# Patient Record
Sex: FEMALE | Race: WHITE | NOT HISPANIC OR LATINO | Employment: FULL TIME | URBAN - METROPOLITAN AREA
[De-identification: names, ages, dates, MRNs, and addresses within clinical notes are randomized per-mention and may not be internally consistent; named-entity substitution may affect disease eponyms.]

---

## 2017-01-04 ENCOUNTER — ALLSCRIPTS OFFICE VISIT (OUTPATIENT)
Dept: OTHER | Facility: OTHER | Age: 57
End: 2017-01-04

## 2017-01-05 ENCOUNTER — GENERIC CONVERSION - ENCOUNTER (OUTPATIENT)
Dept: OTHER | Facility: OTHER | Age: 57
End: 2017-01-05

## 2017-01-05 ENCOUNTER — HOSPITAL ENCOUNTER (OUTPATIENT)
Dept: RADIOLOGY | Age: 57
Discharge: HOME | End: 2017-01-05

## 2017-01-05 DIAGNOSIS — Z12.31 ENCOUNTER FOR SCREENING MAMMOGRAM FOR BREAST CANCER: ICD-10-CM

## 2017-01-11 ENCOUNTER — ALLSCRIPTS OFFICE VISIT (OUTPATIENT)
Dept: OTHER | Facility: OTHER | Age: 57
End: 2017-01-11

## 2017-01-31 ENCOUNTER — TRANSCRIBE ORDERS (OUTPATIENT)
Dept: ADMINISTRATIVE | Age: 57
End: 2017-01-31

## 2017-01-31 ENCOUNTER — APPOINTMENT (OUTPATIENT)
Dept: LAB | Age: 57
End: 2017-01-31
Attending: PREVENTIVE MEDICINE

## 2017-01-31 DIAGNOSIS — Z00.00 ROUTINE GENERAL MEDICAL EXAMINATION AT A HEALTH CARE FACILITY: ICD-10-CM

## 2017-01-31 DIAGNOSIS — Z00.00 ROUTINE GENERAL MEDICAL EXAMINATION AT A HEALTH CARE FACILITY: Primary | ICD-10-CM

## 2017-01-31 LAB — RUBV IGG SERPL IA-ACNC: 30.6 IU/ML

## 2017-01-31 PROCEDURE — 86735 MUMPS ANTIBODY: CPT

## 2017-01-31 PROCEDURE — 86480 TB TEST CELL IMMUN MEASURE: CPT

## 2017-01-31 PROCEDURE — 36415 COLL VENOUS BLD VENIPUNCTURE: CPT

## 2017-01-31 PROCEDURE — 86787 VARICELLA-ZOSTER ANTIBODY: CPT

## 2017-01-31 PROCEDURE — 86762 RUBELLA ANTIBODY: CPT

## 2017-01-31 PROCEDURE — 86765 RUBEOLA ANTIBODY: CPT

## 2017-02-02 LAB
ANNOTATION COMMENT IMP: NORMAL
GAMMA INTERFERON BACKGROUND BLD IA-ACNC: 0.07 IU/ML
M TB IFN-G BLD-IMP: NEGATIVE
M TB IFN-G CD4+ BCKGRND COR BLD-ACNC: 0 IU/ML
M TB IFN-G CD4+ T-CELLS BLD-ACNC: 0.07 IU/ML
MEV IGG SER QL: NORMAL
MITOGEN IGNF BLD-ACNC: >10 IU/ML
MUV IGG SER QL: NORMAL
QUANTIFERON-TB GOLD IN TUBE: NORMAL
SERVICE CMNT-IMP: NORMAL
VZV IGG SER IA-ACNC: NORMAL

## 2017-02-08 ENCOUNTER — ALLSCRIPTS OFFICE VISIT (OUTPATIENT)
Dept: OTHER | Facility: OTHER | Age: 57
End: 2017-02-08

## 2017-02-20 ENCOUNTER — ALLSCRIPTS OFFICE VISIT (OUTPATIENT)
Dept: OTHER | Facility: OTHER | Age: 57
End: 2017-02-20

## 2017-03-21 ENCOUNTER — ALLSCRIPTS OFFICE VISIT (OUTPATIENT)
Dept: OTHER | Facility: OTHER | Age: 57
End: 2017-03-21

## 2017-07-05 ENCOUNTER — ALLSCRIPTS OFFICE VISIT (OUTPATIENT)
Dept: OTHER | Facility: OTHER | Age: 57
End: 2017-07-05

## 2017-07-11 ENCOUNTER — ALLSCRIPTS OFFICE VISIT (OUTPATIENT)
Dept: OTHER | Facility: OTHER | Age: 57
End: 2017-07-11

## 2017-07-12 ENCOUNTER — APPOINTMENT (OUTPATIENT)
Dept: LAB | Facility: HOSPITAL | Age: 57
End: 2017-07-12
Payer: COMMERCIAL

## 2017-07-12 ENCOUNTER — TRANSCRIBE ORDERS (OUTPATIENT)
Dept: LAB | Facility: HOSPITAL | Age: 57
End: 2017-07-12

## 2017-07-12 DIAGNOSIS — R74.02 NONSPECIFIC ELEVATION OF LEVELS OF TRANSAMINASE OR LACTIC ACID DEHYDROGENASE (LDH): ICD-10-CM

## 2017-07-12 DIAGNOSIS — D64.9 ANEMIA, UNSPECIFIED: ICD-10-CM

## 2017-07-12 DIAGNOSIS — R74.01 NONSPECIFIC ELEVATION OF LEVELS OF TRANSAMINASE OR LACTIC ACID DEHYDROGENASE (LDH): ICD-10-CM

## 2017-07-12 DIAGNOSIS — R53.83 FATIGUE, UNSPECIFIED TYPE: ICD-10-CM

## 2017-07-12 DIAGNOSIS — E55.9 UNSPECIFIED VITAMIN D DEFICIENCY: ICD-10-CM

## 2017-07-12 DIAGNOSIS — Z00.8 ENCOUNTER FOR OTHER GENERAL EXAMINATION: Primary | ICD-10-CM

## 2017-07-12 DIAGNOSIS — R53.83 FATIGUE, UNSPECIFIED TYPE: Primary | ICD-10-CM

## 2017-07-12 DIAGNOSIS — Z00.8 ENCOUNTER FOR OTHER GENERAL EXAMINATION: ICD-10-CM

## 2017-07-12 LAB
25(OH)D3 SERPL-MCNC: 45.1 NG/ML (ref 30–100)
ALBUMIN SERPL BCP-MCNC: 3.7 G/DL (ref 3.5–5)
ALP SERPL-CCNC: 75 U/L (ref 46–116)
ALT SERPL W P-5'-P-CCNC: 38 U/L (ref 12–78)
AST SERPL W P-5'-P-CCNC: 17 U/L (ref 5–45)
BASOPHILS # BLD AUTO: 0.02 THOUSANDS/ΜL (ref 0–0.1)
BASOPHILS NFR BLD AUTO: 0 % (ref 0–1)
BILIRUB DIRECT SERPL-MCNC: 0.09 MG/DL (ref 0–0.2)
BILIRUB SERPL-MCNC: 0.41 MG/DL (ref 0.2–1)
CHOLEST SERPL-MCNC: 191 MG/DL (ref 50–200)
EOSINOPHIL # BLD AUTO: 0.22 THOUSAND/ΜL (ref 0–0.61)
EOSINOPHIL NFR BLD AUTO: 4 % (ref 0–6)
ERYTHROCYTE [DISTWIDTH] IN BLOOD BY AUTOMATED COUNT: 12.9 % (ref 11.6–15.1)
EST. AVERAGE GLUCOSE BLD GHB EST-MCNC: 117 MG/DL
FERRITIN SERPL-MCNC: 16 NG/ML (ref 8–388)
HBA1C MFR BLD: 5.7 % (ref 4.2–6.3)
HCT VFR BLD AUTO: 35.9 % (ref 34.8–46.1)
HDLC SERPL-MCNC: 58 MG/DL (ref 40–60)
HGB BLD-MCNC: 12.1 G/DL (ref 11.5–15.4)
IRON SERPL-MCNC: 64 UG/DL (ref 50–170)
LDLC SERPL CALC-MCNC: 105 MG/DL (ref 0–100)
LYMPHOCYTES # BLD AUTO: 1.78 THOUSANDS/ΜL (ref 0.6–4.47)
LYMPHOCYTES NFR BLD AUTO: 31 % (ref 14–44)
MCH RBC QN AUTO: 30.4 PG (ref 26.8–34.3)
MCHC RBC AUTO-ENTMCNC: 33.7 G/DL (ref 31.4–37.4)
MCV RBC AUTO: 90 FL (ref 82–98)
MONOCYTES # BLD AUTO: 0.33 THOUSAND/ΜL (ref 0.17–1.22)
MONOCYTES NFR BLD AUTO: 6 % (ref 4–12)
NEUTROPHILS # BLD AUTO: 3.33 THOUSANDS/ΜL (ref 1.85–7.62)
NEUTS SEG NFR BLD AUTO: 59 % (ref 43–75)
NRBC BLD AUTO-RTO: 0 /100 WBCS
PLATELET # BLD AUTO: 329 THOUSANDS/UL (ref 149–390)
PMV BLD AUTO: 9 FL (ref 8.9–12.7)
PROT SERPL-MCNC: 6.9 G/DL (ref 6.4–8.2)
RBC # BLD AUTO: 3.98 MILLION/UL (ref 3.81–5.12)
TRIGL SERPL-MCNC: 141 MG/DL
WBC # BLD AUTO: 5.69 THOUSAND/UL (ref 4.31–10.16)

## 2017-07-12 PROCEDURE — 80076 HEPATIC FUNCTION PANEL: CPT

## 2017-07-12 PROCEDURE — 85025 COMPLETE CBC W/AUTO DIFF WBC: CPT

## 2017-07-12 PROCEDURE — 80061 LIPID PANEL: CPT

## 2017-07-12 PROCEDURE — 86663 EPSTEIN-BARR ANTIBODY: CPT

## 2017-07-12 PROCEDURE — 83036 HEMOGLOBIN GLYCOSYLATED A1C: CPT

## 2017-07-12 PROCEDURE — 82728 ASSAY OF FERRITIN: CPT

## 2017-07-12 PROCEDURE — 83540 ASSAY OF IRON: CPT

## 2017-07-12 PROCEDURE — 86665 EPSTEIN-BARR CAPSID VCA: CPT

## 2017-07-12 PROCEDURE — 86618 LYME DISEASE ANTIBODY: CPT

## 2017-07-12 PROCEDURE — 86664 EPSTEIN-BARR NUCLEAR ANTIGEN: CPT

## 2017-07-12 PROCEDURE — 36415 COLL VENOUS BLD VENIPUNCTURE: CPT

## 2017-07-12 PROCEDURE — 82306 VITAMIN D 25 HYDROXY: CPT

## 2017-07-13 LAB
EBV EA IGG SER-ACNC: 27.5 U/ML (ref 0–8.9)
EBV NA IGG SER IA-ACNC: <18 U/ML (ref 0–17.9)
EBV PATRN SPEC IB-IMP: ABNORMAL
EBV VCA IGG SER IA-ACNC: >600 U/ML (ref 0–17.9)
EBV VCA IGM SER IA-ACNC: <36 U/ML (ref 0–35.9)

## 2017-07-16 LAB
B BURGDOR IGG SER IA-ACNC: 0.06
B BURGDOR IGM SER IA-ACNC: 0.25

## 2017-07-17 ENCOUNTER — GENERIC CONVERSION - ENCOUNTER (OUTPATIENT)
Dept: OTHER | Facility: OTHER | Age: 57
End: 2017-07-17

## 2017-07-19 ENCOUNTER — TRANSCRIBE ORDERS (OUTPATIENT)
Dept: RADIOLOGY | Facility: HOSPITAL | Age: 57
End: 2017-07-19

## 2017-07-19 ENCOUNTER — HOSPITAL ENCOUNTER (OUTPATIENT)
Dept: RADIOLOGY | Facility: HOSPITAL | Age: 57
Discharge: HOME | End: 2017-07-19
Payer: COMMERCIAL

## 2017-07-19 DIAGNOSIS — M25.551 RIGHT HIP PAIN: ICD-10-CM

## 2017-07-19 DIAGNOSIS — M79.604 RIGHT LEG PAIN: ICD-10-CM

## 2017-07-19 DIAGNOSIS — M79.604 RIGHT LEG PAIN: Primary | ICD-10-CM

## 2017-07-19 PROCEDURE — 73521 X-RAY EXAM HIPS BI 2 VIEWS: CPT

## 2017-07-19 PROCEDURE — 72110 X-RAY EXAM L-2 SPINE 4/>VWS: CPT

## 2017-07-26 ENCOUNTER — GENERIC CONVERSION - ENCOUNTER (OUTPATIENT)
Dept: OTHER | Facility: OTHER | Age: 57
End: 2017-07-26

## 2017-09-18 ENCOUNTER — ALLSCRIPTS OFFICE VISIT (OUTPATIENT)
Dept: OTHER | Facility: OTHER | Age: 57
End: 2017-09-18

## 2017-09-18 DIAGNOSIS — G47.09 OTHER ORGANIC INSOMNIA: ICD-10-CM

## 2017-09-18 DIAGNOSIS — F41.1 GAD (GENERALIZED ANXIETY DISORDER): ICD-10-CM

## 2017-09-18 DIAGNOSIS — F33.42 MAJOR DEPRESSIVE DISORDER, RECURRENT EPISODE, IN FULL REMISSION (HCC): Primary | ICD-10-CM

## 2017-09-21 ENCOUNTER — ALLSCRIPTS OFFICE VISIT (OUTPATIENT)
Dept: OTHER | Facility: OTHER | Age: 57
End: 2017-09-21

## 2017-11-08 ENCOUNTER — ALLSCRIPTS OFFICE VISIT (OUTPATIENT)
Dept: OTHER | Facility: OTHER | Age: 57
End: 2017-11-08

## 2017-11-12 NOTE — PROGRESS NOTES
Assessment    1  Hypertension (401 9) (I10)   2  Hypovitaminosis D (268 9) (E55 9)   3  Anemia (285 9) (D64 9)   4  Fibromyalgia (729 1) (M79 7)    Plan  Anemia    · (1) FERRITIN; Status:Active; Requested for:08Nov2017;    · (1) IRON; Status:Active; Requested for:08Nov2017;   Elevated BP    · HydroCHLOROthiazide 12 5 MG Oral Tablet; TAKE 1 TABLET DAILY  Fatigue    · (1) TSH WITH FT4 REFLEX; Status:Active; Requested OHL:[id number];   Hypertension, Impaired fasting glucose    · (1) COMPREHENSIVE METABOLIC PANEL; Status:Active; Requested VZW:[id number];   Impaired fasting glucose    · (1) HEMOGLOBIN A1C; Status:Active; Requested ISR:[id number];     Discussion/Summary    61-year-old female here for routine follow-up  Patient states she is doing well overall  HTN: Initially elevated, however upon recheck decreased and down to a normal range  I would like patient to monitor this closely  Return in 1-2 weeks for BP check with nurse visit  Continue with hydrochlorothiazide and lifestyle modifications  Hypovitaminosis D: Continue with vitamin-D supplementation  Anemia: Will check iron studies as patient discontinued iron supplementation 1 month ago due to constipation and painful hemorrhoids  Would encourage increasing iron rich food intake  Fibromyalgia: Overall stable  RHM: Patient is scheduled for colonoscopy to be done on 12/04  with mammogram in January  for BP check in 1-2 weeks  for routine checkup in 6 months or sooner if neededspent 25 minutes with the patient with greater than 50% of the time spent on counseling  The patient was counseled regarding diagnostic results,-- instructions for management,-- risk factor reductions,-- prognosis,-- patient and family education,-- impressions,-- risks and benefits of treatment options,-- importance of compliance with treatment  Possible side effects of new medications were reviewed with the patient/guardian today   The treatment plan was reviewed with the patient/guardian  The patient/guardian understands and agrees with the treatment plan      Chief Complaint  Pt is here for follow up office visit  All meds/allergies reviewed w/ pt  History of Present Illness  53-year-old female here for routine follow-up  Patient states she is doing well overall  is scheduled for colonoscopy on 12/04 to be done by Dr Nicola Arizmendi  states she stopped taking iron as she was getting constipated and having hemorrhoids, which were painful  Review of Systems   Constitutional: no recent weight gain-- and-- no recent weight loss  Eyes: No complaints of eye pain, no red eyes, no eyesight problems, no discharge, no dry eyes, no itching of eyes  ENT: no complaints of earache, no loss of hearing, no nose bleeds, no nasal discharge, no sore throat, no hoarseness  Cardiovascular: No complaints of slow heart rate, no fast heart rate, no chest pain, no palpitations, no leg claudication, no lower extremity edema,-- no chest pain,-- no palpitations-- and-- no lower extremity edema  Respiratory: No complaints of shortness of breath, no wheezing, no cough, no SOB on exertion, no orthopnea, no PND-- and-- no shortness of breath  Gastrointestinal: no abdominal pain-- and-- no constipation  Psychiatric: depression-- and-- stable  Active Problems    1  Abnormal abdominal ultrasound (793 6) (R93 5)   2  Anemia (285 9) (D64 9)   3  Chronic fatigue syndrome (780 71) (R53 82)   4  Coccyx pain (724 79) (M53 3)   5  Colon cancer screening (V76 51) (Z12 11)   6  Cough (786 2) (R05)   7  Diarrhea (787 91) (R19 7)   8  Elevated ALT measurement (790 4) (R74 0)   9  Elevated BP (401 9) (I10)   10  Encounter for screening colonoscopy (V76 51) (Z12 11)   11  Encounter for screening mammogram for malignant neoplasm of breast (V76 12)  (Z12 31)   12  Fatigue (780 79) (R53 83)   13  Fibromyalgia (729 1) (M79 7)   14  JA (generalized anxiety disorder) (300 02) (F41 1)   15   Gluten intolerance (579 0) (K90 0)   16  Hypertension (401 9) (I10)   17  Hypovitaminosis D (268 9) (E55 9)   18  Impaired fasting glucose (790 21) (R73 01)   19  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)   20  Liver lesion (573 8) (K76 9)   21  Low hemoglobin (285 9) (D64 9)   22  Major depressive disorder, recurrent episode, in full remission (296 36) (F33 42)   23  Nausea and vomiting (787 01) (R11 2)   24  Other insomnia (780 52) (G47 09)   25  Right hip pain (719 45) (M25 551)   26  Right leg pain (729 5) (M79 604)   27  Right low back pain (724 2) (M54 5)   28  Screening for lipoid disorders (V77 91) (Z13 220)   29  Skin lesion (709 9) (L98 9)   30  Skin rash (782 1) (R21)   31  Sleep apnea (780 57) (G47 30)   32  Thumb pain, left (729 5) (M79 645)   33  Thumb pain, right (729 5) (M79 644)   34  Upper respiratory infection (465 9) (J06 9)   35  Vaginal Pap smear (V76 47) (Z12 72)    Past Medical History  1  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)   2  Denied: History of head injury   3  Denied: History of Seizures    Surgical History  1  History of Appendectomy   2  History of Cholecystectomy    Family History  Mother    1  Family history of depression (V17 0) (Z81 8)   2  Family history of essential hypertension (V17 49) (Z82 49)   3  Family history of Mitral valve prolapse  Brother    4  Family history of diabetes mellitus (V18 0) (Z83 3)   5  FH: heart attack (V17 3) (Z82 49)  Maternal Grandmother    6  Family history of cardiac disorder (V17 49) (Z82 49)   7  Family history of hyperlipidemia (V18 19) (Z83 49)  Paternal Grandmother    6  Family history of stroke (V17 1) (Z82 3)  Paternal Grandfather    5  Family history of stroke (V17 1) (Z82 3)  Maternal Aunt    10  Family history of malignant neoplasm of breast (V16 3) (Z80 3)    Social History     · Denied: History of Caffeine use   · Never smoked   · No alcohol use   · No drug use    Current Meds   1  D-Ribose Powder; Therapy: 56IRS5179 to Recorded   2   DULoxetine HCl - 60 MG Oral Capsule Delayed Release Particles; take 1 capsule daily; Therapy: 40FKO3972 to (Evaluate:01Jan2018)  Requested for: 45RTD2606; Last Rx:54Iyu5715 Ordered   3  Ferrous Sulfate 324 (65 Fe) MG TBEC; Therapy: 38NCE1080 to Recorded   4  HydroCHLOROthiazide 12 5 MG Oral Tablet; TAKE 1 TABLET DAILY; Therapy: 37WLB7947 to ([place])  Requested for: 11Aug2017; Last Rx:11Aug2017 Ordered   5  Iron 325 (65 Fe) MG Oral Tablet; Take one daily; Therapy: 22OCI4757 to Recorded   6  LORazepam 0 5 MG Oral Tablet; TAKE 1 TABLET TWICE DAILY AS NEEDED; Therapy: 77PZT1418 to (Evaluate:01Jan2018); Last Rx:10Wst0555 Ordered   7  Magnesium Gluconate 250 MG Oral Tablet; TAKE 1 TABLET DAILY; Therapy: 37DAC9096 to Recorded   8  Magnesium Glycinate Powder; Therapy: 52BRH4736 to Recorded   9  TraMADol HCl - 50 MG Oral Tablet; TAKE 1 TABLET as needed daily  Do not drink, operate machinery or drive while on this medication; Therapy: 75OGC5051 to (Evaluate:08Jul2016); Last Rx:08Jun2016 Ordered   10  Vitamin D3 5000 UNIT Oral Capsule; Take one capsule daily; Therapy: 36ZPB0263 to (Evaluate:13Gbr8548) Recorded   11  Vitamin D3 5000 UNIT Oral Tablet; Therapy: 63VAJ4165 to Recorded   12  Zolpidem Tartrate ER 12 5 MG Oral Tablet Extended Release; TAKE 1 TABLET AT   BEDTIME AS NEEDED FOR INSOMNIA; Therapy: 92DCH0178 to (Evaluate:01Jan2018); Last Rx:98Nfo7902 Ordered    The medication list was reviewed and updated today  Allergies  1  Lexapro TABS   2  NSAIDs    Vitals  Vital Signs    Recorded: 03BKM3713 06:21PM Recorded: 39RQB7561 05:59PM   Temperature  97 9 F   Heart Rate  88   Respiration  16   Systolic 567 396   Diastolic 86 90   Height  5 ft 1 in   Weight  168 lb 8 oz   BMI Calculated  31 84   BSA Calculated  1 76       Physical Exam   Constitutional  General appearance: No acute distress, well appearing and well nourished  Eyes  Conjunctiva and lids: No swelling, erythema or discharge     Pupils and irises: Equal, round and reactive to light  Ears, Nose, Mouth, and Throat  External inspection of ears and nose: Normal    Pulmonary  Respiratory effort: No increased work of breathing or signs of respiratory distress  Auscultation of lungs: Clear to auscultation  Cardiovascular  Auscultation of heart: Normal rate and rhythm, normal S1 and S2, without murmurs  Examination of extremities for edema and/or varicosities: Normal    Abdomen  Abdomen: Non-tender, no masses  Liver and spleen: No hepatomegaly or splenomegaly  Lymphatic  Palpation of lymph nodes in neck: No lymphadenopathy  Neurologic  Cranial nerves: Cranial nerves 2-12 intact  Psychiatric  Mood and affect: Normal          Future Appointments    Date/Time Provider Specialty Site   12/04/2017 07:30 AM Cheyanne Gaines DO Gastroenterology Adult [address] OUTPATIENT   12/13/2017 04:30 PM DEBI Flor   Psychiatry [facility]   05/09/2018 06:00 PM Arianna Keane MD Family Medicine [address] [place] [address]   11/15/2017 11:45 AM [address] [facility], Nurse Schedule  [address] [place] [address]       Signatures   Electronically signed by : Quynh Govea MD; Nov 11 2017  8:19AM EST                       (Author)

## 2017-11-15 ENCOUNTER — APPOINTMENT (EMERGENCY)
Dept: RADIOLOGY | Facility: HOSPITAL | Age: 57
End: 2017-11-15
Payer: COMMERCIAL

## 2017-11-15 ENCOUNTER — HOSPITAL ENCOUNTER (EMERGENCY)
Facility: HOSPITAL | Age: 57
Discharge: HOME | End: 2017-11-15
Attending: EMERGENCY MEDICINE | Admitting: EMERGENCY MEDICINE
Payer: COMMERCIAL

## 2017-11-15 VITALS
TEMPERATURE: 98.2 F | OXYGEN SATURATION: 97 % | BODY MASS INDEX: 30.91 KG/M2 | RESPIRATION RATE: 18 BRPM | SYSTOLIC BLOOD PRESSURE: 149 MMHG | WEIGHT: 168 LBS | HEART RATE: 96 BPM | DIASTOLIC BLOOD PRESSURE: 78 MMHG | HEIGHT: 62 IN

## 2017-11-15 DIAGNOSIS — R55 NEAR SYNCOPE: Primary | ICD-10-CM

## 2017-11-15 DIAGNOSIS — M54.12 CERVICAL RADICULOPATHY: ICD-10-CM

## 2017-11-15 LAB
ANION GAP SERPL CALCULATED.3IONS-SCNC: 8 MMOL/L (ref 4–13)
BACTERIA UR QL AUTO: ABNORMAL /HPF
BASOPHILS # BLD AUTO: 0.02 THOUSANDS/ΜL (ref 0–0.1)
BASOPHILS NFR BLD AUTO: 0 % (ref 0–1)
BILIRUB UR QL STRIP: NEGATIVE
BUN SERPL-MCNC: 13 MG/DL (ref 5–25)
CALCIUM SERPL-MCNC: 9.9 MG/DL (ref 8.3–10.1)
CHLORIDE SERPL-SCNC: 103 MMOL/L (ref 100–108)
CLARITY UR: CLEAR
CO2 SERPL-SCNC: 29 MMOL/L (ref 21–32)
COLOR UR: YELLOW
COLOR, POC: NORMAL
CREAT SERPL-MCNC: 0.79 MG/DL (ref 0.6–1.3)
EOSINOPHIL # BLD AUTO: 0.26 THOUSAND/ΜL (ref 0–0.61)
EOSINOPHIL NFR BLD AUTO: 4 % (ref 0–6)
ERYTHROCYTE [DISTWIDTH] IN BLOOD BY AUTOMATED COUNT: 13 % (ref 11.6–15.1)
GFR SERPL CREATININE-BSD FRML MDRD: 85 ML/MIN/1.73SQ M
GLUCOSE SERPL-MCNC: 125 MG/DL (ref 65–140)
GLUCOSE UR STRIP-MCNC: NEGATIVE MG/DL
HCT VFR BLD AUTO: 39 % (ref 34.8–46.1)
HGB BLD-MCNC: 13.2 G/DL (ref 11.5–15.4)
HGB UR QL STRIP.AUTO: ABNORMAL
HYALINE CASTS #/AREA URNS LPF: ABNORMAL /LPF
KETONES UR STRIP-MCNC: NEGATIVE MG/DL
LEUKOCYTE ESTERASE UR QL STRIP: ABNORMAL
LYMPHOCYTES # BLD AUTO: 2.12 THOUSANDS/ΜL (ref 0.6–4.47)
LYMPHOCYTES NFR BLD AUTO: 33 % (ref 14–44)
MCH RBC QN AUTO: 29.5 PG (ref 26.8–34.3)
MCHC RBC AUTO-ENTMCNC: 33.8 G/DL (ref 31.4–37.4)
MCV RBC AUTO: 87 FL (ref 82–98)
MONOCYTES # BLD AUTO: 0.33 THOUSAND/ΜL (ref 0.17–1.22)
MONOCYTES NFR BLD AUTO: 5 % (ref 4–12)
NEUTROPHILS # BLD AUTO: 3.59 THOUSANDS/ΜL (ref 1.85–7.62)
NEUTS SEG NFR BLD AUTO: 58 % (ref 43–75)
NITRITE UR QL STRIP: NEGATIVE
NON-SQ EPI CELLS URNS QL MICRO: ABNORMAL /HPF
NRBC BLD AUTO-RTO: 0 /100 WBCS
PH UR STRIP.AUTO: 7.5 [PH] (ref 4.5–8)
PLATELET # BLD AUTO: 323 THOUSANDS/UL (ref 149–390)
PMV BLD AUTO: 8.8 FL (ref 8.9–12.7)
POTASSIUM SERPL-SCNC: 3.1 MMOL/L (ref 3.5–5.3)
PROT UR STRIP-MCNC: NEGATIVE MG/DL
RBC # BLD AUTO: 4.48 MILLION/UL (ref 3.81–5.12)
RBC #/AREA URNS AUTO: ABNORMAL /HPF
SODIUM SERPL-SCNC: 140 MMOL/L (ref 136–145)
SP GR UR STRIP.AUTO: 1.01 (ref 1–1.03)
SPECIMEN SOURCE: NORMAL
TROPONIN I BLD-MCNC: 0 NG/ML (ref 0–0.08)
UROBILINOGEN UR QL STRIP.AUTO: 0.2 E.U./DL
WBC # BLD AUTO: 6.34 THOUSAND/UL (ref 4.31–10.16)
WBC #/AREA URNS AUTO: ABNORMAL /HPF

## 2017-11-15 PROCEDURE — 70496 CT ANGIOGRAPHY HEAD: CPT

## 2017-11-15 PROCEDURE — 81002 URINALYSIS NONAUTO W/O SCOPE: CPT | Performed by: EMERGENCY MEDICINE

## 2017-11-15 PROCEDURE — 80048 BASIC METABOLIC PNL TOTAL CA: CPT | Performed by: EMERGENCY MEDICINE

## 2017-11-15 PROCEDURE — 85025 COMPLETE CBC W/AUTO DIFF WBC: CPT | Performed by: EMERGENCY MEDICINE

## 2017-11-15 PROCEDURE — 99284 EMERGENCY DEPT VISIT MOD MDM: CPT

## 2017-11-15 PROCEDURE — 36415 COLL VENOUS BLD VENIPUNCTURE: CPT | Performed by: EMERGENCY MEDICINE

## 2017-11-15 PROCEDURE — 84484 ASSAY OF TROPONIN QUANT: CPT

## 2017-11-15 PROCEDURE — 81001 URINALYSIS AUTO W/SCOPE: CPT

## 2017-11-15 PROCEDURE — 70498 CT ANGIOGRAPHY NECK: CPT

## 2017-11-15 PROCEDURE — 93005 ELECTROCARDIOGRAM TRACING: CPT

## 2017-11-15 RX ORDER — ERGOCALCIFEROL 1.25 MG/1
CAPSULE ORAL
COMMUNITY
Start: 2016-08-08 | End: 2018-02-20 | Stop reason: SDDI

## 2017-11-15 RX ORDER — POTASSIUM CHLORIDE 20 MEQ/1
40 TABLET, EXTENDED RELEASE ORAL ONCE
Status: COMPLETED | OUTPATIENT
Start: 2017-11-15 | End: 2017-11-15

## 2017-11-15 RX ORDER — DULOXETIN HYDROCHLORIDE 60 MG/1
60 CAPSULE, DELAYED RELEASE ORAL DAILY
COMMUNITY
Start: 2014-12-08 | End: 2018-04-16 | Stop reason: SDUPTHER

## 2017-11-15 RX ORDER — LORAZEPAM 0.5 MG/1
0.5 TABLET ORAL AS NEEDED
COMMUNITY
Start: 2014-12-08 | End: 2020-07-13 | Stop reason: ALTCHOICE

## 2017-11-15 RX ORDER — DIAZEPAM 5 MG/1
5 TABLET ORAL ONCE
Status: COMPLETED | OUTPATIENT
Start: 2017-11-15 | End: 2017-11-15

## 2017-11-15 RX ORDER — ZOLPIDEM TARTRATE 12.5 MG/1
TABLET, FILM COATED, EXTENDED RELEASE ORAL
COMMUNITY
Start: 2011-12-07 | End: 2018-08-14 | Stop reason: SDUPTHER

## 2017-11-15 RX ORDER — HYDROCHLOROTHIAZIDE 12.5 MG/1
12.5 TABLET ORAL DAILY
COMMUNITY
Start: 2016-11-14 | End: 2018-08-24 | Stop reason: SDUPTHER

## 2017-11-15 RX ADMIN — DIAZEPAM 5 MG: 5 TABLET ORAL at 16:35

## 2017-11-15 RX ADMIN — IOHEXOL 85 ML: 350 INJECTION, SOLUTION INTRAVENOUS at 16:52

## 2017-11-15 RX ADMIN — POTASSIUM CHLORIDE 40 MEQ: 1500 TABLET, EXTENDED RELEASE ORAL at 17:26

## 2017-11-15 NOTE — ED PROVIDER NOTES
History  Chief Complaint   Patient presents with    Dizziness     Patient is an employee of hospital  Patient states she was walking down hallway and became dizzy  Patient found by GI staff with HTN  Patient states she does feels better at this time  HPI     61-year-old female with past medical history of hypertension currently on hydrochlorothiazide, fibromyalgia, insomnia presents with chief complaint of near syncope at work  Patient was rubbing her neck trying to work out a "kink" as she slept differently on her bed last evening  Patient has a history of chronic neck pain  She describes as a spasmodic sharp pain currently 5/10 with rotatory neck movements becomes a 8 out 10  This pain does not radiate  Patient admits to a dull global headache over the last 7 days as well  Patient admits to numbness in her left hand  Patient admits to an episode of lightheadedness which remitted with sitting down, lasts 3-4 minutes  Patient has had a history of numbness chronically from work  Patient uses a keyboard often at work  Patient has been hx of fibromyalgia  Patient denies cervical trauma or recent manipulation to her cervical spine  Patient became concerned today when she almost had a near syncopal episode  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 Taking?    DULoxetine (CYMBALTA) 60 mg delayed release capsule   Yes Yes   Sig: Take by mouth   LORazepam (ATIVAN) 0 5 mg tablet   Yes Yes   Sig: Take by mouth   ergocalciferol (VITAMIN D2) 50,000 units   Yes Yes   Sig: Take by mouth   hydrochlorothiazide (HYDRODIURIL) 12 5 mg tablet   Yes Yes   Sig: Take by mouth   zolpidem (AMBIEN CR) 12 5 MG CR tablet   Yes Yes   Sig: Zolpidem Tartrate ER 12 5 MG Oral Tablet Extended Release   Quantity: 60 00; Refills: 0      , M D ;  Started 7-Dec-2011  Active      Facility-Administered Medications: None       Past Medical History:   Diagnosis Date    Hypertension        History reviewed  No pertinent surgical history  History reviewed  No pertinent family history  I have reviewed and agree with the history as documented  Social History   Substance Use Topics    Smoking status: Never Smoker    Smokeless tobacco: Never Used    Alcohol use No        Review of Systems   Constitutional: Negative for activity change, appetite change, chills, diaphoresis, fatigue, fever and unexpected weight change  HENT: Negative for congestion, ear discharge, ear pain, facial swelling, hearing loss, nosebleeds, postnasal drip, rhinorrhea, sinus pressure, sneezing, sore throat and tinnitus  Eyes: Negative for photophobia, pain, redness, itching and visual disturbance  Respiratory: Negative for cough, chest tightness, shortness of breath, wheezing and stridor  Cardiovascular: Negative for chest pain, palpitations and leg swelling  Gastrointestinal: Negative for abdominal distention, abdominal pain, anal bleeding, blood in stool, constipation, diarrhea, nausea and vomiting  Endocrine: Negative for polydipsia, polyphagia and polyuria  Genitourinary: Negative for decreased urine volume, difficulty urinating, dysuria, enuresis, flank pain, frequency, hematuria, menstrual problem, urgency, vaginal bleeding, vaginal discharge and vaginal pain  Musculoskeletal: Positive for neck pain  Negative for arthralgias, back pain, gait problem, joint swelling, myalgias and neck stiffness  Skin: Negative for rash and wound  Allergic/Immunologic: Negative for environmental allergies, food allergies and immunocompromised state  Neurological: Positive for numbness and headaches  Negative for dizziness, tremors, seizures, syncope, facial asymmetry, speech difficulty, weakness and light-headedness  Hematological: Negative for adenopathy  Psychiatric/Behavioral: Negative for agitation, behavioral problems, confusion, dysphoric mood, hallucinations, self-injury, sleep disturbance and suicidal ideas  The patient is not nervous/anxious and is not hyperactive  Physical Exam  ED Triage Vitals   Temperature Pulse Respirations Blood Pressure SpO2   11/15/17 1441 11/15/17 1441 11/15/17 1441 11/15/17 1441 11/15/17 1515   98 2 °F (36 8 °C) 72 18 (!) 186/91 99 %      Temp Source Heart Rate Source Patient Position - Orthostatic VS BP Location FiO2 (%)   11/15/17 1441 11/15/17 1441 11/15/17 1441 11/15/17 1441 --   Oral Monitor Standing Right arm       Pain Score       11/15/17 1440       3           Orthostatic Vital Signs  Vitals:    11/15/17 1515 11/15/17 1634 11/15/17 1745 11/15/17 1915   BP: (!) 185/86 163/97 134/96 149/78   Pulse: 102 104 (!) 108 96   Patient Position - Orthostatic VS:  Lying         Physical Exam   Constitutional: She is oriented to person, place, and time  She appears well-developed and well-nourished  No distress  HENT:   Head: Normocephalic and atraumatic  Right Ear: External ear normal    Left Ear: External ear normal    Nose: Nose normal    Mouth/Throat: Oropharynx is clear and moist  No oropharyngeal exudate  Eyes: Conjunctivae and EOM are normal  Pupils are equal, round, and reactive to light  Right eye exhibits no discharge  Left eye exhibits no discharge  No scleral icterus  Neck: Normal range of motion  Neck supple  No JVD present  No tracheal deviation present  No thyromegaly present  Cardiovascular: Normal rate, regular rhythm, normal heart sounds and intact distal pulses  No murmur heard  Pulmonary/Chest: Effort normal and breath sounds normal  No stridor  No respiratory distress  She has no wheezes  Abdominal: Soft   Bowel sounds are normal  She exhibits no distension and no mass  There is no tenderness  There is no rebound and no guarding  No hernia  Musculoskeletal: Normal range of motion  She exhibits no edema, tenderness or deformity  Lymphadenopathy:     She has no cervical adenopathy  Neurological: She is alert and oriented to person, place, and time  She displays normal reflexes  No cranial nerve deficit  She exhibits normal muscle tone  strength 5/5 upper and lower extremities, sensation intact throughout, cerebellar testing including finger-to-nose heel-to-shin rapid alternating movement intact, cranial nerves 2-12 intact  No pronator drift, normal gait normal, tandem gait, normal speech  Skin: Skin is warm and dry  No rash noted  She is not diaphoretic  No erythema  Psychiatric: She has a normal mood and affect  Her behavior is normal  Judgment and thought content normal    Nursing note and vitals reviewed        ED Medications  Medications   diazepam (VALIUM) tablet 5 mg (5 mg Oral Given 11/15/17 1635)   potassium chloride (K-DUR,KLOR-CON) CR tablet 40 mEq (40 mEq Oral Given 11/15/17 1726)   iohexol (OMNIPAQUE) 350 MG/ML injection (MULTI-DOSE) 85 mL (85 mL Intravenous Given 11/15/17 1652)       Diagnostic Studies  Results Reviewed     Procedure Component Value Units Date/Time    Urine Microscopic [[id number]]  (Abnormal) Collected:  11/15/17 1924    Lab Status:  Final result Specimen:  Urine from Urine, Clean Catch Updated:  11/15/17 1838     RBC, UA None Seen /hpf      WBC, UA 2-4 (A) /hpf      Epithelial Cells None Seen /hpf      Bacteria, UA None Seen /hpf      Hyaline Casts, UA None Seen /lpf     POCT urinalysis dipstick [[id number]]  (Normal) Resulted:  11/15/17 1818    Lab Status:  Final result Updated:  11/15/17 1818     Color, UA see results    ED Urine Macroscopic [[id number]]  (Abnormal) Collected:  11/15/17 1924    Lab Status:  Final result Specimen:  Urine Updated:  11/15/17 1817     Color, UA Yellow     Clarity, UA Clear pH, UA 7 5     Leukocytes, UA Moderate (A)     Nitrite, UA Negative     Protein, UA Negative mg/dl      Glucose, UA Negative mg/dl      Ketones, UA Negative mg/dl      Urobilinogen, UA 0 2 E U /dl      Bilirubin, UA Negative     Blood, UA Trace (A)     Specific [place], UA 1 015    Narrative:       CLINITEK RESULT    POCT troponin [[id number]]  (Normal) Collected:  11/15/17 1637    Lab Status:  Final result Updated:  11/15/17 1650     POC Troponin I 0 00 ng/ml      Specimen Type VENOUS    Narrative:         [employer] i-Stat handheld analyzer 99% cutoff is > 0 08ng/mL in network Emergency Departments    o cTnI 99% cutoff is useful only when applied to patients in the clinical setting of myocardial ischemia  o cTnI 99% cutoff should be interpreted in the context of clinical history, ECG findings and possibly cardiac imaging to establish correct diagnosis  o cTnI 99% cutoff may be suggestive but clearly not indicative of a coronary event without the clinical setting of myocardial ischemia  Basic metabolic panel [[id number]]  (Abnormal) Collected:  11/15/17 1557    Lab Status:  Final result Specimen:  Blood from Arm, Left Updated:  11/15/17 1632     Sodium 140 mmol/L      Potassium 3 1 (L) mmol/L      Chloride 103 mmol/L      CO2 29 mmol/L      Anion Gap 8 mmol/L      BUN 13 mg/dL      Creatinine 0 79 mg/dL      Glucose 125 mg/dL      Calcium 9 9 mg/dL      eGFR 85 ml/min/1 73sq m     Narrative:         National Kidney Disease Education Program recommendations are as follows:  GFR calculation is accurate only with a steady state creatinine  Chronic Kidney disease less than 60 ml/min/1 73 sq  meters  Kidney failure less than 15 ml/min/1 73 sq  meters      CBC and differential [[id number]]  (Abnormal) Collected:  11/15/17 1557    Lab Status:  Final result Specimen:  Blood from Arm, Left Updated:  11/15/17 1620     WBC 6 34 Thousand/uL      RBC 4 48 Million/uL      Hemoglobin 13 2 g/dL      Hematocrit 39 0 %      MCV 87 fL MCH 29 5 pg      MCHC 33 8 g/dL      RDW 13 0 %      MPV 8 8 (L) fL      Platelets 207 Thousands/uL      nRBC 0 /100 WBCs      Neutrophils Relative 58 %      Lymphocytes Relative 33 %      Monocytes Relative 5 %      Eosinophils Relative 4 %      Basophils Relative 0 %      Neutrophils Absolute 3 59 Thousands/µL      Lymphocytes Absolute 2 12 Thousands/µL      Monocytes Absolute 0 33 Thousand/µL      Eosinophils Absolute 0 26 Thousand/µL      Basophils Absolute 0 02 Thousands/µL                  CTA head and neck with and without contrast   Final Result by Curly Sullivan MD (11/15 1801)         1  No evidence of occlusion, stenosis, aneurysm or dissection  2  Multilevel cervical spine degenerative changes  Workstation performed: [id number]               Procedures  ECG 12 Lead Documentation  Date/Time: 11/15/2017 4:55 PM  Performed by: Xavier Ackerman  Authorized by: Fallon Polanco     Indications / Diagnosis:  Lightheadedness  ECG reviewed by me, the ED Provider: yes    Patient location:  ED  Interpretation:     Interpretation: normal    Rate:     ECG rate:  78    ECG rate assessment: normal    Rhythm:     Rhythm: sinus rhythm    Ectopy:     Ectopy: none    QRS:     QRS axis:  Normal  Conduction:     Conduction: normal    ST segments:     ST segments:  Normal  T waves:     T waves: flattening      Flattening:  III, V5 and V6          Phone Consults  ED Phone Contact    ED Course  ED Course as of Nov 16 0312   Wed Nov 15, 2017   1649 Potassium: (!) 3 1   1654 Improved with Valium Blood Pressure: 163/97   1654 Replace Potassium: (!) 3 1                               MDM  Number of Diagnoses or Management Options  Cervical radiculopathy:   Near syncope:   Diagnosis management comments: 65-year-old female presenting with chief complaint of neck pain and near syncope at work  On exam patient is hypertensive took meds today  Patient's blood pressure remitted with Valium  Patient's pain improved  Cervical radiculopathy history of, concern for vertebral artery dissection as patient has numbness in left hand, CTA of head and neck result signed out to the next resident Dr Ricardo Mahoney  EKG normal sinus rhythm, troponin negative, patient hypokalemic which was replaced orally  Disposition pending Dr Ricardo Mahoney is evaluation after CTA  CritCare Time    Disposition  Final diagnoses:   Near syncope   Cervical radiculopathy     Time reflects when diagnosis was documented in both MDM as applicable and the Disposition within this note     Time User Action Codes Description Comment    11/15/2017  7:41 PM Luz, [address] [place] [address] [R55] Near syncope     11/15/2017  7:41 PM Amor Escobar Add [M54 12] Cervical radiculopathy       ED Disposition     ED Disposition Condition Comment    Discharge  Moe Chu discharge to home/self care  Condition at discharge: Stable        Follow-up Information     Follow up With Specialties Details Why [address] [facility] [address], [place] Family Medicine In 1 week  [address]  [phone]          Discharge Medication List as of 11/15/2017  7:42 PM      CONTINUE these medications which have NOT CHANGED    Details   DULoxetine (CYMBALTA) 60 mg delayed release capsule Take by mouth, Starting Mon 12/8/2014, Historical Med      ergocalciferol (VITAMIN D2) 50,000 units Take by mouth, Starting Mon 8/8/2016, Historical Med      hydrochlorothiazide (HYDRODIURIL) 12 5 mg tablet Take by mouth, Starting Mon 11/14/2016, Historical Med      LORazepam (ATIVAN) 0 5 mg tablet Take by mouth, Starting Mon 12/8/2014, Historical Med      zolpidem (AMBIEN CR) 12 5 MG CR tablet Zolpidem Tartrate ER 12 5 MG Oral Tablet Extended Release   Quantity: 60 00; Refills: 0      , M D ;  Started 7-Dec-2011  Active, Historical Med           No discharge procedures on file  ED Provider  Attending physically available and evaluated Moe Chu I managed the patient along with the ED Attending      Electronically Signed by         Skip Chen DO  Resident  11/16/17 5294

## 2017-11-15 NOTE — ED ATTENDING ATTESTATION
Esteban Orosco MD, saw and evaluated the patient  I have discussed the patient with the resident/non-physician practitioner and agree with the resident's/non-physician practitioner's findings, Plan of Care, and MDM as documented in the resident's/non-physician practitioner's note, except where noted  All available labs and Radiology studies were reviewed  At this point I agree with the current assessment done in the Emergency Department  I have conducted an independent evaluation of this patient a history and physical is as follows:  Patient presents with episode of lightheadedness and feelings as though she may pass out  The event occurred after she tried to work out a kink in her neck by putting pressure on the posterior aspect of her right neck patient never actually passed out had no vertiginous symptoms    Patient has had a mild headache over the last week frontal in nature throbbing described as a 2/10 patient has no visual changes, no focal weakness, she did complain of some mild tingling in her left hand however she states that this numbness and tingling has been present for years intermittently patient states she has known cervical disc disease  Patient denies chest pain or palpitations denies shortness of breath denies leg pain or leg swelling no vomiting or diarrhea no fever or chills  EXAM:   Const:   well appearing   NAD     HEENT:  NCAT    sclera anicteric conjunctiva pink   throat clear, MMM    Neck:   supple  no meningismus  no jvd   no bruits  no  midline tenderness   Lungs:   clear  CW non-tender   No creiptation  Heart:   RRR no m/g/r  Normal pulses  Abd:   soft nt nd pos bs   Ext:    normal nontender  No edema  Neruo:   CN 2 -12 intact  motor intact 5/5 sensory intact cerebellar intact       Gait normal    IMPRESSION:  Lightheadedness with presyncope in the setting of neck pain  PLAN:  CTA of head and neck labs EKG shows normal sinus rhythm nonspecific ST T wave changes no acute ischemic changes  Will treat muscle spasm in neck with benzodiazepine patient will be re-evaluated      Critical Care Time  CritCare Time

## 2017-11-16 LAB
ATRIAL RATE: 78 BPM
P AXIS: 42 DEGREES
PR INTERVAL: 130 MS
QRS AXIS: -2 DEGREES
QRSD INTERVAL: 82 MS
QT INTERVAL: 364 MS
QTC INTERVAL: 414 MS
T WAVE AXIS: 83 DEGREES
VENTRICULAR RATE: 78 BPM

## 2017-11-16 NOTE — DISCHARGE INSTRUCTIONS
Cervical Radiculopathy   WHAT YOU NEED TO KNOW:   Cervical radiculopathy is a painful condition that happens when a spinal nerve in your neck is pinched or irritated  DISCHARGE INSTRUCTIONS:   Medicines: You may need any of the following:  · NSAIDs  help decrease swelling and pain  This medicine can be bought without a doctor's order  This medicine can cause stomach bleeding or kidney problems in certain people  If you take blood thinner medicine, always ask your healthcare provider if NSAIDs are safe for you  Always read the medicine label and follow the directions on it before using this medicine  · Prescription pain medicine  helps decrease pain  Do not wait until the pain is severe before you take this medicine  · Steroids  help decrease pain and swelling  These may be given as a pill or as an injection in your neck  You may need more than 1 injection if your symptoms do not improve after the first treatment  · Take your medicine as directed  Contact your healthcare provider if you think your medicine is not helping or if you have side effects  Tell him of her if you are allergic to any medicine  Keep a list of the medicines, vitamins, and herbs you take  Include the amounts, and when and why you take them  Bring the list or the pill bottles to follow-up visits  Carry your medicine list with you in case of an emergency  Follow up with your healthcare provider or spine specialist as directed:  Write down your questions so you remember to ask them during your visits  Physical therapy:  Your healthcare provider may suggest physical therapy to stretch and strengthen your muscles  Your physical therapist can teach you how to improve your posture and the way you hold your neck  He may also teach you how to be safely active and avoid further injury  He can also help you develop an exercise program that is safe for your back and neck  Self-care:   · Ice  helps decrease swelling and pain   Ice may also help prevent tissue damage  Use an ice pack, or put crushed ice in a plastic bag  Cover it with a towel and place it on your neck for 15 to 20 minutes every hour or as directed  · Rest  when you feel it is needed  Slowly start to do more each day  Return to your daily activities as directed  · Wear a soft collar  You may be given a soft collar to support your neck while you sleep  Wear the soft collar only as directed  · Do light stretches and regular exercise  Your healthcare provider may suggest light stretches to help decrease stiffness in your neck and arm as you recover  After your pain is controlled, you may benefit from regular exercise  Ask what type of exercise is safe for your back and neck  · Review your work area  A comfortable work area can help prevent neck strain  Ask your employer for an ergonomic review to check the position of your desk, chair, phone, and computer  Make any necessary adjustments for your comfort  Contact your healthcare provider or spine specialist if:   · You have a fever  · You are losing weight without trying  · Your pain is worse, even with medicine  · One or both hands feel more numb than before, or you cannot move your fingers well  · You have questions or concerns about your condition or care  © 2017 [employer]  Information is for End User's use only and may not be sold, redistributed or otherwise used for commercial purposes  All illustrations and images included in CareNotes® are the copyrighted property of [employer]  or A[employer] Shannon  The above information is an  only  It is not intended as medical advice for individual conditions or treatments  Talk to your doctor, nurse or pharmacist before following any medical regimen to see if it is safe and effective for you        Near Syncope   WHAT YOU NEED TO KNOW:   Near syncope, also called presyncope, is the feeling that you may faint (lose consciousness), but you do not  You can control some health conditions that cause near syncope  Your healthcare providers can help you create a plan to manage near syncope and prevent episodes  DISCHARGE INSTRUCTIONS:   Return to the emergency department if:   · You have sudden chest pain  · You have trouble breathing or shortness of breath  · You have vision changes, are sweating, and have nausea while you are sitting or lying down  · You feel dizzy or flushed and your heart is fluttering  · You lose consciousness  · You cannot use your arm, hand, foot, or leg, or it feels weak  · You have trouble speaking or understanding others when they speak  Contact your healthcare provider if:   · You have new or worsening symptoms  · Your heart beats faster or slower than usual      · You have questions or concerns about your condition or care  Follow up with your healthcare provider as directed: You may need more tests to help find the cause of your near syncope episodes  The tests will help healthcare providers plan the best treatment for you  Write down your questions so you remember to ask them during your visits  Manage near syncope:   · Sit or lie down when needed  This includes when you feel dizzy, your throat is getting tight, and your vision changes  Raise your legs above the level of your heart  · Take slow, deep breaths if you start to breathe faster with anxiety or fear  This can help decrease dizziness and the feeling that you might faint  · Keep a record of your near syncope episodes  Include your symptoms and your activity before and after the episode  The record can help your healthcare provider find the cause of your near syncope and help you manage episodes  Prevent a near syncope episode:   · Move slowly and let yourself get used to one position before you move to another position    This is very important when you change from a lying or sitting position to a standing position  Take some deep breaths before you stand up from a lying position  Stand up slowly  Sudden movements may cause a fainting spell  Sit on the side of the bed or couch for a few minutes before you stand up  If you are on bedrest, try to be upright for about 2 hours each day, or as directed  Do not lock your legs if you are standing for a long period of time  Move your legs and bend your knees to keep blood flowing  · Follow your healthcare provider's recommendations  Your provider may  recommend that you drink more liquids to prevent dehydration  You may also need to have more salt to keep your blood pressure from dropping too low and causing syncope  Your provider will tell you how much liquid and sodium to have each day  · Watch for signs of low blood sugar  These include hunger, nervousness, sweating, and fast or fluttery heartbeats  Talk with your healthcare provider about ways to keep your blood sugar level steady  · Check your blood pressure often  This is important if you take medicine to lower your blood pressure  Check your blood pressure when you are lying down and when you are standing  Ask how often to check during the day  Keep a record of your blood pressure numbers  Your healthcare provider may use the record to help plan your treatment  · Do not strain if you are constipated  You may faint if you strain to have a bowel movement  Walking is the best way to get your bowels moving  Eat foods high in fiber to make it easier to have a bowel movement  Good examples are high-fiber cereals, beans, vegetables, and whole-grain breads  Prune juice may help make bowel movements softer  · Do not exercise outside on a hot day  The combination of physical activity and heat can lead to dehydration  This can cause syncope  © 2017 [employer] Information is for End User's use only and may not be sold, redistributed or otherwise used for commercial purposes  All illustrations and images included in CareNotes® are the copyrighted property of [employer]  or [employer] Shannon  The above information is an  only  It is not intended as medical advice for individual conditions or treatments  Talk to your doctor, nurse or pharmacist before following any medical regimen to see if it is safe and effective for you

## 2017-11-21 ENCOUNTER — ANESTHESIA EVENT (OUTPATIENT)
Dept: PERIOP | Facility: AMBULARY SURGERY CENTER | Age: 57
End: 2017-11-21
Payer: COMMERCIAL

## 2017-12-01 RX ORDER — RIBONUCLEIC ACID (RNA)
POWDER (GRAM) MISCELLANEOUS
COMMUNITY
End: 2020-01-29

## 2017-12-01 RX ORDER — FERROUS SULFATE 325(65) MG
325 TABLET ORAL
Status: ON HOLD | COMMUNITY
End: 2017-12-04 | Stop reason: ALTCHOICE

## 2017-12-01 RX ORDER — MULTIVITAMIN WITH IRON
1 TABLET ORAL DAILY
COMMUNITY
End: 2018-04-16 | Stop reason: SDUPTHER

## 2017-12-01 RX ORDER — TRAMADOL HYDROCHLORIDE 50 MG/1
50 TABLET ORAL EVERY 6 HOURS PRN
COMMUNITY
End: 2019-03-20

## 2017-12-04 ENCOUNTER — ANESTHESIA (OUTPATIENT)
Dept: PERIOP | Facility: AMBULARY SURGERY CENTER | Age: 57
End: 2017-12-04
Payer: COMMERCIAL

## 2017-12-04 ENCOUNTER — HOSPITAL ENCOUNTER (OUTPATIENT)
Facility: AMBULARY SURGERY CENTER | Age: 57
Setting detail: OUTPATIENT SURGERY
Discharge: HOME | End: 2017-12-04
Attending: INTERNAL MEDICINE | Admitting: INTERNAL MEDICINE
Payer: COMMERCIAL

## 2017-12-04 ENCOUNTER — GENERIC CONVERSION - ENCOUNTER (OUTPATIENT)
Dept: OTHER | Facility: OTHER | Age: 57
End: 2017-12-04

## 2017-12-04 VITALS
HEIGHT: 61 IN | HEART RATE: 82 BPM | BODY MASS INDEX: 31.34 KG/M2 | OXYGEN SATURATION: 100 % | TEMPERATURE: 98 F | DIASTOLIC BLOOD PRESSURE: 81 MMHG | RESPIRATION RATE: 18 BRPM | SYSTOLIC BLOOD PRESSURE: 126 MMHG | WEIGHT: 166 LBS

## 2017-12-04 DIAGNOSIS — Z12.11 ENCOUNTER FOR SCREENING FOR MALIGNANT NEOPLASM OF COLON: ICD-10-CM

## 2017-12-04 PROCEDURE — 88305 TISSUE EXAM BY PATHOLOGIST: CPT | Performed by: INTERNAL MEDICINE

## 2017-12-04 RX ORDER — PROPOFOL 10 MG/ML
INJECTION, EMULSION INTRAVENOUS CONTINUOUS PRN
Status: DISCONTINUED | OUTPATIENT
Start: 2017-12-04 | End: 2017-12-04 | Stop reason: SURG

## 2017-12-04 RX ORDER — PROPOFOL 10 MG/ML
INJECTION, EMULSION INTRAVENOUS AS NEEDED
Status: DISCONTINUED | OUTPATIENT
Start: 2017-12-04 | End: 2017-12-04 | Stop reason: SURG

## 2017-12-04 RX ORDER — SODIUM CHLORIDE 9 MG/ML
50 INJECTION, SOLUTION INTRAVENOUS CONTINUOUS
Status: DISCONTINUED | OUTPATIENT
Start: 2017-12-04 | End: 2017-12-04 | Stop reason: HOSPADM

## 2017-12-04 RX ADMIN — PROPOFOL 100 MG: 10 INJECTION, EMULSION INTRAVENOUS at 10:32

## 2017-12-04 RX ADMIN — PROPOFOL 50 MG: 10 INJECTION, EMULSION INTRAVENOUS at 10:50

## 2017-12-04 RX ADMIN — PROPOFOL 50 MG: 10 INJECTION, EMULSION INTRAVENOUS at 10:40

## 2017-12-04 RX ADMIN — SODIUM CHLORIDE: 0.9 INJECTION, SOLUTION INTRAVENOUS at 09:46

## 2017-12-04 RX ADMIN — PROPOFOL 160 MCG/KG/MIN: 10 INJECTION, EMULSION INTRAVENOUS at 10:33

## 2017-12-04 RX ADMIN — PROPOFOL 50 MG: 10 INJECTION, EMULSION INTRAVENOUS at 10:35

## 2017-12-04 NOTE — ANESTHESIA PREPROCEDURE EVALUATION
Review of Systems/Medical History  Patient summary reviewed  Chart reviewed  No history of anesthetic complications (REQUIRED NEBULIZER AFTER PREVIOUS ENDOSCOPY)     Cardiovascular  Exercise tolerance: good,  Hypertension , No CAD, ,    Pulmonary  Asthma (REMOTE H/O ASTHMA): , No recent URI , Sleep apnea , ,        GI/Hepatic            Endo/Other     GYN       Hematology  Anemia ,     Musculoskeletal       Neurology    Neuromuscular disease (CHRONIC FATIGUE SYNDROME) , No CVA ,    Psychology   Depression ,            Physical Exam    Airway    Mallampati score: II  TM Distance: >3 FB       Dental       Cardiovascular      Pulmonary      Other Findings  RIGHT UPPER CROWNS      Anesthesia Plan  ASA Score- 2       Anesthesia Type- IV sedation with anesthesia with ASA Monitors  Additional Monitors:   Airway Plan:     Comment: DEBI Holloway , have personally seen and evaluated the patient prior to anesthetic care  I have reviewed the pre-anesthetic record, and other medical records if appropriate to the anesthetic care  If a CRNA is involved in the case, I have reviewed the CRNA assessment, if present, and agree  Risks/benefits and alternatives discussed with patient including possible PONV, sore throat, and possibility of rare anesthetic and surgical emergencies          Induction-       Informed Consent- Anesthetic plan and risks discussed with patient  I personally reviewed this patient with the CRNA  Discussed and agreed on the Anesthesia Plan with the CRNA  Godfrey Smith

## 2017-12-04 NOTE — OP NOTE
OPERATIVE REPORT  PATIENT NAME: Marko Rodriguez    :  1960  MRN: [id number]  Pt Location: [facility]  GI ROOM 01    SURGERY DATE: 2017    Surgeon(s) and Role:     * Roderick Razo DO - Primary    Preop Diagnosis:  Encounter for screening for malignant neoplasm of colon [Z12 11]    Post-Op Diagnosis Codes:     * Encounter for screening for malignant neoplasm of colon [Z12 11]    Procedure(s) (LRB):  COLONOSCOPY (N/A)    Specimen(s):  ID Type Source Tests Collected by Time Destination   1 : descending colon Tissue Polyp, Colorectal TISSUE EXAM Roderick Razo,  2017 1045    2 : sigmoid colon Tissue Polyp, Colorectal TISSUE EXAM Roderick Razo,  2017 1048    3 : distal sigmoid colon Tissue Polyp, Colorectal TISSUE EXAM Roderick Razo, DO 2017 1052        Estimated Blood Loss:   Minimal    Drains:       Anesthesia Type:   IV Sedation with Anesthesia    Operative Indications:  Encounter for screening for malignant neoplasm of colon [Z12 11]      Operative Findings:    Colonoscopy Procedure Note    Procedure: Colonoscopy    Sedation: Monitored anesthesia care, check anesthesia records      ASA Class: 2    INDICATIONS: Screening for Colon Cancer    POST-OP DIAGNOSIS: See the impression below    Procedure Details     Prior colonoscopy: No prior colonoscopy  Informed consent was obtained for the procedure, including sedation  Risks of perforation, hemorrhage, adverse drug reaction and aspiration were discussed  The patient was placed in the left lateral decubitus position  Based on the pre-procedure assessment, including review of the patient's medical history, medications, allergies, and review of systems, she had been deemed to be an appropriate candidate for conscious sedation; she was therefore sedated with the medications listed below  The patient was monitored continuously with telemetry, pulse oximetry, blood pressure monitoring, and direct observations        A rectal examination was performed  The colonoscope was inserted into the rectum and advanced under direct vision to the cecum, which was identified by the ileocecal valve and appendiceal orifice  The quality of the colonic preparation was good  A careful inspection was made as the colonoscope was withdrawn, including a retroflexed view of the rectum; findings and interventions are described below  Findings:  One 5 mm sessile polyp in the descending colon  Removed with cold snare polypectomy  One 5 mm sessile polyp in the sigmoid colon  Removed with cold snare polypectomy  One 15 mm pedunculated polyp in the distal sigmoid colon  Removed with hot snare polypectomy  Complications:  None; patient tolerated the procedure well  Impression: Three polyps removed  Recommendations:  Await pathology results  Repeat colonoscopy in 3 years or sooner if clinically indicated  Repeat colonoscopy is being recommended at an interval of less than 10 years due to a greater than 1 cm polyp removed today which is likely an adenoma              Lei Lloyd DO  DATE: December 4, 2017  TIME: 11:01 AM

## 2017-12-04 NOTE — H&P
History and Physical - [facility]  Sekou Driver 56 y o  female MRN: [id number]                  HPI: Sekou Driver is a 56y o  year old female who presents for colon cancer screening  For colonoscopy  This is her first colonoscopy  REVIEW OF SYSTEMS: Per the HPI, and otherwise unremarkable  Historical Information   Past Medical History:   Diagnosis Date    Anemia     Chronic fatigue syndrome     Depression     Fibromyalgia, primary     Hypertension     Sleep apnea      Past Surgical History:   Procedure Laterality Date    APPENDECTOMY      CHOLECYSTECTOMY       Social History   History   Alcohol Use No     History   Drug use: Unknown     History   Smoking Status    Never Smoker   Smokeless Tobacco    Never Used     History reviewed  No pertinent family history  Meds/Allergies     Prescriptions Prior to Admission   Medication    D-Ribose POWD    DULoxetine (CYMBALTA) 60 mg delayed release capsule    ergocalciferol (VITAMIN D2) 50,000 units    hydrochlorothiazide (HYDRODIURIL) 12 5 mg tablet    LORazepam (ATIVAN) 0 5 mg tablet    Magnesium 250 MG TABS    zolpidem (AMBIEN CR) 12 5 MG CR tablet    traMADol (ULTRAM) 50 mg tablet       Allergies   Allergen Reactions    Escitalopram Swelling    Nsaids        Objective     Blood pressure 134/88, pulse 86, temperature 98 °F (36 7 °C), temperature source Temporal, resp  rate 16, height 5' 1" (1 549 m), weight 75 3 kg (166 lb), SpO2 100 %  PHYSICAL EXAM    Gen: NAD  CV: RRR  CHEST: Clear  ABD: soft, NT/ND  EXT: no edema  Neuro: AAO      ASSESSMENT/PLAN:  This is a 56y o  year old female here for colon cancer screening        PLAN:  colonoscopy

## 2017-12-13 ENCOUNTER — GENERIC CONVERSION - ENCOUNTER (OUTPATIENT)
Dept: OTHER | Facility: OTHER | Age: 57
End: 2017-12-13

## 2017-12-13 ENCOUNTER — ALLSCRIPTS OFFICE VISIT (OUTPATIENT)
Dept: OTHER | Facility: OTHER | Age: 57
End: 2017-12-13

## 2018-01-09 NOTE — RESULT NOTES
Verified Results  (1) HEPATIC FUNCTION PANEL 28Jun2016 11:31AM Milagro Dorman     Test Name Result Flag Reference   PROTEIN, TOTAL 6 7 g/dL  6 1-8 1   ALBUMIN 4 2 g/dL  3 6-5 1   GLOBULIN 2 5 g/dL (calc)  1 9-3 7   ALBUMIN/GLOBULIN RATIO 1 7 (calc)  1 0-2 5   BILIRUBIN, TOTAL 0 4 mg/dL  0 2-1 2   BILIRUBIN, DIRECT 0 1 mg/dL  < OR = 0 2   BILIRUBIN, INDIRECT 0 3 mg/dL (calc)  0 2-1 2   ALKALINE PHOSPHATASE 84 U/L     AST 35 U/L  10-35   ALT 46 U/L H 6-29     (1) ACUTE HEPATITIS PANEL 28Jun2016 11:31AM Milagro Dorman     Test Name Result Flag Reference   HEPATITIS A IGM NON-REACTIVE  NON-REACTIVE   HEPATITIS B SURFACE$ANTIGEN NON-REACTIVE  NON-REACTIVE   HEPATITIS B CORE$ANTIBODY (IGM) NON-REACTIVE  NON-REACTIVE   HEPATITIS C ANTIBODY NON-REACTIVE  NON-REACTIVE   SIGNAL TO CUT-OFF 0 01  <1 00     (1) IRON [id number] 11:31AM Milagro Dorman     Test Name Result Flag Reference   IRON, TOTAL 63 mcg/dL       (1) OP KWAN FERRITIN [id number] 11:31AM Milagro Dorman   REPORT COMMENT:  FASTING:NO     Test Name Result Flag Reference   FERRITIN 7 ng/mL L

## 2018-01-12 VITALS
WEIGHT: 167 LBS | HEIGHT: 61 IN | HEART RATE: 81 BPM | DIASTOLIC BLOOD PRESSURE: 85 MMHG | SYSTOLIC BLOOD PRESSURE: 134 MMHG | BODY MASS INDEX: 31.53 KG/M2

## 2018-01-12 NOTE — RESULT NOTES
Verified Results  (Q) LIPID PANEL WITH REFLEX TO DIRECT LDL [id number] 09:49AM Milagro Dorman     Test Name Result Flag Reference   CHOLESTEROL, TOTAL 195 mg/dL  125-200   HDL CHOLESTEROL 61 mg/dL  > OR = 46   TRIGLICERIDES 69 mg/dL  <587   LDL-CHOLESTEROL 120 mg/dL (calc)  <130   Desirable range <100 mg/dL for patients with CHD or  diabetes and <70 mg/dL for diabetic patients with  known heart disease  CHOL/HDLC RATIO 3 2 (calc)  < OR = 5 0   NON HDL CHOLESTEROL 134 mg/dL (calc)     Target for non-HDL cholesterol is 30 mg/dL higher than   LDL cholesterol target  (1) COMPREHENSIVE METABOLIC PANEL [id number] 84:87LI Stephenvarun Milagro     Test Name Result Flag Reference   GLUCOSE 98 mg/dL  65-99   Fasting reference interval   UREA NITROGEN (BUN) 13 mg/dL  7-25   CREATININE 0 82 mg/dL  0 50-1 05   For patients >52years of age, the reference limit  for Creatinine is approximately 13% higher for people  identified as -American  eGFR NON-AFR   AMERICAN 82 mL/min/1 73m2  > OR = 60   eGFR AFRICAN AMERICAN 95 mL/min/1 73m2  > OR = 60   BUN/CREATININE RATIO   1-02   NOT APPLICABLE (calc)   SODIUM 141 mmol/L  135-146   POTASSIUM 3 9 mmol/L  3 5-5 3   CHLORIDE 105 mmol/L     CARBON DIOXIDE 25 mmol/L  19-30   CALCIUM 9 6 mg/dL  8 6-10 4   PROTEIN, TOTAL 6 8 g/dL  6 1-8 1   ALBUMIN 4 4 g/dL  3 6-5 1   GLOBULIN 2 4 g/dL (calc)  1 9-3 7   ALBUMIN/GLOBULIN RATIO 1 8 (calc)  1 0-2 5   BILIRUBIN, TOTAL 0 4 mg/dL  0 2-1 2   ALKALINE PHOSPHATASE 87 U/L     AST 25 U/L  10-35   ALT 41 U/L H 6-29     (1) CBC/PLT/DIFF [id number] 09:49AM Rowancolten Milagro     Test Name Result Flag Reference   WHITE BLOOD CELL COUNT 5 3 Thousand/uL  3 8-10 8   RED BLOOD CELL COUNT 4 01 Million/uL  3 80-5 10   HEMOGLOBIN 10 8 g/dL L 11 7-15 5   HEMATOCRIT 33 3 % L 35 0-45 0   MCV 83 2 fL  80 0-100 0   MCH 26 9 pg L 27 0-33 0   MCHC 32 3 g/dL  32 0-36 0   RDW 16 4 % H 11 0-15 0   PLATELET COUNT 715 Thousand/uL  140-400   MPV 8 0 fL 7  5-11 5   ABSOLUTE NEUTROPHILS 3132 cells/uL  9093-7088   ABSOLUTE LYMPHOCYTES 1611 cells/uL  850-3900   ABSOLUTE MONOCYTES 350 cells/uL  200-950   ABSOLUTE EOSINOPHILS 180 cells/uL     ABSOLUTE BASOPHILS 27 cells/uL  0-200   NEUTROPHILS 59 1 %     LYMPHOCYTES 30 4 %     MONOCYTES 6 6 %     EOSINOPHILS 3 4 %     BASOPHILS 0 5 %       (Q) NICK BARR VIRUS ANTIBODY PANEL [id number] 09:49AM Milagro Dorman     Test Name Result Flag Reference   NICK SOTO VIRUS VCA$ANTIBODY (IGM) < OR = 0 90     Value         Interpretation                             -----         --------------                             < or = 0 90   Negative                             0  91-1 09     Equivocal                             > or = 1 10   Positive   NICK SOTO VIRUS VCA$ANTIBODY (IGG) >5 00 H    Value         Interpretation                             -----         --------------                             < or = 0 90   Negative                             0  91-1 09     Equivocal                             > or = 1 10   Positive   NICK BARR VIRUS (EBNA)$ANTIBODY (IGG) 3 91 H    Value         Interpretation                             -----         --------------                             < or = 0 90   Negative                             0  91-1 09     Equivocal                             > or = 1 10   Positive   INTERPRETATION:      Suggestive of a past Nick-Barr virus infection  In infants, a similar pattern may occur as a result  of passive maternal transfer of antibody       *(Q) VITAMIN D, 25-HYDROXY, LC/MS/MS [id number] 09:49AM Milagro Dorman     Test Name Result Flag Reference   VITAMIN D, 25-OH, TOTAL 20 ng/mL L    Vitamin D Status         25-OH Vitamin D:     Deficiency:                    <20 ng/mL  Insufficiency:             20 - 29 ng/mL  Optimal:                 > or = 30 ng/mL     For 25-OH Vitamin D testing on patients on   D2-supplementation and patients for whom quantitation   of D2 and D3 fractions is required, the QuestAssureD(TM)  25-OH VIT D, (D2,D3), LC/MS/MS is recommended: order   code 07687 (patients >2yrs)  For more information on this test, go to:  http://education  HotelQuickly/faq/NIN160  (This link is being provided for   informational/educational purposes only )     (Q) TSH, 3RD GENERATION W/REFLEX TO FT4 [id number] 09:49AM Milagro Dorman     Test Name Result Flag Reference   TSH W/REFLEX TO FT4 1 39 mIU/L     Reference Range                         > or = 20 Years  0 40-4 50                              Pregnancy Ranges            First trimester    0 26-2 66            Second trimester   0 55-2 73            Third trimester    0 43-2 91     (Q) HEMOGLOBIN A1c [id number] 09:49AM Milagro Dorman   REPORT COMMENT:  FASTING:YES     Test Name Result Flag Reference   HEMOGLOBIN A1c 5 9 % of total Hgb H <5 7   According to ADA guidelines, hemoglobin A1c <7 0%  represents optimal control in non-pregnant diabetic  patients  Different metrics may apply to specific  patient populations  Standards of Medical Care in    Diabetes Care  2013;36:s11-s66     For the purpose of screening for the presence of  diabetes  <5 7%       Consistent with the absence of diabetes  5 7-6 4%    Consistent with increased risk for diabetes              (prediabetes)  >or=6 5%    Consistent with diabetes     This assay result is consistent with an increased risk  of diabetes  Currently, no consensus exists for use of hemoglobin  A1c for diagnosis of diabetes for children       (Q) SED RATE BY MODIFIED WESTERGREN [id number] 09:49AM Milagro Dorman     Test Name Result Flag Reference   SED RATE BY MODIFIED$WESTERGREN 9 mm/h  < OR = 30     (1) RF QUANTITATION [id number] 09:49AM Lakia Milagro     Test Name Result Flag Reference   RHEUMATOID FACTOR 9 IU/mL  <14     (1) C-REACTIVE PROTEIN [id number] 09:49AM Milagro Dorman     Test Name Result Flag Reference   C-REACTIVE PROTEIN 0 34 mg/dL  <0 80 Please be advised that patients taking Carboxypenicillins  may exhibit falsely decreased C-Reactive Protein levels  due to an analytical interference in this assay  (Q) TEST AUTHORIZATION [id number] 09:49AM Milagro Dorman   REPORT COMMENT:  FASTING:YES     Test Name Result Flag Reference   TEST(S) ORDERED ON$REQUISITION      ANACHOICE(R) SPECIFIC AB DANIEL   TEST CODE:      [id number] [id number] 193X   CLIENT CONTACT: Jose Guadalupe Lee     REPORT ALWAYS MESSAGE$SIGNATURE See Below     The laboratory testing on this patient was verbally requested  or confirmed by the ordering physician or his or her authorized  representative after contact with an employee of [employer]  Federal regulations require that we maintain on file written  authorization for all laboratory testing  Accordingly we are asking  that the ordering physician or his or her authorized representative  sign a copy of this report and promptly return it to the client    Signature:____________________________________________________     * XR SACRUM AND COCCYX [id number] 11:56AM Milagro Whitley     Test Name Result Flag Reference   XR SACRUM AND COCCYX (Report)     SACRUM AND COCCYX     INDICATION: Sacral/coccygeal pain  COMPARISON: None     VIEWS: 3; 3 images      FINDINGS:     There is anterolisthesis L5 on S1 with malalignment measuring about 7 mm  There is complete loss of the L5-S1 disc space with sclerosis of the vertebral endplates at this level  There appears to be narrowing of the lower lumbar facet joints and some    sclerosis  Sacral arcuate lines are maintained  The SI joints appear symmetric  There is unusual appearance of the L5 transverse processes which are somewhat hyperplastic extending almost to the iliac bones  On the left, there may be pseudoarthrosis of the left transverse process and ileum  Pubic symphysis maintained  IMPRESSION:     No fracture   Severe degenerative changes L5-S1 with 7 mm anterolisthesis L5 on S1  Degenerative arthritis of the lower lumbar facet joints  Hyperplastic L5 transverse processes with questionable pseudoarthrosis on the left with the ileum  Workstation performed: [id number]     Signed by:   Mekhi Salcedo MD   5/5/16     * XR SPINE LUMBAR MINIMUM 4 VIEWS [id number] 11:56AM Muragali, Milagro     Test Name Result Flag Reference   XR SPINE LUMBAR MINIMUM 4 VIEWS (Report)     LUMBAR SPINE     INDICATION: Low back and coccyx pain  COMPARISON: None     VIEWS: AP, lateral, bilateral oblique and coned down projections; 5 images     FINDINGS:     Mild thoracolumbar dextroscoliosis  Grade 2 L5 anterolisthesis  Marked narrowing L5 versus S1 disc space  There may be a left-sided L5 spondylolysis  Grade one L4 anterolisthesis  Mild narrowing L4-5 disc space  Prominent hypertrophic changes at the facets at L4-S1  Enlarged transverse processes at L5, possible pseudoarticulation with the left iliac wing  There is no radiographic evidence of acute fracture or destructive osseous lesion  Surgical clips right upper quadrant  IMPRESSION:     Prominent anterolisthesis L5-S1 and L4-5 levels  Workstation performed: [id number]     Signed by:   Giuliano Sotelo MD   5/5/16     XR HAND 2 VIEW LEFT [id number] 11:56AM Muragali, Milagro     Test Name Result Flag Reference   XR HAND 2 VW LEFT (Report)     BILATERAL HANDS     INDICATION: Bilateral hand pain MCP joints  COMPARISON: None     VIEWS: 2 of each hand; 2 images     For the purposes of institution wide universal language the following terms will apply: (thumb=1st digit/finger, index finger=2nd digit/finger, long finger=3rd digit/finger, ring=4th digit/finger and small finger=5th digit/finger)      FINDINGS:     No acute fracture or pathologic bone lesions,   No focal erosions  LEFT HAND:   No significant degenerative changes  There may be minimal degenerative change 1st MCP joint  Bony alignment is maintained  Soft tissues are unremarkable  RIGHT HAND:   No significant degenerative changes  There may be minimal degenerative change 1st MCP joint  Bony alignment is maintained  Soft tissues are unremarkable  IMPRESSION:     Intact  There may be minimal degenerative change 1st MCP joints  No significant degenerative changes  Workstation performed: [id number]     Signed by:   Beronica Gallegos MD   5/5/16     XR HAND 2 VIEW RIGHT [id number] 11:56AM Milagro Dorman     Test Name Result Flag Reference   XR HAND 2 VW RIGHT (Report)     BILATERAL HANDS     INDICATION: Bilateral hand pain MCP joints  COMPARISON: None     VIEWS: 2 of each hand; 2 images     For the purposes of institution wide universal language the following terms will apply: (thumb=1st digit/finger, index finger=2nd digit/finger, long finger=3rd digit/finger, ring=4th digit/finger and small finger=5th digit/finger)      FINDINGS:     No acute fracture or pathologic bone lesions,   No focal erosions  LEFT HAND:   No significant degenerative changes  There may be minimal degenerative change 1st MCP joint  Bony alignment is maintained  Soft tissues are unremarkable  RIGHT HAND:   No significant degenerative changes  There may be minimal degenerative change 1st MCP joint  Bony alignment is maintained  Soft tissues are unremarkable  IMPRESSION:     Intact  There may be minimal degenerative change 1st MCP joints  No significant degenerative changes  Workstation performed: [id number]     Signed by:   Beronica Gallegos MD   5/5/16       Plan  Coccyx pain    · 1 - Odessa Mata DO  (Physical Medicine And Rehabilitation) Physician Referral   Consult  Status: Active  Requested for: 02IRA0682  Care Summary provided  : Yes  Elevated ALT measurement    · (1) ACUTE HEPATITIS PANEL; Status:Active; Requested for:15Dak9040;    · (1) HEPATIC FUNCTION PANEL; Status:Active;  Requested for:13May2016; · * US ABDOMEN COMPLETE; Status:Hold For - Scheduling; Requested for:13May2016;   Hypovitaminosis D    · Vitamin D (Ergocalciferol) 50337 UNIT Oral Capsule; Take 1 capsule weekly for 8  weeks  , then monthly for 2 months  Low hemoglobin    · (1) FERRITIN; Status:Active; Requested for:13May2016;    · (1) IRON; Status:Active;  Requested for:13May2016;

## 2018-01-12 NOTE — PSYCH
Psych Med Mgmt    Appearance: was calm and cooperative, adequate hygiene and grooming and good eye contact  Observed mood: mood appropriate  Observed mood: affect appropriate  Speech: a normal rate and fluent  Thought processes: coherent/organized  Hallucinations: no hallucinations present  Thought Content: no delusions  Abnormal Thoughts: The patient has no suicidal thoughts and no homicidal thoughts  Orientation: The patient is oriented to person, place and time  Recent and Remote Memory: short term memory intact and long term memory intact  Attention Span And Concentration: concentration intact  Insight: Insight intact  Judgment: Her judgment was intact  Muscle Strength And Tone  Muscle strength and tone were normal  Normal gait and station  Language: no difficulty naming common objects, no difficulty repeating a phrase and no difficulty writing a sentence  Fund of knowledge: Patient displays adequate knowledge of current events, adequate fund of knowledge regarding past history and adequate fund of knowledge regarding vocabulary  The patient is experiencing moderate to severe pain  On a scale of 0 - 10 the pain severity is a 6  Treatment Recommendations: Continue Cymbalta 60 mg daily - patient does not want dose increase  Continue Ambien CR 12 5 mg at bedtime as needed  Continue Ativan 0 5 mg bid PRN  Patient follows up with PCP for elevated blood pressure  Risks, Benefits And Possible Side Effects Of Medications: Risks, benefits, and possible side effects of medications explained to patient and patient verbalizes understanding  Discussed with patient the risks of sedation, respiratory depression, impairment of ability to drive and potential for abuse and addiction related to treatment with benzodiazepine medications   The patient understands risk of treatment with benzodiazepine medications, agrees to not drive if feels impaired and agrees to take medications as prescribed  The patient has been filling controlled prescriptions on time as prescribed to [facility]       She reports normal appetite, decreased energy, no weight change and normal number of sleep hours  Gaye Oconnor states she has been doing better since last visit  Mood has improved, no longer feels depressed  Anxiety is controlled better as well  Denies suicidal or homicidal ideation  No psychotic symptoms  Sleep has improved  Appetite is good  No side effects from medications reported  PHQ-9 is decreased to 2 today from 5 at the last visit  Vitals  Signs   Recorded: 06Zhu4928 04:37PM   Heart Rate: 81  Systolic: 089  Diastolic: 85  BP Cuff Size: Large  Height: 5 ft 1 in  Weight: 167 lb   BMI Calculated: 31 55  BSA Calculated: 1 75    Assessment    1  JA (generalized anxiety disorder) (300 02) (F41 1)   2  Major depressive disorder, recurrent episode, in full remission (296 36) (F33 42)   3  Other insomnia (780 52) (G47 09)    Plan    1  Follow-up visit in 3 months Evaluation and Treatment  Follow-up  Status: Hold For -   Scheduling  Requested for: 50Ppz9170    Review of Systems    Constitutional: feeling tired  Cardiovascular: no complaints of slow or fast heart rate, no chest pain, no palpitations  Respiratory: shortness of breath and cough  Gastrointestinal: no complaints of abdominal pain, no constipation, no nausea, no diarrhea, no vomiting  Genitourinary: no complaints of dysuria, no incontinence, no pelvic pain, no urinary frequency  Musculoskeletal: myalgias  Integumentary: no complaints of skin rash, no itching, no dry skin  Neurological: no complaints of headache, no confusion, no numbness, no dizziness  Past Psychiatric History    Past Psychiatric History: No history of past inpatient psychiatric admissions  Attended partial program January 2015  No history of past suicide attempts          Substance Abuse Hx    Substance Abuse History: No history of drug or alcohol use  Active Problems    1  Abnormal abdominal ultrasound (793 6) (R93 5)   2  Anemia (285 9) (D64 9)   3  Chronic fatigue syndrome (780 71) (R53 82)   4  Coccyx pain (724 79) (M53 3)   5  Cough (786 2) (R05)   6  Diarrhea (787 91) (R19 7)   7  Elevated ALT measurement (790 4) (R74 0)   8  Elevated BP (401 9) (I10)   9  Encounter for screening colonoscopy (V76 51) (Z12 11)   10  Encounter for screening mammogram for malignant neoplasm of breast (V76 12)    (Z12 31)   11  Fatigue (780 79) (R53 83)   12  Fibromyalgia (729 1) (M79 7)   13  JA (generalized anxiety disorder) (300 02) (F41 1)   14  Gluten intolerance (579 0) (K90 0)   15  Hypertension (401 9) (I10)   16  Hypovitaminosis D (268 9) (E55 9)   17  Impaired fasting glucose (790 21) (R73 01)   18  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)   19  Liver lesion (573 8) (K76 9)   20  Low hemoglobin (285 9) (D64 9)   21  Nausea and vomiting (787 01) (R11 2)   22  Other insomnia (780 52) (G47 09)   23  Right hip pain (719 45) (M25 551)   24  Right leg pain (729 5) (M79 604)   25  Right low back pain (724 2) (M54 5)   26  Screening for lipoid disorders (V77 91) (Z13 220)   27  Skin lesion (709 9) (L98 9)   28  Skin rash (782 1) (R21)   29  Sleep apnea (780 57) (G47 30)   30  Thumb pain, left (729 5) (M79 645)   31  Thumb pain, right (729 5) (M79 644)   32  Upper respiratory infection (465 9) (J06 9)   33  Vaginal Pap smear (V76 47) (Z12 72)    Past Medical History    1  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)   2  Denied: History of head injury   3  Denied: History of Seizures    The active problems and past medical history were reviewed and updated today  Allergies    1  Lexapro TABS   2  NSAIDs    Current Meds   1  D-Ribose Powder; Therapy: 10YCN4109 to Recorded   2  DULoxetine HCl - 60 MG Oral Capsule Delayed Release Particles; take 1 capsule daily;    Therapy: 76EIP5548 to (Evaluate:01Jan2018) Requested for: 45VHE2657; Last   Rx:21Ylt0346 Ordered   3  Ferrous Sulfate 324 (65 Fe) MG TBEC; Therapy: 86GBA1220 to Recorded   4  HydroCHLOROthiazide 12 5 MG Oral Tablet; TAKE 1 TABLET DAILY; Therapy: 77GUG7229 to (Mal Taj)  Requested for: 11Aug2017; Last   Rx:11Aug2017 Ordered   5  Iron 325 (65 Fe) MG Oral Tablet; Take one daily; Therapy: 18FEG5073 to Recorded   6  LORazepam 0 5 MG Oral Tablet; TAKE 1 TABLET TWICE DAILY AS NEEDED; Therapy: 30KBO4351 to (Evaluate:01Jan2018); Last Rx:44Uhg8372 Ordered   7  Magnesium Gluconate 250 MG Oral Tablet; TAKE 1 TABLET DAILY; Therapy: 17CAV7707 to Recorded   8  Magnesium Glycinate Powder; Therapy: 50ECG6486 to Recorded   9  TraMADol HCl - 50 MG Oral Tablet; TAKE 1 TABLET as needed daily  Do not drink,   operate machinery or drive while on this medication; Therapy: 18ZOH5267 to (Evaluate:08Jul2016); Last Rx:08Jun2016 Ordered   10  Vitamin D3 5000 UNIT Oral Capsule; Take one capsule daily; Therapy: 24QOO7393 to (Evaluate:11Huz2244) Recorded   11  Vitamin D3 5000 UNIT Oral Tablet; Therapy: 91NKL2298 to Recorded   12  Zolpidem Tartrate ER 12 5 MG Oral Tablet Extended Release; TAKE 1 TABLET AT     BEDTIME AS NEEDED FOR INSOMNIA; Therapy: 56CSH0960 to (Evaluate:01Jan2018); Last Rx:11Nqq4281 Ordered    The medication list was reviewed and updated today  Family Psych History  Mother    1  Family history of depression (V17 0) (Z81 8)   2  Family history of essential hypertension (V17 49) (Z82 49)   3  Family history of Mitral valve prolapse  Brother    4  Family history of diabetes mellitus (V18 0) (Z83 3)   5  FH: heart attack (V17 3) (Z82 49)  Maternal Grandmother    6  Family history of cardiac disorder (V17 49) (Z82 49)   7  Family history of hyperlipidemia (V18 19) (Z83 49)  Paternal Grandmother    6  Family history of stroke (V17 1) (Z82 3)  Paternal Grandfather    5  Family history of stroke (V17 1) (Z82 3)  Maternal Aunt    10   Family history of malignant neoplasm of breast (V16 3) (Z80 3)    The family history was reviewed and updated today  Social History    · Denied: History of Caffeine use   · Never smoked   · No alcohol use   · No drug use  The social history was reviewed and updated today  Single, lives alone  Danii Hayes No children  Works for [employer] at [employer]  Has bachelor's degree in sociology  No history of legal problems  No  history  History Of Phys/Sex Abuse Or Perpetration    History Of Phys/Sex Abuse or Perpetration: No history of physical or sexual abuse  End of Encounter Meds    1  Ferrous Sulfate 324 (65 Fe) MG TBEC; Therapy: 74CBI7031 to Recorded    2  HydroCHLOROthiazide 12 5 MG Oral Tablet; TAKE 1 TABLET DAILY; Therapy: 57YNF8348 to (Beena Jimenez)  Requested for: 11Aug2017; Last   Rx:11Aug2017 Ordered    3  D-Ribose Powder; Therapy: 71SAU1117 to Recorded   4  TraMADol HCl - 50 MG Oral Tablet; TAKE 1 TABLET as needed daily  Do not drink,   operate machinery or drive while on this medication; Therapy: 03LZH1977 to (Evaluate:08Jul2016); Last Rx:08Jun2016 Ordered    5  LORazepam 0 5 MG Oral Tablet; TAKE 1 TABLET TWICE DAILY AS NEEDED; Therapy: 58QSL9947 to (Evaluate:01Jan2018); Last Rx:18Qax0358 Ordered    6  Iron 325 (65 Fe) MG Oral Tablet; Take one daily; Therapy: 55KCH5034 to Recorded   7  Magnesium Gluconate 250 MG Oral Tablet; TAKE 1 TABLET DAILY; Therapy: 66LQF4233 to Recorded   8  Magnesium Glycinate Powder; Therapy: 89TWO0325 to Recorded   9  Vitamin D3 5000 UNIT Oral Capsule; Take one capsule daily; Therapy: 79VVB2252 to (Evaluate:11Stc7683) Recorded    10  Vitamin D3 5000 UNIT Oral Tablet; Therapy: 63WCD1412 to Recorded    11  DULoxetine HCl - 60 MG Oral Capsule Delayed Release Particles; take 1 capsule daily; Therapy: 79XNI2584 to (Evaluate:01Jan2018)  Requested for: 65SGY8299; Last    Rx:32Gsp9244 Ordered    12   Zolpidem Tartrate ER 12 5 MG Oral Tablet Extended Release; TAKE 1 TABLET AT     BEDTIME AS NEEDED FOR INSOMNIA; Therapy: 32UTS9554 to (Evaluate:01Jan2018);  Last Rx:62Liz0518 Ordered    Future Appointments    Date/Time Provider Specialty Site   09/21/2017 10:20 AM Chris Lee DO Gastroenterology Adult [facility]   11/08/2017 06:30 PM Tiffanie Harrington MD Family Medicine [address] [place] [address]     Signatures   Electronically signed by : DEBI Roa ; Sep 18 2017  4:52PM EST                       (Author)

## 2018-01-12 NOTE — RESULT NOTES
Verified Results  (1) CBC/PLT/DIFF [id number] 09:06AM Milagro Dorman     Test Name Result Flag Reference   WBC COUNT 5 69 Thousand/uL  4 31-10 16   RBC COUNT 3 98 Million/uL  3 81-5 12   HEMOGLOBIN 12 1 g/dL  11 5-15 4   HEMATOCRIT 35 9 %  34 8-46  1   MCV 90 fL  82-98   MCH 30 4 pg  26 8-34 3   MCHC 33 7 g/dL  31 4-37 4   RDW 12 9 %  11 6-15 1   MPV 9 0 fL  8 9-12 7   PLATELET COUNT 567 Thousands/uL  149-390   nRBC AUTOMATED 0 /100 WBCs     NEUTROPHILS RELATIVE PERCENT 59 %  43-75   LYMPHOCYTES RELATIVE PERCENT 31 %  14-44   MONOCYTES RELATIVE PERCENT 6 %  4-12   EOSINOPHILS RELATIVE PERCENT 4 %  0-6   BASOPHILS RELATIVE PERCENT 0 %  0-1   NEUTROPHILS ABSOLUTE COUNT 3 33 Thousands/? ??L  1 85-7 62   LYMPHOCYTES ABSOLUTE COUNT 1 78 Thousands/? ??L  0 60-4 47   MONOCYTES ABSOLUTE COUNT 0 33 Thousand/? ??L  0 17-1 22   EOSINOPHILS ABSOLUTE COUNT 0 22 Thousand/? ??L  0 00-0 61   BASOPHILS ABSOLUTE COUNT 0 02 Thousands/? ??L  0 00-0 10   This bloodwork is non-fasting  Please drink two glasses of water morning of  bloodwork  (1) HEPATIC FUNCTION PANEL [id number] 09:06AM Hannah Galdamez     Test Name Result Flag Reference   ALBUMIN 3 7 g/dL  3 5-5 0   This bloodwork is non-fasting  Please drink two glasses of water morning of  bloodwork  ALK PHOSPHATAS 75 U/L     ALT (SGPT) 38 U/L  12-78   AST(SGOT) 17 U/L  5-45   BILI, DIRECT 0 09 mg/dL  0 00-0 20   BILI, TOTAL 0 41 mg/dL  0 20-1 00   TOTAL PROTEIN 6 9 g/dL  6 4-8 2     (1) FERRITIN [id number] 09:06AM Milagro Dorman     Test Name Result Flag Reference   FERRITIN 16 ng/mL  8-388     (1) IRON [id number] 09:06AM Milagro Dorman     Test Name Result Flag Reference   IRON 64 ug/dL     Patients treated with metal-binding drugs (ie  Deferoxamine) may have depressed iron values       (1) VITAMIN D 25-HYDROXY [id number] 09:06Milagro Maurice     Test Name Result Flag Reference   VIT D 25-HYDROX 45 1 ng/mL  30 0-100 0   This assay is a certified procedure of the CDC Vitamin D Standardization Certification Program (VDSCP)     Deficiency <20ng/ml   Insufficiency 20-30ng/ml   Sufficient  ng/ml     *Patients undergoing fluorescein dye angiography may retain small amounts of fluorescein in the body for 48-72 hours post procedure  Samples containing fluorescein can produce falsely elevated Vitamin D values  If the patient had this procedure, a specimen should be resubmitted post fluorescein clearance  (1) NICK SOTO VIRUS [id number] 09:06AM Milagro Dorman     Test Name Result Flag Reference   EBV EARLY ANTIGEN AB, IGG 27 5 U/mL H 0 0 - 8 9   Hepatitis A, Hepatitis C and HIV antibodies may cross-react  with this assay                                     Negative        < 9 0                                   Equivocal  9 0 - 10 9                                   Positive        >10 9   NICK-BARR VCA IGG >600 0 U/mL H 0 0 - 17 9   Negative        <18 0                                   Equivocal 18 0 - 21 9                                   Positive        >21 9   NICK-BARR VCA IGM <36 0 U/mL  0 0 - 35 9   Negative        <36 0                                   Equivocal 36 0 - 43 9                                   Positive        >43 9   NICK-SOTO NUCLEAR ANTIGEN AB IGG <18 0 U/mL  0 0 - 17 9   Negative        <18 0                                   Equivocal 18 0 - 21 9                                   Positive        >21 9   EBV INTERPRETATION Comment     EBV Interpretation Chart  Interpretation   EBV-IgM  EA(D)-IgG  VCA-IgG  EBNA-IgG  EBV Seronegative    -        -         -          -  Early Phase         +        -         -          -  Acute Primary       +       +or-       +          -  Infection  Convalescence/Past  -       +or-       +          +  Infection  Reactivated        +or-      +         +          +  Infection         + Antibody Present      - Antibody Absent  Performed at:  [address]  [id number]  Lab Director: Gibson Faust MD, Phone:  [phone]     (1) LYME ANTIBODY PROFILE W/REFLEX TO WESTERN BLOT [id number] 09:06AM Milagro Dorman     Test Name Result Flag Reference   LYME IGG 0 06  0 00-0 79   NEGATIVE(0 00-0 79)-Absence of detectable Borrelia IgG Antibodies  A negative result does not exclude the possibility of Borrelia infection  If early Lyme disease is suspected,a second sample should be collected & tested 4 weeks after initial testing  LYME IGM 0 25  0 00-0 79   NEGATIVE (0 00-0 79)-Absence of detectable Borrelia IgM antibodies  A negative result does not exclude the possibility of Borrelia infection  If early lyme disease is suspected, a second sample should be collected & tested 4 weeks after initial testing

## 2018-01-13 NOTE — PSYCH
Psych Med Mgmt    Appearance: was calm and cooperative, adequate hygiene and grooming and good eye contact  Observed mood: anxious  Observed mood: affect appropriate  Speech: a normal rate  Thought processes: coherent/organized  Hallucinations: no hallucinations present  Thought Content: no delusions  Abnormal Thoughts: The patient has no suicidal thoughts and no homicidal thoughts  Orientation: The patient is oriented to person, place and time  Recent and Remote Memory: short term memory intact and long term memory intact  Attention Span And Concentration: concentration impaired  Insight: Insight intact  Judgment: Her judgment was intact  Muscle Strength And Tone  Muscle strength and tone were normal  Normal gait and station  Language: no difficulty naming common objects  Fund of knowledge: Patient displays adequate knowledge of current events, adequate fund of knowledge regarding past history and adequate fund of knowledge regarding vocabulary  The patient is experiencing moderate to severe pain  On a scale of 0 - 10 the pain severity is a 5  Treatment Recommendations: Continue Cymbalta 60 mg daily  Increase Ambien CR to 12 5 mg at bedtime as needed  Continue Ativan 0 5 mg bid PRN  Patient follows up with PCP for elevated blood pressure  Risks, Benefits And Possible Side Effects Of Medications: Risks, benefits, and possible side effects of medications explained to patient and patient verbalizes understanding  She reports normal appetite, decreased energy, no weight change and decrease in number of sleep hours 4  Patient states she has been doing fairly well since last visit  Mood is improved, depression is improved  Has some tiredness - attributes that to her fibromyalgia and chronic pain  Still has occasional on and off anxiety, feels having Ativan with her helps with anxiety "If I have it with me then I do not need it"  Denies suicidality or homicidality    No psychotic symptoms  Sleep is decreased - wakes up due to pain  Glad she has now a job with [employer] [place] [address]  No side effects from medications reported  PHQ-9 is 10 today  Vitals  Signs [Data Includes: Current Encounter]   Recorded: [id number] 04:43PM   Heart Rate: 81  Systolic: 838  Diastolic: 93  BP Cuff Size: Large  Height: 5 ft 1 in  Weight: 174 lb   BMI Calculated: 32 88  BSA Calculated: 1 78    Assessment    1  Anxiety disorder, unspecified (300 00) (F41 9)   2  Major depression, recurrent, full remission (296 36) (F33 42)    Plan    1  LORazepam 0 5 MG Oral Tablet; TAKE 1 TABLET TWICE DAILY AS NEEDED    2  Follow-up visit in 3 months Evaluation and Treatment  Follow-up  Status: Hold For -   Scheduling  Requested for: [id number]    3  From  Zolpidem Tartrate ER 6 25 MG Oral Tablet Extended Release TAKE 1   TABLET AT  BEDTIME AS NEEDED FOR INSOMNIA To Zolpidem Tartrate ER 12 5 MG Oral   Tablet Extended Release TAKE 1 TABLET AT  BEDTIME AS NEEDED FOR INSOMNIA    4  DULoxetine HCl - 60 MG Oral Capsule Delayed Release Particles; take 1   capsule daily    Review of Systems    Constitutional: feeling tired  Cardiovascular: no complaints of slow or fast heart rate, no chest pain, no palpitations  Respiratory: no complaints of shortness of breath, no wheezing, no dyspnea on exertion  Gastrointestinal: no complaints of abdominal pain, no constipation, no nausea, no diarrhea, no vomiting  Genitourinary: no complaints of dysuria, no incontinence, no pelvic pain, no urinary frequency  Musculoskeletal: arthralgias and myalgias  Integumentary: no complaints of skin rash, no itching, no dry skin  Neurological: no complaints of headache, no confusion, no numbness, no dizziness  Past Psychiatric History    Past Psychiatric History: No history of past inpatient psychiatric admissions  Attended partial program January 2015  No history of past suicide attempts          Substance Abuse Hx    Substance Abuse History: No history of drug or alcohol use  Active Problems    1  Anxiety disorder, unspecified (300 00) (F41 9)   2  Chronic fatigue syndrome (780 71) (R53 82)   3  Coccyx pain (724 79) (M53 3)   4  Encounter for screening colonoscopy (V76 51) (Z12 11)   5  Fatigue (780 79) (R53 83)   6  Fibromyalgia (729 1) (M79 7)   7  Gluten intolerance (579 0) (K90 0)   8  Insomnia (780 52) (G47 00)   9  Major depression, recurrent, full remission (296 36) (F33 42)   10  Screening for lipoid disorders (V77 91) (Z13 220)   11  Skin lesion (709 9) (L98 9)   12  Sleep apnea (780 57) (G47 30)   13  Thumb pain, left (729 5) (M79 645)   14  Thumb pain, right (729 5) (M79 644)    Past Medical History    1  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)   2  Denied: History of head injury   3  Denied: History of Seizures    The active problems and past medical history were reviewed and updated today  Allergies    1  Lexapro TABS   2  NSAIDs    Current Meds   1  Betamethasone Valerate 0 1 % External Ointment; Therapy: 58SUS8418 to (Last Rx:76Ntl7656)  Requested for: 83Kog8799 Ordered   2  DULoxetine HCl - 60 MG Oral Capsule Delayed Release Particles; take 1 capsule daily; Therapy: 14ZUE6312 to (Evaluate:01Rjd6720)  Requested for: 51Exi2661; Last   Rx:92Kjs9815 Ordered   3  LORazepam 0 5 MG Oral Tablet; TAKE 1 TABLET TWICE DAILY AS NEEDED; Therapy: 11KRA2218 to (Evaluate:22Jun2016); Last Rx:92Cny2216 Ordered   4  Omeprazole 20 MG Oral Capsule Delayed Release; Therapy: 50ZAM9363 to (Last Rx:10Mar2011)  Requested for: 69ZIX2593 Ordered   5  Zolpidem Tartrate ER 12 5 MG Oral Tablet Extended Release; Therapy: 55CRF0787 to (Last Rx:52Epd6720)  Requested for: 04Tvi2658 Ordered   6  Zolpidem Tartrate ER 6 25 MG Oral Tablet Extended Release; TAKE 1 TABLET AT    BEDTIME AS NEEDED FOR INSOMNIA;    Therapy: 47WUJ0318 to (Evaluate:22Jun2016)  Requested for: 23Feb2016; Last   Rx:23Feb2016 Ordered    The medication list was reviewed and updated today  Family Psych History  Mother    1  Family history of depression (V17 0) (Z81 8)   2  Family history of essential hypertension (V17 49) (Z82 49)   3  Family history of Mitral valve prolapse  Brother    4  Family history of diabetes mellitus (V18 0) (Z83 3)   5  FH: heart attack (V17 3) (Z82 49)  Maternal Grandmother    6  Family history of cardiac disorder (V17 49) (Z82 49)   7  Family history of hyperlipidemia (V18 19) (Z83 49)  Paternal Grandmother    6  Family history of stroke (V17 1) (Z82 3)  Paternal Grandfather    5  Family history of stroke (V17 1) (Z82 3)  Maternal Aunt    10  Family history of malignant neoplasm of breast (V16 3) (Z80 3)    The family history was reviewed and updated today  Social History    · Denied: History of Caffeine use   · Never smoked   · No alcohol use   · No drug use  The social history was reviewed and updated today  The social history was reviewed and is unchanged  Single, lives with a roommate  No children  Works for [employer] at [employer]  Has bachelor's degree in sociology  No history of legal problems  No  history  History Of Phys/Sex Abuse Or Perpetration    History Of Phys/Sex Abuse or Perpetration: No history of physical or sexual abuse  End of Encounter Meds    1  LORazepam 0 5 MG Oral Tablet; TAKE 1 TABLET TWICE DAILY AS NEEDED; Therapy: 23ENS6562 to (Evaluate:08Sep2016); Last Rx:11May2016 Ordered    2  Zolpidem Tartrate ER 12 5 MG Oral Tablet Extended Release; TAKE 1 TABLET AT    BEDTIME AS NEEDED FOR INSOMNIA; Therapy: 93FHP7725 to (Evaluate:08Sep2016)  Requested for: 94RZL0060; Last   Rx:11May2016 Ordered    3  DULoxetine HCl - 60 MG Oral Capsule Delayed Release Particles; take 1 capsule daily; Therapy: 93FUU9975 to (Tono Wild)  Requested for: 88EBA3764; Last   Rx:11May2016 Ordered    4   Betamethasone Valerate 0 1 % External Ointment; Therapy: 97TFM9288 to (Last Rx:55Uwc0916)  Requested for: 76IXV0444 Ordered   5  Omeprazole 20 MG Oral Capsule Delayed Release; Therapy: 53EHT9586 to (Last Rx:10Mar2011)  Requested for: 05DWW6630 Ordered   6  Zolpidem Tartrate ER 12 5 MG Oral Tablet Extended Release;    Therapy: 39MVA2397 to (Last Rx:74Ybm5651)  Requested for: 88WMA6634 Ordered    Future Appointments    Date/Time Provider Specialty Site   06/08/2016 06:30 PM Raeann Esteves MD Family Medicine [address] [place] [address]     Signatures   Electronically signed by : DEBI Leon ; May 11 2016  4:59PM EST                       (Author)

## 2018-01-13 NOTE — RESULT NOTES
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  Thank you     Verified Results  * MRI ABDOMEN W WO CONTRAST [id number] 08:24AM Yoana Raven     Test Name Result Flag Reference   MRI ABDOMEN W WO CONTRAST (Report)     MRI OF THE ABDOMEN (LIVER) WITH AND WITHOUT CONTRAST     INDICATION: 1 3 cm hypoechoic lesion in the right lobe of the liver, noted on recent sonogram      COMPARISON: Abdominal sonogram from July 6, 2016  TECHNIQUE: The following pulse sequences were obtained on a 1 5 T scanner: Coronal and axial T2 with TE of 90 and 180 respectively, axial T2 with fat saturation, axial FIESTA fat-sat, axial T1-weighted in-and-out-of phase, pre-contrast axial T1 with    fat saturation, post-contrast dynamic axial T1 with fat saturation at 20, 70, and 180 seconds, followed by coronal and 7 minute delayed axial T1 with fat saturation  8 mL of Gadavist gadolinium was administered intravenously without immediate    complication  FINDINGS:     LIVER:    General: Normal in size, signal and enhancement  Decreased hepatic signal on opposed phase images, indicating mild hepatic steatosis  Lesions: 6 mm mass in the dome of the right lobe (segment 7), hyperintense on T2-weighted images with T1 hypointensity, no restricted water diffusion and gradual enhancement, hyperintense on 7 minute delayed enhanced images  This is most consistent    with a small hemangioma  This does not correspond anatomically to the finding on sonography, which is located more inferiorly  That finding has no correlate on this MRI study  Specifically, there is no mass in that location  It may represent a small    area of focal fatty sparing     Vasculature: Portal and hepatic veins patent without evidence of thrombosis  BILIARY TREE: Normal       GALLBLADDER: Postcholecystectomy  PANCREAS: Normal      ADRENAL GLANDS: Normal      SPLEEN: Normal      KIDNEYS: Normal      ABDOMINAL CAVITY: No lymphadenopathy or mass  No ascites  BOWEL: Unremarkable MRI appearance  OSSEOUS STRUCTURES: Normal marrow signal and enhancement  No recent fracture or marrow replacing process  EXTRAHEPATIC VASCULAR STRUCTURES: Visualized vasculature is normal      ABDOMINAL WALL: Normal      LUNG BASES: Unremarkable MRI appearance  IMPRESSION:     1  Hepatic steatosis  2  6 mm hemangioma in the dome of the right lobe of the liver  3  No evidence of additional liver mass  Specifically, there is no mass that corresponds to the finding on recent sonography  This may be an small area of focal fatty sparing         Workstation performed: [id number]     Signed by:   Pillo Rivera MD   8/9/16

## 2018-01-14 VITALS
RESPIRATION RATE: 16 BRPM | TEMPERATURE: 97.9 F | HEART RATE: 88 BPM | SYSTOLIC BLOOD PRESSURE: 138 MMHG | WEIGHT: 168.5 LBS | BODY MASS INDEX: 31.81 KG/M2 | HEIGHT: 61 IN | DIASTOLIC BLOOD PRESSURE: 86 MMHG

## 2018-01-14 VITALS
WEIGHT: 167.13 LBS | DIASTOLIC BLOOD PRESSURE: 82 MMHG | BODY MASS INDEX: 31.55 KG/M2 | OXYGEN SATURATION: 98 % | HEART RATE: 76 BPM | HEIGHT: 61 IN | SYSTOLIC BLOOD PRESSURE: 120 MMHG

## 2018-01-14 VITALS
BODY MASS INDEX: 32.36 KG/M2 | HEART RATE: 98 BPM | SYSTOLIC BLOOD PRESSURE: 118 MMHG | HEIGHT: 61 IN | DIASTOLIC BLOOD PRESSURE: 72 MMHG | TEMPERATURE: 98.8 F | RESPIRATION RATE: 16 BRPM | WEIGHT: 171.38 LBS

## 2018-01-14 VITALS
TEMPERATURE: 98.5 F | RESPIRATION RATE: 16 BRPM | WEIGHT: 167.5 LBS | SYSTOLIC BLOOD PRESSURE: 108 MMHG | BODY MASS INDEX: 31.63 KG/M2 | HEIGHT: 61 IN | HEART RATE: 80 BPM | DIASTOLIC BLOOD PRESSURE: 84 MMHG

## 2018-01-14 VITALS
HEART RATE: 74 BPM | RESPIRATION RATE: 16 BRPM | SYSTOLIC BLOOD PRESSURE: 134 MMHG | BODY MASS INDEX: 31.77 KG/M2 | DIASTOLIC BLOOD PRESSURE: 88 MMHG | WEIGHT: 168.25 LBS | TEMPERATURE: 98.4 F | HEIGHT: 61 IN

## 2018-01-14 NOTE — MISCELLANEOUS
Message  Spoke to patient regarding abdominal ultrasound results which shows hypoechoic lesion in the liver, recommendation is to have MRI of abdomen at this is indeterminate  I will mail Rx to patient along with BMP  Will call when results are back      Plan  Liver lesion    · (1) BASIC METABOLIC PROFILE; Status:Active; Requested for:93Tsy1879;    · * MRI ABDOMEN W WO CONTRAST; Status:Need Information - Financial Authorization;   Requested for:56Saj9406;     Signatures   Electronically signed by : Eveline Joseph MD; Jul 8 2016  2:35PM EST                       (Author)

## 2018-01-14 NOTE — PSYCH
Psych Med Mgmt    Appearance: was calm and cooperative, adequate hygiene and grooming and good eye contact  Observed mood: mood appropriate  Observed mood: affect appropriate  Speech: a normal rate  Thought processes: coherent/organized  Hallucinations: no hallucinations present  Thought Content: no delusions  Abnormal Thoughts: The patient has no suicidal thoughts and no homicidal thoughts  Orientation: The patient is oriented to person, place and time  Recent and Remote Memory: short term memory intact and long term memory intact  Attention Span And Concentration: concentration intact  Insight: Insight intact  Judgment: Her judgment was intact  Muscle Strength And Tone  Muscle strength and tone were normal  Normal gait and station  Language: no difficulty naming common objects, no difficulty repeating a phrase and no difficulty writing a sentence  Fund of knowledge: Patient displays adequate knowledge of current events, adequate fund of knowledge regarding past history and adequate fund of knowledge regarding vocabulary  The patient is experiencing moderate to severe pain  On a scale of 0 - 10 the pain severity is a 5  Treatment Recommendations: Continue Cymbalta 60 mg daily  Continue Ambien CR 6 26 mg at bedtime as needed  Continue Ativan 0 5 mg bid PRN  Patient follows up with PCP for elevated blood pressure  Risks, Benefits And Possible Side Effects Of Medications: Risks, benefits, and possible side effects of medications explained to patient and patient verbalizes understanding  Discussed with patient the risks of sedation, respiratory depression, impairment of ability to drive and potential for abuse and addiction related to treatment with benzodiazepine medications  The patient understands risk of treatment with benzodiazepine medications, agrees to not drive if feels impaired and agrees to take medications as prescribed         No records found for controlled prescriptions according to Dajuan Landrum [address]        She reports normal appetite, decreased energy, recent 5 lbs weight gain  and normal number of sleep hours  [facility] states she has been doing well since last visit  Mood remains stable, no significant depression  Anxiety remains controlled  Denies suicidal or homicidal ideation  No psychotic symptoms  Has now permanent job with [employer] in [address] as a   No side effects from medications reported  PHQ-9 is slightly decreased to 1 today from 7 at the last visit  Vitals  Signs   Recorded: 20Feb2017 04:22PM   Heart Rate: 94  Systolic: 839  Diastolic: 83  BP Cuff Size: Large  Height: 5 ft 1 in  Weight: 173 lb   BMI Calculated: 32 69  BSA Calculated: 1 78    Assessment    1  Insomnia (780 52) (G47 00)   2  Major depression, recurrent, full remission (296 36) (F33 42)   3  Anxiety disorder, unspecified (300 00) (F41 9)    Plan    1  LORazepam 0 5 MG Oral Tablet; TAKE 1 TABLET TWICE DAILY AS NEEDED    2  Follow-up visit in 4 Months Evaluation and Treatment  Follow-up  Status: Hold For -   Scheduling  Requested for: 65Kte4822    3  Zolpidem Tartrate ER 6 25 MG Oral Tablet Extended Release; TAKE 1 TABLET AT    BEDTIME AS NEEDED FOR INSOMNIA    4  DULoxetine HCl - 60 MG Oral Capsule Delayed Release Particles; take 1   capsule daily    Review of Systems    Constitutional: recent 5 lb weight gain and feeling tired  Cardiovascular: no complaints of slow or fast heart rate, no chest pain, no palpitations  Respiratory: no complaints of shortness of breath, no wheezing, no dyspnea on exertion  Gastrointestinal: no complaints of abdominal pain, no constipation, no nausea, no diarrhea, no vomiting  Genitourinary: no complaints of dysuria, no incontinence, no pelvic pain, no urinary frequency  Musculoskeletal: foot pain and upper back pain     Integumentary: no complaints of skin rash, no itching, no dry skin  Neurological: no complaints of headache, no confusion, no numbness, no dizziness  Past Psychiatric History    Past Psychiatric History: No history of past inpatient psychiatric admissions  Attended partial program January 2015  No history of past suicide attempts  Substance Abuse Hx    Substance Abuse History: No history of drug or alcohol use  Active Problems    1  Abnormal abdominal ultrasound (793 6) (R93 5)   2  Anemia (285 9) (D64 9)   3  Anxiety disorder, unspecified (300 00) (F41 9)   4  Chronic fatigue syndrome (780 71) (R53 82)   5  Coccyx pain (724 79) (M53 3)   6  Cough (786 2) (R05)   7  Diarrhea (787 91) (R19 7)   8  Elevated ALT measurement (790 4) (R74 0)   9  Elevated BP (401 9) (I10)   10  Encounter for screening colonoscopy (V76 51) (Z12 11)   11  Encounter for screening mammogram for malignant neoplasm of breast (V76 12)    (Z12 31)   12  Fatigue (780 79) (R53 83)   13  Fibromyalgia (729 1) (M79 7)   14  Gluten intolerance (579 0) (K90 0)   15  Hypertension (401 9) (I10)   16  Hypovitaminosis D (268 9) (E55 9)   17  Impaired fasting glucose (790 21) (R73 01)   18  Insomnia (780 52) (G47 00)   19  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)   20  Liver lesion (573 8) (K76 9)   21  Low hemoglobin (285 9) (D64 9)   22  Major depression, recurrent, full remission (296 36) (F33 42)   23  Screening for lipoid disorders (V77 91) (Z13 220)   24  Skin lesion (709 9) (L98 9)   25  Skin rash (782 1) (R21)   26  Sleep apnea (780 57) (G47 30)   27  Thumb pain, left (729 5) (M79 645)   28  Thumb pain, right (729 5) (M79 644)   29  Upper respiratory infection (465 9) (J06 9)   30  Vaginal Pap smear (V76 47) (Z12 72)    Past Medical History    1  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)   2  Denied: History of head injury   3  Denied: History of Seizures    The active problems and past medical history were reviewed and updated today  Allergies    1  Lexapro TABS   2  NSAIDs    Current Meds   1  D-Ribose Powder; Therapy: 58KBE7941 to Recorded   2  DULoxetine HCl - 60 MG Oral Capsule Delayed Release Particles; take 1 capsule daily; Therapy: 60VJN4399 to (Evaluate:85Qit8079)  Requested for: 17Aug2016; Last   Rx:17Aug2016 Ordered   3  Ferrous Sulfate 324 (65 Fe) MG Oral Tablet Delayed Release; Therapy: 24ZSO8215 to Recorded   4  HydroCHLOROthiazide 12 5 MG Oral Tablet; TAKE 1 TABLET DAILY; Therapy: 84FLS5218 to (Evaluate:85Pwf4385)  Requested for: 77YFI1025; Last   Rx:37Eoo7019 Ordered   5  Iron 325 (65 Fe) MG Oral Tablet; Take one daily; Therapy: 06SHU2121 to Recorded   6  LORazepam 0 5 MG Oral Tablet; TAKE 1 TABLET TWICE DAILY AS NEEDED; Therapy: 28PAY7118 to (Evaluate:43Hqh4318); Last Rx:17Aug2016 Ordered   7  Magnesium Gluconate 250 MG Oral Tablet; TAKE 1 TABLET DAILY; Therapy: 39AHW6220 to Recorded   8  Magnesium Glycinate Powder; Therapy: 14FLM9813 to Recorded   9  Omeprazole 20 MG Oral Capsule Delayed Release; Therapy: 82VYO3074 to (Last Rx:10Mar2011)  Requested for: 43GBZ5335 Ordered   10  TraMADol HCl - 50 MG Oral Tablet; TAKE 1 TABLET as needed daily  Do not drink,    operate machinery or drive while on this medication; Therapy: 18OIR7197 to (Evaluate:32Xew1754); Last Rx:08Jun2016 Ordered   11  Vitamin D3 5000 UNIT Oral Capsule; Take one capsule daily; Therapy: 45JBB9478 to (Evaluate:18Idf6759) Recorded   12  Vitamin D3 5000 UNIT Oral Tablet; Therapy: 81JAH3350 to Recorded   13  Zolpidem Tartrate ER 6 25 MG Oral Tablet Extended Release; TAKE 1 TABLET AT     BEDTIME AS NEEDED FOR INSOMNIA; Therapy: 01VMQ0711 to (Evaluate:85Aix4724)  Requested for: 17Aug2016; Last    Rx:17Aug2016 Ordered    The medication list was reviewed and updated today  Family Psych History  Mother    1  Family history of depression (V17 0) (Z81 8)   2  Family history of essential hypertension (V17 49) (Z82 49)   3   Family history of Mitral valve prolapse  Brother    4  Family history of diabetes mellitus (V18 0) (Z83 3)   5  FH: heart attack (V17 3) (Z82 49)  Maternal Grandmother    6  Family history of cardiac disorder (V17 49) (Z82 49)   7  Family history of hyperlipidemia (V18 19) (Z83 49)  Paternal Grandmother    6  Family history of stroke (V17 1) (Z82 3)  Paternal Grandfather    5  Family history of stroke (V17 1) (Z82 3)  Maternal Aunt    10  Family history of malignant neoplasm of breast (V16 3) (Z80 3)    The family history was reviewed and updated today  Social History    · Denied: History of Caffeine use   · Never smoked   · No alcohol use   · No drug use  The social history was reviewed and updated today  The social history was reviewed and is unchanged  Single, lives with a roommate  No children  Works for [employer] at [employer]  Has bachelor's degree in sociology  No history of legal problems  No  history  History Of Phys/Sex Abuse Or Perpetration    History Of Phys/Sex Abuse or Perpetration: No history of physical or sexual abuse  End of Encounter Meds    1  Ferrous Sulfate 324 (65 Fe) MG Oral Tablet Delayed Release; Therapy: 28PVR2106 to Recorded    2  LORazepam 0 5 MG Oral Tablet; TAKE 1 TABLET TWICE DAILY AS NEEDED; Therapy: 87POV4655 to (Evaluate:14Uvz4535); Last Rx:29Dzq9748 Ordered    3  HydroCHLOROthiazide 12 5 MG Oral Tablet; TAKE 1 TABLET DAILY; Therapy: 76QIP3281 to (Evaluate:23Ncw9317)  Requested for: 23DMB4577; Last   Rx:28Wjr4093 Ordered    4  D-Ribose Powder; Therapy: 41AFE1188 to Recorded   5  TraMADol HCl - 50 MG Oral Tablet; TAKE 1 TABLET as needed daily  Do not drink,   operate machinery or drive while on this medication; Therapy: 39OOI0113 to (Evaluate:40Fax1956); Last Rx:72Hnn7294 Ordered    6  Iron 325 (65 Fe) MG Oral Tablet; Take one daily; Therapy: 94LND1565 to Recorded   7  Magnesium Gluconate 250 MG Oral Tablet; TAKE 1 TABLET DAILY;    Therapy: 47EQX4327 to Recorded   8  Magnesium Glycinate Powder; Therapy: 49FBI8623 to Recorded   9  Vitamin D3 5000 UNIT Oral Capsule; Take one capsule daily; Therapy: 76UVX0102 to (Evaluate:21Kif2740) Recorded    10  Vitamin D3 5000 UNIT Oral Tablet; Therapy: 67UQL0209 to Recorded    11  Zolpidem Tartrate ER 6 25 MG Oral Tablet Extended Release; TAKE 1 TABLET AT     BEDTIME AS NEEDED FOR INSOMNIA; Therapy: 95PQJ4494 to (Evaluate:79Kjy0066)  Requested for: 48Ouq4995; Last    Rx:15Qli0307 Ordered    12  DULoxetine HCl - 60 MG Oral Capsule Delayed Release Particles; take 1 capsule daily; Therapy: 41BDX0039 to (Evaluate:76Etz7548)  Requested for: 98Odz8025; Last    Rx:55Jqg2045 Ordered    13  Omeprazole 20 MG Oral Capsule Delayed Release;     Therapy: 86MHL3508 to (Last Rx:10Mar2011)  Requested for: 99RLL3595 Ordered    Future Appointments    Date/Time Provider Specialty Site   03/23/2017 04:30 PM DEBI Gold  [facility]   03/21/2017 05:30 PM Shahida Betts MD Family [facility]  [address]   Electronically signed by : DEBI Lim ; Feb 20 2017  4:36PM EST                       (Author)

## 2018-01-14 NOTE — RESULT NOTES
Verified Results  * MAMMO SCREENING BILATERAL W CAD [id number] 01:20PM Arianna Keane     Test Name Result Flag Reference   MAMMO SCREENING BILATERAL W CAD (Report)     Patient History:   Patient is nulliparous  Family history of breast cancer in maternal aunt at age 61  Benign excisional biopsy of the left breast, October 25, 2004  Patient has never smoked  Patient's BMI is 31 6  Reason for exam: screening (asymptomatic)  Screening     Mammo Screening Bilateral W CAD: December 22, 2016 - Check In #:    [de-identified]   Bilateral MLO, CC, and XCCL view(s) were taken  Technologist: Ml Calabrese RT(R)(M)   Prior study comparison: June 9, 2015, bilateral mammogram,    performed at [facility]  Rosalva 3, 2014,    bilateral mammogram, performed at [facility]  May 2, 2013, bilateral mammogram, performed at [facility]  March 30, 2010, bilateral digital screening    mammogram performed at [address] [place] [address]  August 29, 2007, bilateral digital screening mammo w/CAD, performed at    [facility]  There are scattered fibroglandular densities  No dominant soft    tissue mass, architectural distortion or suspicious    calcifications are noted in either breast  The skin and nipple    contours are within normal limits  The right inframammary fold is obscured by overlapping tissue and   subtle abnormalities could be obscured  The right mediolateral    oblique view should be repeated with improved visualization of    the DANICA       1  The right inframammary fold is not optimally seen secondary    to overlapping tissue and this view should be repeated to obtain    better visualization of the inferior posterior right breast    2  Stable left breast mammogram      ASSESSMENT: BiRad:0 - Incomplete: needs additional imaging    evaluation - Right     Recommendation:   Repeat films of the right breast    A breast health care nurse from our facility will be contacting    the patient regarding the need for additional imaging  Analyzed by CAD     8-10% of cancers will be missed on mammography  Management of a    palpable abnormality must be based on clinical grounds  Patients   will be notified of their results via letter from our facility  Accredited by [employer] of [employer] and FDA       Transcription Location: [address] [address]: [id number]     Risk Value(s):   Tyrer-Cuzick 10 Year: 4 994%, Tyrer-Cuzick Lifetime: 16 857%,    Myriad Table: 1 5%, HARSHA 5 Year: 1 6%, NCI Lifetime: 11 6%   Signed by:   Christopher Mckeon MD   12/28/16

## 2018-01-15 NOTE — RESULT NOTES
Verified Results  * US ABDOMEN COMPLETE [id number] 07:26AM Milagro Dorman     Test Name Result Flag Reference   US ABDOMEN COMPLETE (Report)     ABDOMEN ULTRASOUND, COMPLETE     INDICATION: Elevated liver enzymes     COMPARISON: None  TECHNIQUE:  Real-time ultrasound of the abdomen was performed with a curvilinear transducer with both volumetric sweeps and still imaging techniques  FINDINGS:     PANCREAS: Portions of the pancreas are obscured by bowel gas  Visualized portions of the pancreas are unremarkable  AORTA AND IVC: Visualized portions are normal for patient age  LIVER:   Size: Within normal range  The liver measures 10 0 cm in the midclavicular line  Contour: Surface contour is smooth  Parenchyma: The liver is mildly increased in echotexture  There is a hypoechoic lesion in the right lobe measuring 0 8 x 1 3 x 1 2 cm  The main portal vein is patent and hepatopetal       BILIARY:   The patient is status post cholecystectomy  No intrahepatic biliary dilatation  CBD measures 2 mm  No choledocholithiasis  KIDNEY:    Right kidney measures 10 2 x 3 7 cm  Within normal limits  Left kidney measures 10 1 x 5 3 cm  Within normal limits  SPLEEN:    Measures 9 1 cm  Within normal limits  ASCITES: None  IMPRESSION:     1  Mildly increased liver echotexture suggesting hepatic steatosis  2  Hypoechoic lesion in the right lobe, indeterminate  Follow-up MRI of the abdomen is recommended for further evaluation       ##sigslh##sigslh     ##fuslh01##fuslh01       Workstation performed: [id number]     Signed by:   Navid Randall MD   7/7/16

## 2018-01-15 NOTE — RESULT NOTES
Verified Results  (1) BASIC METABOLIC PROFILE [id number] 09:44AM Milagro Dorman   REPORT COMMENT:  FASTING:YES     Test Name Result Flag Reference   GLUCOSE 80 mg/dL  65-99   Fasting reference interval   UREA NITROGEN (BUN) 12 mg/dL  7-25   CREATININE 0 88 mg/dL  0 50-1 05   For patients >52years of age, the reference limit  for Creatinine is approximately 13% higher for people  identified as -American  eGFR NON-AFR   AMERICAN 75 mL/min/1 73m2  > OR = 60   eGFR AFRICAN AMERICAN 87 mL/min/1 73m2  > OR = 60   BUN/CREATININE RATIO   4-45   NOT APPLICABLE (calc)   SODIUM 143 mmol/L  135-146   POTASSIUM 3 5 mmol/L  3 5-5 3   CHLORIDE 106 mmol/L     CARBON DIOXIDE 26 mmol/L  19-30   CALCIUM 9 7 mg/dL  8 6-10 4

## 2018-01-16 NOTE — PSYCH
Psych Med Mgmt    Appearance: was calm and cooperative, adequate hygiene and grooming and good eye contact  Observed mood: depressed and anxious  Observed mood: affect was constricted  Speech: speech soft and fluent  Thought processes: coherent/organized  Hallucinations: no hallucinations present  Thought Content: no delusions  Abnormal Thoughts: The patient has no suicidal thoughts and no homicidal thoughts  Orientation: The patient is oriented to person, place and time  Recent and Remote Memory: short term memory intact and long term memory intact  Attention Span And Concentration: concentration intact  Insight: Insight intact  Judgment: Her judgment was intact  Muscle Strength And Tone  Muscle strength and tone were normal  Normal gait and station  Language: no difficulty naming common objects, no difficulty repeating a phrase and no difficulty writing a sentence  Fund of knowledge: Patient displays adequate knowledge of current events, adequate fund of knowledge regarding past history and adequate fund of knowledge regarding vocabulary  The patient is experiencing moderate to severe pain  On a scale of 0 - 10 the pain severity is an 8  Treatment Recommendations: Continue Cymbalta 60 mg daily - patient does not want dose increase  Increase Ambien CR to 12 5 mg at bedtime as needed  Continue Ativan 0 5 mg bid PRN  Patient follows up with PCP for elevated blood pressure  Risks, Benefits And Possible Side Effects Of Medications: Risks, benefits, and possible side effects of medications explained to patient and patient verbalizes understanding  Discussed with patient the risks of sedation, respiratory depression, impairment of ability to drive and potential for abuse and addiction related to treatment with benzodiazepine medications   The patient understands risk of treatment with benzodiazepine medications, agrees to not drive if feels impaired and agrees to take medications as prescribed  No records found for controlled prescriptions according to Dajuan Landrum 17      She reports decreased appetite, decreased energy, recent 3 lbs weight loss and decrease in number of sleep hours 5  Alpha Mixer states she has been feeling slightly more depressed and slightly more anxious since last visit  Frustrated with financial issues "every appliance in my house broke and cannot afford to buy new ones"  Has low energy  Denies suicidal or homicidal ideation  No psychotic symptoms  Sleep has been decreased recently  Appetite is also decreased  No side effects from medications reported  PHQ-9 is increased to 5 today from 1 at the last visit  Vitals  Signs   Recorded: 79DLW9955 04:32PM   Heart Rate: 81  Systolic: 523  Diastolic: 85  BP Cuff Size: Large  Height: 5 ft 1 in  Weight: 168 lb   BMI Calculated: 31 74  BSA Calculated: 1 75    Assessment    1  MDD (major depressive disorder), recurrent episode, mild (296 31) (F33 0)   2  Other insomnia (780 52) (G47 09)   3  JA (generalized anxiety disorder) (300 02) (F41 1)    Plan    1  LORazepam 0 5 MG Oral Tablet; TAKE 1 TABLET TWICE DAILY AS NEEDED   2  Follow-up visit in 2 months Evaluation and Treatment  Follow-up  Status: Hold For -   Scheduling  Requested for: 56PPG3257    3  DULoxetine HCl - 60 MG Oral Capsule Delayed Release Particles; take 1   capsule daily    4  Zolpidem Tartrate ER 6 25 MG Oral Tablet Extended Release; TAKE 1 TABLET AT    BEDTIME AS NEEDED FOR INSOMNIA    Review of Systems    Constitutional: feeling tired and recent 3 lb weight loss  Cardiovascular: no complaints of slow or fast heart rate, no chest pain, no palpitations  Respiratory: no complaints of shortness of breath, no wheezing, no dyspnea on exertion  Gastrointestinal: no complaints of abdominal pain, no constipation, no nausea, no diarrhea, no vomiting     Genitourinary: no complaints of dysuria, no incontinence, no pelvic pain, no urinary frequency  Musculoskeletal: hand pain  Integumentary: no complaints of skin rash, no itching, no dry skin  Neurological: no complaints of headache, no confusion, no numbness, no dizziness  Past Psychiatric History    Past Psychiatric History: No history of past inpatient psychiatric admissions  Attended partial program January 2015  No history of past suicide attempts  Substance Abuse Hx    Substance Abuse History: No history of drug or alcohol use  Active Problems    1  Abnormal abdominal ultrasound (793 6) (R93 5)   2  Anemia (285 9) (D64 9)   3  Chronic fatigue syndrome (780 71) (R53 82)   4  Coccyx pain (724 79) (M53 3)   5  Cough (786 2) (R05)   6  Diarrhea (787 91) (R19 7)   7  Elevated ALT measurement (790 4) (R74 0)   8  Elevated BP (401 9) (I10)   9  Encounter for screening colonoscopy (V76 51) (Z12 11)   10  Encounter for screening mammogram for malignant neoplasm of breast (V76 12)    (Z12 31)   11  Fatigue (780 79) (R53 83)   12  Fibromyalgia (729 1) (M79 7)   13  Gluten intolerance (579 0) (K90 0)   14  Hypertension (401 9) (I10)   15  Hypovitaminosis D (268 9) (E55 9)   16  Impaired fasting glucose (790 21) (R73 01)   17  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)   18  Liver lesion (573 8) (K76 9)   19  Low hemoglobin (285 9) (D64 9)   20  Screening for lipoid disorders (V77 91) (Z13 220)   21  Skin lesion (709 9) (L98 9)   22  Skin rash (782 1) (R21)   23  Sleep apnea (780 57) (G47 30)   24  Thumb pain, left (729 5) (M79 645)   25  Thumb pain, right (729 5) (M79 644)   26  Upper respiratory infection (465 9) (J06 9)   27  Vaginal Pap smear (V76 47) (Z12 72)    Past Medical History    1  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)   2  Denied: History of head injury   3  Denied: History of Seizures    The active problems and past medical history were reviewed and updated today  Allergies    1  Lexapro TABS   2  NSAIDs    Current Meds   1  D-Ribose Powder; Therapy: 25MME7630 to Recorded   2  DULoxetine HCl - 60 MG Oral Capsule Delayed Release Particles; take 1 capsule daily; Therapy: 23WMU4631 to (Evaluate:95Xbj2426)  Requested for: 26Dfy9474; Last   Rx:97Sga6548 Ordered   3  Ferrous Sulfate 324 (65 Fe) MG Oral Tablet Delayed Release; Therapy: 75CCE8068 to Recorded   4  HydroCHLOROthiazide 12 5 MG Oral Tablet; TAKE 1 TABLET DAILY; Therapy: 94IJW6250 to (Evaluate:10Aug2017)  Requested for: 56XJV2278; Last   Rx:42Mqy2070 Ordered   5  Iron 325 (65 Fe) MG Oral Tablet; Take one daily; Therapy: 59JQB3775 to Recorded   6  LORazepam 0 5 MG Oral Tablet; TAKE 1 TABLET TWICE DAILY AS NEEDED; Therapy: 53XLO3045 to (Evaluate:43Itw3493); Last Rx:67Zny6855 Ordered   7  Magnesium Gluconate 250 MG Oral Tablet; TAKE 1 TABLET DAILY; Therapy: 01WQS0148 to Recorded   8  Magnesium Glycinate Powder; Therapy: 49BAB8942 to Recorded   9  Omeprazole 20 MG Oral Capsule Delayed Release; Therapy: 48QAB1523 to (Last Rx:10Mar2011)  Requested for: 24LAW4946 Ordered   10  TraMADol HCl - 50 MG Oral Tablet; TAKE 1 TABLET as needed daily  Do not drink,    operate machinery or drive while on this medication; Therapy: 12OPB2711 to (Evaluate:57Gxa7890); Last Rx:08Jun2016 Ordered   11  Vitamin D3 5000 UNIT Oral Capsule; Take one capsule daily; Therapy: 00OIB3361 to (Evaluate:45Mxl0685) Recorded   12  Vitamin D3 5000 UNIT Oral Tablet; Therapy: 21MDS9810 to Recorded   13  Zolpidem Tartrate ER 6 25 MG Oral Tablet Extended Release; TAKE 1 TABLET AT     BEDTIME AS NEEDED FOR INSOMNIA; Therapy: 36PLE5087 to (Evaluate:54Jky6402)  Requested for: 57Pvs7380; Last    Rx:84Pkw1340 Ordered    The medication list was reviewed and updated today  Family Psych History  Mother    1  Family history of depression (V17 0) (Z81 8)   2  Family history of essential hypertension (V17 49) (Z82 49)   3   Family history of Mitral valve prolapse  Brother 4  Family history of diabetes mellitus (V18 0) (Z83 3)   5  FH: heart attack (V17 3) (Z82 49)  Maternal Grandmother    6  Family history of cardiac disorder (V17 49) (Z82 49)   7  Family history of hyperlipidemia (V18 19) (Z83 49)  Paternal Grandmother    6  Family history of stroke (V17 1) (Z82 3)  Paternal Grandfather    5  Family history of stroke (V17 1) (Z82 3)  Maternal Aunt    10  Family history of malignant neoplasm of breast (V16 3) (Z80 3)    The family history was reviewed and updated today  Social History    · Denied: History of Caffeine use   · Never smoked   · No alcohol use   · No drug use  The social history was reviewed and updated today  The social history was reviewed and is unchanged  Single, lives with a roommate  No children  Works for [employer] at [employer]  Has bachelor's degree in sociology  No history of legal problems  No  history  History Of Phys/Sex Abuse Or Perpetration    History Of Phys/Sex Abuse or Perpetration: No history of physical or sexual abuse  End of Encounter Meds    1  Ferrous Sulfate 324 (65 Fe) MG Oral Tablet Delayed Release; Therapy: 16KAY2898 to Recorded    2  HydroCHLOROthiazide 12 5 MG Oral Tablet; TAKE 1 TABLET DAILY; Therapy: 77DMK8311 to (Evaluate:10Aug2017)  Requested for: 34ALM7268; Last   Rx:81Kih9491 Ordered    3  D-Ribose Powder; Therapy: 54UWC1031 to Recorded   4  TraMADol HCl - 50 MG Oral Tablet; TAKE 1 TABLET as needed daily  Do not drink,   operate machinery or drive while on this medication; Therapy: 01KTD1869 to (Evaluate:71Ukh9920); Last Rx:08Jun2016 Ordered    5  LORazepam 0 5 MG Oral Tablet; TAKE 1 TABLET TWICE DAILY AS NEEDED; Therapy: 85XYE5975 to (Evaluate:01Jan2018); Last Rx:94Uyc2432 Ordered    6  Iron 325 (65 Fe) MG Oral Tablet; Take one daily; Therapy: 49SNQ5836 to Recorded   7  Magnesium Gluconate 250 MG Oral Tablet; TAKE 1 TABLET DAILY; Therapy: 12EHH4753 to Recorded   8  Magnesium Glycinate Powder; Therapy: 06PPJ8949 to Recorded   9  Vitamin D3 5000 UNIT Oral Capsule; Take one capsule daily; Therapy: 79KYO2890 to (Evaluate:48Stm5639) Recorded    10  Vitamin D3 5000 UNIT Oral Tablet; Therapy: 42UBK7488 to Recorded    11  DULoxetine HCl - 60 MG Oral Capsule Delayed Release Particles; take 1 capsule daily; Therapy: 38VWW8316 to (Evaluate:01Jan2018)  Requested for: 21ZBF4262; Last    Rx:51Ihj7530 Ordered    12  Zolpidem Tartrate ER 6 25 MG Oral Tablet Extended Release; TAKE 1 TABLET AT     BEDTIME AS NEEDED FOR INSOMNIA; Therapy: 27JWF9427 to (Evaluate:01Jan2018)  Requested for: 73APH2799; Last    Rx:78Ikx6672 Ordered    13  Omeprazole 20 MG Oral Capsule Delayed Release;     Therapy: 40DMJ8631 to (Last Rx:10Mar2011)  Requested for: 18VZU6855 Ordered    Future Appointments    Date/Time Provider Specialty Site   07/11/2017 06:00 PM Ayana Peace MD Family Medicine [address] [place] [address]     Signatures   Electronically signed by : DEBI Saul ; Jul 5 2017  4:48PM EST                       (Author)    Electronically signed by : EDBI Saul ; Jul 5 2017  4:50PM EST                       (Author)

## 2018-01-16 NOTE — MISCELLANEOUS
Message  Left message on patient's voicemail regarding blood work and x-ray results  Patient's ALT was minimally elevated  I would like to repeat this in one month along with a hepatitis panel  I would also like to check an abdominal ultrasound as well  In addition patient's hemoglobin was noted to be 10 8  I would like to check an iron and ferritin level  Her vitamin D was noted to be 20 and I will call in an Rx for supplementation to start  Her EBV titers were elevated, IgG  I do not have a prior IgG to compare to  Her x-ray of her coccyx showed severe degenerative changes at L5-S1, her head x-ray showed mild first MCP degenerative changes  I will print out all repeat blood work, ultrasound, referral to spine specialist to have patient   I have asked patient to return my call      Plan  Coccyx pain    · 1 - Alexa Mata DO  ([facility]) Physician Referral   Consult  Status: Active  Requested for: 55MXS2162  Care Summary provided  : Yes  Elevated ALT measurement    · (1) ACUTE HEPATITIS PANEL; Status:Active; Requested for:82Meh4405;    · (1) HEPATIC FUNCTION PANEL; Status:Active; Requested for:13May2016;    · * US ABDOMEN COMPLETE; Status:Hold For - Scheduling; Requested for:13May2016;   Hypovitaminosis D    · Vitamin D (Ergocalciferol) 18617 UNIT Oral Capsule; Take 1 capsule weekly for 8  weeks  , then monthly for 2 months  Low hemoglobin    · (1) FERRITIN; Status:Active; Requested for:13May2016;    · (1) IRON; Status:Active;  Requested for:58Dby3770;     Signatures   Electronically signed by : Valentine Spence MD; May 13 2016  4:54PM EST                       (Author)

## 2018-01-16 NOTE — PSYCH
Psych Med Mgmt    Appearance: was calm and cooperative  Observed mood: anxious  Observed mood: affect appropriate  Speech: a normal rate  Thought processes: coherent/organized  Hallucinations: no hallucinations present  Thought Content: no delusions  Abnormal Thoughts: The patient has no suicidal thoughts and no homicidal thoughts  Orientation: The patient is oriented to person, place and time  Recent and Remote Memory: short term memory intact and long term memory intact  Attention Span And Concentration: concentration intact  Insight: Insight intact  Judgment: Her judgment was intact  Muscle Strength And Tone  Muscle strength and tone were normal  Normal gait and station  Language: no difficulty naming common objects, no difficulty repeating a phrase and no difficulty writing a sentence  Fund of knowledge: Patient displays adequate knowledge of current events, adequate fund of knowledge regarding past history and adequate fund of knowledge regarding vocabulary  The patient is experiencing moderate to severe pain  On a scale of 0 - 10 the pain severity is a 4  Treatment Recommendations: Continue Cymbalta 60 mg daily  Continue Ambien CR 6 26 mg at bedtime as needed  Continue Ativan 0 5 mg bid PRN  Patient follows up with PCP for elevated blood pressure  Risks, Benefits And Possible Side Effects Of Medications: Risks, benefits, and possible side effects of medications explained to patient and patient verbalizes understanding  Discussed with patient the risks of sedation, respiratory depression, impairment of ability to drive and potential for abuse and addiction related to treatment with benzodiazepine medications  The patient understands risk of treatment with benzodiazepine medications, agrees to not drive if feels impaired and agrees to take medications as prescribed          The patient has been filling controlled prescriptions on time as prescribed to [place] [facility] program       She reports decreased appetite, decreased energy, recent 5 lbs weight gain  and normal number of sleep hours  [place] states she continues to do well since last visit  Mood has been fairly stable, no significant depressive symptoms  Some anxiety on and off - felt frustrated and overwhelmed with election time, now anxiety is better  Denies suicidal or homicidal ideation  No psychotic symptoms  Sleep is improved  States she still awaits response regarding permanent position at [address]  No side effects from medications reported  PHQ-9 is slightly increased to 7 today from 4 at the last visit  Labs reviewed - ALT is elevated at 42, patient states she was diagnosed in past with fatty liver  Vitals  Signs   Recorded: 47PJZ4619 63:25GH   Systolic: 921  Diastolic: 75  Heart Rate: 102  Height: 5 ft 1 in  Weight: 175 lb   BMI Calculated: 33 07  BSA Calculated: 1 78  BP Cuff Size: Large    Assessment    1  Anxiety disorder, unspecified (300 00) (F41 9)   2  Insomnia (780 52) (G47 00)   3  Major depression, recurrent, full remission (296 36) (F33 42)    Plan    1  Follow-up visit in 3 months Evaluation and Treatment  Follow-up  Status: Hold For -   Scheduling  Requested for: 28OYN0358    Review of Systems    Constitutional: recent 5 lb weight gain and feeling tired  Cardiovascular: no complaints of slow or fast heart rate, no chest pain, no palpitations  Respiratory: no complaints of shortness of breath, no wheezing, no dyspnea on exertion  Gastrointestinal: no complaints of abdominal pain, no constipation, no nausea, no diarrhea, no vomiting  Genitourinary: no complaints of dysuria, no incontinence, no pelvic pain, no urinary frequency  Musculoskeletal: shoulder pain and upper back pain  Integumentary: no complaints of skin rash, no itching, no dry skin  Neurological: no complaints of headache, no confusion, no numbness, no dizziness  Past Psychiatric History    Past Psychiatric History: No history of past inpatient psychiatric admissions  Attended partial program January 2015  No history of past suicide attempts  Substance Abuse Hx    Substance Abuse History: No history of drug or alcohol use  Active Problems    1  Abnormal abdominal ultrasound (793 6) (R93 5)   2  Anxiety disorder, unspecified (300 00) (F41 9)   3  Chronic fatigue syndrome (780 71) (R53 82)   4  Coccyx pain (724 79) (M53 3)   5  Elevated ALT measurement (790 4) (R74 0)   6  Elevated BP (401 9) (I10)   7  Encounter for screening colonoscopy (V76 51) (Z12 11)   8  Encounter for screening mammogram for malignant neoplasm of breast (V76 12)   (Z12 31)   9  Fatigue (780 79) (R53 83)   10  Fibromyalgia (729 1) (M79 7)   11  Gluten intolerance (579 0) (K90 0)   12  Hypertension (401 9) (I10)   13  Hypovitaminosis D (268 9) (E55 9)   14  Impaired fasting glucose (790 21) (R73 01)   15  Insomnia (780 52) (G47 00)   16  Liver lesion (573 8) (K76 9)   17  Low hemoglobin (285 9) (D64 9)   18  Major depression, recurrent, full remission (296 36) (F33 42)   19  Screening for lipoid disorders (V77 91) (Z13 220)   20  Skin lesion (709 9) (L98 9)   21  Skin rash (782 1) (R21)   22  Sleep apnea (780 57) (G47 30)   23  Thumb pain, left (729 5) (M79 645)   24  Thumb pain, right (729 5) (M79 644)   25  Vaginal Pap smear (V76 47) (Z12 72)    Past Medical History    1  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)   2  Denied: History of head injury   3  Denied: History of Seizures    The active problems and past medical history were reviewed and updated today  Allergies    1  Lexapro TABS   2  NSAIDs    Current Meds   1  D-Ribose Powder; Therapy: 93WZQ6107 to Recorded   2  DULoxetine HCl - 60 MG Oral Capsule Delayed Release Particles; take 1 capsule daily; Therapy: 23YYD4105 to (Evaluate:23Elx6954)  Requested for: 55Var8477; Last   Rx:23Cvm0804 Ordered   3  HydroCHLOROthiazide 12 5 MG Oral Tablet; TAKE 1 TABLET DAILY; Therapy: 58DTI3115 to (Evaluate:01Zxe7750)  Requested for: 73HTG7182; Last   Rx:14Nov2016 Ordered   4  LORazepam 0 5 MG Oral Tablet; TAKE 1 TABLET TWICE DAILY AS NEEDED; Therapy: 64ZUU9848 to (Evaluate:51Mjp7722); Last Rx:17Aug2016 Ordered   5  Omeprazole 20 MG Oral Capsule Delayed Release; Therapy: 56HIQ9664 to (Last Rx:10Mar2011)  Requested for: 27VWA0703 Ordered   6  TraMADol HCl - 50 MG Oral Tablet; TAKE 1 TABLET as needed daily  Do not drink,   operate machinery or drive while on this medication; Therapy: 27TKK8913 to (Evaluate:39Ypi3963); Last Rx:08Jun2016 Ordered   7  Vitamin D (Ergocalciferol) 46522 UNIT Oral Capsule; Take 1 capsule weekly for 8   weeks  , then monthly for 2 months; Therapy: 75CVD7806 to (Braeden Hodge)  Requested for: 95Nic9414; Last   Rx:42Khx1413 Ordered   8  Zolpidem Tartrate ER 6 25 MG Oral Tablet Extended Release; TAKE 1 TABLET AT    BEDTIME AS NEEDED FOR INSOMNIA; Therapy: 23ZZR5387 to (Evaluate:97Glw3701)  Requested for: 17Aug2016; Last   Rx:17Aug2016 Ordered    The medication list was reviewed and updated today  Family Psych History  Mother    1  Family history of depression (V17 0) (Z81 8)   2  Family history of essential hypertension (V17 49) (Z82 49)   3  Family history of Mitral valve prolapse  Brother    4  Family history of diabetes mellitus (V18 0) (Z83 3)   5  FH: heart attack (V17 3) (Z82 49)  Maternal Grandmother    6  Family history of cardiac disorder (V17 49) (Z82 49)   7  Family history of hyperlipidemia (V18 19) (Z83 49)  Paternal Grandmother    6  Family history of stroke (V17 1) (Z82 3)  Paternal Grandfather    5  Family history of stroke (V17 1) (Z82 3)  Maternal Aunt    10  Family history of malignant neoplasm of breast (V16 3) (Z80 3)    The family history was reviewed and updated today         Social History    · Denied: History of Caffeine use   · Never smoked   · No alcohol use   · No drug use  The social history was reviewed and updated today  The social history was reviewed and is unchanged  Single, lives with a roommate  No children  Works for information technology at [employer]  Has bachelor's degree in sociology  No history of legal problems  No  history  History Of Phys/Sex Abuse Or Perpetration    History Of Phys/Sex Abuse or Perpetration: No history of physical or sexual abuse  End of Encounter Meds    1  LORazepam 0 5 MG Oral Tablet; TAKE 1 TABLET TWICE DAILY AS NEEDED; Therapy: 46HQU4042 to (Evaluate:76Osh3735); Last Rx:17Aug2016 Ordered    2  HydroCHLOROthiazide 12 5 MG Oral Tablet; TAKE 1 TABLET DAILY; Therapy: 39JSA4917 to (Evaluate:84Hwn2414)  Requested for: 54VBN8950; Last   Rx:14Nov2016 Ordered    3  D-Ribose Powder; Therapy: 25IVZ6028 to Recorded   4  TraMADol HCl - 50 MG Oral Tablet; TAKE 1 TABLET as needed daily  Do not drink,   operate machinery or drive while on this medication; Therapy: 30LVP5625 to (Evaluate:78Pko3891); Last Rx:08Jun2016 Ordered    5  Vitamin D (Ergocalciferol) 89238 UNIT Oral Capsule; Take 1 capsule weekly for 8   weeks  , then monthly for 2 months; Therapy: 34FZH8803 to (Kitty Veras)  Requested for: 50Deq6649; Last   Rx:67Xmh6582 Ordered    6  Zolpidem Tartrate ER 6 25 MG Oral Tablet Extended Release; TAKE 1 TABLET AT    BEDTIME AS NEEDED FOR INSOMNIA; Therapy: 22WQJ8606 to (Evaluate:39Orw9883)  Requested for: 17Aug2016; Last   Rx:17Aug2016 Ordered    7  DULoxetine HCl - 60 MG Oral Capsule Delayed Release Particles; take 1 capsule daily; Therapy: 53VTN8182 to (Evaluate:04Nza8799)  Requested for: 17Aug2016; Last   Rx:17Aug2016 Ordered    8  Omeprazole 20 MG Oral Capsule Delayed Release;    Therapy: 01LPN0833 to (Last Rx:10Mar2011)  Requested for: 91GZS3489 Ordered    Signatures   Electronically signed by : DEBI Moreno ; Nov 17 2016  4:44PM EST                       (Author)

## 2018-01-17 NOTE — RESULT NOTES
Verified Results  * XR SPINE LUMBAR MINIMUM 4 VIEWS NON INJURY 51YVC8605 05:44PM Murvarun, Milagro     Test Name Result Flag Reference   XR SPINE LUMBAR MINIMUM 4 VIEWS (Report)     LUMBAR SPINE     INDICATION: Low back pain radiating into hip     COMPARISON: May 4, 2016     VIEWS: AP, lateral, bilateral oblique and coned down projections     IMAGES: 5     FINDINGS:     Lumbar scoliosis again noted convex to the right centered at L2-L3  Degree of scoliosis is stable  There is again noted anterolisthesis of L4 on L5 grade 1 as well as L5 on S1 also grade 1  This anterolisthesis is stable  There is no radiographic evidence of acute fracture or destructive osseous lesion  Prominent facet degeneration seen extending from L4 through S1  Disc space narrowing at the L5-S1 level stable  Remaining disc spaces are preserved  Rounded calcific density seen projected anteriorly over the left kidney  IMPRESSION:     Stable anterolisthesis L4 and L5 on L5 on S1 with associated degenerative changes lower lumbar spine  Workstation performed: [id number]     Signed by:   Eve Tubbs MD   7/22/17     * XR HIPS BILATERAL 2 VW W PELVIS IF PERFORMED [id number] 05:44PM Lakia Milagro     Test Name Result Flag Reference   XR HIPS BILATERAL WITH AP PELVIS (Report)     BILATERAL HIPS AND PELVIS     INDICATION: Back pain radiating to both hips  COMPARISON: None     VIEWS: AP pelvis and coned down views of each hip     IMAGES: 5      FINDINGS:     No acute pelvic fracture or pathologic bone lesions  Visualized bony pelvis appears intact  LEFT HIP:   No significant degenerative changes  Bony alignment is maintained  Soft tissues are unremarkable  RIGHT HIP:   No significant degenerative changes  Bony alignment is maintained  Soft tissues are unremarkable  IMPRESSION:     Unremarkable hips and pelvis         Workstation performed: [id number]     Signed by:   Eve Tubbs MD   7/22/17

## 2018-01-18 NOTE — RESULT NOTES
Verified Results  MAMMO FOLLOW UP (NO CHARGE) [id number] 12:23PM Milagro Dorman     Test Name Result Flag Reference   MAMMO FOLLOW UP (NO CHARGE) (Report)     Patient History:   Patient is nulliparous  Family history of breast cancer in maternal aunt at age 61  Benign excisional biopsy of the left breast, October 25, 2004  Patient has never smoked  Patient's BMI is 31 6  Reason for exam: screening (asymptomatic)  Mammo Follow Up (No Charge): January 5, 2017 - Check In #:    [de-identified]   Bilateral CC and MLO view(s) were taken  Technologist: RT Maureen(R)(M)   Prior study comparison: December 22, 2016, mammo screening    bilateral W CAD, performed at [address] [place] [address]     June 9, 2015, bilateral mammogram, performed at [facility]  There are scattered fibroglandular densities  This was a technical callback  The entire study was reviewed  No dominant soft tissue mass, architectural distortion or    suspicious calcifications are noted in either breast  The skin    and nipple contours are within normal limits  No evidence of malignancy  No significant changes when compared with prior studies  ASSESSMENT: BiRad:2 - Benign     Recommendation:   Routine screening mammogram of both breasts in 1 year  A    reminder letter will be scheduled  Analyzed by CAD     8-10% of cancers will be missed on mammography  Management of a    palpable abnormality must be based on clinical grounds  Patients   will be notified of their results via letter from our facility  Accredited by [employer] and FDA       Transcription Location: [facility]: [id number]     Risk Value(s):   Tyrer-Cuzick 10 Year: 4 994%, Tyrer-Cuzick Lifetime: 16 857%,    Myriad Table: 1 5%, HARSHA 5 Year: 1 6%, NCI Lifetime: 11 6%   Signed by:   Roger Metzger MD   1/5/17

## 2018-01-18 NOTE — PSYCH
Psych Med Mgmt    Appearance: was calm and cooperative, adequate hygiene and grooming and good eye contact  Observed mood: mood appropriate  Observed mood: affect appropriate  Speech: a normal rate  Thought processes: coherent/organized  Hallucinations: no hallucinations present  Thought Content: no delusions  Abnormal Thoughts: The patient has no suicidal thoughts and no homicidal thoughts  Orientation: The patient is oriented to person, place and time  Recent and Remote Memory: short term memory intact and long term memory intact  Attention Span And Concentration: concentration intact  Insight: Insight intact  Judgment: Her judgment was intact  Muscle Strength And Tone  Muscle strength and tone were normal  Normal gait and station  Language: no difficulty naming common objects  Fund of knowledge: Patient displays adequate knowledge of current events, adequate fund of knowledge regarding past history and adequate fund of knowledge regarding vocabulary  The patient is experiencing moderate to severe pain  On a scale of 0 - 10 the pain severity is a 5  Treatment Recommendations: Continue Cymbalta 60 mg daily  Decrease Ambien CR to 6 26 mg at bedtime as needed (patient feels that higher dose was "too strong"  Continue Ativan 0 5 mg bid PRN  Patient follows up with PCP for elevated blood pressure  Risks, Benefits And Possible Side Effects Of Medications: Risks, benefits, and possible side effects of medications explained to patient and patient verbalizes understanding  She reports normal appetite, decreased energy, recent 4 lbs weight loss and normal number of sleep hours  Henrik Al states she has been doing well since last visit  Mood remains stable, no significant depression "I am in a pretty good place right now"  Anxiety is controlled  Denies suicidal or homicidal ideation  No psychotic symptoms  Sleep is improved    Doing well at work - applied for permanent position today at [employer]  No side effects from medications reported  PHQ-9 is decreased to 4 today from 10 at the last visit  Vitals  Signs   Recorded: 85GFY3712 39:23IE   Systolic: 576  Diastolic: 82  Heart Rate: 99  Height: 5 ft 1 in  Weight: 170 lb   BMI Calculated: 32 12  BSA Calculated: 1 76  BP Cuff Size: Large    Assessment    1  Anxiety disorder, unspecified (300 00) (F41 9)   2  Insomnia (780 52) (G47 00)   3  Major depression, recurrent, full remission (296 36) (F33 42)    Plan    1  LORazepam 0 5 MG Oral Tablet; TAKE 1 TABLET TWICE DAILY AS NEEDED    2  Follow-up visit in 3 months Evaluation and Treatment  Follow-up  Status: Hold For -   Scheduling  Requested for: 20Yfj3642    3  From  Zolpidem Tartrate ER 12 5 MG Oral Tablet Extended Release TAKE 1   TABLET AT  BEDTIME AS NEEDED FOR INSOMNIA To Zolpidem Tartrate ER 6 25 MG Oral   Tablet Extended Release TAKE 1 TABLET AT  BEDTIME AS NEEDED FOR INSOMNIA    4  DULoxetine HCl - 60 MG Oral Capsule Delayed Release Particles; take 1   capsule daily    Review of Systems    Constitutional: feeling tired and recent 4 lb weight loss  Cardiovascular: no complaints of slow or fast heart rate, no chest pain, no palpitations  Respiratory: no complaints of shortness of breath, no wheezing, no dyspnea on exertion  Gastrointestinal: no complaints of abdominal pain, no constipation, no nausea, no diarrhea, no vomiting  Genitourinary: no complaints of dysuria, no incontinence, no pelvic pain, no urinary frequency  Musculoskeletal: upper back pain  Integumentary: no complaints of skin rash, no itching, no dry skin  Neurological: no complaints of headache, no confusion, no numbness, no dizziness  Past Psychiatric History    Past Psychiatric History: No history of past inpatient psychiatric admissions  Attended partial program January 2015  No history of past suicide attempts          Substance Abuse Hx    Substance Abuse History: No history of drug or alcohol use  Active Problems    1  Abnormal abdominal ultrasound (793 6) (R93 5)   2  Anxiety disorder, unspecified (300 00) (F41 9)   3  Chronic fatigue syndrome (780 71) (R53 82)   4  Coccyx pain (724 79) (M53 3)   5  Elevated ALT measurement (790 4) (R74 0)   6  Elevated BP (401 9) (I10)   7  Encounter for screening colonoscopy (V76 51) (Z12 11)   8  Fatigue (780 79) (R53 83)   9  Fibromyalgia (729 1) (M79 7)   10  Gluten intolerance (579 0) (K90 0)   11  Hypertension (401 9) (I10)   12  Hypovitaminosis D (268 9) (E55 9)   13  Impaired fasting glucose (790 21) (R73 01)   14  Insomnia (780 52) (G47 00)   15  Liver lesion (573 8) (K76 9)   16  Low hemoglobin (285 9) (D64 9)   17  Major depression, recurrent, full remission (296 36) (F33 42)   18  Screening for lipoid disorders (V77 91) (Z13 220)   19  Skin lesion (709 9) (L98 9)   20  Sleep apnea (780 57) (G47 30)   21  Thumb pain, left (729 5) (M79 645)   22  Thumb pain, right (729 5) (M79 644)    Past Medical History    1  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)   2  Denied: History of head injury   3  Denied: History of Seizures    The active problems and past medical history were reviewed and updated today  Allergies    1  Lexapro TABS   2  NSAIDs    Current Meds   1  D-Ribose Powder; Therapy: 06AQB0163 to Recorded   2  DULoxetine HCl - 60 MG Oral Capsule Delayed Release Particles; take 1 capsule daily; Therapy: 63FWG3860 to (Fozia Oh)  Requested for: 41OIW3727; Last   Rx:50Fvw5571 Ordered   3  Hydrochlorothiazide 12 5 MG Oral Tablet; TAKE 1 TABLET DAILY; Therapy: 39UNN1311 to (Evaluate:04Oct2016)  Requested for: 22Qnz1675; Last   Rx:73Ndd9612 Ordered   4  LORazepam 0 5 MG Oral Tablet; TAKE 1 TABLET TWICE DAILY AS NEEDED; Therapy: 47KYA6232 to (Evaluate:89Tof8871); Last Rx:97Rsp7036 Ordered   5  Omeprazole 20 MG Oral Capsule Delayed Release;    Therapy: 30LKU7566 to (Last Rx:10Mar2011)  Requested for: 63TBS4758 Ordered   6  TraMADol HCl - 50 MG Oral Tablet; TAKE 1 TABLET as needed daily  Do not drink,   operate machinery or drive while on this medication; Therapy: 69PUY5571 to (Evaluate:05Pgv0738); Last Rx:85Wer4202 Ordered   7  Vitamin D (Ergocalciferol) 74780 UNIT Oral Capsule; Take 1 capsule weekly for 8   weeks  , then monthly for 2 months; Therapy: 55YCE6956 to (Candi Cowan)  Requested for: 36Fwd7585; Last   Rx:70Rlg2383 Ordered   8  Zolpidem Tartrate ER 12 5 MG Oral Tablet Extended Release; TAKE 1 TABLET AT    BEDTIME AS NEEDED FOR INSOMNIA; Therapy: 64BPN9876 to (Evaluate:48Qgf5839)  Requested for: 65INI8755; Last   Rx:37Swy1824 Ordered    The medication list was reviewed and updated today  Family Psych History  Mother    1  Family history of depression (V17 0) (Z81 8)   2  Family history of essential hypertension (V17 49) (Z82 49)   3  Family history of Mitral valve prolapse  Brother    4  Family history of diabetes mellitus (V18 0) (Z83 3)   5  FH: heart attack (V17 3) (Z82 49)  Maternal Grandmother    6  Family history of cardiac disorder (V17 49) (Z82 49)   7  Family history of hyperlipidemia (V18 19) (Z83 49)  Paternal Grandmother    6  Family history of stroke (V17 1) (Z82 3)  Paternal Grandfather    5  Family history of stroke (V17 1) (Z82 3)  Maternal Aunt    10  Family history of malignant neoplasm of breast (V16 3) (Z80 3)    The family history was reviewed and updated today  Social History    · Denied: History of Caffeine use   · Never smoked   · No alcohol use   · No drug use  The social history was reviewed and updated today  The social history was reviewed and is unchanged  Single, lives with a roommate  No children  Works for [employer] at [employer]  Has bachelor's degree in sociology  No history of legal problems  No  history        History Of Phys/Sex Abuse Or Perpetration    History Of Phys/Sex Abuse or Perpetration: No history of physical or sexual abuse  End of Encounter Meds    1  LORazepam 0 5 MG Oral Tablet; TAKE 1 TABLET TWICE DAILY AS NEEDED; Therapy: 62OXG9860 to (Evaluate:47Nfo5068); Last Rx:17Aug2016 Ordered    2  Hydrochlorothiazide 12 5 MG Oral Tablet; TAKE 1 TABLET DAILY; Therapy: 19CRI4274 to (Evaluate:04Oct2016)  Requested for: 33Chx7817; Last   Rx:24Zgf8789 Ordered    3  D-Ribose Powder; Therapy: 98MVP9573 to Recorded   4  TraMADol HCl - 50 MG Oral Tablet; TAKE 1 TABLET as needed daily  Do not drink,   operate machinery or drive while on this medication; Therapy: 42UDP8046 to (Evaluate:49Jka7262); Last Rx:08Jun2016 Ordered    5  Vitamin D (Ergocalciferol) 28261 UNIT Oral Capsule; Take 1 capsule weekly for 8   weeks  , then monthly for 2 months; Therapy: 67ICJ3265 to (John Betancourt)  Requested for: 70Iql6942; Last   Rx:25Kgd4483 Ordered    6  Zolpidem Tartrate ER 6 25 MG Oral Tablet Extended Release; TAKE 1 TABLET AT    BEDTIME AS NEEDED FOR INSOMNIA; Therapy: 53IMC7475 to (Evaluate:90Mal9579)  Requested for: 17Aug2016; Last   Rx:17Aug2016 Ordered    7  DULoxetine HCl - 60 MG Oral Capsule Delayed Release Particles; take 1 capsule daily; Therapy: 30KIY2698 to (Evaluate:90Bsc6482)  Requested for: 17Aug2016; Last   Rx:17Aug2016 Ordered    8  Omeprazole 20 MG Oral Capsule Delayed Release;    Therapy: 01TXB5324 to (Last Rx:10Mar2011)  Requested for: 97FAN7366 Ordered    Future Appointments    Date/Time Provider Specialty Site   10/05/2016 06:00 PM Shannon Vicente MD Family Medicine [address] [place] [address]     Signatures   Electronically signed by : DEBI Suggs ; Aug 17 2016  5:37PM EST                       (Author)

## 2018-01-22 VITALS
SYSTOLIC BLOOD PRESSURE: 128 MMHG | DIASTOLIC BLOOD PRESSURE: 85 MMHG | WEIGHT: 168 LBS | HEART RATE: 81 BPM | HEIGHT: 61 IN | BODY MASS INDEX: 31.72 KG/M2

## 2018-01-22 VITALS
WEIGHT: 173 LBS | SYSTOLIC BLOOD PRESSURE: 133 MMHG | HEART RATE: 94 BPM | HEIGHT: 61 IN | BODY MASS INDEX: 32.66 KG/M2 | DIASTOLIC BLOOD PRESSURE: 83 MMHG

## 2018-01-23 VITALS
SYSTOLIC BLOOD PRESSURE: 138 MMHG | HEART RATE: 114 BPM | BODY MASS INDEX: 31.91 KG/M2 | DIASTOLIC BLOOD PRESSURE: 93 MMHG | HEIGHT: 61 IN | WEIGHT: 169 LBS

## 2018-01-23 NOTE — PSYCH
Psych Med Mgmt    Appearance: was calm and cooperative, adequate hygiene and grooming and good eye contact  Observed mood: mood appropriate  Observed mood: affect appropriate  Speech: a normal rate and fluent  Thought processes: coherent/organized  Hallucinations: no hallucinations present  Abnormal Thoughts: The patient has no suicidal thoughts and no homicidal thoughts  Orientation: The patient is oriented to person, place and time  Recent and Remote Memory: short term memory intact and long term memory intact  Attention Span And Concentration: concentration intact  Insight: Insight intact  Judgment: Her judgment was intact  Muscle Strength And Tone  Muscle strength and tone were normal  Normal gait and station  Language: no difficulty naming common objects, no difficulty repeating a phrase and no difficulty writing a sentence  Fund of knowledge: Patient displays adequate knowledge of current events, adequate fund of knowledge regarding past history and adequate fund of knowledge regarding vocabulary  The patient is experiencing moderate to severe pain  On a scale of 0 - 10 the pain severity is a 4  Treatment Recommendations: Continue Cymbalta 60 mg daily - she will try to take it in the evening to see if it helps with tiredness  Continue Ambien CR 12 5 mg at bedtime as needed  She will also try Melatonin 3 mg at bedtime as needed in addition to Ambien CR to help with insomnia  Continue Ativan 0 5 mg bid PRN  Patient follows up with PCP for elevated blood pressure  Risks, Benefits And Possible Side Effects Of Medications: Risks, benefits, and possible side effects of medications explained to patient and patient verbalizes understanding  Discussed with patient the risks of sedation, respiratory depression, impairment of ability to drive and potential for abuse and addiction related to treatment with benzodiazepine medications   The patient understands risk of treatment with benzodiazepine medications, agrees to not drive if feels impaired and agrees to take medications as prescribed  The patient has been filling controlled prescriptions on time as prescribed to [place] [facility]       She reports normal appetite, decreased energy, recent 1 lbs weight gain  and decrease in number of sleep hours 6  Yady Heath states she has been doing well since last visit  Mood has been stable, no significant depressive symptoms  Anxiety is controlled most of the time "manageable"  Denies suicidal or homicidal ideation  No psychotic symptoms  Sleep has been decreased again  Appetite is good  Some tiredness; no other side effects from medications reported  PHQ-9 is slightly increased to 4 today from 2 at the last visit  Vitals  Signs   Recorded: 98Lth2082 04:31PM   Heart Rate: 100  Systolic: 552  Diastolic: 93  BP Cuff Size: Large  Height: 5 ft 1 in  Weight: 169 lb   BMI Calculated: 31 93  BSA Calculated: 1 76    Assessment    1  JA (generalized anxiety disorder) (300 02) (F41 1)   2  Other insomnia (780 52) (G47 09)   3  Major depressive disorder, recurrent episode, in full remission (296 36) (F33 42)    Plan    1  LORazepam 0 5 MG Oral Tablet; TAKE 1 TABLET TWICE DAILY AS NEEDED; Do   Not Fill Before: 76KOU5707    2  Follow-up visit in 4 Months Evaluation and Treatment  Follow-up  Status: Hold For -   Scheduling  Requested for: 13SRM8742    3  DULoxetine HCl - 60 MG Oral Capsule Delayed Release Particles; take 1   capsule daily    4  Zolpidem Tartrate ER 12 5 MG Oral Tablet Extended Release; TAKE 1 TABLET AT    BEDTIME AS NEEDED FOR INSOMNIA; Do Not Fill Before: 24QKK2647    Review of Systems    Constitutional: recent 1 lb weight gain and feeling tired  Cardiovascular: no complaints of slow or fast heart rate, no chest pain, no palpitations  Respiratory: no complaints of shortness of breath, no wheezing, no dyspnea on exertion  Gastrointestinal: no complaints of abdominal pain, no constipation, no nausea, no diarrhea, no vomiting  Genitourinary: no complaints of dysuria, no incontinence, no pelvic pain, no urinary frequency  Musculoskeletal: myalgias  Integumentary: no complaints of skin rash, no itching, no dry skin  Neurological: headache  Past Psychiatric History    Past Psychiatric History: No history of past inpatient psychiatric admissions  Attended partial program January 2015  No history of past suicide attempts  Substance Abuse Hx    Substance Abuse History: No history of drug or alcohol use  Active Problems    1  Abnormal abdominal ultrasound (793 6) (R93 5)   2  Anemia (285 9) (D64 9)   3  Chronic fatigue syndrome (780 71) (R53 82)   4  Coccyx pain (724 79) (M53 3)   5  Colon cancer screening (V76 51) (Z12 11)   6  Cough (786 2) (R05)   7  Diarrhea (787 91) (R19 7)   8  Elevated ALT measurement (790 4) (R74 0)   9  Elevated BP (401 9) (I10)   10  Encounter for screening colonoscopy (V76 51) (Z12 11)   11  Encounter for screening mammogram for malignant neoplasm of breast (V76 12)    (Z12 31)   12  Fatigue (780 79) (R53 83)   13  Fibromyalgia (729 1) (M79 7)   14  JA (generalized anxiety disorder) (300 02) (F41 1)   15  Gluten intolerance (579 0) (K90 0)   16  Hypertension (401 9) (I10)   17  Hypovitaminosis D (268 9) (E55 9)   18  Impaired fasting glucose (790 21) (R73 01)   19  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)   20  Liver lesion (573 8) (K76 9)   21  Low hemoglobin (285 9) (D64 9)   22  Major depressive disorder, recurrent episode, in full remission (296 36) (F33 42)   23  Nausea and vomiting (787 01) (R11 2)   24  Other insomnia (780 52) (G47 09)   25  Personal history of colonic polyps (V12 72) (Z86 010)   26  Right hip pain (719 45) (M25 551)   27  Right leg pain (729 5) (M79 604)   28  Right low back pain (724 2) (M54 5)   29  Screening for lipoid disorders (V77 91) (Z13 220)   30   Skin lesion (709  9) (L98 9)   31  Skin rash (782 1) (R21)   32  Sleep apnea (780 57) (G47 30)   33  Thumb pain, left (729 5) (M79 645)   34  Thumb pain, right (729 5) (M79 644)   35  Upper respiratory infection (465 9) (J06 9)   36  Vaginal Pap smear (V76 47) (Z12 72)    Past Medical History    1  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)   2  Denied: History of head injury   3  Denied: History of Seizures    The active problems and past medical history were reviewed and updated today  Allergies    1  Lexapro TABS   2  NSAIDs    Current Meds   1  D-Ribose Powder; Therapy: 27WJI4590 to Recorded   2  DULoxetine HCl - 60 MG Oral Capsule Delayed Release Particles; take 1 capsule daily; Therapy: 85RKV1404 to (Evaluate:01Jan2018)  Requested for: 63PNI6835; Last   Rx:42Cfs0163 Ordered   3  HydroCHLOROthiazide 12 5 MG Oral Tablet; TAKE 1 TABLET DAILY; Therapy: 43WOH2217 to (Evaluate:09Lts1602)  Requested for: 21MOQ5167; Last   Rx:08Nov2017 Ordered   4  LORazepam 0 5 MG Oral Tablet; TAKE 1 TABLET TWICE DAILY AS NEEDED; Therapy: 41NOT2098 to (Evaluate:01Jan2018); Last Rx:53Gwt0735 Ordered   5  Magnesium Gluconate 250 MG Oral Tablet; TAKE 1 TABLET DAILY; Therapy: 88PQW8116 to Recorded   6  Magnesium Glycinate Powder; Therapy: 41ZCS4509 to Recorded   7  Vitamin D3 5000 UNIT Oral Capsule; Take one capsule daily; Therapy: 66RTO1937 to (Evaluate:28Ndj0159) Recorded   8  Vitamin D3 5000 UNIT Oral Tablet; Therapy: 82UKR1884 to Recorded   9  Zolpidem Tartrate ER 12 5 MG Oral Tablet Extended Release; TAKE 1 TABLET AT    BEDTIME AS NEEDED FOR INSOMNIA; Therapy: 45XSH5819 to (Evaluate:01Jan2018); Last Rx:92Iye7949 Ordered    The medication list was reviewed and updated today  Family Psych History  Mother    1  Family history of depression (V17 0) (Z81 8)   2  Family history of essential hypertension (V17 49) (Z82 49)   3  Family history of Mitral valve prolapse  Brother    4   Family history of diabetes mellitus (V18 0) (Z83 3)   5  FH: heart attack (V17 3) (Z82 49)  Maternal Grandmother    6  Family history of cardiac disorder (V17 49) (Z82 49)   7  Family history of hyperlipidemia (V18 19) (Z83 49)  Paternal Grandmother    6  Family history of stroke (V17 1) (Z82 3)  Paternal Grandfather    5  Family history of stroke (V17 1) (Z82 3)  Maternal Aunt    10  Family history of malignant neoplasm of breast (V16 3) (Z80 3)    The family history was reviewed and updated today  Social History    · Denied: History of Caffeine use   · Never smoked   · No alcohol use   · No drug use  The social history was reviewed and updated today  The social history was reviewed and is unchanged  Single, lives alone  Cleo Escoto No children  Works for information technology at [employer]  Has bachelor's degree in sociology  No history of legal problems  No  history  History Of Phys/Sex Abuse Or Perpetration    History Of Phys/Sex Abuse or Perpetration: No history of physical or sexual abuse  End of Encounter Meds    1  HydroCHLOROthiazide 12 5 MG Oral Tablet; TAKE 1 TABLET DAILY; Therapy: 28FZB0140 to (Evaluate:63Rmb6199)  Requested for: 36RFF1564; Last   Rx:08Nov2017 Ordered    2  D-Ribose Powder; Therapy: 92JXW7639 to Recorded    3  LORazepam 0 5 MG Oral Tablet; TAKE 1 TABLET TWICE DAILY AS NEEDED; Therapy: 26ZRB5113 to (Evaluate:30Jun2018); Last Rx:72Aiq8303 Ordered    4  Magnesium Gluconate 250 MG Oral Tablet; TAKE 1 TABLET DAILY; Therapy: 97BNF8867 to Recorded   5  Magnesium Glycinate Powder; Therapy: 59NUW2431 to Recorded   6  Vitamin D3 5000 UNIT Oral Capsule; Take one capsule daily; Therapy: 40ARG7184 to (Evaluate:32Sfh2411) Recorded    7  Vitamin D3 5000 UNIT Oral Tablet; Therapy: 51NWO3859 to Recorded    8  DULoxetine HCl - 60 MG Oral Capsule Delayed Release Particles; take 1 capsule daily;    Therapy: 07EIT5909 to (Evaluate:11Jun2018)  Requested for: 19Zkg5121; Last Rx:07Npe4646; Status: ACTIVE - Transmit to Pharmacy - Awaiting Verification Ordered    9  Zolpidem Tartrate ER 12 5 MG Oral Tablet Extended Release; TAKE 1 TABLET AT    BEDTIME AS NEEDED FOR INSOMNIA; Therapy: 12INO1626 to (Evaluate:30Jun2018);  Last Rx:98Fnh0377 Ordered    Future Appointments    Date/Time Provider Specialty Site   05/09/2018 06:00 PM Rj Witt MD Family Medicine [address] [place] [address]     Signatures   Electronically signed by : DEBI Martino ; Dec 13 2017  4:46PM EST                       (Author)

## 2018-01-23 NOTE — RESULT NOTES
Discussion/Summary   polyps were all precancerous and one had some abnormal cell growth in it, I took them out in it's entirety and recommend a repeat colonoscopy in 3 years, very impt that she comes for follow up colonoscopy     Verified Results  (1) TISSUE EXAM [id number] 10:45AM Tiffany      Test Name Result Flag Reference   LAB AP CASE REPORT (Report)     Surgical Pathology Report             Case: [id number]                   Authorizing Provider: Ambrosio Dawn DO    Collected:      12/04/2017 1045        Ordering Location:   formerly [facility]    Received:      12/04/2017 Tamera Juarez                            Pathologist:      Baldev Ziegler MD                             Specimens:  A) - Polyp, Colorectal, descending colon                                B) - Polyp, Colorectal, sigmoid colon                                 C) - Polyp, Colorectal, distal sigmoid colon   LAB AP FINAL DIAGNOSIS (Report)     A  Descending colon polyp:    - Tubular adenoma  - Negative for high grade dysplasia and malignancy  B  Sigmoid colon polyp:    - Tubular adenoma  - Negative for high grade dysplasia and malignancy  C  Distal sigmoid colon polyp:    - High-grade dysplasia in a tubular adenoma  - Resection margin negative for dysplasia  Electronically signed by Baldev Ziegler MD on 12/6/2017 at 11:54 AM   LAB AP NOTE      Interpretation performed at [facility], [address] [address]   LAB AP SURGICAL ADDITIONAL INFORMATION (Report)     All controls performed with the immunohistochemical stains reported above   reacted appropriately  These tests were developed and their performance   characteristics determined by [facility] or   [facility]  They may not be cleared or approved by the [place]  Food and Drug Administration   The FDA has determined that such clearance   or approval is not necessary  These tests are used for clinical purposes  They should not be regarded as investigational or for research  This   laboratory has been approved by [place] [address], designated as a high-complexity   laboratory and is qualified to perform these tests  LAB AP GROSS DESCRIPTION (Report)     A  The specimen is received in formalin, labeled with the patient's name   and hospital number, and is designated  Descending colon  The specimen   consists of a single, rubbery, tan-brown tissue fragment measuring up to   0 2 cm in greatest dimension  Entirely submitted  One cassette  B  The specimen is received in formalin, labeled with the patient's name   and hospital number, and is designated Sigmoid polyp  The specimen   consists of a single, rubbery, tan-brown tissue fragment measuring up to   0 2 cm in greatest dimension  Entirely submitted  One cassette  C  The specimen is received in formalin, labeled with the patient's name   and hospital number, and is designated Distal sigmoid colon   The   specimen consists of a single rubbery red-tan pedunculated polyp measuring   1 0 x 0 6 x 0 5 cm in greatest dimension  Apparent resected surfaces is   inked blue  Bisected  Entirely submitted  One cassette  Note: The estimated total formalin fixation time based upon information   provided by the submitting clinician and the standard processing schedule   is less than 72 hours    SResendez   LAB AP CLINICAL INFORMATION Cold snare     Cold snare

## 2018-01-23 NOTE — MISCELLANEOUS
Dear Dr Brittney Renae,      The polyps I removed from Ms Bertha Ryder were all precancerous and one had some dysplasia inside it  They were all removed  Based on this, I recommend a repeat colonoscopy in 3 years      She will  be placed on my recall list         Sincerely,        Lio Mitchell,        Electronically signed by:Tracey Santos DO  Dec 13 2017  3:53PM EST

## 2018-02-20 ENCOUNTER — OFFICE VISIT (OUTPATIENT)
Dept: URGENT CARE | Age: 58
End: 2018-02-20
Payer: COMMERCIAL

## 2018-02-20 VITALS
HEART RATE: 81 BPM | SYSTOLIC BLOOD PRESSURE: 162 MMHG | TEMPERATURE: 97.8 F | DIASTOLIC BLOOD PRESSURE: 87 MMHG | BODY MASS INDEX: 31.83 KG/M2 | HEIGHT: 62 IN | RESPIRATION RATE: 18 BRPM | OXYGEN SATURATION: 97 % | WEIGHT: 173 LBS

## 2018-02-20 DIAGNOSIS — H60.502 ACUTE OTITIS EXTERNA OF LEFT EAR, UNSPECIFIED TYPE: Primary | ICD-10-CM

## 2018-02-20 PROCEDURE — 99213 OFFICE O/P EST LOW 20 MIN: CPT | Performed by: PREVENTIVE MEDICINE

## 2018-02-20 PROCEDURE — S9088 SERVICES PROVIDED IN URGENT: HCPCS | Performed by: PREVENTIVE MEDICINE

## 2018-02-20 RX ORDER — RIBONUCLEIC ACID (RNA)
POWDER (GRAM) MISCELLANEOUS
COMMUNITY
Start: 2016-06-29 | End: 2018-02-20 | Stop reason: SDUPTHER

## 2018-02-20 RX ORDER — DULOXETIN HYDROCHLORIDE 60 MG/1
60 CAPSULE, DELAYED RELEASE ORAL
COMMUNITY
Start: 2015-10-05 | End: 2018-02-20 | Stop reason: SDUPTHER

## 2018-02-20 RX ORDER — ZOLPIDEM TARTRATE 12.5 MG/1
1 TABLET, FILM COATED, EXTENDED RELEASE ORAL
COMMUNITY
Start: 2017-07-05 | End: 2018-02-20 | Stop reason: SDUPTHER

## 2018-02-20 RX ORDER — CIPROFLOXACIN AND DEXAMETHASONE 3; 1 MG/ML; MG/ML
4 SUSPENSION/ DROPS AURICULAR (OTIC) 2 TIMES DAILY
Qty: 7.5 ML | Refills: 0 | Status: SHIPPED | OUTPATIENT
Start: 2018-02-20 | End: 2019-03-20

## 2018-02-20 RX ORDER — HYDROCHLOROTHIAZIDE 12.5 MG/1
1 TABLET ORAL DAILY
COMMUNITY
Start: 2016-06-08 | End: 2018-02-20 | Stop reason: SDUPTHER

## 2018-02-20 RX ORDER — MULTIVIT,TX WITH IRON,MINERALS
1 TABLET, EXTENDED RELEASE ORAL DAILY
COMMUNITY
Start: 2017-02-08

## 2018-02-20 NOTE — PROGRESS NOTES
[facility]    NAME: Lisset Diop is a 56 y o  female  : 1960    MRN: [id number]  DATE: 2018  TIME: 6:10 PM    Assessment and Plan   Acute otitis externa of left ear, unspecified type [H60 502]  1  Acute otitis externa of left ear, unspecified type  ciprofloxacin-dexamethasone (CIPRODEX) otic suspension     Patient Instructions     Follow up with PCP in 3-5 days  Proceed to  ER if symptoms worsen  Chief Complaint     Chief Complaint   Patient presents with    Earache     left ear since this morning  History of Present Illness   Lisset Diop presents to the clinic c/o    55 y/o female with c/o left ear pain x       Review of Systems   Review of Systems    Current Medications       Current Outpatient Prescriptions:     Cholecalciferol (VITAMIN D3) 5000 UNIT/ML LIQD, Take by mouth, Disp: , Rfl:     D-Ribose POWD, by Does not apply route, Disp: , Rfl:     DULoxetine (CYMBALTA) 60 mg delayed release capsule, Take by mouth, Disp: , Rfl:     hydrochlorothiazide (HYDRODIURIL) 12 5 mg tablet, Take by mouth, Disp: , Rfl:     LORazepam (ATIVAN) 0 5 mg tablet, Take by mouth, Disp: , Rfl:     Magnesium 250 MG TABS, Take 1 tablet by mouth daily, Disp: , Rfl:     Magnesium Gluconate 250 MG TABS, Take 1 tablet by mouth daily, Disp: , Rfl:     omeprazole (PRILOSEC) 20 mg delayed release capsule, Take 20 mg by mouth, Disp: , Rfl:     traMADol (ULTRAM) 50 mg tablet, Take 50 mg by mouth every 6 (six) hours as needed for moderate pain, Disp: , Rfl:     zolpidem (AMBIEN CR) 12 5 MG CR tablet, Zolpidem Tartrate ER 12 5 MG Oral Tablet Extended Release  Quantity: 60 00;   Refills: 0   , M D ;  Started 7-Dec-2011 Active, Disp: , Rfl:     ciprofloxacin-dexamethasone (CIPRODEX) otic suspension, Administer 4 drops into the left ear 2 (two) times a day for 7 days, Disp: 7 5 mL, Rfl: 0    Current Allergies     Allergies as of 2018 - Reviewed 2018   Allergen Reaction Noted    Escitalopram Swelling and Edema 12/08/2014    Fluticasone-salmeterol  05/19/2015    Latex  05/19/2015    Nsaids  05/19/2015    Other  05/19/2015            The following portions of the patient's history were reviewed and updated as appropriate: allergies, current medications, past family history, past medical history, past social history, past surgical history and problem list     Objective   /87   Pulse 81   Temp 97 8 °F (36 6 °C) (Temporal)   Resp 18   Ht 5' 2" (1 575 m)   Wt 78 5 kg (173 lb)   SpO2 97%   BMI 31 64 kg/m²        Physical Exam     Physical Exam

## 2018-02-20 NOTE — PATIENT INSTRUCTIONS
Use antibiotic drops as directed  Tylenol for discomfort  Ice or warm compress to the TMJ area for discomfort  If symptoms worsen or if not resolving, call your family doctor or go to the Er  Go to the ER with any change in hearing or balance  Otitis Externa   WHAT YOU NEED TO KNOW:   Otitis externa, or swimmer's ear, is an infection in the outer ear canal  This canal goes from the outside of the ear to the eardrum  DISCHARGE INSTRUCTIONS:   Return to the emergency department if:   · You have severe ear pain  · You are suddenly unable to hear at all  · You have new swelling in your face, behind your ears, or in your neck  · You suddenly cannot move part of your face  · Your face suddenly feels numb  Contact your healthcare provider if:   · You have a fever  · Your signs and symptoms do not get better after 2 days of treatment  · Your signs and symptoms go away for a time, but then come back  · You have questions or concerns about your condition or care  Medicines:   · NSAIDs , such as ibuprofen, help decrease swelling, pain, and fever  This medicine is available with or without a doctor's order  NSAIDs can cause stomach bleeding or kidney problems in certain people  If you take blood thinner medicine, always ask if NSAIDs are safe for you  Always read the medicine label and follow directions  Do not give these medicines to children under 10months of age without direction from your child's healthcare provider  · Acetaminophen  decreases pain and fever  It is available without a doctor's order  Ask how much to take and how often to take it  Follow directions  Acetaminophen can cause liver damage if not taken correctly  · Ear drops  that contain an antibiotic may be given  The antibiotic helps treat a bacterial infection  You may also be given steroid medicine  The steroid helps decrease redness, swelling, and pain  · Take your medicine as directed    Contact your healthcare provider if you think your medicine is not helping or if you have side effects  Tell him or her if you are allergic to any medicine  Keep a list of the medicines, vitamins, and herbs you take  Include the amounts, and when and why you take them  Bring the list or the pill bottles to follow-up visits  Carry your medicine list with you in case of an emergency  Follow up with your healthcare provider as directed:  Write down your questions so you remember to ask them during your visits  How to use eardrops:   · Lie down on your side with your infected ear facing up  · Carefully drip the correct number of eardrops into your ear  Have another person help you if possible  · Gently move the outside part of your ear back and forth to help the medicine reach your ear canal      · Stay lying down in the same position (with your ear facing up) for 3 to 5 minutes  Prevent otitis externa:   · Do not put cotton swabs or foreign objects in your ears  · Wrap a clean moist washcloth around your finger, and use it to clean your outer ear and remove extra ear wax  · Use ear plugs when you swim  Dry your outer ears completely after you swim or bathe  © 2017 [address]  Information is for End User's use only and may not be sold, redistributed or otherwise used for commercial purposes  All illustrations and images included in CareNotes® are the copyrighted property of A [employer]  or [employer]  The above information is an  only  It is not intended as medical advice for individual conditions or treatments  Talk to your doctor, nurse or pharmacist before following any medical regimen to see if it is safe and effective for you

## 2018-02-20 NOTE — PROGRESS NOTES
[facility]        NAME: Ana Andrade is a 56 y o  female  : 1960    MRN: [id number]  DATE: 2018  TIME: 6:18 PM    Assessment and Plan   Acute otitis externa of left ear, unspecified type [H60 502]  1  Acute otitis externa of left ear, unspecified type  ciprofloxacin-dexamethasone (CIPRODEX) otic suspension     Patient Instructions   Use antibiotic drops as directed  Tylenol for discomfort  Ice or warm compress to the TMJ area for discomfort  Go to the ER with change in hearing, dizziness, or balance difficulty  Follow up with PCP in 3-5 days  Proceed to  ER if symptoms worsen  Pt states Advair allergy is inaccurate  She states she was taken off of it due to PCP notifying her that people were dying from it  Precautions given regarding progressing infection  All questions answered  Chief Complaint     Chief Complaint   Patient presents with    Earache     left ear since this morning  History of Present Illness   Ana Andrade presents to the clinic c/o    55 y/o female with c/o itchy left ear x 2 days  She notes ear often becomes itchy after having "too much sugar," however, she became concerned today when she awoke with a constant, dull throbbing pain  Pain changes to sharp, inner ear pain with jaw movement and is occasionally associated with posterior left sided tooth pain  Today began to have a constant dull throbbing pain in her inner ear  She notes that sounds have become somewhat muffled and has intermittent tinnitus  No lightheadedness, dizziness, balance difficulty, F, C, or difficulty chewing  She notes she has had ear pain similar to this in the past with ear infections  No treatments tried  Review of Systems   Review of Systems   Constitutional: Negative for chills and fever  HENT: Negative for congestion, ear discharge, rhinorrhea and sore throat  Respiratory: Negative for cough, shortness of breath and wheezing      Gastrointestinal: Negative for abdominal pain, nausea and vomiting  Skin: Negative for rash  Neurological: Negative for dizziness, light-headedness and headaches  Current Medications       Current Outpatient Prescriptions:     Cholecalciferol (VITAMIN D3) 5000 UNIT/ML LIQD, Take by mouth, Disp: , Rfl:     D-Ribose POWD, by Does not apply route, Disp: , Rfl:     DULoxetine (CYMBALTA) 60 mg delayed release capsule, Take by mouth, Disp: , Rfl:     hydrochlorothiazide (HYDRODIURIL) 12 5 mg tablet, Take by mouth, Disp: , Rfl:     LORazepam (ATIVAN) 0 5 mg tablet, Take by mouth, Disp: , Rfl:     Magnesium 250 MG TABS, Take 1 tablet by mouth daily, Disp: , Rfl:     Magnesium Gluconate 250 MG TABS, Take 1 tablet by mouth daily, Disp: , Rfl:     omeprazole (PRILOSEC) 20 mg delayed release capsule, Take 20 mg by mouth, Disp: , Rfl:     traMADol (ULTRAM) 50 mg tablet, Take 50 mg by mouth every 6 (six) hours as needed for moderate pain, Disp: , Rfl:     zolpidem (AMBIEN CR) 12 5 MG CR tablet, Zolpidem Tartrate ER 12 5 MG Oral Tablet Extended Release  Quantity: 60 00;   Refills: 0   , M D ;  Started 7-Dec-2011 Active, Disp: , Rfl:     ciprofloxacin-dexamethasone (CIPRODEX) otic suspension, Administer 4 drops into the left ear 2 (two) times a day for 7 days, Disp: 7 5 mL, Rfl: 0    Current Allergies     Allergies as of 02/20/2018 - Reviewed 02/20/2018   Allergen Reaction Noted    Escitalopram Swelling and Edema 12/08/2014    Fluticasone-salmeterol  05/19/2015    Latex  05/19/2015    Nsaids  05/19/2015    Other  05/19/2015            The following portions of the patient's history were reviewed and updated as appropriate: allergies, current medications, past family history, past medical history, past social history, past surgical history and problem list     Objective   /87   Pulse 81   Temp 97 8 °F (36 6 °C) (Temporal)   Resp 18   Ht 5' 2" (1 575 m)   Wt 78 5 kg (173 lb)   SpO2 97%   BMI 31 64 kg/m² Physical Exam     Physical Exam   Constitutional: She is oriented to person, place, and time  She appears well-developed and well-nourished  No distress  HENT:   Head: Normocephalic and atraumatic  Right Ear: External ear normal    Left Ear: External ear normal    Mouth/Throat: Oropharynx is clear and moist    No tenderness with tugging of pinna  Ear canal red, inflamed, swollen with skin flaking  TM does not appear perforated, however, unable to fully visualize given ear canal edema  Hearing grossly intact to spoken word  Able to identify soft noises b/l  Whisper testing from 5 ft WNL  No TMJ crepitus  Eyes: Conjunctivae are normal    Cardiovascular: Normal rate, regular rhythm and normal heart sounds  Pulmonary/Chest: Effort normal and breath sounds normal  No respiratory distress  She has no wheezes  She has no rhonchi  She has no rales  Neurological: She is alert and oriented to person, place, and time  No cranial nerve deficit  Coordination normal    Skin: Skin is warm and dry  No rash noted  No erythema  No pallor  Psychiatric: She has a normal mood and affect  Her behavior is normal    Vitals reviewed

## 2018-03-06 ENCOUNTER — OFFICE VISIT (OUTPATIENT)
Dept: FAMILY MEDICINE CLINIC | Facility: CLINIC | Age: 58
End: 2018-03-06
Payer: COMMERCIAL

## 2018-03-06 VITALS
WEIGHT: 172.2 LBS | BODY MASS INDEX: 31.69 KG/M2 | RESPIRATION RATE: 20 BRPM | HEART RATE: 84 BPM | DIASTOLIC BLOOD PRESSURE: 70 MMHG | TEMPERATURE: 98.2 F | HEIGHT: 62 IN | SYSTOLIC BLOOD PRESSURE: 130 MMHG

## 2018-03-06 DIAGNOSIS — J32.9 SINUSITIS, UNSPECIFIED CHRONICITY, UNSPECIFIED LOCATION: ICD-10-CM

## 2018-03-06 DIAGNOSIS — H92.02 LEFT EAR PAIN: Primary | ICD-10-CM

## 2018-03-06 PROCEDURE — 99213 OFFICE O/P EST LOW 20 MIN: CPT | Performed by: FAMILY MEDICINE

## 2018-03-06 RX ORDER — AMOXICILLIN 875 MG/1
875 TABLET, COATED ORAL 2 TIMES DAILY
Qty: 14 TABLET | Refills: 0 | Status: SHIPPED | OUTPATIENT
Start: 2018-03-06 | End: 2018-03-13

## 2018-03-06 RX ORDER — OMEPRAZOLE 20 MG/1
20 CAPSULE, DELAYED RELEASE ORAL AS NEEDED
COMMUNITY
End: 2018-04-16 | Stop reason: SDDI

## 2018-03-06 NOTE — ASSESSMENT & PLAN NOTE
Patient presents with left ear pain as well as sinusitis  Will start patient on amoxicillin  May benefit from warm compresses over maxillary sinuses as well  I would like patient to be further evaluated by ENT as patient has had symptoms for the past 1 month  Patient is agreeable with this  Return parameters discussed

## 2018-03-06 NOTE — PROGRESS NOTES
FAMILY PRACTICE OFFICE VISIT       NAME: Ann Ellington  AGE: 56 y o  SEX: female       : 1960        MRN: [id number]    DATE: 3/6/2018  TIME: 9:12 AM    Assessment and Plan     Problem List Items Addressed This Visit     Left ear pain - Primary       Patient presents with left ear pain as well as sinusitis  Will start patient on amoxicillin  May benefit from warm compresses over maxillary sinuses as well  I would like patient to be further evaluated by ENT as patient has had symptoms for the past 1 month  Patient is agreeable with this  Return parameters discussed  Relevant Medications    amoxicillin (AMOXIL) 875 mg tablet    Other Relevant Orders    Ambulatory Referral to Otolaryngology    Sinusitis    Relevant Medications    amoxicillin (AMOXIL) 875 mg tablet            There are no Patient Instructions on file for this visit  Chief Complaint     Chief Complaint   Patient presents with    Earache     Pt is here w/ c/o both ears discomfort for about two weeks  She states it feels blocked and ringing sound and cricket like  when she turns her head  [place] [facility] [place]       History of Present Illness     HPI  Left ear pain X 1 month  Went ot [facility] 2 weeks ago  , was prescribed ciprodex which helped a little but   there is loud ringing   Has muffled hearing  Right ear fullness  Last week was laying in bed, watching tv, had her head tirned towards the left and heard crickets  First she thought it was crickets  Review of Systems   Review of Systems   Constitutional: Negative for chills, fever and unexpected weight change  HENT: Positive for ear pain, rhinorrhea and sinus pressure  Respiratory: Negative for cough          Active Problem List     Patient Active Problem List   Diagnosis    Left ear pain    Sinusitis       Past Medical History:  Past Medical History:   Diagnosis Date    Anemia     Chronic fatigue syndrome     Depression     Fibromyalgia, primary     Hypertension     Sleep apnea        Past Surgical History:  Past Surgical History:   Procedure Laterality Date    APPENDECTOMY      CHOLECYSTECTOMY      KS COLONOSCOPY FLX DX W/COLLJ SPEC WHEN PFRMD N/A 12/4/2017    Procedure: COLONOSCOPY;  Surgeon: Yimi Anderson DO;  Location: [facility]; Service: Gastroenterology       Family History:  History reviewed  No pertinent family history  Social History:  Social History     Social History    Marital status: Single     Spouse name: N/A    Number of children: N/A    Years of education: N/A     Occupational History    Not on file  Social History Main Topics    Smoking status: Never Smoker    Smokeless tobacco: Never Used    Alcohol use No    Drug use: No    Sexual activity: No     Other Topics Concern    Not on file     Social History Narrative    No narrative on file     I have reviewed the patient's medical history in detail; there are no changes to the history as noted in the electronic medical record  Objective     Vitals:    03/06/18 0839   BP: 130/70   Pulse: 84   Resp: 20   Temp: 98 2 °F (36 8 °C)     Wt Readings from Last 3 Encounters:   03/06/18 78 1 kg (172 lb 3 2 oz)   02/20/18 78 5 kg (173 lb)   12/13/17 76 7 kg (169 lb)       Physical Exam   Constitutional: She appears well-developed and well-nourished  HENT:   Head: Normocephalic and atraumatic  Right ear with mild effusion  Left ear  With mild erythema in the ear canal   Unable to view TM as it is obstructed by cerumen impaction  Nares   With mild edema noted  Tender to palpation of left maxillary sinus as well as left frontal sinus  There is mild postnasal drainage noted in posterior oropharynx  Neck: Normal range of motion  Cardiovascular: Normal rate and regular rhythm  Pulmonary/Chest: Effort normal and breath sounds normal    Lymphadenopathy:     She has no cervical adenopathy  Nursing note and vitals reviewed        Pertinent Laboratory/Diagnostic Studies:  Lab Results   Component Value Date    GLUCOSE 125 11/15/2017    BUN 13 11/15/2017    CREATININE 0 79 11/15/2017    CALCIUM 9 9 11/15/2017     11/15/2017    K 3 1 (L) 11/15/2017    CO2 29 11/15/2017     11/15/2017     Lab Results   Component Value Date    ALT 38 07/12/2017    AST 17 07/12/2017    ALKPHOS 75 07/12/2017    BILITOT 0 41 07/12/2017       Lab Results   Component Value Date    WBC 6 34 11/15/2017    HGB 13 2 11/15/2017    HCT 39 0 11/15/2017    MCV 87 11/15/2017     11/15/2017       No results found for: TSH    Lab Results   Component Value Date    CHOL 191 07/12/2017     Lab Results   Component Value Date    TRIG 141 07/12/2017     Lab Results   Component Value Date    HDL 58 07/12/2017     Lab Results   Component Value Date    LDLCALC 105 (H) 07/12/2017     Lab Results   Component Value Date    HGBA1C 5 7 07/12/2017       Results for orders placed or performed during the hospital encounter of 12/04/17   Tissue Exam   Result Value Ref Range    Case Report       Surgical Pathology Report                         Case: [id number]                                   Authorizing Provider:  Kelly Collazo DO        Collected:           12/04/2017 1045              Ordering Location:     [facility]        Received:            12/04/2017 [address]                                                      Pathologist:           Sonia Amador MD                                                        Specimens:   A) - Polyp, Colorectal, descending colon                                                            B) - Polyp, Colorectal, sigmoid colon                                                               C) - Polyp, Colorectal, distal sigmoid colon                                               Final Diagnosis       A  Descending colon polyp:     - Tubular adenoma  - Negative for high grade dysplasia and malignancy  B   Sigmoid colon polyp:     - Tubular adenoma  - Negative for high grade dysplasia and malignancy  C   Distal sigmoid colon polyp:     - High-grade dysplasia in a tubular adenoma  - Resection margin negative for dysplasia  Note       Interpretation performed at [facility], [address] [place] [address]      Additional Information       All controls performed with the immunohistochemical stains reported above reacted appropriately  These tests were developed and their performance characteristics determined by [facility] or [facility]  They may not be cleared or approved by the [place]  Food and Drug Administration  The FDA has determined that such clearance or approval is not necessary  These tests are used for clinical purposes  They should not be regarded as investigational or for research  This laboratory has been approved by [place] [address], designated as a high-complexity laboratory and is qualified to perform these tests  Gross Description       A  The specimen is received in formalin, labeled with the patient's name and hospital number, and is designated "   Descending colon"  The specimen consists of a single, rubbery, tan-brown tissue fragment measuring up to 0 2 cm in greatest dimension  Entirely submitted  One cassette  B  The specimen is received in formalin, labeled with the patient's name and hospital number, and is designated " Sigmoid polyp"  The specimen consists of a single, rubbery, tan-brown tissue fragment measuring up to 0 2 cm in greatest dimension  Entirely submitted  One cassette  C  The specimen is received in formalin, labeled with the patient's name and hospital number, and is designated " Distal sigmoid colon  "   The specimen consists of a single rubbery red-tan pedunculated polyp measuring 1 0 x 0 6 x 0 5 cm in greatest dimension  Apparent resected surfaces is inked blue  Bisected  Entirely submitted  One cassette  Note: The estimated total formalin fixation time based upon information provided by the submitting clinician and the standard processing schedule is less than 72 hours  [facility]      Clinical Information Cold snare        Orders Placed This Encounter   Procedures    Ambulatory Referral to Otolaryngology       ALLERGIES:  Allergies   Allergen Reactions    Escitalopram Swelling and Edema     Category: Allergy;     Fluticasone-Salmeterol     Latex      irritation    Nsaids      Action Taken: stomach issues;     Other      Other reaction(s): rash       Current Medications     Current Outpatient Prescriptions   Medication Sig Dispense Refill    ciprofloxacin-dexamethasone (CIPRODEX) otic suspension Administer 4 drops into the left ear 2 (two) times a day for 7 days 7 5 mL 0    D-Ribose POWD by Does not apply route      DULoxetine (CYMBALTA) 60 mg delayed release capsule Take 60 mg by mouth daily        hydrochlorothiazide (HYDRODIURIL) 12 5 mg tablet Take 12 5 mg by mouth daily        LORazepam (ATIVAN) 0 5 mg tablet Take 0 5 mg by mouth as needed        MAGNESIUM MALATE PO Take by mouth daily at bedtime      omeprazole (PriLOSEC) 20 mg delayed release capsule Take 20 mg by mouth as needed      traMADol (ULTRAM) 50 mg tablet Take 50 mg by mouth every 6 (six) hours as needed for moderate pain      zolpidem (AMBIEN CR) 12 5 MG CR tablet Zolpidem Tartrate ER 12 5 MG Oral Tablet Extended Release   Quantity: 60 00; Refills: 0      , M D ;  Started 7-Dec-2011  Active      amoxicillin (AMOXIL) 875 mg tablet Take 1 tablet (875 mg total) by mouth 2 (two) times a day for 7 days 14 tablet 0    Magnesium 250 MG TABS Take 1 tablet by mouth daily      Magnesium Gluconate 250 MG TABS Take 1 tablet by mouth daily       No current facility-administered medications for this visit  Health Maintenance     Health Maintenance   Topic Date Due    HIV SCREENING  1960    Hepatitis C Screening  1960    DTaP,Tdap,and Td Vaccines (1 - Tdap) 07/21/1969    INFLUENZA VACCINE  09/01/2017    Depression Screening PHQ-9  02/20/2019    COLONOSCOPY  12/04/2020     Immunization History   Administered Date(s) Administered    Influenza TIV (IM) 02/15/2017       Gil Clark MD

## 2018-03-07 NOTE — PSYCH
Discharge Summary  Psychiatric Therapist Discharge Summary [facility]:   Discharge Summary: Admission Date: 1/30/15    Patient was referred by [employer]  Discharge Date: 1/28/16  the patient chose to be discharged AMA  Treating Physician: Dr Kurtis Ibarra    Prognosis at time of discharge is fair  Presenting Problem/Pertinent findings:  Stress at work  "Her boss is out to get rid of her " Stress triggered the fiber myalgia and [place] which led to depression  CT was out of work Sept through Dec on leave then back for 6 days then out again for [employer]  Course of treatment includes  individual counseling, group counseling, cognitive behavioral and eclectic  Treatment Progress: PT attending individual therapy and one group on Mindfulness  She made mild progress in treatment  Due to no appts scheduled MSW sent pt a letter  No response  Criteria for Discharge: The patient has   AMA  Aftercare recommendations include: Follow up with Dr Kimo Lai    1  Anxiety disorder, unspecified anxiety disorder type (300 00) (F41 9)   2  Chronic fatigue syndrome (780 71) (R53 82)   3  Fibromyalgia (729 1) (M79 7)   4  Gluten intolerance (579 0) (K90 0)   5  Insomnia (780 52) (G47 00)   6  Major depressive disorder, recurrent, in full remission (296 36) (F33 42)   7  Sleep apnea (780 57) (G47 30)    Past Medical History    1  Denied: History of head injury   2  Denied: History of Seizures    Social History    · Never smoked   · No alcohol use   · No drug use    Allergies    1  Lexapro TABS    Current Meds   1  Advair Diskus 250-50 MCG/DOSE Inhalation Aerosol Powder Breath Activated; Therapy: 70HHL1341 to (Last VM:04DKZ2021)  Requested for: 21Jan2011 Ordered   2  Josephine 90-20 MCG Oral Tablet; Therapy: 60KBX0100 to (Last Rx:63Nzg1981)  Requested for: 20Wqb6913 Ordered   3  Azithromycin 250 MG Oral Tablet; Therapy: 14XRM2885 to (Last MH:53CVA8426)  Requested for: 76EML0548 Ordered   4  Betamethasone Valerate 0 1 % External Ointment; Therapy: 52VQJ1864 to (Last Rx:26Sep2011)  Requested for: 08NHM7450 Ordered   5  Citalopram Hydrobromide 20 MG Oral Tablet; Therapy: 44WQH8057 to (Last Rx:91Kns3317)  Requested for: 31Owg6728 Ordered   6  Citalopram Hydrobromide 40 MG Oral Tablet; Therapy: 56FJP9646 to (Last Rx:30Sep2011)  Requested for: 33Uuv2363 Ordered   7  DULoxetine HCl - 60 MG Oral Capsule Delayed Release Particles; TAKE 1 CAPSULE   DAILY; Therapy: 98LPU8105 to (Evaluate:04Nzj2323)  Requested for: 53ZNC8615; Last   Rx:25Jan2016 Ordered   8  Flovent  MCG/ACT Inhalation Aerosol; Therapy: 12IPR1117 to (Last Rx:26Sep2011)  Requested for: 74DWO7851 Ordered   9  Fluticasone Propionate 50 MCG/ACT Nasal Suspension; Therapy: 37RQS4984 to (Last Rx:80Atg5669)  Requested for: 37Ykj5342 Ordered   10  Gabapentin 100 MG Oral Capsule; Therapy: 89BAW0567 to (Last Rx:09Jan2012)  Requested for: 63MKM9111 Ordered   11  Gabapentin 300 MG Oral Capsule; Therapy: 54ANY0584 to (Last Rx:99Oce8326)  Requested for: 06Yig0214 Ordered   12  Ketorolac Tromethamine 0 5 % Ophthalmic Solution; Therapy: 00UJJ8271 to (Last Rx:06Jun2011)  Requested for: 25RID2384 Ordered   13  LORazepam 0 5 MG Oral Tablet; TAKE 1 TABLET TWICE DAILY AS NEEDED; Therapy: 90YQF5167 to (Evaluate:29Mar2016); Last Rx:14Syh6614 Ordered   14  Lybrel 90-20 MCG TABS (Levonorgestrel-Ethinyl Estrad); Therapy: 23DMN6989 to (Last Rx:09Hei3813)  Requested for: 76Lys1986 Ordered   15  MethylPREDNISolone (Sree) 4 MG Oral Tablet; Therapy: 22LJG5320 to (Last Rx:14Feb2011)  Requested for: 75SPC4182 Ordered   16  Omeprazole 20 MG Oral Capsule Delayed Release; Therapy: 08LUH3267 to (Last Rx:10Mar2011)  Requested for: 07LIX9848 Ordered   17  ProAir  (90 Base) MCG/ACT Inhalation Aerosol Solution; Therapy: 47WAG4718 to (Last Rx:49Crc4149)  Requested for: 57OEH9606 Ordered   18   Promethazine-Codeine 6 25-10 MG/5ML Oral Syrup; Therapy: 47DPY8753 to (Last WA:72SBS2500)  Requested for: 66ONV5015 Ordered   19  Singulair 10 MG Oral Tablet (Montelukast Sodium); Therapy: 22ZAF3605 to (Vel Wilson)  Requested for: 75RMN1763 Ordered   20  Tobramycin Sulfate 0 3 % SOLN;    Therapy: 77KSX4566 to (Last PE:66BWR1756)  Requested for: 55EEH8514 Ordered   21  TraMADol HCl - 50 MG Oral Tablet; Therapy: 90MHG8973 to (Last Rx:54Fbk8389)  Requested for: 00Ico2007 Ordered   22  Zolpidem Tartrate ER 12 5 MG Oral Tablet Extended Release; Therapy: 22QGD1963 to (Last Rx:81Aja7661)  Requested for: 63Wue7634 Ordered   23  Zolpidem Tartrate ER 6 25 MG Oral Tablet Extended Release (Ambien CR); TAKE 1    TABLET AT  BEDTIME AS NEEDED FOR INSOMNIA; Therapy: 56TYK7318 to (Gudelia Negron)  Requested for: 50ZFA5496; Last    Rx:27Lsk1877 Ordered    Assessment    1  Major depressive disorder, recurrent, in full remission (296 36) (F33 42)   2  Anxiety disorder, unspecified anxiety disorder type (300 00) (F41 9)    Future Appointments    Date/Time Provider Specialty Site   02/22/2016 02:30 PM DEBI Shah   Psychiatry [facility]     Signatures   Electronically signed by : Tonio Castañeda LCSW; Jan 28 2016  2:33PM EST                       (Author)    Electronically signed by : DEBI Moeller ; Jan 28 2016  3:31PM EST                       (Author)

## 2018-04-16 ENCOUNTER — OFFICE VISIT (OUTPATIENT)
Dept: PSYCHIATRY | Facility: CLINIC | Age: 58
End: 2018-04-16
Payer: COMMERCIAL

## 2018-04-16 VITALS
SYSTOLIC BLOOD PRESSURE: 117 MMHG | HEART RATE: 82 BPM | BODY MASS INDEX: 31.65 KG/M2 | DIASTOLIC BLOOD PRESSURE: 82 MMHG | HEIGHT: 62 IN | WEIGHT: 172 LBS

## 2018-04-16 DIAGNOSIS — G47.09 OTHER INSOMNIA: Chronic | ICD-10-CM

## 2018-04-16 DIAGNOSIS — F41.1 GAD (GENERALIZED ANXIETY DISORDER): Chronic | ICD-10-CM

## 2018-04-16 DIAGNOSIS — F33.42 MAJOR DEPRESSIVE DISORDER, RECURRENT EPISODE, IN FULL REMISSION (HCC): Primary | Chronic | ICD-10-CM

## 2018-04-16 PROBLEM — E88.1 LIPODYSTROPHY: Status: ACTIVE | Noted: 2017-01-11

## 2018-04-16 PROCEDURE — 99213 OFFICE O/P EST LOW 20 MIN: CPT | Performed by: PSYCHIATRY & NEUROLOGY

## 2018-04-16 RX ORDER — DULOXETIN HYDROCHLORIDE 60 MG/1
60 CAPSULE, DELAYED RELEASE ORAL DAILY
Qty: 90 CAPSULE | Refills: 1 | Status: SHIPPED | OUTPATIENT
Start: 2018-04-16 | End: 2018-05-08

## 2018-04-16 NOTE — PSYCH
TREATMENT PLAN (Medication Management Only)        [facility]    Name and Date of Birth:  Christos Acosta 56 y o  1960  Date of Treatment Plan: April 16, 2018  Diagnosis/Diagnoses:   1  Major depressive disorder, recurrent episode, in full remission ([facility] [address] )    2  JA (generalized anxiety disorder)    3  Other insomnia      Strengths/Personal Resources for Self-Care: "meditation, dog, friends and family"  Area/Areas of need (in own words): "physical - energy levels continue to be an issue"  1  Long Term Goal: maintain control of depression  Target Date: 3 months - 7/16/2018  Person/Persons responsible for completion of goal: Lydia Landaverde  2  Short Term Objective (s) - How will we reach this goal?:   A  Provider new recommended medication/dosage changes and/or continue medication(s): continue current medications as prescribed (Cymbalta, Ativan and Ambien CR)  B   N/A   C   N/A  Target Date: 3 months - 7/16/2018  Person/Persons Responsible for Completion of Goal: Lydia Landaverde   Progress Towards Goals: stable  Treatment Modality: medication management every 3 months  Review due 90 to 120 days from date of this plan: 4 months - 8/16/2018  Expected length of service: maintenance  My Physician/PA/NP and I have developed this plan together and I agree to work on the goals and objectives  I understand the treatment goals that were developed for my treatment    Signature:       Date and time:  Signature of parent/guardian if under age of 15 years: Date and time:  Signature of provider:      Date and time:  Signature of Supervising Physician:    Date and time: 4/16/2018      Yessy Garcia MD

## 2018-04-16 NOTE — PSYCH
MEDICATION MANAGEMENT NOTE        [address] [facility]      Name and Date of Birth:  Ricardo Patterson 56 y o  1960    Date of Visit: April 16, 2018    SUBJECTIVE:    Genesis Lawson continues to do well since the last visit  She reports that mood has been stable, denies any significant depressive symptoms  She reports occasional anxiety symptoms - had a panic attack last week "first time in a long time"  Otherwise feels anxiety symptoms are controlled  She denies any suicidal ideation, intent or plan at present; denies any homicidal ideation, intent or plan at present  She has no auditory hallucinations, denies any visual hallucinations, has no overt delusions  She denies any side effects from psychiatric medications  PHQ-9 is 3 today (decreased from 4 at the last visit)  Amisha Cutting HPI ROS Appetite Changes and Sleep: normal sleep, normal appetite, recent weight gain (3 lbs), low energy    Review Of Systems:      Constitutional feeling tired and recent weight gain (3 lbs)   ENT negative   Cardiovascular negative   Respiratory negative   Gastrointestinal negative   Genitourinary negative   Musculoskeletal neck pain   Integumentary negative   Neurological negative   Endocrine negative   Other Symptoms none       Past Psychiatric History:     Past Inpatient Psychiatric Treatment:   No history of past inpatient psychiatric admissions  Past Outpatient Psychiatric Treatment:    In outpatient treatment at [address] [place] [address] E for several years    Was in outpatient psychiatric treatment in the past in Intensive Partial Program in 2015  Past Suicide Attempts: no  Past Violent Behavior: no  Past Psychiatric Medication Trials: Celexa, Cymbalta, Wellbutrin, Ativan and Ambien CR    Traumatic History:     Abuse: no history of sexual abuse, no history of physical abuse  Other Traumatic Events: none     Past Medical History:    Past Medical History:   Diagnosis Date    Anemia     Asthma 10/26/2012    Chronic fatigue syndrome     Patrick-Barr virus seropositivity     Fibromyalgia, primary     Gluten intolerance     Hypertension     Hypovitaminosis D     Impaired fasting glucose     Lipodystrophy     Liver lesion     Migraine     Sleep apnea      Past Medical History Pertinent Negatives:   Diagnosis Date Noted    ADHD (attention deficit hyperactivity disorder) 02/20/2018    Allergic 02/20/2018    Allergic rhinitis 02/20/2018    Anxiety 02/20/2018    Arthritis 02/20/2018    Cancer ([facility] ) 02/20/2018    CHF (congestive heart failure) ([facility] ) 02/20/2018    Chronic kidney disease 02/20/2018    Clotting disorder ([facility] ) 02/20/2018    COPD (chronic obstructive pulmonary disease) ([facility] ) 02/20/2018    Diabetes mellitus ([facility] ) 02/20/2018    Disease of thyroid gland 02/20/2018    Eating disorder 02/20/2018    Eczema 02/20/2018    Emphysema of lung ([facility] ) 02/20/2018    Failure to thrive (child) 02/20/2018    GERD (gastroesophageal reflux disease) 02/20/2018    Glaucoma 02/20/2018    Head injury     Heart murmur 02/20/2018    HIV disease ([facility] ) 02/20/2018    HL (hearing loss) 02/20/2018    Infectious viral hepatitis 02/20/2018    Inflammatory bowel disease 02/20/2018    Jaundice 02/20/2018    Meningitis 02/20/2018    Myocardial infarction ([facility] ) 02/20/2018    Obesity 02/20/2018    Osteoporosis 02/20/2018    Otitis media 02/20/2018    Peptic ulceration 02/20/2018    Pneumonia 02/20/2018    Scoliosis 02/20/2018    Seizures ([facility] ) 02/20/2018    Sickle cell anemia ([facility] ) 02/20/2018    Stroke ([facility] ) 02/20/2018    Substance abuse 02/20/2018    Tuberculosis 02/20/2018    Urinary tract infection 02/20/2018    Varicella 02/20/2018    Visual impairment 02/20/2018     Allergies   Allergen Reactions    Escitalopram Swelling and Edema     Category:  Allergy;     Fluticasone-Salmeterol     Latex      irritation  irritation    Nsaids      Action Taken: stomach issues;     Other Other reaction(s): rash       Substance Abuse History:    History   Alcohol Use No     History   Drug Use No       Social History:    Social History     Social History    Marital status: Single     Spouse name: N/A    Number of children: 0    Years of education: bachelor's degree     Occupational History    works for [employer]     Social History Main Topics    Smoking status: Never Smoker    Smokeless tobacco: Never Used    Alcohol use No    Drug use: No    Sexual activity: No     Other Topics Concern    Not on file     Social History Narrative    Education: bachelor's degree in sociology    Learning Disabilities: none    Marital History: single    Children: none    Living Arrangement: lives alone    Occupational History: works for [employer]: limited support system    Legal History: none     History: None       Family Psychiatric History:     Family History   Problem Relation Age of Onset    Depression Mother        History Review:  The following portions of the patient's history were reviewed and updated as appropriate: allergies, current medications, past family history, past medical history, past social history, past surgical history and problem list          OBJECTIVE:     Vital signs in last 24 hours:    Vitals:    04/16/18 1639   BP: 117/82   Cuff Size: Large   Pulse: 82   Weight: 78 kg (172 lb)   Height: 5' 2" (1 575 m)       Mental Status Evaluation:    Appearance age appropriate, casually dressed   Behavior cooperative, calm   Speech normal rate, normal volume, normal pitch   Mood euthymic   Affect normal range and intensity, appropriate   Thought Processes organized, goal directed   Associations intact associations   Thought Content no overt delusions   Perceptual Disturbances: no auditory hallucinations, no visual hallucinations   Abnormal Thoughts  Risk Potential Suicidal ideation - None  Homicidal ideation - None  Potential for aggression - No   Orientation oriented to person, place, time/date and situation   Memory recent and remote memory grossly intact   Consciousness alert and awake   Attention Span Concentration Span attention span and concentration are age appropriate   Intellect appears to be of average intelligence   Insight intact   Judgement intact   Muscle Strength and  Gait muscle strength and tone were normal, normal gait , normal balance   Motor activity no abnormal movements   Language no difficulty naming common objects, no difficulty repeating a phrase, no difficulty writing a sentence   Fund of Knowledge adequate knowledge of current events  adequate fund of knowledge regarding past history  adequate fund of knowledge regarding vocabulary    Pain severe   Pain Scale 8       Laboratory Results: I have personally reviewed all pertinent laboratory/tests results  Most Recent Labs:   Lab Results   Component Value Date    WBC 6 34 11/15/2017    RBC 4 48 11/15/2017    HGB 13 2 11/15/2017    HCT 39 0 11/15/2017     11/15/2017    RDW 13 0 11/15/2017    NEUTROABS 3 59 11/15/2017     11/15/2017    K 3 1 (L) 11/15/2017     11/15/2017    CO2 29 11/15/2017    BUN 13 11/15/2017    CREATININE 0 79 11/15/2017    GLUCOSE 125 11/15/2017    CALCIUM 9 9 11/15/2017    AST 17 07/12/2017    ALT 38 07/12/2017    ALKPHOS 75 07/12/2017    PROT 6 9 07/12/2017    BILITOT 0 41 07/12/2017    CHOL 191 07/12/2017    HDL 58 07/12/2017    TRIG 141 07/12/2017    LDLCALC 105 (H) 07/12/2017    [id number] 1 28 11/16/2016       Assessment/Plan:       Diagnoses and all orders for this visit:    Major depressive disorder, recurrent episode, in full remission ([facility] )  -     DULoxetine (CYMBALTA) 60 mg delayed release capsule; Take 1 capsule (60 mg total) by mouth daily for 180 days    JA (generalized anxiety disorder)  -     DULoxetine (CYMBALTA) 60 mg delayed release capsule;  Take 1 capsule (60 mg total) by mouth daily for 180 days    Other insomnia        Treatment Recommendations/Precautions:    Continue Cymbalta 60 mg daily to improve depressive symptoms  Continue Ambien CR 12 5 mg at bedtime as needed to help with insomnia  Continue Ativan 0 5 mg bid PRN to improve anxiety symptoms  Medication management every 3 months  Follows with family physician for medical issues    Risks/Benefits      Risks, Benefits And Possible Side Effects Of Medications:    Risks, benefits, and possible side effects of medications explained to Maru Ortiz including risk of suicidality related to treatment with antidepressants and risks of dependence, sedation and respiratory depression related to treatment with benzodiazepine medications  She verbalizes understanding and agreement for treatment  Controlled Medication Discussion:     Maru Ortiz has been filling controlled prescriptions on time as prescribed according to [facility] program    Discussed with Maru Ortiz the risks of sedation, respiratory depression, impairment of ability to drive and potential for abuse and addiction related to treatment with benzodiazepine medications  She understands risk of treatment with benzodiazepine medications, agrees to not drive if feels impaired and agrees to take medications as prescribed      Psychotherapy Provided:     Individual psychotherapy provided: No

## 2018-05-08 ENCOUNTER — OFFICE VISIT (OUTPATIENT)
Dept: FAMILY MEDICINE CLINIC | Facility: CLINIC | Age: 58
End: 2018-05-08
Payer: COMMERCIAL

## 2018-05-08 VITALS
WEIGHT: 174.6 LBS | RESPIRATION RATE: 16 BRPM | TEMPERATURE: 97.5 F | HEIGHT: 62 IN | BODY MASS INDEX: 32.13 KG/M2 | DIASTOLIC BLOOD PRESSURE: 82 MMHG | SYSTOLIC BLOOD PRESSURE: 120 MMHG | HEART RATE: 68 BPM

## 2018-05-08 DIAGNOSIS — Z12.4 PAP SMEAR FOR CERVICAL CANCER SCREENING: ICD-10-CM

## 2018-05-08 DIAGNOSIS — Z00.00 HEALTHCARE MAINTENANCE: ICD-10-CM

## 2018-05-08 DIAGNOSIS — M79.7 FIBROMYALGIA: ICD-10-CM

## 2018-05-08 DIAGNOSIS — F41.9 ANXIETY AND DEPRESSION: ICD-10-CM

## 2018-05-08 DIAGNOSIS — D64.9 ANEMIA, UNSPECIFIED TYPE: ICD-10-CM

## 2018-05-08 DIAGNOSIS — Z12.31 SCREENING MAMMOGRAM, ENCOUNTER FOR: ICD-10-CM

## 2018-05-08 DIAGNOSIS — F32.A ANXIETY AND DEPRESSION: ICD-10-CM

## 2018-05-08 DIAGNOSIS — R53.83 FATIGUE, UNSPECIFIED TYPE: ICD-10-CM

## 2018-05-08 DIAGNOSIS — Z13.220 SCREENING FOR LIPID DISORDERS: ICD-10-CM

## 2018-05-08 DIAGNOSIS — I10 HYPERTENSION, UNSPECIFIED TYPE: Primary | ICD-10-CM

## 2018-05-08 DIAGNOSIS — Z00.00 ROUTINE HEALTH MAINTENANCE: ICD-10-CM

## 2018-05-08 DIAGNOSIS — G47.30 SLEEP APNEA, UNSPECIFIED TYPE: ICD-10-CM

## 2018-05-08 PROCEDURE — 99214 OFFICE O/P EST MOD 30 MIN: CPT | Performed by: FAMILY MEDICINE

## 2018-05-08 RX ORDER — DULOXETIN HYDROCHLORIDE 30 MG/1
30 CAPSULE, DELAYED RELEASE ORAL DAILY
Qty: 90 CAPSULE | Refills: 2 | Status: SHIPPED | OUTPATIENT
Start: 2018-05-08 | End: 2019-07-19 | Stop reason: SDUPTHER

## 2018-05-08 NOTE — PROGRESS NOTES
FAMILY PRACTICE OFFICE VISIT       NAME: Rosa Cortez  AGE: 56 y o  SEX: female       : 1960        MRN: [id number]    DATE: 5/10/2018  TIME: 9:57 PM    Assessment and Plan     Problem List Items Addressed This Visit     Anemia       Will recheck blood work including iron studies  Maintain iron rich foods  Relevant Orders    Ferritin    Iron    Hypertension - Primary       BP overall stable  Continue with hydrochlorothiazide and lifestyle modifications  Relevant Orders    Comprehensive metabolic panel    TSH, 3rd generation with T4 reflex    Fibromyalgia       Patient is currently weaning herself off Cymbalta  Fibromyalgia symptoms have been stable  Patient does take CBD oil to help her sleep  Recommend maintaining adequate exercise regimen and eating well-balanced diet  Will monitor  Relevant Medications    DULoxetine (CYMBALTA) 30 mg delayed release capsule    Other Relevant Orders    TSH, 3rd generation with T4 reflex    Sleep apnea       Overall stable however patient is due for sleep evaluation  Referral has been provided  Relevant Orders    Ambulatory referral to Sleep Medicine    Anxiety and depression       Patient states her mood is overall stable  She is weaning herself off Cymbalta and feeling well  She will also notify her psychiatrist of this as well  She will continue to monitor her mood  Relevant Medications    DULoxetine (CYMBALTA) 30 mg delayed release capsule    Other Relevant Orders    TSH, 3rd generation with T4 reflex    Routine health maintenance       Up-to-date with colonoscopy done by Dr Sinai Christian  In 2017 with recommended repeat in 3 years as patient had polyp  Rx for mammogram provided    Patient will schedule appointment with gyn for Pap exam            Other Visit Diagnoses     Screening mammogram, encounter for        Relevant Orders    Mammo screening bilateral w 3d & cad    Screening for lipid disorders Relevant Orders    Lipid Panel with Direct LDL reflex    Healthcare maintenance        Relevant Orders    HEMOGLOBIN A1C W/ EAG ESTIMATION    Fatigue, unspecified type        Relevant Orders    CBC and differential    Vitamin D 25 hydroxy    Vitamin B12    Pap smear for cervical cancer screening        Relevant Orders    Ambulatory referral to Gynecology            There are no Patient Instructions on file for this visit  Chief Complaint     Chief Complaint   Patient presents with    Follow-up     Pt is here for followup visit        History of Present Illness     HPI    51-year-old female here for follow-up of chronic medical conditions, doing well overall  Patient states she started to wean herself off Cymbalta as she was experiencing side effects  She has been tapering for the past 2 months, initially was on 60 milligrams now is on 30 milligrams  Patient has been open her capsule and taking half of the 60 milligrams  Patient feels most of her side effects have resolved  Patient states her energy level has increased now that she has started tapering off the Cymbalta  Patient states her mood is stable overall  She will notify her psychiatrist of this as well  Her fibromyalgia symptoms are overall stable as well  energy is increased    Review of Systems   Review of Systems   Constitutional: Negative for unexpected weight change  HENT: Negative  Eyes: Negative for visual disturbance  Respiratory: Negative for shortness of breath  Cardiovascular: Negative  Negative for chest pain, palpitations and leg swelling  Gastrointestinal: Negative for abdominal pain and constipation  Genitourinary: Negative for dysuria  Musculoskeletal:        H/o  fibromyalgia   Psychiatric/Behavioral: Negative for dysphoric mood and sleep disturbance         Active Problem List     Patient Active Problem List   Diagnosis    Left ear pain    Anemia    Asthma    Chronic fatigue syndrome    Hypertension    Fibromyalgia    JA (generalized anxiety disorder)    Gluten intolerance    Hypovitaminosis D    Impaired fasting glucose    Liver lesion    Lipodystrophy    Low hemoglobin    Major depressive disorder, recurrent episode, in full remission ([facility] )    Migraine    Myalgia and myositis    Other insomnia    Rosacea    Sleep apnea    Anxiety and depression    Routine health maintenance       Past Medical History:  Past Medical History:   Diagnosis Date    Anemia     Asthma 10/26/2012    Chronic fatigue syndrome     Patrick-Barr virus seropositivity     Fibromyalgia, primary     Gluten intolerance     Hypertension     Hypovitaminosis D     Impaired fasting glucose     Lipodystrophy     Liver lesion     Migraine     Sleep apnea        Past Surgical History:  Past Surgical History:   Procedure Laterality Date    APPENDECTOMY      CHOLECYSTECTOMY      NM COLONOSCOPY FLX DX W/COLLJ SPEC WHEN PFRMD N/A 12/4/2017    Procedure: COLONOSCOPY;  Surgeon: Stefany Galicia DO;  [facility];   Service: Gastroenterology       Family History:  Family History   Problem Relation Age of Onset    Depression Mother        Social History:  Social History     Social History    Marital status: Single     Spouse name: N/A    Number of children: 0    Years of education: bachelor's degree     Occupational History    works for [employer]     Social History Main Topics    Smoking status: Never Smoker    Smokeless tobacco: Never Used    Alcohol use No    Drug use: No    Sexual activity: No     Other Topics Concern    Not on file     Social History Narrative    Education: bachelor's degree in sociology    Learning Disabilities: none    Marital History: single    Children: none    Living Arrangement: lives alone    Occupational History: works for [employer]: limited support system    Legal History: none  History: None     I have reviewed the patient's medical history in detail; there are no changes to the history as noted in the electronic medical record  Objective     Vitals:    05/08/18 1816   BP: 120/82   Pulse:    Resp:    Temp:      Wt Readings from Last 3 Encounters:   05/08/18 79 2 kg (174 lb 9 6 oz)   04/16/18 78 kg (172 lb)   03/06/18 78 1 kg (172 lb 3 2 oz)     Vitals:    05/08/18 1731 05/08/18 1816   BP: 122/88 120/82   BP Location: Left arm    Patient Position: Sitting    Cuff Size: Adult    Pulse: 68    Resp: 16    Temp: 97 5 °F (36 4 °C)    TempSrc: Tympanic    Weight: 79 2 kg (174 lb 9 6 oz)    Height: 5' 2" (1 575 m)        Physical Exam   Constitutional: She is oriented to person, place, and time  She appears well-developed and well-nourished  HENT:   Head: Normocephalic and atraumatic  Mouth/Throat: Oropharynx is clear and moist      TMs intact and clear   Eyes: Conjunctivae and EOM are normal  Pupils are equal, round, and reactive to light  Neck: Normal range of motion  Neck supple  No thyromegaly present  Cardiovascular: Normal rate, regular rhythm and normal heart sounds  Pulmonary/Chest: Effort normal and breath sounds normal    Abdominal: Soft  Bowel sounds are normal  She exhibits no distension  There is no tenderness  Musculoskeletal: Normal range of motion  She exhibits no edema  Lymphadenopathy:     She has no cervical adenopathy  Neurological: She is alert and oriented to person, place, and time  Psychiatric: She has a normal mood and affect  Nursing note and vitals reviewed        Pertinent Laboratory/Diagnostic Studies:  Lab Results   Component Value Date    GLUCOSE 125 11/15/2017    BUN 13 11/15/2017    CREATININE 0 79 11/15/2017    CALCIUM 9 9 11/15/2017     11/15/2017    K 3 1 (L) 11/15/2017    CO2 29 11/15/2017     11/15/2017     Lab Results   Component Value Date    ALT 38 07/12/2017    AST 17 07/12/2017    ALKPHOS 75 07/12/2017 BILITOT 0 41 07/12/2017       Lab Results   Component Value Date    WBC 6 34 11/15/2017    HGB 13 2 11/15/2017    HCT 39 0 11/15/2017    MCV 87 11/15/2017     11/15/2017       No results found for: TSH    Lab Results   Component Value Date    CHOL 191 07/12/2017     Lab Results   Component Value Date    TRIG 141 07/12/2017     Lab Results   Component Value Date    HDL 58 07/12/2017     Lab Results   Component Value Date    LDLCALC 105 (H) 07/12/2017     Lab Results   Component Value Date    HGBA1C 5 7 07/12/2017       Results for orders placed or performed during the hospital encounter of 12/04/17   Tissue Exam   Result Value Ref Range    Case Report       Surgical Pathology Report                         Case: [id number]                                   Authorizing Provider:  Pat Wilkins DO        Collected:           12/04/2017 1045              Ordering Location:     [facility]        Received:            12/04/2017 [address]                                                      Pathologist:           Jakob Nascimento MD                                                        Specimens:   A) - Polyp, Colorectal, descending colon                                                            B) - Polyp, Colorectal, sigmoid colon                                                               C) - Polyp, Colorectal, distal sigmoid colon                                               Final Diagnosis       A  Descending colon polyp:     - Tubular adenoma  - Negative for high grade dysplasia and malignancy  B   Sigmoid colon polyp:     - Tubular adenoma  - Negative for high grade dysplasia and malignancy  C   Distal sigmoid colon polyp:     - High-grade dysplasia in a tubular adenoma  - Resection margin negative for dysplasia  Note       Interpretation performed at [facility], [address]      Additional Information       All controls performed with the immunohistochemical stains reported above reacted appropriately  These tests were developed and their performance characteristics determined by [facility] or [facility]  They may not be cleared or approved by the [place]  Food and Drug Administration  The FDA has determined that such clearance or approval is not necessary  These tests are used for clinical purposes  They should not be regarded as investigational or for research  This laboratory has been approved by [facility], designated as a high-complexity laboratory and is qualified to perform these tests  Gross Description       A  The specimen is received in formalin, labeled with the patient's name and hospital number, and is designated "   Descending colon"  The specimen consists of a single, rubbery, tan-brown tissue fragment measuring up to 0 2 cm in greatest dimension  Entirely submitted  One cassette  B  The specimen is received in formalin, labeled with the patient's name and hospital number, and is designated " Sigmoid polyp"  The specimen consists of a single, rubbery, tan-brown tissue fragment measuring up to 0 2 cm in greatest dimension  Entirely submitted  One cassette  C  The specimen is received in formalin, labeled with the patient's name and hospital number, and is designated " Distal sigmoid colon  "  The specimen consists of a single rubbery red-tan pedunculated polyp measuring 1 0 x 0 6 x 0 5 cm in greatest dimension  Apparent resected surfaces is inked blue  Bisected  Entirely submitted  One cassette  Note: The estimated total formalin fixation time based upon information provided by the submitting clinician and the standard processing schedule is less than 72 hours    [facility]      Clinical Information Cold snare        Orders Placed This Encounter   Procedures    Mammo screening bilateral w 3d & cad    CBC and differential    Comprehensive metabolic panel    Lipid Panel with Direct LDL reflex    TSH, 3rd generation with T4 reflex    Ferritin    HEMOGLOBIN A1C W/ EAG ESTIMATION    Vitamin D 25 hydroxy    Vitamin B12    Iron    Ambulatory referral to Gynecology    Ambulatory referral to Sleep Medicine       ALLERGIES:  Allergies   Allergen Reactions    Escitalopram Swelling and Edema     Category: Allergy;     Fluticasone-Salmeterol     Latex      irritation  irritation    Nsaids      Action Taken: stomach issues;     Other      Other reaction(s): rash       Current Medications     Current Outpatient Prescriptions   Medication Sig Dispense Refill    D-Ribose POWD by Does not apply route      hydrochlorothiazide (HYDRODIURIL) 12 5 mg tablet Take 12 5 mg by mouth daily        LORazepam (ATIVAN) 0 5 mg tablet Take 0 5 mg by mouth as needed        Magnesium Gluconate 250 MG TABS Take 1 tablet by mouth daily      MAGNESIUM MALATE PO Take by mouth daily at bedtime      traMADol (ULTRAM) 50 mg tablet Take 50 mg by mouth every 6 (six) hours as needed for moderate pain      zolpidem (AMBIEN CR) 12 5 MG CR tablet Zolpidem Tartrate ER 12 5 MG Oral Tablet Extended Release   Quantity: 60 00; Refills: 0      , M D ;  Started 7-Dec-2011  Active      ciprofloxacin-dexamethasone (CIPRODEX) otic suspension Administer 4 drops into the left ear 2 (two) times a day for 7 days 7 5 mL 0    DULoxetine (CYMBALTA) 30 mg delayed release capsule Take 1 capsule (30 mg total) by mouth daily 90 capsule 2     No current facility-administered medications for this visit            Health Maintenance     Health Maintenance   Topic Date Due    HIV SCREENING  1960    PNEUMOCOCCAL POLYSACCHARIDE VACCINE AGE 2-64 HIGH RISK  07/21/1964    DTaP,Tdap,and Td Vaccines (1 - Tdap) 07/21/1983    INFLUENZA VACCINE  09/01/2018    Depression Screening PHQ-9 04/16/2019    COLONOSCOPY  12/04/2020    Hepatitis C Screening  Completed     Immunization History   Administered Date(s) Administered    Influenza TIV (IM) 02/15/2017       Dolores Pepper MD

## 2018-05-10 PROBLEM — F41.9 ANXIETY AND DEPRESSION: Status: ACTIVE | Noted: 2018-05-10

## 2018-05-10 PROBLEM — J32.9 SINUSITIS: Status: RESOLVED | Noted: 2018-03-06 | Resolved: 2018-05-10

## 2018-05-10 PROBLEM — Z00.00 ROUTINE HEALTH MAINTENANCE: Status: ACTIVE | Noted: 2018-05-10

## 2018-05-10 PROBLEM — F32.A ANXIETY AND DEPRESSION: Status: ACTIVE | Noted: 2018-05-10

## 2018-05-11 NOTE — ASSESSMENT & PLAN NOTE
Patient states her mood is overall stable  She is weaning herself off Cymbalta and feeling well  She will also notify her psychiatrist of this as well  She will continue to monitor her mood

## 2018-05-11 NOTE — ASSESSMENT & PLAN NOTE
Patient is currently weaning herself off Cymbalta  Fibromyalgia symptoms have been stable  Patient does take CBD oil to help her sleep  Recommend maintaining adequate exercise regimen and eating well-balanced diet  Will monitor

## 2018-05-11 NOTE — ASSESSMENT & PLAN NOTE
Up-to-date with colonoscopy done by Dr Patricio Mendez  In December/2017 with recommended repeat in 3 years as patient had polyp  Rx for mammogram provided    Patient will schedule appointment with gyn for Pap exam

## 2018-06-06 ENCOUNTER — OFFICE VISIT (OUTPATIENT)
Dept: SLEEP CENTER | Facility: CLINIC | Age: 58
End: 2018-06-06
Payer: COMMERCIAL

## 2018-06-06 VITALS
HEIGHT: 62 IN | HEART RATE: 75 BPM | DIASTOLIC BLOOD PRESSURE: 84 MMHG | BODY MASS INDEX: 31.83 KG/M2 | SYSTOLIC BLOOD PRESSURE: 138 MMHG | WEIGHT: 173 LBS

## 2018-06-06 DIAGNOSIS — M79.7 FIBROMYALGIA: ICD-10-CM

## 2018-06-06 DIAGNOSIS — G47.33 OSA (OBSTRUCTIVE SLEEP APNEA): Primary | ICD-10-CM

## 2018-06-06 DIAGNOSIS — E66.9 OBESITY (BMI 30-39.9): ICD-10-CM

## 2018-06-06 DIAGNOSIS — G47.00 INSOMNIA, UNSPECIFIED TYPE: ICD-10-CM

## 2018-06-06 DIAGNOSIS — R51.9 HEADACHE UPON AWAKENING: ICD-10-CM

## 2018-06-06 DIAGNOSIS — I10 ESSENTIAL HYPERTENSION: ICD-10-CM

## 2018-06-06 DIAGNOSIS — F32.A ANXIETY AND DEPRESSION: ICD-10-CM

## 2018-06-06 DIAGNOSIS — G47.10 HYPERSOMNIA: ICD-10-CM

## 2018-06-06 DIAGNOSIS — R53.82 CHRONIC FATIGUE SYNDROME: ICD-10-CM

## 2018-06-06 DIAGNOSIS — F41.9 ANXIETY AND DEPRESSION: ICD-10-CM

## 2018-06-06 PROCEDURE — 99244 OFF/OP CNSLTJ NEW/EST MOD 40: CPT | Performed by: INTERNAL MEDICINE

## 2018-06-06 NOTE — PROGRESS NOTES
Consultation - [address] [facility]  56 y o  female  :1960  NYK:[id number]    Physician Requesting Consult: Vivek Jackson MD     Reason for Consult : [At your kind request] I saw this patient for initial sleep evaluation today  She has previously diagnosed obstructive sleep apnea around  and uses CPAP  She is here because of symptom recurrence  Results of prior studies were not available at the time of this encounter  PFSH, Problem List, Medications & Allergies were reviewed in EMR  She  has a past medical history of Anemia; Asthma (10/26/2012); Chronic fatigue syndrome; Patrick-Barr virus seropositivity; Fibromyalgia, primary; Gluten intolerance; Hypertension; Hypovitaminosis D; Impaired fasting glucose; Lipodystrophy; Liver lesion; Migraine; Obesity (BMI 30-39 9) (2018); and Sleep apnea  She has a current medication list which includes the following prescription(s): d-ribose, duloxetine, hydrochlorothiazide, lorazepam, magnesium gluconate, magnesium malate, tramadol, zolpidem, and ciprofloxacin-dexamethasone  HPI:  She is using CPAP regularly but is not sure of the pressure setting  She has no difficulty tolerating CPAP and is experiencing no adverse effects  However, she continues to report under refreshing sleep and morning headaches around 2 days out of 7  She reported:  Restless leg symptoms in the past; she clenches her teeth during sleep and awakens with jaw pain, but reported no other features of parasomnia  Sleep Routine: Bedtime [is regular,] typically between 9:00 p m  and 6:45 a m  She uses Ambien 12 5 mg as a sleep aid and usually falls asleep in <  45 minutes   Sleep is interrupted infrequent x / night[ ]  She awakens with the aid of an alarm feeling unrefreshed  She  has excessive daytime drowsiness, yawns excessively and feels like napping but avoids doing so  She self rated at Total score: 13 /24 on the [place] Sleepiness Scale     Habits:[ reports that she has never smoked  She has never used smokeless tobacco ], [ reports that she does not drink alcohol ], [ reports that she does not use drugs  ], Caffeine use: limited , Exercise routine: none except for walking all day at work  Family History:  She suspects her mother has Obstructive sleep apnea  ROS: reviewed & as attached  [Significant for weight has been stable; she reports episodic acid reflux  She feels fibromyalgia symptoms are controlled on Cymbalta  She denied depression but has difficulty with memory and concentration ]     EXAM: key  [x] system is Normal  [] see notes  VITALS     []  /84   Pulse 75   Ht 5' 2" (1 575 m)   Wt 78 5 kg (173 lb)   BMI 31 64 kg/m²      GENERAL[x]  [Well] groomed female, well appearing, [at] her stated age, in [no apparent] distress  PSYCH     [x]  Alert, orientated and cooperative  Speech is clear and coherent  Mental state  appears normal    EXTREM/  SKIN         [x]  [No] pedal edema  Skin is warm and dry  Color and hydration are good  HEAD       [x]     Craniofacial anatomy: normal]  EOMI  TMJ & Sinuses are normal    Neck         []  [Neck Circumference: 40 cm] [appears thick] [& there is extra fatty tissue], [no] abnormal masses [or] Lymhadenopathy  Thyroid is [normal]  Trachea is [central]  Nasal        []  Airway is patent Septum is [central,] Mucous membranes appeared [normal]  Turbinates are [normal ] There is [no] rhinorrhea  Oral          []    Airway       [Is crowded,] Modified Mallampati class IV (only hard palate visible)  Palate:  redundant soft palateThere is 1 to 2+ tonsillar hypertrophy  Teeth normal      CVS         [x]  Heart sounds are [regular], [No] murmur[s]  RESP       [x]  Effort is [normal]  Air entry [good] [bilaterally]  [No]wheezes  [No]rales  ABD         [x]  obese, soft & non-tender  CNS         [x]  Grossly intact  Normal gait  Rombergs [Negative]      MSK         [x]  Muscle bulk, tone and power  [normal]          IMPRESSION:   1  NOEMÍ (obstructive sleep apnea)  Ambulatory referral to Sleep Medicine    CPAP Study   2  Insomnia, unspecified type     3  Headache upon awakening     4  Hypersomnia     5  Fibromyalgia     6  Chronic fatigue syndrome     7  Essential hypertension     8  Obesity (BMI 30-39 9)     9  Anxiety and depression        PLAN:   1  With respect to above conditions, I counseled on pathophysiology, diagnosis, treatment options, risks and benefits; interrelationship and effects on symptoms and comorbidities; risks of no treatment; costs and insurance aspects  2  I advised on weight reduction, avoiding sleeping supine, using alcohol or sedating medications close to bed time and on safe driving practices  3  Cognitive behavioral therapy was initiated with advise on Sleep Hygiene and behavioral techniques to manage Insomnia  She states she was unable to wean off Ambien in the past even with these strategies  4  Patient wants to continue Positive airway pressure therapy and it is medically necessary  A retitration study is warranted in view of her ongoing symptoms and  will be scheduled  5   She will obtain results of prior studies and will send over results of her diagnostic study that will be necessary for her to replace her CPAP machine and obtain supplies  6  Follow-up will be scheduled after the studies to review results, further details of treatment options and to initiate/adjust therapy  Thank you for allowing me to participate in the care of this patient  I will keep you apprised of developments          Sincerely,     Authenticated electronically by Andriy Coronado MD   on 26/54/79   Board Certified Specialist

## 2018-06-06 NOTE — PROGRESS NOTES
Review of Systems      Genitourinary post menopausal (no peroids)   Cardiology none   Gastrointestinal frequent heartburn/acid reflux   Neurology awaken with headache, forgetfulness, poor concentration or confusion,  and difficulty with memory   Constitutional fatigue   Integumentary rash or dry skin and itching   Psychiatry none   Musculoskeletal joint pain, muscle aches and leg cramps   Pulmonary difficulty breathing when lying flat    ENT throat clearing and ringing in ears   Endocrine none   Hematological none

## 2018-08-10 ENCOUNTER — HOSPITAL ENCOUNTER (OUTPATIENT)
Dept: SLEEP CENTER | Facility: CLINIC | Age: 58
Discharge: HOME | End: 2018-08-10
Payer: COMMERCIAL

## 2018-08-10 DIAGNOSIS — G47.33 OSA (OBSTRUCTIVE SLEEP APNEA): ICD-10-CM

## 2018-08-10 PROCEDURE — 95811 POLYSOM 6/>YRS CPAP 4/> PARM: CPT

## 2018-08-11 NOTE — PROGRESS NOTES
Sleep Study Documentation    Pre-Sleep Study       Sleep testing procedure explained to patient:YES    Patient napped prior to study:NO    Caffeine:Dayshift worker after 12PM   Caffeine use:YES- chocolate  6 ounces and  coffee 6 to 18 ounces    Alcohol: Alcohol use:NO    Typical day for patient:YES       Study Documentation  Treatment   Optimal PAP pressure: 8  Leak:Small  Snore:Eliminated  REM Obtained:yes  Supplemental O2: no    Minimum SaO2 94%  Baseline SaO2 96%  PAP mask tried (list all)RESMED FX MIRAGE STANDARD NASAL MASK  PAP mask choice (final)RESMED FX MIRAGE STANDARD NASAL MASK  PAP mask type:nasal  PAP pressure at which snoring was eliminated 6  Minimum SaO2 at final PAP pressure 95%  Mode of Therapy:CPAP  ETCO2:No  CPAP changed to BiPAP:No    Mode of Therapy:CPAP    EKG abnormalities: no     EEG abnormalities: no    Study Terminated:no    Patient classification: employed       Post-Sleep Study    Medication used at bedtime or during sleep study:YES other prescription medications and prescription sleep aid    Patient reports time it took to fall asleep:greater than 60 minutes    Patient reports waking up during study:3 or more times  Patient reports returning to sleep in greater than 30 minutes  Patient reports sleeping 2 to 4 hours without dreaming  Patient reports sleep during study:typical    Patient rated sleepiness: Somewhat sleepy or tired    PAP treatment:yes: Post PAP treatment patient reports feeling unchanged and would wear PAP mask at home

## 2018-08-13 ENCOUNTER — TRANSCRIBE ORDERS (OUTPATIENT)
Dept: LAB | Facility: HOSPITAL | Age: 58
End: 2018-08-13

## 2018-08-13 ENCOUNTER — LAB (OUTPATIENT)
Dept: LAB | Facility: HOSPITAL | Age: 58
End: 2018-08-13
Payer: COMMERCIAL

## 2018-08-13 ENCOUNTER — APPOINTMENT (OUTPATIENT)
Dept: LAB | Facility: HOSPITAL | Age: 58
End: 2018-08-13
Payer: COMMERCIAL

## 2018-08-13 DIAGNOSIS — I10 HYPERTENSION, UNSPECIFIED TYPE: ICD-10-CM

## 2018-08-13 DIAGNOSIS — F32.A ANXIETY AND DEPRESSION: ICD-10-CM

## 2018-08-13 DIAGNOSIS — F41.9 ANXIETY AND DEPRESSION: ICD-10-CM

## 2018-08-13 DIAGNOSIS — D64.9 ANEMIA, UNSPECIFIED TYPE: ICD-10-CM

## 2018-08-13 DIAGNOSIS — Z00.8 HEALTH EXAMINATION IN POPULATION SURVEY: ICD-10-CM

## 2018-08-13 DIAGNOSIS — M79.7 FIBROMYALGIA: ICD-10-CM

## 2018-08-13 DIAGNOSIS — Z13.220 SCREENING FOR LIPID DISORDERS: ICD-10-CM

## 2018-08-13 DIAGNOSIS — Z00.8 HEALTH EXAMINATION IN POPULATION SURVEY: Primary | ICD-10-CM

## 2018-08-13 DIAGNOSIS — Z00.00 HEALTHCARE MAINTENANCE: ICD-10-CM

## 2018-08-13 DIAGNOSIS — R53.83 FATIGUE, UNSPECIFIED TYPE: ICD-10-CM

## 2018-08-13 LAB
25(OH)D3 SERPL-MCNC: 43.6 NG/ML (ref 30–100)
ALBUMIN SERPL BCP-MCNC: 3.8 G/DL (ref 3.5–5)
ALP SERPL-CCNC: 77 U/L (ref 46–116)
ALT SERPL W P-5'-P-CCNC: 39 U/L (ref 12–78)
ANION GAP SERPL CALCULATED.3IONS-SCNC: 6 MMOL/L (ref 4–13)
AST SERPL W P-5'-P-CCNC: 17 U/L (ref 5–45)
BASOPHILS # BLD AUTO: 0.04 THOUSANDS/ΜL (ref 0–0.1)
BASOPHILS NFR BLD AUTO: 1 % (ref 0–1)
BILIRUB SERPL-MCNC: 0.51 MG/DL (ref 0.2–1)
BUN SERPL-MCNC: 13 MG/DL (ref 5–25)
CALCIUM SERPL-MCNC: 9.3 MG/DL (ref 8.3–10.1)
CHLORIDE SERPL-SCNC: 105 MMOL/L (ref 100–108)
CHOLEST SERPL-MCNC: 183 MG/DL (ref 50–200)
CO2 SERPL-SCNC: 28 MMOL/L (ref 21–32)
CREAT SERPL-MCNC: 0.82 MG/DL (ref 0.6–1.3)
EOSINOPHIL # BLD AUTO: 0.33 THOUSAND/ΜL (ref 0–0.61)
EOSINOPHIL NFR BLD AUTO: 5 % (ref 0–6)
ERYTHROCYTE [DISTWIDTH] IN BLOOD BY AUTOMATED COUNT: 12.2 % (ref 11.6–15.1)
EST. AVERAGE GLUCOSE BLD GHB EST-MCNC: 114 MG/DL
FERRITIN SERPL-MCNC: 33 NG/ML (ref 8–388)
GFR SERPL CREATININE-BSD FRML MDRD: 80 ML/MIN/1.73SQ M
GLUCOSE P FAST SERPL-MCNC: 95 MG/DL (ref 65–99)
HBA1C MFR BLD: 5.6 % (ref 4.2–6.3)
HCT VFR BLD AUTO: 39.3 % (ref 34.8–46.1)
HDLC SERPL-MCNC: 46 MG/DL (ref 40–60)
HGB BLD-MCNC: 13.2 G/DL (ref 11.5–15.4)
IMM GRANULOCYTES # BLD AUTO: 0.02 THOUSAND/UL (ref 0–0.2)
IMM GRANULOCYTES NFR BLD AUTO: 0 % (ref 0–2)
IRON SERPL-MCNC: 77 UG/DL (ref 50–170)
LDLC SERPL CALC-MCNC: 86 MG/DL (ref 0–100)
LYMPHOCYTES # BLD AUTO: 2.13 THOUSANDS/ΜL (ref 0.6–4.47)
LYMPHOCYTES NFR BLD AUTO: 33 % (ref 14–44)
MCH RBC QN AUTO: 30.6 PG (ref 26.8–34.3)
MCHC RBC AUTO-ENTMCNC: 33.6 G/DL (ref 31.4–37.4)
MCV RBC AUTO: 91 FL (ref 82–98)
MONOCYTES # BLD AUTO: 0.56 THOUSAND/ΜL (ref 0.17–1.22)
MONOCYTES NFR BLD AUTO: 9 % (ref 4–12)
NEUTROPHILS # BLD AUTO: 3.47 THOUSANDS/ΜL (ref 1.85–7.62)
NEUTS SEG NFR BLD AUTO: 52 % (ref 43–75)
NRBC BLD AUTO-RTO: 0 /100 WBCS
PLATELET # BLD AUTO: 301 THOUSANDS/UL (ref 149–390)
PMV BLD AUTO: 9.6 FL (ref 8.9–12.7)
POTASSIUM SERPL-SCNC: 3.9 MMOL/L (ref 3.5–5.3)
PROT SERPL-MCNC: 7.3 G/DL (ref 6.4–8.2)
RBC # BLD AUTO: 4.31 MILLION/UL (ref 3.81–5.12)
SODIUM SERPL-SCNC: 139 MMOL/L (ref 136–145)
TRIGL SERPL-MCNC: 257 MG/DL
TSH SERPL DL<=0.05 MIU/L-ACNC: 2.33 UIU/ML (ref 0.36–3.74)
VIT B12 SERPL-MCNC: 1031 PG/ML (ref 100–900)
WBC # BLD AUTO: 6.55 THOUSAND/UL (ref 4.31–10.16)

## 2018-08-13 PROCEDURE — 80053 COMPREHEN METABOLIC PANEL: CPT

## 2018-08-13 PROCEDURE — 82607 VITAMIN B-12: CPT

## 2018-08-13 PROCEDURE — 80061 LIPID PANEL: CPT

## 2018-08-13 PROCEDURE — 85025 COMPLETE CBC W/AUTO DIFF WBC: CPT

## 2018-08-13 PROCEDURE — 84443 ASSAY THYROID STIM HORMONE: CPT

## 2018-08-13 PROCEDURE — 36415 COLL VENOUS BLD VENIPUNCTURE: CPT

## 2018-08-13 PROCEDURE — 83036 HEMOGLOBIN GLYCOSYLATED A1C: CPT

## 2018-08-13 PROCEDURE — 82728 ASSAY OF FERRITIN: CPT

## 2018-08-13 PROCEDURE — 83540 ASSAY OF IRON: CPT

## 2018-08-13 PROCEDURE — 82306 VITAMIN D 25 HYDROXY: CPT

## 2018-08-14 ENCOUNTER — OFFICE VISIT (OUTPATIENT)
Dept: PSYCHIATRY | Facility: CLINIC | Age: 58
End: 2018-08-14
Payer: COMMERCIAL

## 2018-08-14 ENCOUNTER — TELEPHONE (OUTPATIENT)
Dept: FAMILY MEDICINE CLINIC | Facility: CLINIC | Age: 58
End: 2018-08-14

## 2018-08-14 VITALS
SYSTOLIC BLOOD PRESSURE: 121 MMHG | BODY MASS INDEX: 30.91 KG/M2 | WEIGHT: 168 LBS | HEART RATE: 85 BPM | DIASTOLIC BLOOD PRESSURE: 79 MMHG | HEIGHT: 62 IN

## 2018-08-14 DIAGNOSIS — F33.42 MAJOR DEPRESSIVE DISORDER, RECURRENT EPISODE, IN FULL REMISSION (HCC): Primary | Chronic | ICD-10-CM

## 2018-08-14 DIAGNOSIS — R74.8 ELEVATED VITAMIN B12 LEVEL: Primary | ICD-10-CM

## 2018-08-14 DIAGNOSIS — E78.1 HYPERTRIGLYCERIDEMIA: ICD-10-CM

## 2018-08-14 DIAGNOSIS — G47.09 OTHER INSOMNIA: Chronic | ICD-10-CM

## 2018-08-14 DIAGNOSIS — F41.1 GAD (GENERALIZED ANXIETY DISORDER): Chronic | ICD-10-CM

## 2018-08-14 PROCEDURE — 90833 PSYTX W PT W E/M 30 MIN: CPT | Performed by: PSYCHIATRY & NEUROLOGY

## 2018-08-14 PROCEDURE — 99213 OFFICE O/P EST LOW 20 MIN: CPT | Performed by: PSYCHIATRY & NEUROLOGY

## 2018-08-14 RX ORDER — ZOLPIDEM TARTRATE 12.5 MG/1
12.5 TABLET, FILM COATED, EXTENDED RELEASE ORAL
Qty: 90 TABLET | Refills: 1 | Status: SHIPPED | OUTPATIENT
Start: 2018-08-14 | End: 2019-09-20 | Stop reason: SDUPTHER

## 2018-08-14 NOTE — TELEPHONE ENCOUNTER
----- Message from Lawanda Boyd MD sent at 8/14/2018  7:05 PM EDT -----  Can you please let patient know that her blood work including vitamin-D, blood sugar, kidneys, liver, total cholesterol and bad cholesterol, thyroid, iron was within normal range  Her vitamin B12 was a little higher  Can you ask patient if she is taking a supplement  Vitamin B12 is mainly from animal source to foods  If she is taking a vitamin, I would like her to check how much vitamin-D is in the vitamin  We can keep an eye on this  In addition, her triglycerides were elevated  I would like to recheck this in 4 months  I would like her to start taking fish oil supplement of 2000 milligrams daily to lower the triglycerides  Prior to blood work, I would like her to avoid any fried fatty foods, cheese that may increase her triglycerides  I will print this out    Thank you

## 2018-08-14 NOTE — PROGRESS NOTES
Can you please let patient know that her blood work including vitamin-D, blood sugar, kidneys, liver, total cholesterol and bad cholesterol, thyroid, iron was within normal range  Her vitamin B12 was a little higher  Can you ask patient if she is taking a supplement  Vitamin B12 is mainly from animal source to foods  If she is taking a vitamin, I would like her to check how much vitamin-D is in the vitamin  We can keep an eye on this  In addition, her triglycerides were elevated  I would like to recheck this in 4 months  I would like her to start taking fish oil supplement of 2000 milligrams daily to lower the triglycerides  Prior to blood work, I would like her to avoid any fried fatty foods, cheese that may increase her triglycerides  I will print this out    Thank you

## 2018-08-14 NOTE — PSYCH
TREATMENT PLAN (Medication Management Only)        [facility]    Name/Date of Birth/MRN:  Devante Henry 66 y o  1960 MRN: [id number]  Date of Treatment Plan: August 14, 2018  Diagnosis/Diagnoses:   1  Major depressive disorder, recurrent episode, in full remission ([facility] [address] )    2  JA (generalized anxiety disorder)    3  Other insomnia      Strengths/Personal Resources for Self-Care: "family, friends, self"  Area/Areas of need (in own words): "keep things stable as they have been recently"  1  Long Term Goal: maintain improvement in depression  Target Date: 3 months - 11/14/2018  Person/Persons responsible for completion of goal: Shaheed Vargas  2  Short Term Objective (s) - How will we reach this goal?:   A  Provider new recommended medication/dosage changes and/or continue medication(s): continue current medications as prescribed (Cymbalta, Ativan and Ambien CR)  B   N/A   C   N/A  Target Date: 3 months - 11/14/2018  Person/Persons Responsible for Completion of Goal: Shaheed Vargas   Progress Towards Goals: stable  Treatment Modality: medication management every 3 months  Review due 90 to 120 days from date of this plan: 4 months - 12/14/2018  Expected length of service: maintenance  My Physician/PA/NP and I have developed this plan together and I agree to work on the goals and objectives  I understand the treatment goals that were developed for my treatment    Signature:       Date and time:  Signature of parent/guardian if under age of 15 years: Date and time:  Signature of provider:      Date and time:  Signature of Supervising Physician:    Date and time: 8/14/2018      Ernestine Bartholomew MD

## 2018-08-14 NOTE — PSYCH
MEDICATION MANAGEMENT NOTE        [facility]      Name and Date of Birth:  Miguelina Acevedo 66 y o  1960 MRN: [id number]    Date of Visit: August 14, 2018    SUBJECTIVE:    Blaine Tabor reports that she continues to do well since the last visit  She states that mood remains stable, denies any significant depressive symptoms  She reports that anxiety symptoms are well controlled  No panic attacks since the last visit  Has been doing well at work  Reports that she lowered her Cymbalta dose to 30 mg per day (received Rx from PCP) and wants to continue lower dose 'I have been fine"    She denies any suicidal ideation, intent or plan at present; denies any homicidal ideation, intent or plan at present  She has no auditory hallucinations, denies any visual hallucinations, no overt delusions noted  She denies any side effects from current psychiatric medications  PHQ-9 is 3 today (same as at the last visit)  Daniela Govea HPI ROS Appetite Changes and Sleep: fluctuating sleep pattern, decrease in number of sleep hours (5 hours), normal appetite, recent weight loss (4 lbs), normal energy level    Review Of Systems:      Constitutional recent weight loss (4 lbs)   ENT negative   Cardiovascular negative   Respiratory negative   Gastrointestinal negative   Genitourinary negative   Musculoskeletal negative   Integumentary negative   Neurological negative   Endocrine negative   Other Symptoms none       Past Psychiatric History:      Past Inpatient Psychiatric Treatment:   No history of past inpatient psychiatric admissions  Past Outpatient Psychiatric Treatment:    In outpatient treatment at [address] [place] [address] E for several years    Was in outpatient psychiatric treatment in the past in Intensive Partial Program in 2015  Past Suicide Attempts: no  Past Violent Behavior: no  Past Psychiatric Medication Trials: Celexa, Cymbalta, Wellbutrin, Ativan and Ambien DRE [place] History:      Abuse: no history of sexual abuse, no history of physical abuse  Other Traumatic Events: none     Past Medical History:    Past Medical History:   Diagnosis Date    Anemia     Asthma 10/26/2012    Chronic fatigue syndrome     Patrick-Barr virus seropositivity     Fibromyalgia, primary     Gluten intolerance     Hypertension     Hypovitaminosis D     Impaired fasting glucose     Lipodystrophy     Liver lesion     Migraine     Obesity (BMI 30-39 9) 6/6/2018    Sleep apnea      Past Medical History Pertinent Negatives:   Diagnosis Date Noted    ADHD (attention deficit hyperactivity disorder) 02/20/2018    Allergic 02/20/2018    Allergic rhinitis 02/20/2018    Anxiety 02/20/2018    Arthritis 02/20/2018    Cancer ([facility] [address] ) 02/20/2018    CHF (congestive heart failure) ([facility] [address] ) 02/20/2018    Chronic kidney disease 02/20/2018    Clotting disorder ([facility] [address] ) 02/20/2018    COPD (chronic obstructive pulmonary disease) ([facility] [address] ) 02/20/2018    Diabetes mellitus ([facility] [address] ) 02/20/2018    Disease of thyroid gland 02/20/2018    Eating disorder 02/20/2018    Eczema 02/20/2018    Emphysema of lung ([facility] [address] ) 02/20/2018    Failure to thrive (child) 02/20/2018    GERD (gastroesophageal reflux disease) 02/20/2018    Glaucoma 02/20/2018    Head injury     Heart murmur 02/20/2018    HIV disease ([facility] [address] ) 02/20/2018    HL (hearing loss) 02/20/2018    Infectious viral hepatitis 02/20/2018    Inflammatory bowel disease 02/20/2018    Jaundice 02/20/2018    Meningitis 02/20/2018    Myocardial infarction ([facility] [address] ) 02/20/2018    Osteoporosis 02/20/2018    Otitis media 02/20/2018    Peptic ulceration 02/20/2018    Pneumonia 02/20/2018    Scoliosis 02/20/2018    Seizures ([facility] [address] ) 02/20/2018    Seizures ([facility] [address] ) 06/24/2018    Sickle cell anemia ([facility] [address] ) 02/20/2018    Stroke ([facility] [address] ) 02/20/2018    Substance abuse 02/20/2018    Tuberculosis 02/20/2018    Urinary tract infection 02/20/2018    Varicella 02/20/2018    Visual impairment 02/20/2018     Allergies   Allergen Reactions    Escitalopram Swelling and Edema     Category: Allergy;     Fluticasone-Salmeterol     Latex      irritation  irritation  irritation    Nsaids      Action Taken: stomach issues;     Other Rash     Adhesive bandages       Substance Abuse History:    History   Alcohol Use No     History   Drug Use No       Social History:    Social History     Social History    Marital status: Single     Spouse name: N/A    Number of children: 0    Years of education: bachelor's degree     Occupational History    works for [employer]     Social History Main Topics    Smoking status: Never Smoker    Smokeless tobacco: Never Used    Alcohol use No    Drug use: No    Sexual activity: No     Other Topics Concern    Not on file     Social History Narrative    Education: bachelor's degree in sociology    Learning Disabilities: none    Marital History: single    Children: none    Living Arrangement: lives alone    Occupational History: works for [employer] at [address] [employer] Relationships: limited support system    Legal History: none     History: None    Caffeine use denied       Family Psychiatric History:     Family History   Problem Relation Age of Onset    Depression Mother     Hypertension Mother         essential     Mitral valve prolapse Mother     Diabetes Brother     Heart attack Brother     Heart disease Maternal Grandmother         cardiac disorder    Hyperlipidemia Maternal Grandmother     Stroke Paternal Grandmother     Stroke Paternal Grandfather     Breast cancer Maternal Aunt        History Review:  The following portions of the patient's history were reviewed and updated as appropriate: allergies, current medications, past family history, past medical history, past social history, past surgical history and problem list          OBJECTIVE:     Vital signs in last 24 hours:    Vitals:    08/14/18 1537   BP: 121/79   Pulse: 85   Weight: 76 2 kg (168 lb)   Height: 5' 2" (1 575 m)       Mental Status Evaluation:    Appearance age appropriate, casually dressed   Behavior cooperative, calm   Speech normal rate, normal volume, normal pitch   Mood euthymic   Affect normal range and intensity, appropriate   Thought Processes organized, goal directed   Associations intact associations   Thought Content no overt delusions   Perceptual Disturbances: no auditory hallucinations, no visual hallucinations   Abnormal Thoughts  Risk Potential Suicidal ideation - None  Homicidal ideation - None  Potential for aggression - No   Orientation oriented to person, place, time/date and situation   Memory recent and remote memory grossly intact   Consciousness alert and awake   Attention Span Concentration Span attention span and concentration are age appropriate   Intellect appears to be of average intelligence   Insight intact   Judgement intact   Muscle Strength and  Gait muscle strength and tone were normal, normal gait , normal balance   Motor activity no abnormal movements   Language no difficulty naming common objects, no difficulty repeating a phrase, no difficulty writing a sentence   Fund of Knowledge adequate knowledge of current events  adequate fund of knowledge regarding past history  adequate fund of knowledge regarding vocabulary    Pain none   Pain Scale 0       Laboratory Results: I have personally reviewed all pertinent laboratory/tests results      Recent Labs (last 4 months):   Appointment on 08/13/2018   Component Date Value    WBC 08/13/2018 6 55     RBC 08/13/2018 4 31     Hemoglobin 08/13/2018 13 2     Hematocrit 08/13/2018 39 3     MCV 08/13/2018 91     MCH 08/13/2018 30 6     MCHC 08/13/2018 33 6     RDW 08/13/2018 12 2     MPV 08/13/2018 9 6     Platelets 56/33/7174 301     nRBC 08/13/2018 0     Neutrophils Relative 08/13/2018 52     Immat GRANS % 08/13/2018 0     Lymphocytes Relative 08/13/2018 33     Monocytes Relative 08/13/2018 9     Eosinophils Relative 08/13/2018 5     Basophils Relative 08/13/2018 1     Neutrophils Absolute 08/13/2018 3 47     Immature Grans Absolute 08/13/2018 0 02     Lymphocytes Absolute 08/13/2018 2 13     Monocytes Absolute 08/13/2018 0 56     Eosinophils Absolute 08/13/2018 0 33     Basophils Absolute 08/13/2018 0 04     Sodium 08/13/2018 139     Potassium 08/13/2018 3 9     Chloride 08/13/2018 105     CO2 08/13/2018 28     Anion Gap 08/13/2018 6     BUN 08/13/2018 13     Creatinine 08/13/2018 0 82     Glucose, Fasting 08/13/2018 95     Calcium 08/13/2018 9 3     AST 08/13/2018 17     ALT 08/13/2018 39     Alkaline Phosphatase 08/13/2018 77     Total Protein 08/13/2018 7 3     Albumin 08/13/2018 3 8     Total Bilirubin 08/13/2018 0 51     eGFR 08/13/2018 80     Cholesterol 08/13/2018 183     Triglycerides 08/13/2018 257*    HDL, Direct 08/13/2018 46     LDL Calculated 08/13/2018 86     TSH 3RD GENERATON 08/13/2018 2 330     Ferritin 08/13/2018 33     Hemoglobin A1C 08/13/2018 5 6     EAG 08/13/2018 114     Vit D, 25-Hydroxy 08/13/2018 43 6     Vitamin B-12 08/13/2018 1031*    Iron 08/13/2018 77        Assessment/Plan:       Diagnoses and all orders for this visit:    Major depressive disorder, recurrent episode, in full remission ([facility] [address] )    JA (generalized anxiety disorder)    Other insomnia  -     zolpidem (AMBIEN CR) 12 5 MG CR tablet; Take 1 tablet (12 5 mg total) by mouth daily at bedtime as needed for sleep for up to 180 days        Treatment Recommendations/Precautions:    Continue Cymbalta 30 mg daily - she wants to continue lower dose  Mood is stable   Reports history of only 2 major depressive episodes in the past - would like to consider getting off Cymbalta eventually in the future  Continue Ambien CR 12 5 mg at bedtime as needed to help with insomnia  Continue Ativan 0 5 mg bid PRN to improve anxiety symptoms  Medication management every 3 months  Follows with family physician for medical issues    Risks/Benefits      Risks, Benefits And Possible Side Effects Of Medications:    Risks, benefits, and possible side effects of medications explained to Sergio Kelly including risk of suicidality related to treatment with antidepressants and risks of dependence, sedation and respiratory depression related to treatment with benzodiazepine medications  She verbalizes understanding and agreement for treatment  Controlled Medication Discussion:     Sergio Robin has been filling controlled prescriptions on time as prescribed according to [facility] program    Discussed with Sergio Kelly the risks of sedation, respiratory depression, impairment of ability to drive and potential for abuse and addiction related to treatment with benzodiazepine medications  She understands risk of treatment with benzodiazepine medications, agrees to not drive if feels impaired and agrees to take medications as prescribed  Psychotherapy Provided:     Individual psychotherapy provided: Yes  Counseling was provided during the session today for 20 minutes  Medications, treatment progress and treatment plan reviewed with Sergio Kelly  Medication education provided to Sergio Kelly  Goals discussed during in session: improve control of anxiety and maintain improvement in depression  Coping strategies including acquiring relapse prevention skills and stress reduction reviewed with Sergio Kelly  Supportive therapy provided        Treatment Plan;    Completed and signed during the session: Yes - with Gume Brown MD 08/14/18

## 2018-08-15 DIAGNOSIS — G47.33 OSA (OBSTRUCTIVE SLEEP APNEA): Primary | ICD-10-CM

## 2018-08-20 ENCOUNTER — TELEPHONE (OUTPATIENT)
Dept: SLEEP CENTER | Facility: CLINIC | Age: 58
End: 2018-08-20

## 2018-08-22 DIAGNOSIS — G47.33 OSA (OBSTRUCTIVE SLEEP APNEA): Primary | ICD-10-CM

## 2018-08-22 NOTE — TELEPHONE ENCOUNTER
Spoke with patient  She was unable to obtain copy of diagnostic study  Per [facility], diagnostic is needed for new machine as well supplies only  Obtained order for home study from Dr Chencho Kaur  Patient will need to stop her CPAP a few days prior    Left message for patient to call and schedule test

## 2018-08-24 DIAGNOSIS — I10 HYPERTENSION, UNSPECIFIED TYPE: Primary | ICD-10-CM

## 2018-08-24 RX ORDER — HYDROCHLOROTHIAZIDE 12.5 MG/1
12.5 TABLET ORAL DAILY
Qty: 90 TABLET | Refills: 1 | Status: SHIPPED | OUTPATIENT
Start: 2018-08-24 | End: 2019-02-25 | Stop reason: SDUPTHER

## 2018-09-18 ENCOUNTER — OFFICE VISIT (OUTPATIENT)
Dept: FAMILY MEDICINE CLINIC | Facility: CLINIC | Age: 58
End: 2018-09-18
Payer: COMMERCIAL

## 2018-09-18 VITALS
HEART RATE: 80 BPM | WEIGHT: 166.4 LBS | RESPIRATION RATE: 16 BRPM | BODY MASS INDEX: 30.62 KG/M2 | TEMPERATURE: 99.1 F | SYSTOLIC BLOOD PRESSURE: 130 MMHG | HEIGHT: 62 IN | DIASTOLIC BLOOD PRESSURE: 88 MMHG

## 2018-09-18 DIAGNOSIS — I10 HYPERTENSION, UNSPECIFIED TYPE: Primary | ICD-10-CM

## 2018-09-18 DIAGNOSIS — F32.A ANXIETY AND DEPRESSION: ICD-10-CM

## 2018-09-18 DIAGNOSIS — Z00.00 ROUTINE HEALTH MAINTENANCE: ICD-10-CM

## 2018-09-18 DIAGNOSIS — M79.7 FIBROMYALGIA: ICD-10-CM

## 2018-09-18 DIAGNOSIS — G47.33 OBSTRUCTIVE SLEEP APNEA: ICD-10-CM

## 2018-09-18 DIAGNOSIS — M54.2 NECK PAIN ON RIGHT SIDE: ICD-10-CM

## 2018-09-18 DIAGNOSIS — F41.9 ANXIETY AND DEPRESSION: ICD-10-CM

## 2018-09-18 PROCEDURE — 3079F DIAST BP 80-89 MM HG: CPT | Performed by: FAMILY MEDICINE

## 2018-09-18 PROCEDURE — 99214 OFFICE O/P EST MOD 30 MIN: CPT | Performed by: FAMILY MEDICINE

## 2018-09-18 PROCEDURE — 3075F SYST BP GE 130 - 139MM HG: CPT | Performed by: FAMILY MEDICINE

## 2018-09-18 PROCEDURE — 3008F BODY MASS INDEX DOCD: CPT | Performed by: FAMILY MEDICINE

## 2018-09-18 RX ORDER — MELATONIN 10 MG
10000 TABLET, SUBLINGUAL SUBLINGUAL DAILY
COMMUNITY
End: 2022-07-27 | Stop reason: ALTCHOICE

## 2018-09-18 NOTE — PROGRESS NOTES
FAMILY PRACTICE OFFICE VISIT       NAME: Keanu Conner  AGE: 66 y o  SEX: female       : 1960        MRN: [id number]    DATE: 2018  TIME: 5:38 PM    Assessment and Plan     Problem List Items Addressed This Visit     Hypertension - Primary    Relevant Orders    Comprehensive metabolic panel    Fibromyalgia    Obstructive sleep apnea    Routine health maintenance    Anxiety and depression    Neck pain on right side    Relevant Orders    Ambulatory referral to Physical Therapy         Hypertension:    BP initially elevated, upon recheck, decreased but diastolic BP borderline  Will monitor  patient has stressors today as she is currently on call  Continue with hydrochlorothiazide and lifestyle modifications  Would like patient to keep an eye on the blood pressure  Fibromyalgia:    Patient states the severity of her symptoms very  She is taking Cymbalta 30 mg  Would benefit from warm water therapy such as aquatic therapy, massage therapy, stretching  Sleep apnea:    Patient will have sleep study  Depression and anxiety: This is overall stable  Patient is on Cymbalta 30 mg daily  Takes lorazepam as needed  Neck pain on right side:    Likely muscular in origin  Will refer to Physical therapy for further evaluation  Would benefit from chiropractic evaluation as well  Routine health maintenance:    Patient has mammogram scheduled for tomorrow  Up-to-date with colonoscopy done by Dr Danae Plascencia  In 2017 with recommended repeat in 3 years as patient had polyp  Will call gyn to schedule Pap  There are no Patient Instructions on file for this visit  Chief Complaint     Chief Complaint   Patient presents with    Follow-up     Routine Check up and to discuss BW  History of Present Illness     HPI     16-year-old female here for routine follow-up  Last blood work was done in mid August   Patient is scheduled for mammogram for tomorrow    Will call to schedule for Pap  Patient states she continues to have right-sided neck pain which is worsened with stress  Does have full range of motion  Denies tingling, numbness over arms  Denies bowel or bladder incontinence  Review of Systems   Review of Systems   Constitutional: Negative for unexpected weight change  HENT: Negative  Eyes: Negative for visual disturbance  Respiratory: Negative for shortness of breath  Cardiovascular: Negative  Gastrointestinal: Negative for abdominal pain and constipation  Genitourinary: Negative for dysuria  Musculoskeletal: Positive for neck pain  Psychiatric/Behavioral: Negative for dysphoric mood  Active Problem List     Patient Active Problem List   Diagnosis    Left ear pain    Anemia    Asthma    Chronic fatigue syndrome    Hypertension    Fibromyalgia    JA (generalized anxiety disorder)    Gluten intolerance    Hypovitaminosis D    Impaired fasting glucose    Liver lesion    Lipodystrophy    Low hemoglobin    Major depressive disorder, recurrent episode, in full remission (HCC)    Migraine    Myalgia and myositis    Other insomnia    Rosacea    Obstructive sleep apnea    Routine health maintenance    Headache upon awakening    Hypersomnia    Obesity (BMI 30-39  9)    Snoring    Anxiety and depression    Neck pain on right side       Past Medical History:  Past Medical History:   Diagnosis Date    Anemia     Asthma 10/26/2012    Chronic fatigue syndrome     Patrick-Barr virus seropositivity     Fibromyalgia, primary     Gluten intolerance     Hypertension     Hypovitaminosis D     Impaired fasting glucose     Lipodystrophy     Liver lesion     Migraine     Obesity (BMI 30-39 9) 6/6/2018    Sleep apnea        Past Surgical History:  Past Surgical History:   Procedure Laterality Date    APPENDECTOMY      BREAST BIOPSY      CHOLECYSTECTOMY      CA COLONOSCOPY FLX DX W/COLLJ SPEC WHEN PFRMD N/A 12/4/2017 Procedure: COLONOSCOPY;  Surgeon: [employer], DO;  Location: [facility]; Service: Gastroenterology       Family History:  Family History   Problem Relation Age of Onset    Depression Mother     Hypertension Mother         essential     Mitral valve prolapse Mother     Diabetes Brother     Heart attack Brother     Heart disease Maternal Grandmother         cardiac disorder    Hyperlipidemia Maternal Grandmother     Stroke Paternal Grandmother     Stroke Paternal Grandfather     Breast cancer Maternal Aunt        Social History:  Social History     Social History    Marital status: Single     Spouse name: N/A    Number of children: 0    Years of education: bachelor's degree     Occupational History    works for [employer]     Social History Main Topics    Smoking status: Never Smoker    Smokeless tobacco: Never Used    Alcohol use No    Drug use: No    Sexual activity: No     Other Topics Concern    Not on file     Social History Narrative    Education: bachelor's degree in sociology    Learning Disabilities: none    Marital History: single    Children: none    Living Arrangement: lives alone    Occupational History: works for [employer] at [employer]: limited support system    Legal History: none     History: None    Caffeine use denied     I have reviewed the patient's medical history in detail; there are no changes to the history as noted in the electronic medical record      Objective     Vitals:    09/18/18 1737   BP: 130/88   Pulse:    Resp:    Temp:      Wt Readings from Last 3 Encounters:   09/18/18 75 5 kg (166 lb 6 4 oz)   08/14/18 76 2 kg (168 lb)   06/06/18 78 5 kg (173 lb)     Vitals:    09/18/18 1705 09/18/18 1737   BP: 130/90 130/88   BP Location: Left arm    Patient Position: Sitting    Cuff Size: Standard    Pulse: 80    Resp: 16    Temp: 99 1 °F (37 3 °C)    TempSrc: Tympanic    Weight: 75 5 kg (166 lb 6 4 oz)    Height: 5' 2" (1 575 m)          Physical Exam   Constitutional: She is oriented to person, place, and time  She appears well-developed and well-nourished  HENT:   Head: Normocephalic and atraumatic  Mouth/Throat: Oropharynx is clear and moist      TMs intact and clear   Eyes: Conjunctivae and EOM are normal  Pupils are equal, round, and reactive to light  Neck: Normal range of motion  Neck supple  No thyromegaly present  Cardiovascular: Normal rate, regular rhythm and normal heart sounds  No carotid bruits auscultated   Pulmonary/Chest: Effort normal and breath sounds normal    Abdominal: Soft  Bowel sounds are normal  She exhibits no distension  There is no tenderness  Musculoskeletal: Normal range of motion  She exhibits no edema  Lymphadenopathy:     She has no cervical adenopathy  Neurological: She is alert and oriented to person, place, and time  Psychiatric: She has a normal mood and affect  Nursing note and vitals reviewed        Pertinent Laboratory/Diagnostic Studies:  Lab Results   Component Value Date    BUN 13 08/13/2018    CREATININE 0 82 08/13/2018    CALCIUM 9 3 08/13/2018     08/13/2018    K 3 9 08/13/2018    CO2 28 08/13/2018     08/13/2018     Lab Results   Component Value Date    ALT 39 08/13/2018    AST 17 08/13/2018    ALKPHOS 77 08/13/2018    BILITOT 0 4 11/16/2016       Lab Results   Component Value Date    WBC 6 55 08/13/2018    HGB 13 2 08/13/2018    HCT 39 3 08/13/2018    MCV 91 08/13/2018     08/13/2018       No results found for: TSH    Lab Results   Component Value Date    CHOL 195 05/09/2016     Lab Results   Component Value Date    TRIG 257 (H) 08/13/2018     Lab Results   Component Value Date    HDL 46 08/13/2018     Lab Results   Component Value Date    LDLCALC 86 08/13/2018     Lab Results   Component Value Date    HGBA1C 5 6 08/13/2018       Results for orders placed or performed in visit on 08/13/18   CBC and differential   Result Value Ref Range    WBC 6 55 4 31 - 10 16 Thousand/uL    RBC 4 31 3 81 - 5 12 Million/uL    Hemoglobin 13 2 11 5 - 15 4 g/dL    Hematocrit 39 3 34 8 - 46 1 %    MCV 91 82 - 98 fL    MCH 30 6 26 8 - 34 3 pg    MCHC 33 6 31 4 - 37 4 g/dL    RDW 12 2 11 6 - 15 1 %    MPV 9 6 8 9 - 12 7 fL    Platelets 749 213 - 308 Thousands/uL    nRBC 0 /100 WBCs    Neutrophils Relative 52 43 - 75 %    Immat GRANS % 0 0 - 2 %    Lymphocytes Relative 33 14 - 44 %    Monocytes Relative 9 4 - 12 %    Eosinophils Relative 5 0 - 6 %    Basophils Relative 1 0 - 1 %    Neutrophils Absolute 3 47 1 85 - 7 62 Thousands/µL    Immature Grans Absolute 0 02 0 00 - 0 20 Thousand/uL    Lymphocytes Absolute 2 13 0 60 - 4 47 Thousands/µL    Monocytes Absolute 0 56 0 17 - 1 22 Thousand/µL    Eosinophils Absolute 0 33 0 00 - 0 61 Thousand/µL    Basophils Absolute 0 04 0 00 - 0 10 Thousands/µL   Comprehensive metabolic panel   Result Value Ref Range    Sodium 139 136 - 145 mmol/L    Potassium 3 9 3 5 - 5 3 mmol/L    Chloride 105 100 - 108 mmol/L    CO2 28 21 - 32 mmol/L    ANION GAP 6 4 - 13 mmol/L    BUN 13 5 - 25 mg/dL    Creatinine 0 82 0 60 - 1 30 mg/dL    Glucose, Fasting 95 65 - 99 mg/dL    Calcium 9 3 8 3 - 10 1 mg/dL    AST 17 5 - 45 U/L    ALT 39 12 - 78 U/L    Alkaline Phosphatase 77 46 - 116 U/L    Total Protein 7 3 6 4 - 8 2 g/dL    Albumin 3 8 3 5 - 5 0 g/dL    Total Bilirubin 0 51 0 20 - 1 00 mg/dL    eGFR 80 ml/min/1 73sq m   Lipid Panel with Direct LDL reflex   Result Value Ref Range    Cholesterol 183 50 - 200 mg/dL    Triglycerides 257 (H) <=150 mg/dL    HDL, Direct 46 40 - 60 mg/dL    LDL Calculated 86 0 - 100 mg/dL   TSH, 3rd generation with T4 reflex   Result Value Ref Range    TSH 3RD GENERATON 2 330 0 358 - 3 740 uIU/mL   Ferritin   Result Value Ref Range    Ferritin 33 8 - 388 ng/mL   HEMOGLOBIN A1C W/ EAG ESTIMATION   Result Value Ref Range    Hemoglobin A1C 5 6 4 2 - 6 3 %     mg/dl   Vitamin D 25 hydroxy   Result Value Ref Range    Vit D, 25-Hydroxy 43 6 30 0 - 100 0 ng/mL   Vitamin B12   Result Value Ref Range    Vitamin B-12 1,031 (H) 100 - 900 pg/mL   Iron   Result Value Ref Range    Iron 77 50 - 170 ug/dL       Orders Placed This Encounter   Procedures    Comprehensive metabolic panel    Ambulatory referral to Physical Therapy       ALLERGIES:  Allergies   Allergen Reactions    Escitalopram Swelling and Edema     Category: Allergy;     Fluticasone-Salmeterol     Latex      irritation  irritation  irritation    Nsaids      Action Taken: stomach issues;     Other Rash     Adhesive bandages       Current Medications     Current Outpatient Prescriptions   Medication Sig Dispense Refill    Cholecalciferol (VITAMIN D3) 39257 units TABS Take 10,000 Units by mouth daily      cyanocobalamin (VITAMIN B-12) 1,000 mcg tablet Take 1,000 mcg by mouth every other day      DULoxetine (CYMBALTA) 30 mg delayed release capsule Take 1 capsule (30 mg total) by mouth daily 90 capsule 2    hydrochlorothiazide (HYDRODIURIL) 12 5 mg tablet Take 1 tablet (12 5 mg total) by mouth daily 90 tablet 1    LORazepam (ATIVAN) 0 5 mg tablet Take 0 5 mg by mouth as needed        traMADol (ULTRAM) 50 mg tablet Take 50 mg by mouth every 6 (six) hours as needed for moderate pain      zolpidem (AMBIEN CR) 12 5 MG CR tablet Take 1 tablet (12 5 mg total) by mouth daily at bedtime as needed for sleep for up to 180 days 90 tablet 1    ciprofloxacin-dexamethasone (CIPRODEX) otic suspension Administer 4 drops into the left ear 2 (two) times a day for 7 days 7 5 mL 0    D-Ribose POWD by Does not apply route      Magnesium Gluconate 250 MG TABS Take 1 tablet by mouth daily      MAGNESIUM MALATE PO Take by mouth daily at bedtime       No current facility-administered medications for this visit            Health Maintenance     Health Maintenance   Topic Date Due    DTaP,Tdap,and Td Vaccines (1 - Tdap) 07/21/1983    PAP SMEAR 07/21/1983    MAMMOGRAM  01/05/2018    INFLUENZA VACCINE  09/01/2018    CRC Screening: Colonoscopy  12/04/2020     Immunization History   Administered Date(s) Administered    Influenza TIV (IM) 02/15/2017       Lisbet Diallo MD

## 2018-09-19 ENCOUNTER — HOSPITAL ENCOUNTER (OUTPATIENT)
Dept: RADIOLOGY | Age: 58
Discharge: HOME | End: 2018-09-19
Payer: COMMERCIAL

## 2018-09-19 DIAGNOSIS — Z12.31 SCREENING MAMMOGRAM, ENCOUNTER FOR: ICD-10-CM

## 2018-09-19 PROBLEM — F32.A ANXIETY AND DEPRESSION: Status: ACTIVE | Noted: 2018-09-19

## 2018-09-19 PROBLEM — M54.2 NECK PAIN ON RIGHT SIDE: Status: ACTIVE | Noted: 2018-09-19

## 2018-09-19 PROBLEM — F41.9 ANXIETY AND DEPRESSION: Status: ACTIVE | Noted: 2018-09-19

## 2018-09-19 PROCEDURE — 77063 BREAST TOMOSYNTHESIS BI: CPT

## 2018-09-19 PROCEDURE — 77067 SCR MAMMO BI INCL CAD: CPT

## 2018-10-03 ENCOUNTER — EVALUATION (OUTPATIENT)
Dept: PHYSICAL THERAPY | Facility: CLINIC | Age: 58
End: 2018-10-03
Payer: COMMERCIAL

## 2018-10-03 DIAGNOSIS — M54.2 NECK PAIN: Primary | ICD-10-CM

## 2018-10-03 DIAGNOSIS — M54.2 NECK PAIN ON RIGHT SIDE: ICD-10-CM

## 2018-10-03 PROCEDURE — 97161 PT EVAL LOW COMPLEX 20 MIN: CPT | Performed by: PHYSICAL THERAPIST

## 2018-10-03 PROCEDURE — G8990 OTHER PT/OT CURRENT STATUS: HCPCS | Performed by: PHYSICAL THERAPIST

## 2018-10-03 PROCEDURE — G8991 OTHER PT/OT GOAL STATUS: HCPCS | Performed by: PHYSICAL THERAPIST

## 2018-10-03 NOTE — PROGRESS NOTES
PT Evaluation     Today's date: 10/3/2018  Patient name: Mele Tyler  : 1960  MRN: [id number]  Referring provider: Shahida Betts MD  Dx:   Encounter Diagnosis     ICD-10-CM    1  Neck pain M54 2    2  Neck pain on right side M54 2 Ambulatory referral to Physical Therapy                  Assessment    Assessment details: Pt presents to outpatient PT with pain, decreased rom, decreased strength and decreased functional mobility  She will benefit from skilled PT to address these deficits in order to achieve her goals and maximize her functional mobility  Plan  Frequency: 2x week  Duration in weeks: 6        Subjective Evaluation    History of Present Illness  Mechanism of injury: Pt reports that she has been suffering from cervical spine pain for several week  Insidious onset  Reports that she has most pain at the end of her work day  Works in IT for [employer]  Denies any radicular symptoms, Denies headache  Reports that she had a massage last week that sig improved her symptoms  Pain  Current pain ratin  At worst pain rating: 10    Patient Goals  Patient goals for therapy: decreased pain, increased motion, independence with ADLs/IADLs and return to work          Objective     ROM:   Flexion: wfl   Extension: min limited,pain   Right Rotation: min limited, pain   Left Rotation:mod limited, pain   Right Lateral Flexion: mod limited, stiffness   Left Lateral Flexion:   Mod limited, stiffness        Poor control of deep cervical flexors noted with chin tuck  Reactive trigger points UT and levator, R>L   Rounded shoulder/forward head posture  Hypomobility noted with spring testing  ULTT: neg  Traction:slight decrease in pain  Compression: no change in symptoms          Flowsheet Rows      Most Recent Value   PT/OT G-Codes   Assessment Type  Evaluation   G code set  Other PT/OT Primary   Other PT Primary Current Status ()  CK   Other PT Primary Goal Status ()  CJ          Precautions: none     Daily Treatment Diary       Manuals             Cervical/thoracic pa's             DTM R paraspinals                                       Exercise Diary              ube             Chin tuck             UT stretch             Levator stretch             Low rows             rows             scap depression                                                                                                                                                                                                                Modalities

## 2018-10-08 ENCOUNTER — OFFICE VISIT (OUTPATIENT)
Dept: PHYSICAL THERAPY | Facility: CLINIC | Age: 58
End: 2018-10-08
Payer: COMMERCIAL

## 2018-10-08 DIAGNOSIS — M54.2 NECK PAIN ON RIGHT SIDE: Primary | ICD-10-CM

## 2018-10-08 PROCEDURE — 97140 MANUAL THERAPY 1/> REGIONS: CPT | Performed by: PHYSICAL THERAPIST

## 2018-10-08 PROCEDURE — 97110 THERAPEUTIC EXERCISES: CPT | Performed by: PHYSICAL THERAPIST

## 2018-10-08 PROCEDURE — 97530 THERAPEUTIC ACTIVITIES: CPT | Performed by: PHYSICAL THERAPIST

## 2018-10-08 NOTE — PROGRESS NOTES
Daily Note     Today's date: 10/8/2018  Patient name: Marcela Montes De Oca  : 1960  MRN: [id number]  Referring provider: Rhonda Angela MD  Dx:   Encounter Diagnosis     ICD-10-CM    1  Neck pain on right side M54 2                   Subjective: Pt reports compliance with her hep and that she is having no difficulty with it  Reports that she feels   "looser" after she stretching  Objective: See treatment diary below      Precautions: none     Daily Treatment Diary       Manuals 10/8            Cervical/thoracic pa's 5'            DTM R paraspinals 5'                                      Exercise Diary              ube 4'/4'            Chin tuck 5"x10            UT stretch 15x4            Levator stretch             Low rows Green tb 5"x20            rows             scap depression Green tb 5"x20            scap retraction with cane ext 10"x10                                                                                                                                                                                                  Modalities                                                   Assessment: Initiated therex as listed with no complaints, pr requires occasional verbal cues to maintain prorper form with there  Monitor response NV and progress as to  Plan: Continue per plan of care

## 2018-10-10 ENCOUNTER — OFFICE VISIT (OUTPATIENT)
Dept: PHYSICAL THERAPY | Facility: CLINIC | Age: 58
End: 2018-10-10
Payer: COMMERCIAL

## 2018-10-10 DIAGNOSIS — M54.2 NECK PAIN ON RIGHT SIDE: Primary | ICD-10-CM

## 2018-10-10 PROCEDURE — 97140 MANUAL THERAPY 1/> REGIONS: CPT | Performed by: PHYSICAL THERAPIST

## 2018-10-10 PROCEDURE — 97530 THERAPEUTIC ACTIVITIES: CPT | Performed by: PHYSICAL THERAPIST

## 2018-10-10 PROCEDURE — 97110 THERAPEUTIC EXERCISES: CPT | Performed by: PHYSICAL THERAPIST

## 2018-10-10 NOTE — PROGRESS NOTES
Daily Note     Today's date: 10/10/2018  Patient name: Colt Carney  : 1960  MRN: [id number]  Referring provider: Luis Moncada MD  Dx:   No diagnosis found  Subjective: Pt reports min soreness after her LV but that she is having increased pain after completing 4 loads of laundry    Objective: See treatment diary below      Precautions: none     Daily Treatment Diary       Manuals 10/8 10/10           Cervical/thoracic pa's 5' 5'           DTM R paraspinals 5' 5'                                     Exercise Diary              ube 4'/4' 4'4/'           Chin tuck 5"x10 10           UT stretch 15x4 30"x4           Levator stretch             Low rows Green tb 5"x20 Blue tb x20           rows             scap depression Green tb 5"x20 Blue tb 5"x20           scap retraction with cane ext 10"x10 10"x10                                                                                                                                                                                                 Modalities                                                   Assessment: improved rom and reports of decreased soreness with manual tx  Pt initially reports increased stiffness with tb there but this improves with instructed to reduce substitution from UT  Plan: Continue per plan of care

## 2018-10-15 ENCOUNTER — OFFICE VISIT (OUTPATIENT)
Dept: PHYSICAL THERAPY | Facility: CLINIC | Age: 58
End: 2018-10-15
Payer: COMMERCIAL

## 2018-10-15 DIAGNOSIS — M54.2 NECK PAIN ON RIGHT SIDE: Primary | ICD-10-CM

## 2018-10-15 PROCEDURE — 97110 THERAPEUTIC EXERCISES: CPT | Performed by: PHYSICAL THERAPIST

## 2018-10-15 PROCEDURE — 97140 MANUAL THERAPY 1/> REGIONS: CPT | Performed by: PHYSICAL THERAPIST

## 2018-10-15 PROCEDURE — 97530 THERAPEUTIC ACTIVITIES: CPT | Performed by: PHYSICAL THERAPIST

## 2018-10-15 NOTE — PROGRESS NOTES
Daily Note     Today's date: 10/15/2018  Patient name: Giuseppe Rodriguez  : 1960  MRN: [id number]  Referring provider: Rik Gallardo MD  Dx:   Encounter Diagnosis     ICD-10-CM    1  Neck pain on right side M54 2                   Subjective: Pt reports mild mm soreness after her LV but denies any pain  Reports slight increase in pain over the weekend and completing heavy housework and dancing  Objective: See treatment diary below      Precautions: none     Daily Treatment Diary       Manuals 10/8 10/10 10/15          Cervical/thoracic pa's 5' 5' 5'          DTM R paraspinals 5' 5' 5'                                    Exercise Diary              ube 4'/4' 4'4' 4'/4'          Chin tuck 5"x10 10 5"x15          UT stretch 15x4 30"x4 30"x4          Levator stretch             Low rows Green tb 5"x20 Blue tb x20 x20          rows             scap depression Green tb 5"x20 Blue tb 5"x20 5"x20          scap retraction with cane ext 10"x10 10"x10 10"x10          scap squeeze with eR   Red tb x7                                                                                                                                                                                   Modalities                                                   Assessment: improved rom noted with manual tx  Pt continues to have reactive TP's in R UT  Attempted with add scap retract with tb ER but pt report shoulder pain therefore this was stopped at 7 reps  Monitor response NV and continue to progress as imelda  Plan: Continue per plan of care

## 2018-10-17 ENCOUNTER — OFFICE VISIT (OUTPATIENT)
Dept: PHYSICAL THERAPY | Facility: CLINIC | Age: 58
End: 2018-10-17
Payer: COMMERCIAL

## 2018-10-17 ENCOUNTER — APPOINTMENT (OUTPATIENT)
Dept: URGENT CARE | Age: 58
End: 2018-10-17
Payer: OTHER MISCELLANEOUS

## 2018-10-17 ENCOUNTER — APPOINTMENT (OUTPATIENT)
Dept: RADIOLOGY | Age: 58
End: 2018-10-17
Payer: OTHER MISCELLANEOUS

## 2018-10-17 DIAGNOSIS — T14.90XA INJURY: ICD-10-CM

## 2018-10-17 DIAGNOSIS — T14.90XA INJURY: Primary | ICD-10-CM

## 2018-10-17 DIAGNOSIS — M54.2 NECK PAIN ON RIGHT SIDE: Primary | ICD-10-CM

## 2018-10-17 PROCEDURE — 72100 X-RAY EXAM L-S SPINE 2/3 VWS: CPT

## 2018-10-17 PROCEDURE — G0382 LEV 3 HOSP TYPE B ED VISIT: HCPCS | Performed by: PREVENTIVE MEDICINE

## 2018-10-17 PROCEDURE — 99283 EMERGENCY DEPT VISIT LOW MDM: CPT | Performed by: PREVENTIVE MEDICINE

## 2018-10-17 PROCEDURE — 73521 X-RAY EXAM HIPS BI 2 VIEWS: CPT

## 2018-10-17 NOTE — PROGRESS NOTES
Daily Note     Today's date: 10/17/2018  Patient name: Ana Andrade  : 1960  MRN: [id number]  Referring provider: Tiffanie Harrington MD  Dx:   No diagnosis found  Subjective: Pt reports mild mm soreness after her LV but denies any pain  Reports slight increase in pain over the weekend and completing heavy housework and dancing  Objective: See treatment diary below      Precautions: none     Daily Treatment Diary       Manuals 10/8 10/10 10/15 10/17         Cervical/thoracic pa's 5' 5' 5'          DTM R paraspinals 5' 5' 5'                                    Exercise Diary              ube 4'/4' 4'4' 4'/4'          Chin tuck 5"x10 10 5"x15          UT stretch 15x4 30"x4 30"x4          Levator stretch             Low rows Green tb 5"x20 Blue tb x20 x20          rows             scap depression Green tb 5"x20 Blue tb 5"x20 5"x20          scap retraction with cane ext 10"x10 10"x10 10"x10          scap squeeze with eR   Red tb x7                                                                                                                                                                                   Modalities                                                   Assessment: improved rom noted with manual tx  Pt continues to have reactive TP's in R UT  Attempted with add scap retract with tb ER but pt report shoulder pain therefore this was stopped at 7 reps  Monitor response NV and continue to progress as imelda  Plan: Continue per plan of care
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
## 2018-10-22 ENCOUNTER — APPOINTMENT (OUTPATIENT)
Dept: URGENT CARE | Age: 58
End: 2018-10-22
Payer: OTHER MISCELLANEOUS

## 2018-10-22 ENCOUNTER — OFFICE VISIT (OUTPATIENT)
Dept: PHYSICAL THERAPY | Facility: CLINIC | Age: 58
End: 2018-10-22
Payer: COMMERCIAL

## 2018-10-22 DIAGNOSIS — M54.2 NECK PAIN ON RIGHT SIDE: Primary | ICD-10-CM

## 2018-10-22 PROCEDURE — 99213 OFFICE O/P EST LOW 20 MIN: CPT | Performed by: PREVENTIVE MEDICINE

## 2018-10-22 PROCEDURE — 97140 MANUAL THERAPY 1/> REGIONS: CPT

## 2018-10-22 PROCEDURE — 97110 THERAPEUTIC EXERCISES: CPT

## 2018-10-22 NOTE — PROGRESS NOTES
Daily Note     Today's date: 10/22/2018  Patient name: Nir Chung  : 1960  MRN: [id number]  Referring provider: Ellyn Klinefelter, MD  Dx:   Encounter Diagnosis     ICD-10-CM    1  Neck pain on right side M54 2                 Subjective: Patient states, "It needs work "  Patient reports neck pain is "not as bad" however she has experienced an exacerbation of back pain due to a work related incident which happened last Tuesday  Objective: See treatment diary below  KT, PT performs mobilizations as noted  Precautions: none     Daily Treatment Diary     Manuals 10/8 10/10 10/15 10/22         Cervical/thoracic pa's 5' 5' 5' 5'         DTM R paraspinals 5' 5' 5' R UT and scap  8'                                   Exercise Diary              ube 4'/4' 4'4' 4'/4' NP         Chin tuck 5"x10 10 5"x15 5"x15         UT stretch 15x4 30"x4 30"x4 30"x4         Levator stretch    30"x4         Low rows Green tb 5"x20 Blue tb x20 x20 green  5"x20         rows             scap depression Green tb 5"x20 Blue tb 5"x20 5"x20 green  5"x20         scap retraction with cane ext 10"x10 10"x10 10"x10 10"x10         scap squeeze with er   Red tb x7 red  5"x20                                                                                                                                                                                  Modalities             MHP post tx prone    10 min                        Assessment: Bilateral upper extremity fatigue reported throughout therapeutic exercise program   Relief of pain reported with MHP treatment  Plan: Continue treatment as per PT plan of care

## 2018-10-24 ENCOUNTER — OFFICE VISIT (OUTPATIENT)
Dept: PHYSICAL THERAPY | Facility: CLINIC | Age: 58
End: 2018-10-24
Payer: COMMERCIAL

## 2018-10-24 DIAGNOSIS — M54.2 NECK PAIN ON RIGHT SIDE: Primary | ICD-10-CM

## 2018-10-24 PROCEDURE — 97140 MANUAL THERAPY 1/> REGIONS: CPT

## 2018-10-24 PROCEDURE — 97110 THERAPEUTIC EXERCISES: CPT

## 2018-10-24 NOTE — PROGRESS NOTES
Daily Note     Today's date: 10/24/2018  Patient name: Shirley Beatty  : 1960  MRN: [id number]  Referring provider: Vic Nayak MD  Dx:   Encounter Diagnosis     ICD-10-CM    1  Neck pain on right side M54 2                 Subjective: Patient states, "I had a stressful day "  Patient complains neck pain is worse today, rated 7/10 prior to today's treatment  Patient complains lower back pain is also "really bad" today - patient reports she has been put on light duty at work - reports she is scheduled to go back to the doctor next Friday  Objective: See treatment diary below  KL, PT performs mobilizations as noted  Precautions: none     Daily Treatment Diary     Manuals 10/8 10/10 10/15 10/22 10/24        Cervical/thoracic pa's 5' 5' 5' 5' 5'        DTM R paraspinals 5' 5' 5' R UT and scap  8' R UT and scap  8'                                  Exercise Diary              ube 4'/4' 4'4' 4'/4' NP NP        Chin tuck 5"x10 10 5"x15 5"x15 5"x15        UT stretch 15x4 30"x4 30"x4 30"x4 30"x4        Levator stretch    30"x4 30"x4        Low rows Green tb 5"x20 Blue tb x20 x20 green  5"x20 red  5"x20        rows             scap depression Green tb 5"x20 Blue tb 5"x20 5"x20 green  5"x20 red  5"x20        scap retraction with cane ext 10"x10 10"x10 10"x10 10"x10 10"x10        scap squeeze with er   Red tb x7 red  5"x20 red  5"x20                                                                                                                                                                                 Modalities             MHP post tx prone    10 min 10 min seated                       Assessment: Reduced resistance for theraband exercises because of increased scapular pain and fatigue  Relief of pain reported with MHP treatment  Plan: Continue treatment as per PT plan of care

## 2018-10-29 ENCOUNTER — OFFICE VISIT (OUTPATIENT)
Dept: PHYSICAL THERAPY | Facility: CLINIC | Age: 58
End: 2018-10-29
Payer: COMMERCIAL

## 2018-10-29 DIAGNOSIS — M54.2 NECK PAIN ON RIGHT SIDE: Primary | ICD-10-CM

## 2018-10-29 PROCEDURE — 97014 ELECTRIC STIMULATION THERAPY: CPT

## 2018-10-29 PROCEDURE — 97140 MANUAL THERAPY 1/> REGIONS: CPT

## 2018-10-29 PROCEDURE — 97110 THERAPEUTIC EXERCISES: CPT

## 2018-10-29 NOTE — PROGRESS NOTES
Daily Note     Today's date: 10/29/2018  Patient name: Clau Escobar  : 1960  MRN: [id number]  Referring provider: Lindsey Fragoso MD  Dx:   Encounter Diagnosis     ICD-10-CM    1  Neck pain on right side M54 2                 Subjective: Patient reports neck pain is slightly better as compared to last week - neck pain rated 5/10 prior to today's treatment  Patient is wondering if DTM is too aggressive as she has often been sore afterwards - will hold off on performing this during today's treatment  Objective: See treatment diary below  KL, PT performs mobilizations as noted  Precautions: fibromyalgia     Daily Treatment Diary    Manuals 10/8 10/10 10/15 10/22 10/24 10/29       Cervical/thoracic pa's 5' 5' 5' 5' 5' 8'       DTM R paraspinals 5' 5' 5' R UT and scap  8' R UT and scap  8' NP                                 Exercise Diary              ube 4'/4' 4'4' 4'/4' NP NP NP       Chin tuck 5"x10 10 5"x15 5"x15 5"x15 5"x15       UT stretch 15x4 30"x4 30"x4 30"x4 30"x4 30"x4       Levator stretch    30"x4 30"x4 30"x4       Low rows Green tb 5"x20 Blue tb x20 x20 green  5"x20 red  5"x20 red   5"x20       rows             scap depression Green tb 5"x20 Blue tb 5"x20 5"x20 green  5"x20 red  5"x20 red  5"x20       scap retraction with cane ext 10"x10 10"x10 10"x10 10"x10 10"x10 10"x10       scap squeeze with er   Red tb x7 red  5"x20 red  5"x20 red  5"x20                                                                                                                                                                                Modalities             MHP post tx prone    10 min 10 min seated with TENS  10 min                      Assessment: Therapeutic exercise program is tolerated well  Relief of neck pain and tenderness reported with addition of TENS treatment  Plan: Continue treatment as per PT plan of care

## 2018-10-31 ENCOUNTER — OFFICE VISIT (OUTPATIENT)
Dept: PHYSICAL THERAPY | Facility: CLINIC | Age: 58
End: 2018-10-31
Payer: COMMERCIAL

## 2018-10-31 DIAGNOSIS — M54.2 NECK PAIN ON RIGHT SIDE: Primary | ICD-10-CM

## 2018-10-31 PROCEDURE — 97140 MANUAL THERAPY 1/> REGIONS: CPT | Performed by: PHYSICAL THERAPIST

## 2018-10-31 PROCEDURE — 97110 THERAPEUTIC EXERCISES: CPT | Performed by: PHYSICAL THERAPIST

## 2018-10-31 PROCEDURE — 97014 ELECTRIC STIMULATION THERAPY: CPT | Performed by: PHYSICAL THERAPIST

## 2018-10-31 NOTE — PROGRESS NOTES
Daily Note     Today's date: 10/31/2018  Patient name: Luis Fernando Delgado  : 1960  MRN: [id number]  Referring provider: Raeann Esteves MD  Dx:   Encounter Diagnosis     ICD-10-CM    1  Neck pain on right side M54 2                 Subjective: Patient reports improved results since DTM was not performed LV  Objective: See treatment diary below  KL, PT performs mobilizations as noted  Precautions: fibromyalgia     Daily Treatment Diary    Manuals 10/8 10/10 10/15 10/22 10/24 10/29 10/31      15Cervical/thoracic pa's 5' 5' 5' 5' 5' 8' 8'      DTM R paraspinals 5' 5' 5' R UT and scap  8' R UT and scap  8' NP                                 Exercise Diary              ube 4'/4' 4'4' 4'/4' NP NP NP       Chin tuck 5"x10 10 5"x15 5"x15 5"x15 5"x15 15      UT stretch 15x4 30"x4 30"x4 30"x4 30"x4 30"x4 4      Levator stretch    30"x4 30"x4 30"x4 4      Low rows Green tb 5"x20 Blue tb x20 x20 green  5"x20 red  5"x20 red   5"x20 Red tb x30      rows             scap depression Green tb 5"x20 Blue tb 5"x20 5"x20 green  5"x20 red  5"x20 red  5"x20 Red tb x30      scap retraction with cane ext 10"x10 10"x10 10"x10 10"x10 10"x10 10"x10 10"x10      scap squeeze with er   Red tb x7 red  5"x20 red  5"x20 red  5"x20 redx20                                                                                                                                                                               Modalities             MHP post tx prone    10 min 10 min seated with TENS  10 min 10'                     Assessment: Pt reports that she is having decreased pain with manual tx and TENS, Consider a home TENS unit if she continues to notice and improvements from this tx  Plan: Continue treatment as per PT plan of care

## 2018-11-02 ENCOUNTER — APPOINTMENT (OUTPATIENT)
Dept: URGENT CARE | Age: 58
End: 2018-11-02
Payer: OTHER MISCELLANEOUS

## 2018-11-02 PROCEDURE — 99213 OFFICE O/P EST LOW 20 MIN: CPT | Performed by: PREVENTIVE MEDICINE

## 2018-11-05 ENCOUNTER — OFFICE VISIT (OUTPATIENT)
Dept: PHYSICAL THERAPY | Facility: CLINIC | Age: 58
End: 2018-11-05
Payer: COMMERCIAL

## 2018-11-05 DIAGNOSIS — M54.2 NECK PAIN ON RIGHT SIDE: Primary | ICD-10-CM

## 2018-11-05 PROCEDURE — 97110 THERAPEUTIC EXERCISES: CPT | Performed by: PHYSICAL THERAPIST

## 2018-11-05 PROCEDURE — 97035 APP MDLTY 1+ULTRASOUND EA 15: CPT | Performed by: PHYSICAL THERAPIST

## 2018-11-05 PROCEDURE — 97014 ELECTRIC STIMULATION THERAPY: CPT | Performed by: PHYSICAL THERAPIST

## 2018-11-05 PROCEDURE — 97140 MANUAL THERAPY 1/> REGIONS: CPT | Performed by: PHYSICAL THERAPIST

## 2018-11-05 NOTE — PROGRESS NOTES
Daily Note     Today's date: 2018  Patient name: Mark Acevedo  : 1960  MRN: [id number]  Referring provider: Teo Singh MD  Dx:   Encounter Diagnosis     ICD-10-CM    1  Neck pain on right side M54 2                 Subjective: Patient reports improved results since DTM was not performed LV  Pt has a new Rx for lumbar pain, RA NV      Objective: See treatment diary below  KL, PT performs mobilizations as noted  Precautions: fibromyalgia     Daily Treatment Diary    Manuals 10/8 10/10 10/15 10/22 10/24 10/29 10/31 11/5     15Cervical/thoracic pa's 5' 5' 5' 5' 5' 8' 8'      DTM R paraspinals 5' 5' 5' R UT and scap  8' R UT and scap  8' NP                                 Exercise Diary              ube 4'/4' 4'4' 4'/4' NP NP NP       Chin tuck 5"x10 10 5"x15 5"x15 5"x15 5"x15 15 15     UT stretch 15x4 30"x4 30"x4 30"x4 30"x4 30"x4 4 4     Levator stretch    30"x4 30"x4 30"x4 4 4     Low rows Green tb 5"x20 Blue tb x20 x20 green  5"x20 red  5"x20 red   5"x20 Red tb x30 green tb x20     rows             scap depression Green tb 5"x20 Blue tb 5"x20 5"x20 green  5"x20 red  5"x20 red  5"x20 Red tb x30 Red tb x30     scap retraction with cane ext 10"x10 10"x10 10"x10 10"x10 10"x10 10"x10 10"x10 10"x10     scap squeeze with er   Red tb x7 red  5"x20 red  5"x20 red  5"x20 redx20 Red tb x30                                                                                                                                                                              Modalities             MHP post tx prone    10 min 10 min seated with TENS  10 min 10' 10'                    Assessment: Pt reports decreased pain with manual tx, progressed therex as imelda with no complaints  Consider a home TENS unit if she continues to notice and improvements from this tx  Plan: Continue treatment as per PT plan of care

## 2018-11-07 ENCOUNTER — OFFICE VISIT (OUTPATIENT)
Dept: PHYSICAL THERAPY | Facility: CLINIC | Age: 58
End: 2018-11-07
Payer: COMMERCIAL

## 2018-11-07 ENCOUNTER — EVALUATION (OUTPATIENT)
Dept: PHYSICAL THERAPY | Facility: CLINIC | Age: 58
End: 2018-11-07
Payer: OTHER MISCELLANEOUS

## 2018-11-07 DIAGNOSIS — M54.2 NECK PAIN ON RIGHT SIDE: Primary | ICD-10-CM

## 2018-11-07 DIAGNOSIS — M54.50 LUMBAR PAIN: Primary | ICD-10-CM

## 2018-11-07 PROCEDURE — 97110 THERAPEUTIC EXERCISES: CPT | Performed by: PHYSICAL THERAPIST

## 2018-11-07 PROCEDURE — G8990 OTHER PT/OT CURRENT STATUS: HCPCS | Performed by: PHYSICAL THERAPIST

## 2018-11-07 PROCEDURE — 97161 PT EVAL LOW COMPLEX 20 MIN: CPT | Performed by: PHYSICAL THERAPIST

## 2018-11-07 PROCEDURE — 97140 MANUAL THERAPY 1/> REGIONS: CPT | Performed by: PHYSICAL THERAPIST

## 2018-11-07 PROCEDURE — G8991 OTHER PT/OT GOAL STATUS: HCPCS | Performed by: PHYSICAL THERAPIST

## 2018-11-07 NOTE — PROGRESS NOTES
Daily Note     Today's date: 2018  Patient name: Cl Jacobs  : 1960  MRN: [id number]  Referring provider: Leonardo Mathews MD  Dx:   Encounter Diagnosis     ICD-10-CM    1  Neck pain on right side M54 2                 Subjective: continued to hold DTM as this tends to aggravate her symptoms, denies any increase in her cervical pain after her LV  Objective: See treatment diary below  KL, PT performs mobilizations as noted  Precautions: fibromyalgia     Daily Treatment Diary    Manuals 10/8 10/10 10/15 10/22 10/24 10/29 10/31 11/5 11/7    Cervical/thoracic pa's 5' 5' 5' 5' 5' 8' 8'  8'    DTM R paraspinals 5' 5' 5' R UT and scap  8' R UT and scap  8' NP                                 Exercise Diary              ube 4'/4' 4'4' 4'/4' NP NP NP       Chin tuck 5"x10 10 5"x15 5"x15 5"x15 5"x15 15 15 15    UT stretch 15x4 30"x4 30"x4 30"x4 30"x4 30"x4 4 4 4    Levator stretch    30"x4 30"x4 30"x4 4 4 4    Low rows Green tb 5"x20 Blue tb x20 x20 green  5"x20 red  5"x20 red   5"x20 Red tb x30 green tb x20 Blue tb x20    rows             scap depression Green tb 5"x20 Blue tb 5"x20 5"x20 green  5"x20 red  5"x20 red  5"x20 Red tb x30 Red tb x30 Red tb x30    scap retraction with cane ext 10"x10 10"x10 10"x10 10"x10 10"x10 10"x10 10"x10 10"x10 10"x10    scap squeeze with er   Red tb x7 red  5"x20 red  5"x20 red  5"x20 redx20 Red tb x30 Red tb x30                                                                                                                                                                             Modalities             MHP post tx prone    10 min 10 min seated with TENS  10 min 10' 10'                    Assessment: Progressed therex as listed with no complaints, cervical rom is improving but continues to be limited by pain  Plan: Continue treatment as per PT plan of care

## 2018-11-07 NOTE — PROGRESS NOTES
PT Evaluation     Today's date: 2018  Patient name: Mark Acevedo  : 1960  MRN: [id number]  Referring provider: Leticia Davenport PA-C  Dx:   Encounter Diagnosis     ICD-10-CM    1  Lumbar pain M54 5                   Assessment    Assessment details: Pt presents to outpatient PT with pain, decreased rom, decreased strength and decreased functional mobility  She will benefit from skilled PT to address these deficits in order to achieve her goals and maximize her functional mobility  Goals  Short Term Goals: Independent performance of initial hep  Decrease pain 2 points on VAS      Long Term Goals: Independent performance of comprehensive hep  Work performance is returned to max level of function  Performance of IADL's is returned to max level of function  Performance in recreational activities is improved to max level of function    Plan  Frequency: 2x week  Duration in weeks: 8        Subjective Evaluation    History of Present Illness  Mechanism of injury: Pt reports that she was picking up glass off the ground at work when she was impacted by a cart on 10/16/18  Reportts that her pain was "extremly painful" initially  Pain has slightly decreased since that time  Has pain when sitting for extended periods of time  X-rays were performed that revealed spondylosis but she was previously diagnosed  Has pain when she walks for an extended period of time  Pain  Current pain ratin  At best pain ratin  At worst pain ratin    Patient Goals  Patient goals for therapy: decreased pain, increased motion, independence with ADLs/IADLs, return to work and return to sport/leisure activities          Objective     ROM:   Flexion: mod limited   Extension: mod limited pain   Right Rotation: mod limited pain   Left Rotation: mod limited   Right Lateral Flexion: mod limited   Left Lateral Flexion:   Mod limited        Poor control of abdominals noted with TaA  Hypomobility noted with spring testing  Straight Leg Raise: neg  Cross SLR: neg  Slump Test: neg  Prone Knee Bend: neg   Directional testing: no change in symptoms with repeated testing  SIJ cluster: neg      Flowsheet Rows      Most Recent Value   PT/OT G-Codes   Assessment Type  Evaluation   G code set  Other PT/OT Primary   Other PT Primary Current Status ()  CL   Other PT Primary Goal Status ()  CI          Precautions: chronic neck pain    Daily Treatment Diary       Manuals             Lumbar PA's                                                    Exercise Diary              ltr             SKTC             TaA             TaA bridge             Repeated ext standing             TaA wall squat                                                                                                                                                                                                                             Modalities

## 2018-11-09 ENCOUNTER — APPOINTMENT (OUTPATIENT)
Dept: URGENT CARE | Age: 58
End: 2018-11-09
Payer: OTHER MISCELLANEOUS

## 2018-11-09 PROCEDURE — 99213 OFFICE O/P EST LOW 20 MIN: CPT | Performed by: PREVENTIVE MEDICINE

## 2018-11-12 ENCOUNTER — OFFICE VISIT (OUTPATIENT)
Dept: PHYSICAL THERAPY | Facility: CLINIC | Age: 58
End: 2018-11-12
Payer: COMMERCIAL

## 2018-11-12 ENCOUNTER — OFFICE VISIT (OUTPATIENT)
Dept: PHYSICAL THERAPY | Facility: CLINIC | Age: 58
End: 2018-11-12
Payer: OTHER MISCELLANEOUS

## 2018-11-12 DIAGNOSIS — M54.2 NECK PAIN ON RIGHT SIDE: Primary | ICD-10-CM

## 2018-11-12 DIAGNOSIS — M54.50 LUMBAR PAIN: Primary | ICD-10-CM

## 2018-11-12 PROCEDURE — 97014 ELECTRIC STIMULATION THERAPY: CPT

## 2018-11-12 PROCEDURE — 97110 THERAPEUTIC EXERCISES: CPT

## 2018-11-12 NOTE — PROGRESS NOTES
Daily Note     Today's date: 2018  Patient name: Lillian Avila  : 1960  MRN: [id number]  Referring provider: Camille Abebe PA-C  Dx:   Encounter Diagnosis     ICD-10-CM    1  Lumbar pain M54 5                 Subjective: Patient reports she was on her feet a lot today - reports about 3/4 of the way through the day "it started getting bad "  Patient reports pain is currently in the lower back/upper glute areas - complains of occasional cramping in bilateral upper extremities and complains "sometimes my feet hurt "    Objective: See treatment diary below  Precautions: chronic neck pain     Daily Treatment Diary         Manuals                       Lumbar PA's  NP                                                                                             Exercise Diary                        ltr  5"x15                     SKTC  30"x3                     TaA  5"x10                     TaA bridge  5"x10                     Repeated ext standing  NP                      TaA wall squat  1x10                                                                                                                                                                                                                                                                     Modalities                        MHP/ESTIM  post tx 10 min                                               Assessment: Repeated extensions in standing not performed due to increased low back and right glute pain  Lumbar PAs not performed due to back pain  Plan: Continue treatment as per PT plan of care

## 2018-11-12 NOTE — PROGRESS NOTES
Daily Note     Today's date: 2018  Patient name: Lisset Diop  : 1960  MRN: [id number]  Referring provider: Enoc Birmingham MD  Dx:   Encounter Diagnosis     ICD-10-CM    1  Neck pain on right side M54 2                 Subjective: Patient states, "My neck is feeling better than it was "  However patient reports today was stressful at work and she is now experiencing right sided jaw pain  Objective: See treatment diary below    Precautions: fibromyalgia      Daily Treatment Diary     Manuals 10/8 10/10 10/15 10/22 10/24 10/29 10/31 11/5 11/7  11/12   Cervical/thoracic pa's 5' 5' 5' 5' 5' 8' 8'   8' T spine only 3'   DTM R paraspinals 5' 5' 5' R UT and scap  8' R UT and scap  8' NP                                                           Exercise Diary                        ube 4'/4' 4'4' 4'/4' NP NP NP        NP   Chin tuck 5"x10 10 5"x15 5"x15 5"x15 5"x15 15 15 15  5"x15   UT stretch 15x4 30"x4 30"x4 30"x4 30"x4 30"x4 4 4 4  30"x4   Levator stretch       30"x4 30"x4 30"x4 4 4 4  30"x4   Low rows Green tb 5"x20 Blue tb x20 x20 green  5"x20 red  5"x20 red   5"x20 Red tb x30 green tb x20 Blue tb x20  red tb  2x10   rows                       scap depression Green tb 5"x20 Blue tb 5"x20 5"x20 green  5"x20 red  5"x20 red  5"x20 Red tb x30 Red tb x30 Red tb x30  red tb  2x10   scap retraction with cane ext 10"x10 10"x10 10"x10 10"x10 10"x10 10"x10 10"x10 10"x10 10"x10  10"x10   scap squeeze with er     Red tb x7 red  5"x20 red  5"x20 red  5"x20 redx20 Red tb x30 Red tb x30  red tb  2x10                                                                                                                                                                                                                                                                                                                           Modalities                       MHP post tx prone       10 min 10 min seated with TENS  10 min 10' 10'    with TENS  10 min                            Assessment: Cervical mobilizations not performed due to pain  Plan: Continue treatment as per PT plan of care

## 2018-11-14 ENCOUNTER — OFFICE VISIT (OUTPATIENT)
Dept: PHYSICAL THERAPY | Facility: CLINIC | Age: 58
End: 2018-11-14
Payer: COMMERCIAL

## 2018-11-14 ENCOUNTER — OFFICE VISIT (OUTPATIENT)
Dept: PHYSICAL THERAPY | Facility: CLINIC | Age: 58
End: 2018-11-14
Payer: OTHER MISCELLANEOUS

## 2018-11-14 DIAGNOSIS — M54.2 NECK PAIN ON RIGHT SIDE: Primary | ICD-10-CM

## 2018-11-14 DIAGNOSIS — M54.50 LUMBAR PAIN: Primary | ICD-10-CM

## 2018-11-14 PROCEDURE — 97110 THERAPEUTIC EXERCISES: CPT | Performed by: PHYSICAL THERAPIST

## 2018-11-14 NOTE — PROGRESS NOTES
Daily Note     Today's date: 2018  Patient name: Que Ponce  : 1960  MRN: [id number]  Referring provider: Rj Witt MD  Dx:   No diagnosis found  Subjective: Pt reports that she is feeling sore today, "possibly b/c of the cold weather"    Objective: See treatment diary below  Precautions: fibromyalgia      Daily Treatment Diary     Manuals    Cervical/thoracic pa's 3'         T spine only 3'   DTM R paraspinals                                            Exercise Diary               ube           NP   Chin tuck 5"x20          5"x15   UT stretch 30"x4          30"x4   Levator stretch 30"x4          30"x4   Low rows green tb x20          red tb  2x10   rows Green tb x20             scap depression Red tb x30          red tb  2x10   scap retraction with cane ext 10"x10          10"x10   scap squeeze with er Red tb x20          red tb  2x10                                                                                                                                                                                                      Modalities              MHP post tx prone 10'          with TENS  10 min                   Assessment: increased reps/resistance  as listed with no complaints other then fatigue,     Plan: Continue treatment as per PT plan of care

## 2018-11-14 NOTE — PROGRESS NOTES
Daily Note     Today's date: 2018  Patient name: Timothy Ordoñez  : 1960  MRN: [id number]  Referring provider: Allyson Baumgarten, PA-C  Dx:   No diagnosis found  Subjective: Patient reports that she was sore for approx 24 hours after her LV but that she is having improved lumbar pain today  Objective: See treatment diary below  Precautions: chronic neck pain     Daily Treatment Diary         Manuals                     Lumbar PA's  NP                                                                                             Exercise Diary                        ltr  5"x15  5"x15                   SKTC  30"x3  30"x4                   TaA  5"x10  5"x10                   TaA bridge  0"F17  0"G81                   Repeated ext standing  NP                      TaA wall squat  1x10  10                                                                                                                                                                                                                                                                   Modalities                        MHP/ESTIM  post tx 10 min  10'                                             Assessment: Increased reps with therex as listed with no complaints, consider a home TENS unit for flare up apin  Plan: Continue treatment as per PT plan of care

## 2018-11-19 ENCOUNTER — OFFICE VISIT (OUTPATIENT)
Dept: PHYSICAL THERAPY | Facility: CLINIC | Age: 58
End: 2018-11-19
Payer: OTHER MISCELLANEOUS

## 2018-11-19 ENCOUNTER — OFFICE VISIT (OUTPATIENT)
Dept: PHYSICAL THERAPY | Facility: CLINIC | Age: 58
End: 2018-11-19
Payer: COMMERCIAL

## 2018-11-19 DIAGNOSIS — M54.50 LUMBAR PAIN: Primary | ICD-10-CM

## 2018-11-19 DIAGNOSIS — M54.2 NECK PAIN ON RIGHT SIDE: Primary | ICD-10-CM

## 2018-11-19 PROCEDURE — 97110 THERAPEUTIC EXERCISES: CPT | Performed by: PHYSICAL THERAPIST

## 2018-11-19 PROCEDURE — 97140 MANUAL THERAPY 1/> REGIONS: CPT | Performed by: PHYSICAL THERAPIST

## 2018-11-19 NOTE — PROGRESS NOTES
Daily Note     Today's date: 2018  Patient name: Luis Fernando Delgado  : 1960  MRN: [id number]  Referring provider: Alfredo Estrada PA-C  Dx:   No diagnosis found  Subjective: Pt reports min pain in her cervical spine today but that she is having an increase in lumbar pain after a massage over the weekend    Objective: See treatment diary below  Precautions: fibromyalgia      Daily Treatment Diary     Manuals    Cervical/thoracic pa's 3' 3'        T spine only 3'   DTM R paraspinals  5'                                          Exercise Diary               ube           NP   Chin tuck 5"x20 20         5"x15   UT stretch 30"x4 4         30"x4   Levator stretch 30"x4 4         30"x4   Low rows green tb x20 30         red tb  2x10   rows Green tb x20 30            scap depression Red tb x30 Green tb x20         red tb  2x10   scap retraction with cane ext 10"x10 10         10"x10   scap squeeze with er Red tb x20 Green tb x20         red tb  2x10                                                                                                                                                                                                      Modalities              MHP post tx prone 10'          with TENS  10 min                   Assessment: Progressed resistance with therex as listed with no complaints other then fatigue, progress as imelda NV  Plan: Continue treatment as per PT plan of care

## 2018-11-19 NOTE — PROGRESS NOTES
Daily Note     Today's date: 2018  Patient name: Marcela Montes De Oca  : 1960  MRN: [id number]  Referring provider: Rhonda Angela MD  Dx:   Encounter Diagnosis     ICD-10-CM    1  Lumbar pain M54 5                 Subjective: Pt reports increased pain after having a massage over the weekend, reports compliance with her hep       Objective: See treatment diary below  Precautions: chronic neck pain     Daily Treatment Diary         Manuals                   Lumbar PA's  NP                                                                                             Exercise Diary                        ltr  5"x15  5"x15  5"x15                 SKTC  30"x3  30"x4  30"x4                 TaA  5"x10  5"x10  10                 TaA bridge  2"D31  6"X42  6"W75                 Repeated ext standing  NP                      TaA wall squat  1x10  10  10                                                                                                                                                                                                                                                                 Modalities                        MHP/ESTIM  post tx 10 min  10'  10'                                           Assessment: increased reps and resistance as listed with no complaints,  consider a home TENS unit for flare up apin  Plan: Continue treatment as per PT plan of care

## 2018-11-21 ENCOUNTER — OFFICE VISIT (OUTPATIENT)
Dept: PHYSICAL THERAPY | Facility: CLINIC | Age: 58
End: 2018-11-21
Payer: OTHER MISCELLANEOUS

## 2018-11-21 ENCOUNTER — OFFICE VISIT (OUTPATIENT)
Dept: PHYSICAL THERAPY | Facility: CLINIC | Age: 58
End: 2018-11-21
Payer: COMMERCIAL

## 2018-11-21 DIAGNOSIS — M54.50 LUMBAR PAIN: Primary | ICD-10-CM

## 2018-11-21 DIAGNOSIS — M54.2 NECK PAIN ON RIGHT SIDE: Primary | ICD-10-CM

## 2018-11-21 PROCEDURE — 97110 THERAPEUTIC EXERCISES: CPT | Performed by: PHYSICAL THERAPIST

## 2018-11-21 PROCEDURE — 97140 MANUAL THERAPY 1/> REGIONS: CPT | Performed by: PHYSICAL THERAPIST

## 2018-11-21 NOTE — PROGRESS NOTES
Daily Note     Today's date: 2018  Patient name: Sandra Stephens  : 1960  MRN: [id number]  Referring provider: Clmeentine Mckenzie PA-C  Dx:   Encounter Diagnosis     ICD-10-CM    1  Lumbar pain M54 5                 Subjective: Pt reports that she has been having increased lumbar pain of insidious onset  Reports compliance with her hep  Objective: See treatment diary below  Precautions: chronic neck pain     Daily Treatment Diary         Manuals                 Lumbar PA's  NP                      STM R paraspianls       10'                                                               Exercise Diary                        ltr  5"x15  5"x15  5"x15  15               SKTC  30"x3  30"x4  30"x4  4               TaA  5"x10  5"x10  10                 TaA bridge  5"K13  2"O57 Erma Rene                 Repeated ext standing  NP                      TaA wall squat  1x10  10  10                                                                                                                                                                                                                                                                 Modalities                        MHP/ESTIM  post tx 10 min  10'  10'  10'                                         Assessment: deferred some therex today secondary to increased pain levels, pt reports decreased pain with manual tx and MHP  Plan: Continue treatment as per PT plan of care

## 2018-11-21 NOTE — PROGRESS NOTES
Daily Note     Today's date: 2018  Patient name: Ana Andrade  : 1960  MRN: [id number]  Referring provider: Tiffanie Harrington MD  Dx:   Encounter Diagnosis     ICD-10-CM    1  Neck pain on right side M54 2                 Subjective: Pt reports min pain in her cervical spine today but that she is having an increase in lumbar pain after a massage over the weekend    Objective: See treatment diary below  Precautions: fibromyalgia      Daily Treatment Diary     Manuals    Cervical/thoracic pa's 3' 3' 3'       T spine only 3'   DTM R paraspinals  5' 5'                                         Exercise Diary               ube           NP   Chin tuck 5"x20 20 20        5"x15   UT stretch 30"x4 4 4        30"x4   Levator stretch 30"x4 4 4        30"x4   Low rows green tb x20 30         red tb  2x10   rows Green tb x20 30            scap depression Red tb x30 Green tb x20         red tb  2x10   scap retraction with cane ext 10"x10 10         10"x10   scap squeeze with er Red tb x20 Green tb x20         red tb  2x10                                                                                                                                                                                                      Modalities              MHP post tx prone 10'          with TENS  10 min                   Assessment: deferred some therex today secondary to reports of increased soreness  Plan to resume full program NV  Plan: Continue treatment as per PT plan of care

## 2018-11-26 ENCOUNTER — OFFICE VISIT (OUTPATIENT)
Dept: PHYSICAL THERAPY | Facility: CLINIC | Age: 58
End: 2018-11-26
Payer: OTHER MISCELLANEOUS

## 2018-11-26 ENCOUNTER — OFFICE VISIT (OUTPATIENT)
Dept: PHYSICAL THERAPY | Facility: CLINIC | Age: 58
End: 2018-11-26
Payer: COMMERCIAL

## 2018-11-26 DIAGNOSIS — M54.50 LUMBAR PAIN: Primary | ICD-10-CM

## 2018-11-26 DIAGNOSIS — M54.2 NECK PAIN ON RIGHT SIDE: Primary | ICD-10-CM

## 2018-11-26 PROCEDURE — 97110 THERAPEUTIC EXERCISES: CPT | Performed by: PHYSICAL THERAPIST

## 2018-11-26 PROCEDURE — 97140 MANUAL THERAPY 1/> REGIONS: CPT | Performed by: PHYSICAL THERAPIST

## 2018-11-27 ENCOUNTER — APPOINTMENT (OUTPATIENT)
Dept: URGENT CARE | Age: 58
End: 2018-11-27
Payer: OTHER MISCELLANEOUS

## 2018-11-27 PROCEDURE — 99213 OFFICE O/P EST LOW 20 MIN: CPT | Performed by: PREVENTIVE MEDICINE

## 2018-11-28 ENCOUNTER — OFFICE VISIT (OUTPATIENT)
Dept: PHYSICAL THERAPY | Facility: CLINIC | Age: 58
End: 2018-11-28
Payer: OTHER MISCELLANEOUS

## 2018-11-28 ENCOUNTER — OFFICE VISIT (OUTPATIENT)
Dept: PHYSICAL THERAPY | Facility: CLINIC | Age: 58
End: 2018-11-28
Payer: COMMERCIAL

## 2018-11-28 ENCOUNTER — TRANSCRIBE ORDERS (OUTPATIENT)
Dept: ADMINISTRATIVE | Facility: HOSPITAL | Age: 58
End: 2018-11-28

## 2018-11-28 DIAGNOSIS — M54.50 LUMBAR PAIN: Primary | ICD-10-CM

## 2018-11-28 DIAGNOSIS — M54.2 NECK PAIN ON RIGHT SIDE: Primary | ICD-10-CM

## 2018-11-28 DIAGNOSIS — M54.5 LOW BACK PAIN, UNSPECIFIED BACK PAIN LATERALITY, UNSPECIFIED CHRONICITY, WITH SCIATICA PRESENCE UNSPECIFIED: Primary | ICD-10-CM

## 2018-11-28 PROCEDURE — 97110 THERAPEUTIC EXERCISES: CPT | Performed by: PHYSICAL THERAPIST

## 2018-11-28 PROCEDURE — 97140 MANUAL THERAPY 1/> REGIONS: CPT | Performed by: PHYSICAL THERAPIST

## 2018-11-28 NOTE — PROGRESS NOTES
Daily Note     Today's date: 2018  Patient name: Tarun Cee  : 1960  MRN: [id number]  Referring provider: Edgardo Tadeo MD  Dx:   Encounter Diagnosis     ICD-10-CM    1  Neck pain on right side M54 2                 Subjective: Pt reports that she continues to have pain but that it is less intense then previously  Objective: See treatment diary below  Precautions: fibromyalgia      Daily Treatment Diary     Manuals    Cervical/thoracic pa's 3' 3' 3' 3' 4'     T spine only 3'   DTM R paraspinals  5' 5' 5' 5'                                       Exercise Diary               ube           NP   Chin tuck 5"x20 20 20 20 20      5"x15   UT stretch 30"x4 4 4 4 4      30"x4   Levator stretch 30"x4 4 4 4 4      30"x4   Low rows green tb x20 30 Green tb x20 Blue tb x20 Blue tb x20      red tb  2x10   rows Green tb x20 30 Green tb x20 Blue tb x20 [employer] tb x20         scap depression Red tb x30 Green tb x20 Green tb x20 20 20      red tb  2x10   scap retraction with cane ext 10"x10 10 10 10 10      10"x10   scap squeeze with er Red tb x20 Green tb x20 Green x20 20 Green x 20      red tb  2x10                                                                                                                                                                                                      Modalities              MHP post tx prone 10'          with TENS  10 min                   Assessment: progressed resistance as listed with no complaints  Plan: Continue treatment as per PT plan of care

## 2018-11-28 NOTE — PROGRESS NOTES
Daily Note     Today's date: 2018  Patient name: Sandra Aguiar  : 1960  MRN: [id number]  Referring provider: Bret Harvey PA-C  Dx:   Encounter Diagnosis     ICD-10-CM    1  Lumbar pain M54 5                 Subjective: Pt reports that she continues to have pain and is scheduled to have an MRI next week  Objective: See treatment diary below  Precautions: chronic neck pain     Daily Treatment Diary         Manuals             Lumbar PA's  NP                      STM R paraspianls       10'  10'  10'                                                           Exercise Diary                        ltr  5"x15  5"x15  5"x15  15  15  20           SKTC  30"x3  30"x4  30"x4  4               TaA  5"x10  5"x10  10                 TaA bridge  5"x10  5"x15 Jay Corporal                 Repeated ext standing  NP                      TaA wall squat  1x10  10  10                                                                                                                                                                                                                                                                 Modalities                        MHP/ESTIM  post tx 10 min  10'  10'  10'  10'  10'                                     Assessment: continued to limit therex secondary to pain levels, plan to resume there as to RICHARD Hebert Plan: Continue treatment as per PT plan of care

## 2018-12-01 ENCOUNTER — HOSPITAL ENCOUNTER (OUTPATIENT)
Dept: MRI IMAGING | Facility: HOSPITAL | Age: 58
Discharge: HOME | End: 2018-12-01
Payer: OTHER MISCELLANEOUS

## 2018-12-01 DIAGNOSIS — M54.5 LOW BACK PAIN, UNSPECIFIED BACK PAIN LATERALITY, UNSPECIFIED CHRONICITY, WITH SCIATICA PRESENCE UNSPECIFIED: ICD-10-CM

## 2018-12-01 PROCEDURE — 72148 MRI LUMBAR SPINE W/O DYE: CPT

## 2018-12-03 ENCOUNTER — OFFICE VISIT (OUTPATIENT)
Dept: PHYSICAL THERAPY | Facility: CLINIC | Age: 58
End: 2018-12-03
Payer: OTHER MISCELLANEOUS

## 2018-12-03 ENCOUNTER — OFFICE VISIT (OUTPATIENT)
Dept: PHYSICAL THERAPY | Facility: CLINIC | Age: 58
End: 2018-12-03
Payer: COMMERCIAL

## 2018-12-03 DIAGNOSIS — M54.50 LUMBAR PAIN: Primary | ICD-10-CM

## 2018-12-03 DIAGNOSIS — M54.2 NECK PAIN ON RIGHT SIDE: Primary | ICD-10-CM

## 2018-12-03 PROCEDURE — 97140 MANUAL THERAPY 1/> REGIONS: CPT

## 2018-12-03 PROCEDURE — 97110 THERAPEUTIC EXERCISES: CPT

## 2018-12-03 NOTE — PROGRESS NOTES
Daily Note     Today's date: 12/3/2018  Patient name: Arturo Kimble  : 1960  MRN: [id number]  Referring provider: Leydi Espinal PA-C  Dx:   Encounter Diagnosis     ICD-10-CM    1  Lumbar pain M54 5                 Subjective: Patient reports back pain is "fair" this evening although reports exercising remains difficult  Patient reports Saturday and  she was having increased difficulty walking for an unknown reason  Patient reports she had an MRI on Saturday - will follow up with workers compensation doctor on Wednesday  Objective: See treatment diary below  Precautions: chronic neck pain     Daily Treatment Diary         Manuals  11/12  11/14  11/9  11/21  1/26  11/28  12/3         Lumbar PA's  NP                      STM R paraspinals       10'  10'  10'  NP                                                         Exercise Diary                        ltr  5"x15  5"x15  5"x15  15  15  20  5"x20         SKTC  30"x3  30"x4  30"x4  4      10"x5         TaA  5"x10  5"x10  10                 TaA bridge  5"x10  5"x15 Etrusca Jump                 Repeated ext standing  NP                      TaA wall squat  1x10  10  10                  bike             4 min                                                                                                                                                                                                                                 Modalities                        MHP/ESTIM  post tx 10 min  10'  10'  10'  10'  10'  10'  MHP                                   Assessment: Lower trunk rotations not tolerated well however pain relief reported when performing single knee to chest stretching  Plan: Continue treatment as per PT plan of care

## 2018-12-03 NOTE — PROGRESS NOTES
Daily Note     Today's date: 12/3/2018  Patient name: Romy Keys  : 1960  MRN: [id number]  Referring provider: Rigo Alvarez MD  Dx:   Encounter Diagnosis     ICD-10-CM    1  Neck pain on right side M54 2                 Subjective: Patient reports neck pain is improving  Objective: See treatment diary below  PT performs manual therapy as noted  Precautions: fibromyalgia      Daily Treatment Diary     Manuals 11/14 11/19 11/21 11/26 11/28 12/3     11/12   Cervical/thoracic pa's 3' 3' 3' 3' 4' gentle  cervical only  4'    T spine only 3'   DTM R paraspinals  5' 5' 5' 5'          gentle cervical traction      4'                       Exercise Diary               ube           NP   Chin tuck 5"x20 20 20 20 20 5"x20     5"x15   UT stretch 30"x4 4 4 4 4 30"x4     30"x4   Levator stretch 30"x4 4 4 4 4 30"x4     30"x4   Low rows green tb x20 30 Green tb x20 Blue tb x20 Blue tb x20 Green  2x10     red tb  2x10   rows Green tb x20 30 Green tb x20 Blue tb x20 [employer] tb x20 Green  2x10        scap depression Red tb x30 Green tb x20 Green tb x20 20 20 Blue  2x10     red tb  2x10   scap retraction with cane ext 10"x10 10 10 10 10 5"x10     10"x10   scap squeeze with er Red tb x20 Green tb x20 Green x20 20 Green x 20 Red  2x10     red tb  2x10                                                                                                                                                                                                      Modalities              MHP post tx prone 10'     10'     with TENS  10 min                   Assessment: Therapeutic exercise program is tolerated well  However PT notes patient has poor tolerance to manual therapy because of pain  Plan: Continue treatment as per PT plan of care

## 2018-12-05 ENCOUNTER — APPOINTMENT (OUTPATIENT)
Dept: URGENT CARE | Age: 58
End: 2018-12-05
Payer: OTHER MISCELLANEOUS

## 2018-12-05 PROCEDURE — 99213 OFFICE O/P EST LOW 20 MIN: CPT | Performed by: PREVENTIVE MEDICINE

## 2018-12-06 ENCOUNTER — APPOINTMENT (OUTPATIENT)
Dept: PHYSICAL THERAPY | Facility: CLINIC | Age: 58
End: 2018-12-06
Payer: OTHER MISCELLANEOUS

## 2018-12-06 ENCOUNTER — APPOINTMENT (OUTPATIENT)
Dept: PHYSICAL THERAPY | Facility: CLINIC | Age: 58
End: 2018-12-06
Payer: COMMERCIAL

## 2018-12-07 ENCOUNTER — HOSPITAL ENCOUNTER (OUTPATIENT)
Dept: SLEEP CENTER | Facility: CLINIC | Age: 58
Discharge: HOME | End: 2018-12-07
Payer: COMMERCIAL

## 2018-12-07 DIAGNOSIS — G47.33 OSA (OBSTRUCTIVE SLEEP APNEA): ICD-10-CM

## 2018-12-07 PROCEDURE — G0399 HOME SLEEP TEST/TYPE 3 PORTA: HCPCS

## 2018-12-10 ENCOUNTER — OFFICE VISIT (OUTPATIENT)
Dept: PHYSICAL THERAPY | Facility: CLINIC | Age: 58
End: 2018-12-10
Payer: OTHER MISCELLANEOUS

## 2018-12-10 ENCOUNTER — OFFICE VISIT (OUTPATIENT)
Dept: PHYSICAL THERAPY | Facility: CLINIC | Age: 58
End: 2018-12-10
Payer: COMMERCIAL

## 2018-12-10 DIAGNOSIS — M54.50 LUMBAR PAIN: Primary | ICD-10-CM

## 2018-12-10 DIAGNOSIS — M54.2 NECK PAIN ON RIGHT SIDE: Primary | ICD-10-CM

## 2018-12-10 PROCEDURE — 97140 MANUAL THERAPY 1/> REGIONS: CPT | Performed by: PHYSICAL THERAPIST

## 2018-12-10 PROCEDURE — 97110 THERAPEUTIC EXERCISES: CPT | Performed by: PHYSICAL THERAPIST

## 2018-12-10 NOTE — PROGRESS NOTES
Daily Note     Today's date: 12/10/2018  Patient name: Sandra Stephens  : 1960  MRN: [id number]  Referring provider: Clementine Mckenzie PA-C  Dx:   Encounter Diagnosis     ICD-10-CM    1  Lumbar pain M54 5                 Subjective: MRI results have not been read  Plans to f/u     Objective: See treatment diary below  Precautions: chronic neck pain     Daily Treatment Diary         Manuals  11/12  11/14  11/9  11/21  1/26  11/28  12/3  12/10       Lumbar PA's  NP                      STM R paraspinals       10'  10'  10'  NP                                                         Exercise Diary                        ltr  5"x15  5"x15  5"x15  15  15  20  5"x20  20       SKTC  30"x3  30"x4  30"x4  4      10"x5  5"x10       TaA  5"x10  5"x10  10                 TaA bridge  5"x10 Erma Rene                 Repeated ext standing  NP                      TaA wall squat  1x10  10  10                  bike             4 min  8'                                                                                                                                                                                                                               Modalities                        MHP/ESTIM  post tx 10 min  10'  10'  10'  10'  10'  10'  MHP                                   Assessment:   Plan: Continue treatment as per PT plan of care

## 2018-12-10 NOTE — PROGRESS NOTES
Daily Note     Today's date: 12/10/2018  Patient name: Essence Pierce  : 1960  MRN: [id number]  Referring provider: Shannon Vicente MD  Dx:   Encounter Diagnosis     ICD-10-CM    1  Neck pain on right side M54 2                 Subjective: Patient reports neck pain is improving  Objective: See treatment diary below  PT performs manual therapy as noted  Precautions: fibromyalgia      Daily Treatment Diary     Manuals 11/14 11/19 11/21 11/26 11/28 12/3 12/10    11/12   Cervical/thoracic pa's 3' 3' 3' 3' 4' gentle  cervical only  4' 4'   T spine only 3'   DTM R paraspinals  5' 5' 5' 5'          gentle cervical traction      4' 4'                      Exercise Diary               ube           NP   Chin tuck 5"x20 20 20 20 20 5"x20 20    5"x15   UT stretch 30"x4 4 4 4 4 30"x4 4    30"x4   Levator stretch 30"x4 4 4 4 4 30"x4 4    30"x4   Low rows green tb x20 30 Green tb x20 Blue tb x20 Blue tb x20 Green  2x10 Blue tb x20    red tb  2x10   rows Green tb x20 30 Green tb x20 Blue tb x20 [employer] tb x20 Green  2x10 Blue tb x20       scap depression Red tb x30 Green tb x20 Green tb x20 20 20 Blue  2x10 20    red tb  2x10   scap retraction with cane ext 10"x10 10 10 10 10 5"x10 10    10"x10   scap squeeze with er Red tb x20 Green tb x20 Green x20 20 Green x 20 Red  2x10 green x20    red tb  2x10                                                                                                                                                                                                      Modalities              MHP post tx prone 10'     10' 10'    with TENS  10 min                   Assessment:  Progressed therex as listed with no complaints other then fatigue  Plan: Continue treatment as per PT plan of care

## 2018-12-12 ENCOUNTER — OFFICE VISIT (OUTPATIENT)
Dept: PHYSICAL THERAPY | Facility: CLINIC | Age: 58
End: 2018-12-12
Payer: COMMERCIAL

## 2018-12-12 ENCOUNTER — DOCUMENTATION (OUTPATIENT)
Dept: PSYCHIATRY | Facility: CLINIC | Age: 58
End: 2018-12-12

## 2018-12-12 ENCOUNTER — OFFICE VISIT (OUTPATIENT)
Dept: PHYSICAL THERAPY | Facility: CLINIC | Age: 58
End: 2018-12-12
Payer: OTHER MISCELLANEOUS

## 2018-12-12 DIAGNOSIS — M54.50 LUMBAR PAIN: Primary | ICD-10-CM

## 2018-12-12 DIAGNOSIS — M54.2 NECK PAIN ON RIGHT SIDE: Primary | ICD-10-CM

## 2018-12-12 PROCEDURE — 97110 THERAPEUTIC EXERCISES: CPT | Performed by: PHYSICAL THERAPIST

## 2018-12-12 PROCEDURE — 97140 MANUAL THERAPY 1/> REGIONS: CPT | Performed by: PHYSICAL THERAPIST

## 2018-12-12 NOTE — PROGRESS NOTES
# 1Treatment Plan not completed within required time limits due to : Provider will be out of the office 12/5/18  and will reschedule at a later time

## 2018-12-12 NOTE — PROGRESS NOTES
Daily Note     Today's date: 2018  Patient name: Ana Andrade  : 1960  MRN: [id number]  Referring provider: Migdalia Macedo PA-C  Dx:   Encounter Diagnosis     ICD-10-CM    1  Lumbar pain M54 5                  Subjective: pt reports no sig changes since LV    Objective: See treatment diary below  Precautions: chronic neck pain     Daily Treatment Diary         Manuals  11/12  11/14  11/9  11/21  1/26  11/28  12/3  12/10  12/12     Lumbar PA's  NP                      STM R paraspinals       10'  10'  10'  NP                                                         Exercise Diary                        ltr  5"x15  5"x15  5"x15  15  15  20  5"x20  20  20     SKTC  30"x3  30"x4  30"x4  4      10"x5  5"x10  10     TaA  5"x10  5"x10  10                 TaA bridge  5"x10  5"x15  5"x15                 Repeated ext standing  NP                      TaA wall squat  1x10  10  10                  bike             4 min  8'  8'                                                                                                                                                                                                                             Modalities                        MHP/ESTIM  post tx 10 min  10'  10'  10'  10'  10'  10'  MHP   10"                               Assessment:   Progressed therex as listed, plan to progress lumbar stabilization as imelda nv  Plan: Continue treatment as per PT plan of care

## 2018-12-13 ENCOUNTER — TELEPHONE (OUTPATIENT)
Dept: SLEEP CENTER | Facility: CLINIC | Age: 58
End: 2018-12-13

## 2018-12-13 NOTE — TELEPHONE ENCOUNTER
Advised patient sleep study resulted   Patient needs to make f/u appt with Dr Luis Mcelroy to discuss treatment options

## 2018-12-17 ENCOUNTER — OFFICE VISIT (OUTPATIENT)
Dept: PHYSICAL THERAPY | Facility: CLINIC | Age: 58
End: 2018-12-17
Payer: COMMERCIAL

## 2018-12-17 ENCOUNTER — OFFICE VISIT (OUTPATIENT)
Dept: PHYSICAL THERAPY | Facility: CLINIC | Age: 58
End: 2018-12-17
Payer: OTHER MISCELLANEOUS

## 2018-12-17 DIAGNOSIS — M54.2 NECK PAIN ON RIGHT SIDE: Primary | ICD-10-CM

## 2018-12-17 DIAGNOSIS — M54.50 LUMBAR PAIN: Primary | ICD-10-CM

## 2018-12-17 PROCEDURE — 97110 THERAPEUTIC EXERCISES: CPT | Performed by: PHYSICAL THERAPIST

## 2018-12-17 PROCEDURE — 97140 MANUAL THERAPY 1/> REGIONS: CPT | Performed by: PHYSICAL THERAPIST

## 2018-12-17 NOTE — PROGRESS NOTES
Daily Note     Today's date: 2018  Patient name: Essence Pierce  : 1960  MRN: [id number]  Referring provider: Shannon Vicente MD  Dx:   Encounter Diagnosis     ICD-10-CM    1  Neck pain on right side M54 2                 Subjective: Patient reports neck pain is improving  Objective: See treatment diary below  PT performs manual therapy as noted  Precautions: fibromyalgia      Daily Treatment Diary     Manuals 11/14 11/19 11/21 11/26 11/28 12/3 12/10 12/12 12/17  11/12   Cervical/thoracic pa's 3' 3' 3' 3' 4' gentle  cervical only  4' 4' 4' 4' T spine only 3'   DTM R paraspinals  5' 5' 5' 5'          gentle cervical traction      4' 4' 4' 4'                    Exercise Diary               ube           NP   Chin tuck 5"x20 20 20 20 20 5"x20 20 20 20  5"x15   UT stretch 30"x4 4 4 4 4 30"x4 4 4 30"x4  30"x4   Levator stretch 30"x4 4 4 4 4 30"x4 4 4 30"x4  30"x4   Low rows green tb x20 30 Green tb x20 Blue tb x20 Blue tb x20 Green  2x10 Blue tb x20 Blue tb x20   red tb  2x10   rows Green tb x20 30 Green tb x20 Blue tb x20 Blue tb x20 Green  2x10 Blue tb x20 Blue tb x20      scap depression Red tb x30 Green tb x20 Green tb x20 20 20 Blue  2x10 20 20   red tb  2x10   scap retraction with cane ext 10"x10 10 10 10 10 5"x10 10 10   10"x10   scap squeeze with er Red tb x20 Green tb x20 Green x20 20 Green x 20 Red  2x10 green x20 Blue tb x20    red tb  2x10                                                                                                                                                                                                      Modalities              MHP post tx prone 10'     10' 10'  10'  with TENS  10 min                   Assessment:  Pt reports decreased stiffness/pain with manual tx and modalities, add SCM stretch with no complaints  Deferred tb therex secondary to reports of pt having increased pain after performing these          Plan: Continue treatment as per PT plan of care

## 2018-12-17 NOTE — PROGRESS NOTES
Daily Note     Today's date: 2018  Patient name: Sandra Stephens  : 1960  MRN: [id number]  Referring provider: Clementine Mckenzie PA-C  Dx:   Encounter Diagnosis     ICD-10-CM    1  Lumbar pain M54 5                  Subjective: pt reports that she continues to have back pain but that she feels the intensity is decreasing  Objective: See treatment diary below  Precautions: chronic neck pain     Daily Treatment Diary         Manuals  11/12  11/14  11/9  11/21  1/26  11/28  12/3  12/10  12/12  12/17   Lumbar PA's  NP                      STM R paraspinals       10'  10'  10'  NP                                                         Exercise Diary                        ltr  5"x15  5"x15  5"x15  15  15  20  5"x20  20  20  20   SKTC  30"x3  30"x4  30"x4  4      10"x5  5"x10  10  10   TaA  5"x10  5"x10  10                 TaA bridge  5"x10  5"x15  5"x15                 Repeated ext standing  NP                      TaA wall squat  1x10  10  10                  bike             4 min  8'  8'  8'                                                                                                                                                                                                                           Modalities                        MHP/ESTIM  post tx 10 min  10'  10'  10'  10'  10'  10'  MHP   10"  10'                             Assessment:   No pain reported with therex but pt continues to have discomfort with supine to sit transfer  Plan: Continue treatment as per PT plan of care

## 2018-12-18 NOTE — TELEPHONE ENCOUNTER
I spoke with pt, advised she has mild NOEMÍ      Scheduled FU with Dr Jayy Myrick 1/7/19 at 200 am in [place] and DME set up 1/7/19a t 1000 am in [facility] and report given to [employer]

## 2018-12-19 ENCOUNTER — OFFICE VISIT (OUTPATIENT)
Dept: PHYSICAL THERAPY | Facility: CLINIC | Age: 58
End: 2018-12-19
Payer: OTHER MISCELLANEOUS

## 2018-12-19 ENCOUNTER — OFFICE VISIT (OUTPATIENT)
Dept: PHYSICAL THERAPY | Facility: CLINIC | Age: 58
End: 2018-12-19
Payer: COMMERCIAL

## 2018-12-19 DIAGNOSIS — M54.50 LUMBAR PAIN: Primary | ICD-10-CM

## 2018-12-19 DIAGNOSIS — M54.2 NECK PAIN ON RIGHT SIDE: Primary | ICD-10-CM

## 2018-12-19 PROCEDURE — 97110 THERAPEUTIC EXERCISES: CPT

## 2018-12-19 PROCEDURE — 97140 MANUAL THERAPY 1/> REGIONS: CPT

## 2018-12-19 NOTE — PROGRESS NOTES
Daily Note     Today's date: 2018  Patient name: Romy Keys  : 1960  MRN: [id number]  Referring provider: Rigo Alvarez MD  Dx:   Encounter Diagnosis     ICD-10-CM    1  Neck pain on right side M54 2                 Subjective: Patient reports she noticed neck pain when driving today adding work was more stressful today  Patient complains right upper extremity pain also worsened throughout the day today  Patient reports compliance with stretching her neck throughout the day  Objective: See treatment diary below  PT performs mobilizations as noted  Precautions: fibromyalgia      Daily Treatment Diary     Manuals 11/14 11/19 11/21 11/26 11/28 12/3 12/10 12/12 12/17 12/19   Cervical/thoracic pa's 3' 3' 3' 3' 4' gentle  cervical only  4' 4' 4' 4' 4'   DTM R paraspinals  5' 5' 5' 5'         gentle cervical traction      4' 4' 4' 4' 4'                 Exercise Diary              ube             Chin tuck 5"x20 20 20 20 20 5"x20 20 20 20 seated  5"x20   UT stretch 30"x4 4 4 4 4 30"x4 4 4 30"x4 30"x4   Levator stretch 30"x4 4 4 4 4 30"x4 4 4 30"x4 30"x4   Low rows green tb x20 30 Green tb x20 Blue tb x20 Blue tb x20 Green  2x10 Blue tb x20 Blue tb x20     rows Green tb x20 30 Green tb x20 Blue tb x20 Blue tb x20 Green  2x10 Blue tb x20 Blue tb x20     scap depression Red tb x30 Green tb x20 Green tb x20 20 20 Blue  2x10 20 20     scap retraction with cane ext 10"x10 10 10 10 10 5"x10 10 10  5"x20   scap squeeze with er Red tb x20 Green tb x20 Green x20 20 Green x 20 Red  2x10 green x20 Blue tb x20                                                                                                                                                                                                         Modalities              MHP post tx prone 8'     10' 10'  10'  NP                   Assessment: Patient is comfortable throughout self stretching and gentle cervical traction      Plan: Continue treatment as per PT plan of care

## 2018-12-19 NOTE — PROGRESS NOTES
Daily Note     Today's date: 2018  Patient name: Ann Ellington  : 1960  MRN: [id number]  Referring provider: Rhina Matthews PA-C  Dx:   Encounter Diagnosis     ICD-10-CM    1  Lumbar pain M54 5                  Subjective: Patient reports back pain has been a lot better overall  Patient recalls the last time she had any serious muscle spasms was the day she had the MRI  Objective: See treatment diary below  Precautions: chronic neck pain     Daily Treatment Diary         Manuals  11/12  11/14  11/9  11/21  1/26  11/28  12/3  12/10  12/12  12/17 12/19   Lumbar PA's  NP                       STM R paraspinals       10'  10'  10'  NP                                                            Exercise Diary                         ltr  5"x15  5"x15  5"x15  15  15  20  5"x20  20  20  20 5"x20   SKTC  30"x3  30"x4  30"x4  4      10"x5  5"x10  10  10 5"x10   TaA  5"x10  5"x10  10                  TaA bridge  5"x10  5"x15  5"x15                  Repeated ext standing  NP                       TaA wall squat  1x10  10  10                   bike             4 min  8'  8'  8' 10'                                                                                                                                                                                                                                    Modalities                         MHP/ESTIM  post tx 10 min  10'  10'  10'  10'  10'  10'  MHP   10"  10' NP                              Assessment: Lower trunk rotations are slightly painful during today's therapeutic exercise program     Plan: Continue treatment as per PT plan of care

## 2018-12-26 ENCOUNTER — APPOINTMENT (OUTPATIENT)
Dept: PHYSICAL THERAPY | Facility: CLINIC | Age: 58
End: 2018-12-26
Payer: COMMERCIAL

## 2018-12-26 ENCOUNTER — APPOINTMENT (OUTPATIENT)
Dept: PHYSICAL THERAPY | Facility: CLINIC | Age: 58
End: 2018-12-26
Payer: OTHER MISCELLANEOUS

## 2018-12-27 ENCOUNTER — OFFICE VISIT (OUTPATIENT)
Dept: OBGYN CLINIC | Facility: HOSPITAL | Age: 58
End: 2018-12-27
Payer: OTHER MISCELLANEOUS

## 2018-12-27 VITALS
HEIGHT: 62 IN | WEIGHT: 168 LBS | HEART RATE: 80 BPM | DIASTOLIC BLOOD PRESSURE: 68 MMHG | BODY MASS INDEX: 30.91 KG/M2 | SYSTOLIC BLOOD PRESSURE: 106 MMHG

## 2018-12-27 DIAGNOSIS — G89.29 CHRONIC RIGHT-SIDED LOW BACK PAIN WITH RIGHT-SIDED SCIATICA: Primary | ICD-10-CM

## 2018-12-27 DIAGNOSIS — M54.41 CHRONIC RIGHT-SIDED LOW BACK PAIN WITH RIGHT-SIDED SCIATICA: Primary | ICD-10-CM

## 2018-12-27 DIAGNOSIS — M51.37 DDD (DEGENERATIVE DISC DISEASE), LUMBOSACRAL: ICD-10-CM

## 2018-12-27 DIAGNOSIS — M54.50 ACUTE BILATERAL LOW BACK PAIN WITHOUT SCIATICA: ICD-10-CM

## 2018-12-27 DIAGNOSIS — M51.26 HERNIATED LUMBAR INTERVERTEBRAL DISC: ICD-10-CM

## 2018-12-27 PROBLEM — M51.379 DDD (DEGENERATIVE DISC DISEASE), LUMBOSACRAL: Status: ACTIVE | Noted: 2018-12-27

## 2018-12-27 PROCEDURE — 99203 OFFICE O/P NEW LOW 30 MIN: CPT | Performed by: ORTHOPAEDIC SURGERY
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
## 2019-01-02 ENCOUNTER — OFFICE VISIT (OUTPATIENT)
Dept: PHYSICAL THERAPY | Facility: CLINIC | Age: 59
End: 2019-01-02
Payer: OTHER MISCELLANEOUS

## 2019-01-02 ENCOUNTER — OFFICE VISIT (OUTPATIENT)
Dept: PHYSICAL THERAPY | Facility: CLINIC | Age: 59
End: 2019-01-02
Payer: COMMERCIAL

## 2019-01-02 DIAGNOSIS — M54.50 LUMBAR PAIN: Primary | ICD-10-CM

## 2019-01-02 DIAGNOSIS — M54.2 NECK PAIN ON RIGHT SIDE: Primary | ICD-10-CM

## 2019-01-02 PROCEDURE — 97140 MANUAL THERAPY 1/> REGIONS: CPT

## 2019-01-02 PROCEDURE — 97110 THERAPEUTIC EXERCISES: CPT

## 2019-01-02 NOTE — PROGRESS NOTES
Daily Note     Today's date: 2019  Patient name: Juan Garcia  : 1960  MRN: [id number]  Referring provider: Imelda Mcclelland PA-C  Dx:   Encounter Diagnosis     ICD-10-CM    1  Lumbar pain M54 5                  Subjective: Patient reports her lower back has been feeling "a lot better" overall with the exception of after sleeping on an air mattress at her mother's house on Saturday  Objective: See treatment diary below  Precautions: chronic neck pain     Daily Treatment Diary         Manuals  11/12  11/14  11/9  11/21  1/26  11/28  12/3  12/10  12/12  12/17 12/19 1/2/19   Lumbar PA's  NP                        STM R paraspinals       10'  10'  10'  NP                                                               Exercise Diary                          ltr  5"x15  5"x15  5"x15  15  15  20  5"x20  20  20  20 5"x20 5"x20   SKTC  30"x3  30"x4  30"x4  4      10"x5  5"x10  10  10 5"x10 5"x10   TaA  5"x10  5"x10  10                   TaA bridge  5"x10  5"x15  5"x15                   Repeated ext standing  NP                        TaA wall squat  1x10  10  10                    bike             4 min  8'  8'  8' 10' 10'                                                                                                                                                                                                                                             Modalities                          MHP/ESTIM  post tx 10 min  10'  10'  10'  10'  10'  10'  MHP   10"  10' NP 10'                               Assessment: No complaints offered throughout therapeutic exercise program     Plan: Continue treatment as per PT plan of care

## 2019-01-02 NOTE — PROGRESS NOTES
Daily Note     Today's date: 2019  Patient name: Romy Keys  : 1960  MRN: [id number]  Referring provider: Rigo Alvarez MD  Dx:   Encounter Diagnosis     ICD-10-CM    1  Neck pain on right side M54 2                 Subjective: Patient arrives to today's treatment complaining of a headache - adds she has been experiencing headaches almost daily for the last week  Patient reports she slept on an air mattress on Saturday night and felt worse after this  Objective: See treatment diary below  PT performs mobilizations as noted  Precautions: fibromyalgia      Daily Treatment Diary     Manuals 11/14 11/19 11/21 11/26 11/28 12/3 12/10 12/12 12/17 12/19 1/2/19   Cervical/thoracic pa's 3' 3' 3' 3' 4' gentle  cervical only  4' 4' 4' 4' 4' 4'   DTM R paraspinals  5' 5' 5' 5'          gentle cervical traction      4' 4' 4' 4' 4' 4'                  Exercise Diary               ube              Chin tuck 5"x20 20 20 20 20 5"x20 20 20 20 seated  5"x20 seated  5"x20   UT stretch 30"x4 4 4 4 4 30"x4 4 4 30"x4 30"x4 30"x4   Levator stretch 30"x4 4 4 4 4 30"x4 4 4 30"x4 30"x4 30"x4   Low rows green tb x20 30 Green tb x20 Blue tb x20 Blue tb x20 Green  2x10 Blue tb x20 Blue tb x20      rows Green tb x20 30 Green tb x20 Blue tb x20 Blue tb x20 Green  2x10 Blue tb x20 Blue tb x20      scap depression Red tb x30 Green tb x20 Green tb x20 20 20 Blue  2x10 20 20      scap retraction with cane ext 10"x10 10 10 10 10 5"x10 10 10  5"x20 5"x20   scap squeeze with er Red tb x20 Green tb x20 Green x20 20 Green x 20 Red  2x10 green x20 Blue tb x20                                                                                                                                                                                                                       Modalities               MHP post tx prone 8'     10' 10'  10'  NP 10'                    Assessment: Patient is comfortable throughout today's treatment      Plan: Continue treatment as per PT plan of care

## 2019-01-03 ENCOUNTER — APPOINTMENT (OUTPATIENT)
Dept: URGENT CARE | Age: 59
End: 2019-01-03
Payer: OTHER MISCELLANEOUS

## 2019-01-03 PROCEDURE — 99213 OFFICE O/P EST LOW 20 MIN: CPT | Performed by: PREVENTIVE MEDICINE

## 2019-01-07 ENCOUNTER — OFFICE VISIT (OUTPATIENT)
Dept: SLEEP CENTER | Facility: CLINIC | Age: 59
End: 2019-01-07
Payer: COMMERCIAL

## 2019-01-07 VITALS
DIASTOLIC BLOOD PRESSURE: 80 MMHG | WEIGHT: 169 LBS | HEART RATE: 72 BPM | BODY MASS INDEX: 31.1 KG/M2 | SYSTOLIC BLOOD PRESSURE: 130 MMHG | HEIGHT: 62 IN

## 2019-01-07 DIAGNOSIS — F41.9 ANXIETY AND DEPRESSION: ICD-10-CM

## 2019-01-07 DIAGNOSIS — M79.7 FIBROMYALGIA: ICD-10-CM

## 2019-01-07 DIAGNOSIS — G47.33 OSA (OBSTRUCTIVE SLEEP APNEA): Primary | ICD-10-CM

## 2019-01-07 DIAGNOSIS — F32.A ANXIETY AND DEPRESSION: ICD-10-CM

## 2019-01-07 DIAGNOSIS — E66.9 OBESITY (BMI 30-39.9): ICD-10-CM

## 2019-01-07 DIAGNOSIS — G47.9 SLEEP DISTURBANCE: ICD-10-CM

## 2019-01-07 DIAGNOSIS — G47.10 HYPERSOMNIA: ICD-10-CM

## 2019-01-07 DIAGNOSIS — J45.20 MILD INTERMITTENT ASTHMA WITHOUT COMPLICATION: ICD-10-CM

## 2019-01-07 PROCEDURE — 99214 OFFICE O/P EST MOD 30 MIN: CPT | Performed by: INTERNAL MEDICINE

## 2019-01-07 NOTE — PROGRESS NOTES
Follow-up Note - Sleep Center   Kevin Chavez  66 y o  female  :1960  XZ    I saw Matty Marmolejo in sleep clinic today for her sleep complaints & medical conditions  A home sleep study was undertaken to revaluate for sleep disordered breathing and also had a subsequent titration study  The patient is here to review results and to initiate therapy or further options  The study demonstrated a WILLIAMS (respiratory event index of) 8 9 /hour  Minimum oxygen saturation was 81% and 2 5% of the study was spent with saturations less than 90%  The snore index was 17 3%  Severity is likely and stated since she did not discontinue CPAP prior to the study  Also, given the limitations of home sleep testing  During the subsequent therapeutic study, sleep disordered breathing was adequately remediated with PAP at 8 cm H2O  She was observed during stage REM but not while supine  PFSH, Problem List, Medications & Allergies were reviewed in EMR  Interval changes: none reported  She  has a past medical history of Anemia; Anxiety and depression; Asthma (10/26/2012); Chronic fatigue syndrome; DDD (degenerative disc disease), lumbosacral (2018); Patrick-Barr virus seropositivity; Fibromyalgia, primary; JA (generalized anxiety disorder); Gluten intolerance; Hypertension; Hypovitaminosis D; Impaired fasting glucose; Lipodystrophy; Liver lesion; Migraine; Obesity (BMI 30-39 9) (2018); and Sleep apnea  She has a current medication list which includes the following prescription(s): cyanocobalamin, duloxetine, hydrochlorothiazide, lorazepam, tramadol, zolpidem, vitamin d3, ciprofloxacin-dexamethasone, d-ribose, magnesium gluconate, and magnesium malate  ROS: reviewed see attached  HPI:  She has ongoing fatigue likely related to her fibromyalgia  She feels mood is stable on current medication  Sleep Routine:  She is getting 6-8 hours sleep    She has difficulty both initiating and maintaining sleep in spite of using Ambien  She awakens with the aid of an alarm and is not always refreshed  [She has excessive drowsiness and at times feels like napping but does not have the opportunity  She  rated herself at Total score: 15 /24 on the [place] sleepiness scale ]          EXAM: /80   Pulse 72   Ht 5' 2" (1 575 m)   Wt 76 7 kg (169 lb)   BMI 30 91 kg/m²     Patient is well groomed; well appearing  Skin/Extrem: warm & dry; col & hydration normal; no edema  Psych: cooperativeand in no distress  Mental state appears normal CNS: Alert, orientated, clear & coherent speech  H&N: EOMI; NC/AT:no facial pressure marks, no rashes  Neck Circumference: 40 cm  ENMT Mucus membranes appear normal Nasal airway:patent  Oral airway:  crowded  Resp:effort is normal CVS: RRR ABD:truncal obesity MSK:Gait normal     IMPRESSION: Primary Sleep/Secondary(to Medical or Psych conditions) & comorbidities   1  NOEMÍ (obstructive sleep apnea)     2  Sleep disturbance     3  Hypersomnia     4  Fibromyalgia     5  Obesity (BMI 30-39 9)     6  Mild intermittent asthma without complication     7  Anxiety and depression       PLAN:    1  I reviewed results of the Sleep studies with the patient  2  With respect to above conditions, I counseled on pathophysiology, diagnosis, treatment options, risks and benefits; inter-relationship and effects on symptoms and comorbidities; risks of no treatment; costs and insurance aspects  3  Patient elected positive airway pressure therapy and care coordinated with the DME provider for set up in clinic today  4  Need for compliance with therapy and weight reduction were emphasized  5  Cognitive behavioral therapy was initiated, Sleep Hygiene and behavioral techniques to manage Insomnia were discussed  I advised weaning off Ambien and she may benefit from gabapentin in its place  6  Follow-up to be scheduled in 2 months to monitor compliance, progress and to adjust therapy      Thank you for allowing me to participate in the care of this patient  I will keep you apprised of developments      Sincerely,    Authenticated electronically by Maryjo Barone MD on 07/17/35   Board Certified Specialist

## 2019-01-07 NOTE — PROGRESS NOTES
Review of Systems      Genitourinary hot flashes at night and post menopausal (no peroids)   Cardiology none   Gastrointestinal frequent heartburn/acid reflux   Neurology frequent headaches, awaken with headache, need to move extremities, forgetfulness, poor concentration or confusion,  and difficulty with memory   Constitutional claustrophobia and fatigue   Integumentary itching   Psychiatry none   Musculoskeletal joint pain, muscle aches, back pain and leg cramps   Pulmonary none   ENT throat clearing and ringing in ears   Endocrine none   Hematological none

## 2019-01-11 ENCOUNTER — APPOINTMENT (OUTPATIENT)
Dept: URGENT CARE | Age: 59
End: 2019-01-11
Payer: OTHER MISCELLANEOUS

## 2019-01-11 PROCEDURE — 99213 OFFICE O/P EST LOW 20 MIN: CPT | Performed by: PREVENTIVE MEDICINE

## 2019-01-15 ENCOUNTER — TELEPHONE (OUTPATIENT)
Dept: OBGYN CLINIC | Facility: HOSPITAL | Age: 59
End: 2019-01-15

## 2019-01-15 NOTE — TELEPHONE ENCOUNTER
Not sure what she means by specialty xray  But happy to see her in follow up if needed  She can make an appt if she would like to be seen   We can obtain xrays here in the office if needed    Thanks,  GS

## 2019-01-15 NOTE — TELEPHONE ENCOUNTER
Okay, thank you Dr Ezequiel Villa I will call patient and relate message and  Make a f/up appt      Thank you

## 2019-01-15 NOTE — TELEPHONE ENCOUNTER
Gaye Oconnor would like to have Dr Shayla Ceja know that she needs speciality xr to check stability of spine ref by Dr Luli Robin does have upcoming appt with Dr Hayes on 1/23, she would like to know if Dr Dominguez Shall like this done prior  Gaye Oconnor would like to let Dr Shayla Ceja know that her pain did become worse      Gaye cOonnor best contact # [phone]    Thank you

## 2019-01-16 ENCOUNTER — OFFICE VISIT (OUTPATIENT)
Dept: PHYSICAL THERAPY | Facility: CLINIC | Age: 59
End: 2019-01-16
Payer: COMMERCIAL

## 2019-01-16 ENCOUNTER — OFFICE VISIT (OUTPATIENT)
Dept: PHYSICAL THERAPY | Facility: CLINIC | Age: 59
End: 2019-01-16
Payer: OTHER MISCELLANEOUS

## 2019-01-16 DIAGNOSIS — M54.50 LUMBAR PAIN: Primary | ICD-10-CM

## 2019-01-16 DIAGNOSIS — M54.2 NECK PAIN ON RIGHT SIDE: Primary | ICD-10-CM

## 2019-01-16 PROCEDURE — 97110 THERAPEUTIC EXERCISES: CPT | Performed by: PHYSICAL THERAPIST

## 2019-01-16 PROCEDURE — 97140 MANUAL THERAPY 1/> REGIONS: CPT | Performed by: PHYSICAL THERAPIST

## 2019-01-16 NOTE — PROGRESS NOTES
Daily Note     Today's date: 2019  Patient name: Mima Bamberger  : 1960  MRN: [id number]  Referring provider: Deion Milan PA-C  Dx:   No diagnosis found  Subjective: Pt reports a sig increase in her lumbar pain of insidous onset, she has a f/u appt with her referring physician tomorrow 19  Objective: See treatment diary below  Precautions: chronic neck pain     Daily Treatment Diary         Manuals 19   Lumbar PA's                STM R paraspinals                                               Exercise Diary  20              ltr            5"x20   SKTC 30"x4           5"x10   TaA               TaA bridge               Repeated ext standing               TaA wall squat                bike            10'                                                                                                                                                   Modalities                MHP/ESTIM  post tx            8'                     Assessment: therex was limited today, secondary to reports of pain    plan: Continue treatment as per PT plan of care

## 2019-01-16 NOTE — PROGRESS NOTES
Daily Note     Today's date: 2019  Patient name: Clau Escobar  : 1960  MRN: [id number]  Referring provider: Lindsey Fragoso MD  Dx:   No diagnosis found  Subjective: Pt reports that she is experiencing increased cervical pain secondary to not being able to attend PT last week  Objective: See treatment diary below  PT performs mobilizations as noted  Precautions: fibromyalgia      Daily Treatment Diary     Manuals 19   Cervical/thoracic pa's 4'          4'   DTM R paraspinals               gentle cervical traction 4'          4'                  Exercise Diary               ube              Chin tuck Seated 20          seated  5"x20   UT stretch 30"x4          30"x4   Levator stretch 30"x4          30"x4   Low rows              rows              scap depression              scap retraction with cane ext 5"x20          5"x20   scap squeeze with er                                                                                                                                                                                                                 Modalities              MHP post tx prone 10'          10'                    Assessment: Patient reports decreased stiffness post-tx  Progress stabilization program as imelda NV  Plan: Continue treatment as per PT plan of care

## 2019-01-21 ENCOUNTER — OFFICE VISIT (OUTPATIENT)
Dept: PHYSICAL THERAPY | Facility: CLINIC | Age: 59
End: 2019-01-21
Payer: COMMERCIAL

## 2019-01-21 ENCOUNTER — OFFICE VISIT (OUTPATIENT)
Dept: PHYSICAL THERAPY | Facility: CLINIC | Age: 59
End: 2019-01-21
Payer: OTHER MISCELLANEOUS

## 2019-01-21 ENCOUNTER — OFFICE VISIT (OUTPATIENT)
Dept: OBGYN CLINIC | Facility: HOSPITAL | Age: 59
End: 2019-01-21
Payer: OTHER MISCELLANEOUS

## 2019-01-21 VITALS
HEART RATE: 73 BPM | DIASTOLIC BLOOD PRESSURE: 84 MMHG | WEIGHT: 169 LBS | SYSTOLIC BLOOD PRESSURE: 125 MMHG | HEIGHT: 62 IN | BODY MASS INDEX: 31.1 KG/M2

## 2019-01-21 DIAGNOSIS — M54.41 CHRONIC RIGHT-SIDED LOW BACK PAIN WITH RIGHT-SIDED SCIATICA: Primary | ICD-10-CM

## 2019-01-21 DIAGNOSIS — M51.26 HERNIATED LUMBAR INTERVERTEBRAL DISC: ICD-10-CM

## 2019-01-21 DIAGNOSIS — M54.50 LUMBAR PAIN: Primary | ICD-10-CM

## 2019-01-21 DIAGNOSIS — M54.2 NECK PAIN ON RIGHT SIDE: Primary | ICD-10-CM

## 2019-01-21 DIAGNOSIS — M51.37 DDD (DEGENERATIVE DISC DISEASE), LUMBOSACRAL: ICD-10-CM

## 2019-01-21 DIAGNOSIS — G89.29 CHRONIC RIGHT-SIDED LOW BACK PAIN WITH RIGHT-SIDED SCIATICA: Primary | ICD-10-CM

## 2019-01-21 PROCEDURE — 99213 OFFICE O/P EST LOW 20 MIN: CPT | Performed by: ORTHOPAEDIC SURGERY

## 2019-01-21 PROCEDURE — 97110 THERAPEUTIC EXERCISES: CPT

## 2019-01-21 PROCEDURE — 97140 MANUAL THERAPY 1/> REGIONS: CPT

## 2019-01-21 NOTE — PROGRESS NOTES
Daily Note     Today's date: 2019  Patient name: Cl Jacobs  : 1960  MRN: [id number]  Referring provider: Umu Patterson PA-C  Dx:   Encounter Diagnosis     ICD-10-CM    1  Lumbar pain M54 5                  Subjective: Patient reports her back is feeling better today as compared to last week  Patient reports she followed up with the doctor today - reports he did not order additional x rays since her back pain is steadily improving  Objective: See treatment diary below  Precautions: chronic neck pain     Daily Treatment Diary      Manuals 19   Lumbar PA's               STM R paraspinals                                               Exercise Diary                ltr  5"x10          5"x20   SKTC 30"x4 30"x4          5"x10   TaA  5"x10             TaA bridge  1x10             Repeated ext standing               TaA wall squat               bike  10'          10'                                                                                                                                                   Modalities               MHP/ESTIM  post tx  10'          10'                     Assessment: Progression of therapeutic exercise program is tolerated well without complaints of significant pain  plan: Continue treatment as per PT plan of care
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
## 2019-01-21 NOTE — ASSESSMENT & PLAN NOTE
Patient presents for evaluation of flare-up of lower back pain  She is in physical therapy  She does report some improvement  Average pain now is 4/10 and is continue to improve  She does not report any distal leg pain at this time  Does not report incontinence of bowel or bladder  Does not report any kirstie weakness  On physical exam she is nontender to palpation over the lumbosacral spine  She does ambulate independently  Good strength L2-S1 bilaterally  Sensation is grossly intact to light touch  Negative modified straight leg raise bilaterally  Assessment and plan  Neurologically stable  Seen improvement with physical therapy  Continue with conservative management of myofascial back pain in the setting of lumbar DDD and spondylolisthesis L4-5 and L5-S1 associated stenosis  Follow-up p r n

## 2019-01-21 NOTE — PROGRESS NOTES
Daily Note     Today's date: 2019  Patient name: Mima Bamberger  : 1960  MRN: [id number]  Referring provider: Mary Felix MD  Dx:   Encounter Diagnosis     ICD-10-CM    1  Neck pain on right side M54 2                 Subjective: Patient states, "It's not the worst it's been but it's not the best either "  Patient reports her neck is feeling "a little sore" possibly from the cold weather - also reports she did a lot of house cleaning yesterday  Objective: See treatment diary below  PT performs mobilizations as noted  Precautions: fibromyalgia      Daily Treatment Diary     Manuals 19   Cervical/thoracic pa's 4' 4'         4'   DTM R paraspinals               gentle cervical traction 4' 6'         4'                  Exercise Diary               ube              Chin tuck seated 20 seated  5"x20         seated  5"x20   UT stretch 30"x4 30"x4         30"x4   Levator stretch 30"x4 30"X4         30"x4   Low rows              rows              scap depression              scap retraction with cane ext 5"x20 5"x20         5"x20   scap squeeze with er                                                                                                                                                                                                                 Modalities              MHP post tx prone 10' 10'         10'                    Assessment: Pain relief reported with gentle manual cervical traction  Plan: Continue treatment as per PT plan of care

## 2019-01-23 ENCOUNTER — APPOINTMENT (OUTPATIENT)
Dept: URGENT CARE | Age: 59
End: 2019-01-23
Payer: OTHER MISCELLANEOUS

## 2019-01-23 ENCOUNTER — APPOINTMENT (OUTPATIENT)
Dept: PHYSICAL THERAPY | Facility: CLINIC | Age: 59
End: 2019-01-23
Payer: COMMERCIAL

## 2019-01-23 ENCOUNTER — APPOINTMENT (OUTPATIENT)
Dept: PHYSICAL THERAPY | Facility: CLINIC | Age: 59
End: 2019-01-23
Payer: OTHER MISCELLANEOUS

## 2019-01-23 PROCEDURE — S9088 SERVICES PROVIDED IN URGENT: HCPCS | Performed by: PREVENTIVE MEDICINE

## 2019-01-23 PROCEDURE — 99213 OFFICE O/P EST LOW 20 MIN: CPT | Performed by: PREVENTIVE MEDICINE

## 2019-01-28 ENCOUNTER — OFFICE VISIT (OUTPATIENT)
Dept: PHYSICAL THERAPY | Facility: CLINIC | Age: 59
End: 2019-01-28
Payer: COMMERCIAL

## 2019-01-28 ENCOUNTER — OFFICE VISIT (OUTPATIENT)
Dept: PHYSICAL THERAPY | Facility: CLINIC | Age: 59
End: 2019-01-28
Payer: OTHER MISCELLANEOUS

## 2019-01-28 DIAGNOSIS — M54.50 LUMBAR PAIN: Primary | ICD-10-CM

## 2019-01-28 DIAGNOSIS — M54.2 NECK PAIN ON RIGHT SIDE: Primary | ICD-10-CM

## 2019-01-28 PROCEDURE — 97110 THERAPEUTIC EXERCISES: CPT | Performed by: PHYSICAL THERAPIST

## 2019-01-28 PROCEDURE — 97140 MANUAL THERAPY 1/> REGIONS: CPT | Performed by: PHYSICAL THERAPIST

## 2019-01-28 NOTE — PROGRESS NOTES
Daily Note     Today's date: 2019  Patient name: Yoselin De Santiago  : 1960  MRN: [id number]  Referring provider: Jaxson Moe PA-C  Dx:   Encounter Diagnosis     ICD-10-CM    1  Lumbar pain M54 5                  Subjective: Patient reports that he experienced increased back pain after pushing her cart at work today  Objective: See treatment diary below  Precautions: chronic neck pain     Daily Treatment Diary      Manuals 19   Lumbar PA's               STM R paraspinals                                               Exercise Diary                ltr  5"x10 5"x20         5"x20   SKTC 30"x4 30"x4 30"x4         5"x10   TaA  5"x10 5"x10            TaA bridge  1x10 10            Repeated ext standing               TaA wall squat               bike  10' 10'         10'                                                                                                                                                   Modalities               MHP  10' 10'         10'                     Assessment: Pt reports decreased pain but not elimination of symptoms with therex program   Monitor response NV       plan: Continue treatment as per PT plan of care

## 2019-01-28 NOTE — PROGRESS NOTES
Daily Note     Today's date: 2019  Patient name: Karen Mobley  : 1960  MRN: [id number]  Referring provider: Ayana Peace MD  Dx:   No diagnosis found  Subjective: Pt reports that she continues to have cervical pain that is unchanged since her LV  Objective: See treatment diary below  PT performs mobilizations as noted  Precautions: fibromyalgia      Daily Treatment Diary     Manuals 19   Cervical/thoracic pa's 4' 4' 6'        4'   DTM R paraspinals               gentle cervical traction 4' 6' 5'        4'                  Exercise Diary               ube              Chin tuck seated 20 seated  5"x20 20        seated  5"x20   UT stretch 30"x4 30"x4 30"x4        30"x4   Levator stretch 30"x4 30"X4 30"x4        30"x4   Low rows              rows              scap depression              scap retraction with cane ext 5"x20 5"x20 5"x20        5"x20   scap squeeze with er                                                                                                                                                                                                                 Modalities              MHP post tx prone 10' 10' 10'        10'                    Assessment: Pain reduction with cervical traction and mobilization, no pain reported with therex, plan to progress stabilization therex as tolerated NV  Plan: Continue treatment as per PT plan of care

## 2019-01-30 ENCOUNTER — OFFICE VISIT (OUTPATIENT)
Dept: PHYSICAL THERAPY | Facility: CLINIC | Age: 59
End: 2019-01-30
Payer: OTHER MISCELLANEOUS

## 2019-01-30 ENCOUNTER — APPOINTMENT (OUTPATIENT)
Dept: PHYSICAL THERAPY | Facility: CLINIC | Age: 59
End: 2019-01-30
Payer: COMMERCIAL

## 2019-01-30 DIAGNOSIS — M54.50 LUMBAR PAIN: Primary | ICD-10-CM

## 2019-01-30 PROCEDURE — 97110 THERAPEUTIC EXERCISES: CPT

## 2019-01-30 NOTE — PROGRESS NOTES
Daily Note     Today's date: 2019  Patient name: Saman Pacheco  : 1960  MRN: [id number]  Referring provider: Leslie Harkins PA-C  Dx:   Encounter Diagnosis     ICD-10-CM    1  Lumbar pain M54 5                  Subjective: Patient reports her back is feeling "okay" prior to today's treatment  Objective: See treatment diary below  Precautions: chronic neck pain     Daily Treatment Diary      Manuals 19   Lumbar PA's               STM R paraspinals                                               Exercise Diary                ltr  5"x10 5"x20 5"x20        5"x20   SKTC 30"x4 30"x4 30"x4 30"x4        5"x10   TaA  5"x10 5"x10 5"x10           TaA bridge  1x10 10 1x20           Repeated ext standing               TaA wall squat    1x15           bike  10' 10' 10'        10'   clamshell supine    red  1x15                                                                                                                                           Modalities               MHP  10' 10' 10'        10'                     Assessment: Soreness reported in the low back with the addition of wall squats  Stiffness reported in the low back when performing bridges  plan: Continue treatment as per PT plan of care

## 2019-02-04 ENCOUNTER — OFFICE VISIT (OUTPATIENT)
Dept: PHYSICAL THERAPY | Facility: CLINIC | Age: 59
End: 2019-02-04
Payer: OTHER MISCELLANEOUS

## 2019-02-04 ENCOUNTER — OFFICE VISIT (OUTPATIENT)
Dept: PHYSICAL THERAPY | Facility: CLINIC | Age: 59
End: 2019-02-04
Payer: COMMERCIAL

## 2019-02-04 DIAGNOSIS — M54.50 LUMBAR PAIN: Primary | ICD-10-CM

## 2019-02-04 DIAGNOSIS — M54.2 NECK PAIN ON RIGHT SIDE: Primary | ICD-10-CM

## 2019-02-04 PROCEDURE — 97110 THERAPEUTIC EXERCISES: CPT | Performed by: PHYSICAL THERAPIST

## 2019-02-04 PROCEDURE — 97140 MANUAL THERAPY 1/> REGIONS: CPT | Performed by: PHYSICAL THERAPIST

## 2019-02-04 NOTE — PROGRESS NOTES
Daily Note     Today's date: 2019  Patient name: Ann Ellington  : 1960  MRN: [id number]  Referring provider: Rhina Matthews PA-C  Dx:   Encounter Diagnosis     ICD-10-CM    1  Lumbar pain M54 5                  Subjective: pt reports mod pain upon arrival to PT today    Objective: See treatment diary below  Precautions: chronic neck pain     Daily Treatment Diary      Manuals 19   Lumbar PA's               STM R paraspinals                                               Exercise Diary                ltr  5"x10 5"x20 5"x20 30       5"x20   SKTC 30"x4 30"x4 30"x4 30"x4 30"x4       5"x10   TaA  5"x10 5"x10 5"x10           TaA bridge  1x10 10 1x20 30          Repeated ext standing               TaA wall squat    1x15 20          bike  10' 10' 10' 10'       10'   clamshell supine    red  1x15 Green x15                                                                                                                                          Modalities               MHP  10' 10' 10' 10'       10'                     Assessment: Pt reports decreased pain with manual tx, progress stabilization therex as imelda NV       plan: Continue treatment as per PT plan of care

## 2019-02-04 NOTE — PROGRESS NOTES
Daily Note     Today's date: 2019  Patient name: Clau Escobar  : 1960  MRN: [id number]  Referring provider: Lindsey Fragoso MD  Dx:   Encounter Diagnosis     ICD-10-CM    1  Neck pain on right side M54 2                 Subjective: Pt reports that she continues to have neck pain but that she feels it is improved since LV    Objective: See treatment diary below  PT performs mobilizations as noted  Precautions: fibromyalgia      Daily Treatment Diary     Manuals 19   Cervical/thoracic pa's 4' 4' 6' 6'       4'   DTM R paraspinals               gentle cervical traction 4' 6' 5' 5'       4'                  Exercise Diary               ube              Chin tuck seated 20 seated  5"x20 20 5"x20       seated  5"x20   UT stretch 30"x4 30"x4 30"x4 30"x4       30"x4   Levator stretch 30"x4 30"X4 30"x4 30"x4       30"x4   Low rows              rows              scap depression              scap retraction with cane ext 5"x20 5"x20 5"x20 5"x10       5"x20   scap squeeze with er                                                                                                                                                                                                                 Modalities              MHP post tx prone 10' 10' 10' 10'       10'                    Assessment:  inccreaed resistance as listed with no complaints other the slight fatigue, continue to progress as imelda NV  Plan: Continue treatment as per PT plan of care

## 2019-02-06 ENCOUNTER — OFFICE VISIT (OUTPATIENT)
Dept: PHYSICAL THERAPY | Facility: CLINIC | Age: 59
End: 2019-02-06
Payer: OTHER MISCELLANEOUS

## 2019-02-06 ENCOUNTER — APPOINTMENT (OUTPATIENT)
Dept: PHYSICAL THERAPY | Facility: CLINIC | Age: 59
End: 2019-02-06
Payer: COMMERCIAL

## 2019-02-06 DIAGNOSIS — M54.2 NECK PAIN ON RIGHT SIDE: Primary | ICD-10-CM

## 2019-02-06 PROCEDURE — 97140 MANUAL THERAPY 1/> REGIONS: CPT | Performed by: PHYSICAL THERAPIST

## 2019-02-06 PROCEDURE — 97110 THERAPEUTIC EXERCISES: CPT | Performed by: PHYSICAL THERAPIST

## 2019-02-06 NOTE — PROGRESS NOTES
Daily Note     Today's date: 2019  Patient name: Leslee Castillo  : 1960  MRN: [id number]  Referring provider: Rowena Soria PA-C  Dx:   Encounter Diagnosis     ICD-10-CM    1  Neck pain on right side M54 2                 Subjective: Pt reports that she is having mod pain in her triceps region that she attributes to therex LV  Reports that she is also having increased cervical pain that she attributes to pushing a cart at work  Objective: See treatment diary below  PT performs mobilizations as noted  Precautions: fibromyalgia      Daily Treatment Diary     Manuals 19   Cervical/thoracic pa's 4' 4' 6' 6' 8'      4'   DTM R paraspinals               gentle cervical traction 4' 6' 5' 5' 5'      4'                  Exercise Diary               ube              Chin tuck seated 20 seated  5"x20 20 5"x20 5"x20      seated  5"x20   UT stretch 30"x4 30"x4 30"x4 30"x4 30"x4      30"x4   Levator stretch 30"x4 30"X4 30"x4 30"x4 30"x4      30"x4   Low rows              rows              scap depression              scap retraction with cane ext 5"x20 5"x20 5"x20 5"x10 10"x10      5"x20   scap squeeze with er                                                                                                                                                                                                                 Modalities              MHP post tx prone 10' 10' 10' 10' 10'      10'                    Assessment: Pt reports decreased stiffness with manual tx but that she continues to have limited rom in rotation B/L   Plan: Continue treatment as per PT plan of care

## 2019-02-11 ENCOUNTER — OFFICE VISIT (OUTPATIENT)
Dept: PHYSICAL THERAPY | Facility: CLINIC | Age: 59
End: 2019-02-11
Payer: OTHER MISCELLANEOUS

## 2019-02-11 ENCOUNTER — OFFICE VISIT (OUTPATIENT)
Dept: PHYSICAL THERAPY | Facility: CLINIC | Age: 59
End: 2019-02-11
Payer: COMMERCIAL

## 2019-02-11 DIAGNOSIS — M54.2 NECK PAIN ON RIGHT SIDE: Primary | ICD-10-CM

## 2019-02-11 DIAGNOSIS — M54.50 LUMBAR PAIN: Primary | ICD-10-CM

## 2019-02-11 PROCEDURE — 97110 THERAPEUTIC EXERCISES: CPT | Performed by: PHYSICAL THERAPIST

## 2019-02-11 PROCEDURE — 97140 MANUAL THERAPY 1/> REGIONS: CPT | Performed by: PHYSICAL THERAPIST

## 2019-02-11 NOTE — PROGRESS NOTES
Daily Note     Today's date: 2019  Patient name: Leslee Castillo  : 1960  MRN: [id number]  Referring provider: Rowena Soria PA-C  Dx:   No diagnosis found  Subjective: pt reports that she attended a dance class over the weekend and report mm soreness but no increase in pain  Reports "im surprised I don't hurt more"    Objective: See treatment diary below  Precautions: chronic neck pain     Daily Treatment Diary      Manuals 19   Lumbar PA's               STM R paraspinals                                               Exercise Diary                ltr  5"x10 5"x20 5"x20 30 30      5"x20   SKTC 30"x4 30"x4 30"x4 30"x4 30"x4 30"x4      5"x10   TaA  5"x10 5"x10 5"x10           TaA bridge  1x10 10 1x20 30 30         Repeated ext standing               TaA wall squat    1x15 20 20         bike  10' 10' 10' 10' 10'      10'   clamshell supine    red  1x15 Green x15 15                                                                                                                                         Modalities               MHP  10' 10' 10' 10'       10'                     Assessment: Pt reports decreased pain with manual tx, progress stabilization therex as imelda NV       plan: Continue treatment as per PT plan of care

## 2019-02-11 NOTE — PROGRESS NOTES
Daily Note     Today's date: 2019  Patient name: Arturo Kimble  : 1960  MRN: [id number]  Referring provider: Kaye Walters MD  Dx:   Encounter Diagnosis     ICD-10-CM    1  Neck pain on right side M54 2                 Subjective: Pt reports she attended a dance class over the week and reports mild soreness but denies any increase in pain  Reports that "I actually feel pretty good"    Objective: See treatment diary below  PT performs mobilizations as noted  Precautions: fibromyalgia      Daily Treatment Diary     Manuals 19   Cervical/thoracic pa's 4' 4' 6' 6' 8' 8'     4'   DTM R paraspinals               gentle cervical traction 4' 6' 5' 5' 5' 5'     4'                  Exercise Diary               ube              Chin tuck seated 20 seated  5"x20 20 5"x20 5"x20 5"x20     seated  5"x20   UT stretch 30"x4 30"x4 30"x4 30"x4 30"x4 30"x4     30"x4   Levator stretch 30"x4 30"X4 30"x4 30"x4 30"x4 30"x4     30"x4   Low rows              rows              scap depression              scap retraction with cane ext 5"x20 5"x20 5"x20 5"x10 10"x10 10"x10     5"x20   scap squeeze with er                                                                                                                                                                                                                 Modalities              MHP post tx prone 10' 10' 10' 10' 10' 10'     10'                    Assessment: Pt reports decreased stiffness with manual tx but that she continues to have limited rom in rotation B/L   Plan: Continue treatment as per PT plan of care

## 2019-02-13 ENCOUNTER — OFFICE VISIT (OUTPATIENT)
Dept: PHYSICAL THERAPY | Facility: CLINIC | Age: 59
End: 2019-02-13
Payer: OTHER MISCELLANEOUS

## 2019-02-13 ENCOUNTER — APPOINTMENT (OUTPATIENT)
Dept: PHYSICAL THERAPY | Facility: CLINIC | Age: 59
End: 2019-02-13
Payer: COMMERCIAL

## 2019-02-13 DIAGNOSIS — M54.50 LUMBAR PAIN: Primary | ICD-10-CM

## 2019-02-13 PROCEDURE — 97110 THERAPEUTIC EXERCISES: CPT | Performed by: PHYSICAL THERAPIST

## 2019-02-13 PROCEDURE — 97140 MANUAL THERAPY 1/> REGIONS: CPT | Performed by: PHYSICAL THERAPIST

## 2019-02-13 NOTE — PROGRESS NOTES
Daily Note     Today's date: 2019  Patient name: Carina Cervantes  : 1960  MRN: [id number]  Referring provider: Eder Gould PA-C  Dx:   Encounter Diagnosis     ICD-10-CM    1  Lumbar pain M54 5                  Subjective: pt reports 7/10 pain upon arrival to PT today but reports "that pretty typical at the end of the day" Reports that she was getting into her car and experienced a "sharp" pain in her R hip but that it is subsiding  Objective: See treatment diary below    Precautions: chronic neck pain     Daily Treatment Diary      Manuals 19   Lumbar PA's       kl        STM R paraspinals       kl                                        Exercise Diary                ltr  5"x10 5"x20 5"x20 30 30 30     5"x20   SKTC 30"x4 30"x4 30"x4 30"x4 30"x4 30"x4 30"x4     5"x10   TaA  5"x10 5"x10 5"x10           TaA bridge  1x10 10 1x20 30 30 30        Repeated ext standing               TaA wall squat    1x15 20 20 30        bike  10' 10' 10' 10' 10' 10'     10'   clamshell supine    red  1x15 Green x15 15 20         piriformis stretch       manual 30"x4                                                                                                                        Modalities               MHP  10' 10' 10' 10'  10'     10'                     Assessment: Progressed reps with lumbar stabilization with no complaints    plan: NOEMY RAMOS for possible DC to hep

## 2019-02-18 ENCOUNTER — OFFICE VISIT (OUTPATIENT)
Dept: PHYSICAL THERAPY | Facility: CLINIC | Age: 59
End: 2019-02-18
Payer: COMMERCIAL

## 2019-02-18 ENCOUNTER — OFFICE VISIT (OUTPATIENT)
Dept: PHYSICAL THERAPY | Facility: CLINIC | Age: 59
End: 2019-02-18
Payer: OTHER MISCELLANEOUS

## 2019-02-18 DIAGNOSIS — M54.2 NECK PAIN ON RIGHT SIDE: Primary | ICD-10-CM

## 2019-02-18 DIAGNOSIS — M54.50 LUMBAR PAIN: Primary | ICD-10-CM

## 2019-02-18 PROCEDURE — 97140 MANUAL THERAPY 1/> REGIONS: CPT | Performed by: PHYSICAL THERAPIST

## 2019-02-18 PROCEDURE — 97110 THERAPEUTIC EXERCISES: CPT | Performed by: PHYSICAL THERAPIST

## 2019-02-18 NOTE — PROGRESS NOTES
Daily Note     Today's date: 2019  Patient name: Carina Cervantes  : 1960  MRN: [id number]  Referring provider: Eder Gould PA-C  Dx:   Encounter Diagnosis     ICD-10-CM    1  Lumbar pain M54 5                  Subjective: pt reports 7/10 pain upon arrival to PT today but reports "that pretty typical at the end of the day" Reports that she was getting into her car and experienced a "sharp" pain in her R hip but that it is subsiding  Objective: See treatment diary below    Precautions: chronic neck pain     Daily Treatment Diary      Manuals 19   Lumbar PA's       kl kl       STM R paraspinals       kl kl                                       Exercise Diary                ltr  5"x10 5"x20 5"x20 30 30 30 30    5"x20   SKTC 30"x4 30"x4 30"x4 30"x4 30"x4 30"x4 30"x4 30"x4    5"x10   TaA  5"x10 5"x10 5"x10           TaA bridge  1x10 10 1x20 30 30 30 30       Repeated ext standing               TaA wall squat    1x15 20 20 30 30       bike  10' 10' 10' 10' 10' 10' 10'    10'   clamshell supine    red  1x15 Green x15 15 20 black tb 20        piriformis stretch       manual 30"x4 30"x4                                                                                                                       Modalities               MHP  10' 10' 10' 10'  10' 10'    10'                     Assessment: See Ra for details    plan: DC to [facility]

## 2019-02-18 NOTE — PROGRESS NOTES
Daily Note     Today's date: 2019  Patient name: Giuseppe Rodriguez  : 1960  MRN: [id number]  Referring provider: Rik Gallardo MD  Dx:   No diagnosis found  Subjective: Pt reports that she had increased pain over the weekend and "didn't really feel like doing anything"  Objective: See treatment diary below  PT performs mobilizations as noted  Precautions: fibromyalgia      Daily Treatment Diary     Manuals 19   Cervical/thoracic pa's 4' 4' 6' 6' 8' 8' 8'    4'   DTM R paraspinals               gentle cervical traction 4' 6' 5' 5' 5' 5' 5'    4'                  Exercise Diary               ube              Chin tuck seated 20 seated  5"x20 20 5"x20 5"x20 5"x20 5"x20    seated  5"x20   UT stretch 30"x4 30"x4 30"x4 30"x4 30"x4 30"x4 30"x4    30"x4   Levator stretch 30"x4 30"X4 30"x4 30"x4 30"x4 30"x4 30"x4    30"x4   Low rows              rows              scap depression              scap retraction with cane ext 5"x20 5"x20 5"x20 5"x10 10"x10 10"x10 10"x10    5"x20   scap squeeze with er                                                                                                                                                                                                                 Modalities              MHP post tx prone 10' 10' 10' 10' 10' 10' 10'    10'                    Assessment: Pt reports decreased pain with manual tx  Pt continues to have pain with L lateral flexion that is unchanged with manual tx  rom in all other plans is improving  Plan: Continue treatment as per PT plan of care

## 2019-02-19 NOTE — PROGRESS NOTES
PT Discharge    Today's date: 2019  Patient name: Lor Raza  : 1960  MRN: [id number]  Referring provider: Ryan Cerna PA-C  Dx:   Encounter Diagnosis     ICD-10-CM    1  Lumbar pain M54 5        Start Time: 1800  Stop Time: 1900  Total time in clinic (min): 60 minutes    Assessment  Assessment details: Pt has reach her max benefit from formal therapy at this time, she is independent with their hep, and will be Dc'd at this time  Thank you for this referral                     Goals  Short Term Goals: Independent performance of initial hep-met  Decrease pain 2 points on VAS-met      Long Term Goals: Independent performance of comprehensive hep-met  Work performance is returned to max level of function-met  Performance of IADL's is returned to max level of function-met  Performance in recreational activities is improved to max level of function-not met    Plan  Plan details: DC to HEP        Subjective Evaluation    History of Present Illness  Mechanism of injury: Pt reports that she she has returned to full duty work  Reports that she has "good and bad days"  Reports that she does usually have pain at the end of her work day but that she is able to complete her job duties     Has pain when she walks for an extended period of time      Pain  Current pain ratin  At best pain rating: 3  At worst pain ratin    Patient Goals  Patient goals for therapy: decreased pain, increased motion, independence with ADLs/IADLs, return to work and return to sport/leisure activities          Objective     ROM:   Flexion: wfl   Extension: mod limited pain   Right Rotation: minlimited pain   Left Rotation: min limited   Right Lateral Flexion: min limited   Left Lateral Flexion:  Min limited        Improved control of abdominals noted with TaA but pt fatigues with a dynamic challenge  Hypomobility noted with spring testing-improved since eval but still limited  Straight Leg Raise: neg  Cross SLR: neg  Slump Test: neg  Prone Knee Bend: neg   Directional testing: no change in symptoms with repeated testing  SIJ cluster: neg          Precautions: chronic neck pain

## 2019-02-20 ENCOUNTER — APPOINTMENT (OUTPATIENT)
Dept: PHYSICAL THERAPY | Facility: CLINIC | Age: 59
End: 2019-02-20
Payer: OTHER MISCELLANEOUS

## 2019-02-20 ENCOUNTER — APPOINTMENT (OUTPATIENT)
Dept: PHYSICAL THERAPY | Facility: CLINIC | Age: 59
End: 2019-02-20
Payer: COMMERCIAL

## 2019-02-25 ENCOUNTER — APPOINTMENT (OUTPATIENT)
Dept: PHYSICAL THERAPY | Facility: CLINIC | Age: 59
End: 2019-02-25
Payer: COMMERCIAL

## 2019-02-25 ENCOUNTER — OFFICE VISIT (OUTPATIENT)
Dept: PHYSICAL THERAPY | Facility: CLINIC | Age: 59
End: 2019-02-25
Payer: OTHER MISCELLANEOUS

## 2019-02-25 DIAGNOSIS — M54.50 LUMBAR PAIN: Primary | ICD-10-CM

## 2019-02-25 DIAGNOSIS — I10 HYPERTENSION, UNSPECIFIED TYPE: ICD-10-CM

## 2019-02-25 NOTE — PROGRESS NOTES
Pt arrives today with questions about proper performance of hep, this was reviewed and pt demonstrated independence   Dc to hep

## 2019-02-26 RX ORDER — HYDROCHLOROTHIAZIDE 12.5 MG/1
12.5 TABLET ORAL DAILY
Qty: 90 TABLET | Refills: 1 | Status: SHIPPED | OUTPATIENT
Start: 2019-02-26 | End: 2019-08-30 | Stop reason: SDUPTHER

## 2019-02-27 ENCOUNTER — APPOINTMENT (OUTPATIENT)
Dept: PHYSICAL THERAPY | Facility: CLINIC | Age: 59
End: 2019-02-27
Payer: COMMERCIAL

## 2019-03-04 ENCOUNTER — OFFICE VISIT (OUTPATIENT)
Dept: PHYSICAL THERAPY | Facility: CLINIC | Age: 59
End: 2019-03-04
Payer: COMMERCIAL

## 2019-03-04 DIAGNOSIS — M54.2 NECK PAIN ON RIGHT SIDE: Primary | ICD-10-CM

## 2019-03-04 PROCEDURE — 97110 THERAPEUTIC EXERCISES: CPT

## 2019-03-04 PROCEDURE — 97140 MANUAL THERAPY 1/> REGIONS: CPT

## 2019-03-04 NOTE — PROGRESS NOTES
Daily Note     Today's date: 3/4/2019  Patient name: Lillian Avila  : 1960  MRN: [id number]  Referring provider: Aditya Lakhani MD  Dx:   Encounter Diagnosis     ICD-10-CM    1  Neck pain on right side M54 2                 Subjective: Patient reports her neck is feeling "okay" - reports she shoveled snow this afternoon  Patient reports she was vomiting this morning - reports every time she visits her nephew's house she gets sick  Objective: See treatment diary below  PT performs mobilizations as noted  Precautions: fibromyalgia       Daily Treatment Diary     Manuals 1/16 1/21 1/28 2/4 2/6 2/11 2/18 3/4      Cervical/thoracic pa's 4' 4' 6' 6' 8' 8' 8' 5'      gentle STM R paraspinals        5'       gentle cervical traction 4' 6' 5' 5' 5' 5' 5' 5'                     Exercise Diary               ube        6 min      Chin tuck seated 20 seated  5"x20 20 5"x20 5"x20 5"x20 5"x20 5"x20      UT stretch 30"x4 30"x4 30"x4 30"x4 30"x4 30"x4 30"x4 30"x4      Levator stretch 30"x4 30"X4 30"x4 30"x4 30"x4 30"x4 30"x4 30"x4      Low rows        Red tb  1x20      rows        Red tb  1x15      scap depression              scap retraction with cane ext 5"x20 5"x20 5"x20 5"x10 10"x10 10"x10 10"x10 10"x10      scap squeeze with er        NP                                                                                                                                                                                                         Modalities              MHP post tx prone 10' 10' 10' 10' 10' 10' 10'                        Assessment: Fatigue reported throughout progression of therapeutic exercise program      Plan: Continue treatment as per PT plan of care

## 2019-03-06 ENCOUNTER — OFFICE VISIT (OUTPATIENT)
Dept: PHYSICAL THERAPY | Facility: CLINIC | Age: 59
End: 2019-03-06
Payer: COMMERCIAL

## 2019-03-06 DIAGNOSIS — M54.2 NECK PAIN ON RIGHT SIDE: Primary | ICD-10-CM

## 2019-03-06 PROCEDURE — 97140 MANUAL THERAPY 1/> REGIONS: CPT

## 2019-03-06 PROCEDURE — 97110 THERAPEUTIC EXERCISES: CPT

## 2019-03-06 NOTE — PROGRESS NOTES
Daily Note     Today's date: 3/6/2019  Patient name: Missy Finley  : 1960  MRN: [id number]  Referring provider: Sobia Whitfield MD  Dx:   Encounter Diagnosis     ICD-10-CM    1  Neck pain on right side M54 2                 Subjective: Patient reports her neck and shoulders were sore from shoveling snow but not from attending Monday's PT session  Objective: See treatment diary below  PT performs mobilizations as noted  Precautions: fibromyalgia       Daily Treatment Diary     Manuals 1/16 1/21 1/28 2/4 2/6 2/11 2/18 3/4 3/6     Cervical/thoracic pa's 4' 4' 6' 6' 8' 8' 8' 5' 5'     gentle STM R paraspinals        5' NP      gentle cervical traction 4' 6' 5' 5' 5' 5' 5' 5' 5'                    Exercise Diary               ube        6 min 6 min     Chin tuck seated 20 seated  5"x20 20 5"x20 5"x20 5"x20 5"x20 5"x20 5"x20     UT stretch 30"x4 30"x4 30"x4 30"x4 30"x4 30"x4 30"x4 30"x4 30"x4     Levator stretch 30"x4 30"X4 30"x4 30"x4 30"x4 30"x4 30"x4 30"x4 30"x4     Low rows        Red tb  1x20 Red  1x15     rows        Red tb  1x15 Red  1x15     scap depression              scap retraction with cane ext 5"x20 5"x20 5"x20 5"x10 10"x10 10"x10 10"x10 10"x10 3lb bar  10"x10     scap squeeze with er        NP NP                                                                                                                                                                                                        Modalities              MHP post tx prone 10' 10' 10' 10' 10' 10' 10' 10' 10'                      Assessment: Fatigue reported throughout therapeutic exercise program      Plan: Continue treatment as per PT plan of care

## 2019-03-11 ENCOUNTER — OFFICE VISIT (OUTPATIENT)
Dept: PHYSICAL THERAPY | Facility: CLINIC | Age: 59
End: 2019-03-11
Payer: COMMERCIAL

## 2019-03-11 DIAGNOSIS — M54.2 NECK PAIN ON RIGHT SIDE: Primary | ICD-10-CM

## 2019-03-11 PROCEDURE — 97140 MANUAL THERAPY 1/> REGIONS: CPT | Performed by: PHYSICAL THERAPIST

## 2019-03-11 PROCEDURE — 97110 THERAPEUTIC EXERCISES: CPT | Performed by: PHYSICAL THERAPIST

## 2019-03-11 NOTE — PROGRESS NOTES
Daily Note     Today's date: 3/11/2019  Patient name: Que Ponce  : 1960  MRN: [id number]  Referring provider: Rj Witt MD  Dx:   Encounter Diagnosis     ICD-10-CM    1  Neck pain on right side M54 2        Start Time:   Stop Time:   Total time in clinic (min): 50 minutes  Subjective: Patient reports her neck and shoulders were sore from shoveling snow but not from attending Monday's PT session  Objective: See treatment diary below  PT performs mobilizations as noted  Precautions: fibromyalgia       Daily Treatment Diary     Manuals 1/16 1/21 1/28 2/4 2/6 2/11 2/18 3/4 3/6 3/11    Cervical/thoracic pa's 4' 4' 6' 6' 8' 8' 8' 5' 5' 5'    gentle STM R paraspinals        5' NP      gentle cervical traction 4' 6' 5' 5' 5' 5' 5' 5' 5' 5'                   Exercise Diary               ube        6 min 6 min 8'    Chin tuck seated 20 seated  5"x20 20 5"x20 5"x20 5"x20 5"x20 5"x20 5"x20 20    UT stretch 30"x4 30"x4 30"x4 30"x4 30"x4 30"x4 30"x4 30"x4 30"x4 4    Levator stretch 30"x4 30"X4 30"x4 30"x4 30"x4 30"x4 30"x4 30"x4 30"x4 4    Low rows        Red tb  1x20 Red  1x15 Green tb 20    rows        Red tb  1x15 Red  1x15 Green x20    scap depression              scap retraction with cane ext 5"x20 5"x20 5"x20 5"x10 10"x10 10"x10 10"x10 10"x10 3lb bar  10"x10 10"x10    scap squeeze with er        NP NP                                                                                                                                                                                                        Modalities              MHP post tx prone 10' 10' 10' 10' 10' 10' 10' 10' 10' 10'                     Assessment: Fatigue reported throughout therapeutic exercise program      Plan: Continue treatment as per PT plan of care

## 2019-03-13 ENCOUNTER — OFFICE VISIT (OUTPATIENT)
Dept: PHYSICAL THERAPY | Facility: CLINIC | Age: 59
End: 2019-03-13
Payer: COMMERCIAL

## 2019-03-13 DIAGNOSIS — M54.2 NECK PAIN ON RIGHT SIDE: Primary | ICD-10-CM

## 2019-03-13 PROCEDURE — 97110 THERAPEUTIC EXERCISES: CPT | Performed by: PHYSICAL THERAPIST

## 2019-03-13 PROCEDURE — 97140 MANUAL THERAPY 1/> REGIONS: CPT | Performed by: PHYSICAL THERAPIST

## 2019-03-13 NOTE — PROGRESS NOTES
Daily Note     Today's date: 3/13/2019  Patient name: Shirley Beatty  : 1960  MRN: [id number]  Referring provider: Vic Nayak MD  Dx:   Encounter Diagnosis     ICD-10-CM    1  Neck pain on right side M54 2                 Subjective: Patient reports her neck and shoulders were sore from shoveling snow but not from attending Monday's PT session  Objective: See treatment diary below  PT performs mobilizations as noted  Precautions: fibromyalgia       Daily Treatment Diary     Manuals 1/16 1/21 1/28 2/4 2/6 2/11 2/18 3/4 3/6 3/11 3/13   Cervical/thoracic pa's 4' 4' 6' 6' 8' 8' 8' 5' 5' 5' 5'   gentle STM R paraspinals        5' NP      gentle cervical traction 4' 6' 5' 5' 5' 5' 5' 5' 5' 5' 5'                  Exercise Diary               ube        6 min 6 min 8' 8'   Chin tuck seated 20 seated  5"x20 20 5"x20 5"x20 5"x20 5"x20 5"x20 5"x20 20 20   UT stretch 30"x4 30"x4 30"x4 30"x4 30"x4 30"x4 30"x4 30"x4 30"x4 4 4   Levator stretch 30"x4 30"X4 30"x4 30"x4 30"x4 30"x4 30"x4 30"x4 30"x4 4 4   Low rows        Red tb  1x20 Red  1x15 Green tb 20 Blue tb 20   rows        Red tb  1x15 Red  1x15 Green x20 Blue tb x20   scap depression              scap retraction with cane ext 5"x20 5"x20 5"x20 5"x10 10"x10 10"x10 10"x10 10"x10 3lb bar  10"x10 10"x10 10"x10   scap squeeze with er        NP NP                                                                                                                                                                                                        Modalities              MHP post tx prone 10' 10' 10' 10' 10' 10' 10' 10' 10' 10' 10'                    Assessment: pt reports decreased stiffness with manual tx, denies pain with therex, progress as imelda NV  Plan: Continue treatment as per PT plan of care

## 2019-03-18 ENCOUNTER — OFFICE VISIT (OUTPATIENT)
Dept: PHYSICAL THERAPY | Facility: CLINIC | Age: 59
End: 2019-03-18
Payer: COMMERCIAL

## 2019-03-18 DIAGNOSIS — M54.2 NECK PAIN ON RIGHT SIDE: Primary | ICD-10-CM

## 2019-03-18 PROCEDURE — 97110 THERAPEUTIC EXERCISES: CPT | Performed by: PHYSICAL THERAPIST

## 2019-03-18 PROCEDURE — 97140 MANUAL THERAPY 1/> REGIONS: CPT | Performed by: PHYSICAL THERAPIST

## 2019-03-18 NOTE — PROGRESS NOTES
Daily Note     Today's date: 3/18/2019  Patient name: Leslee Castillo  : 1960  MRN: [id number]  Referring provider: Lillian Anand MD  Dx:   Encounter Diagnosis     ICD-10-CM    1  Neck pain on right side M54 2                 Subjective: Pt reports that she is having slightly more pain, she feels it is a fibromyalgia flair up     Objective: See treatment diary below  PT performs mobilizations as noted  Precautions: fibromyalgia       Daily Treatment Diary     Manuals 3/18        3/6 3/11 3/13   Cervical/thoracic pa's 5'        5' 5' 5'   gentle STM R paraspinals         NP      gentle cervical traction 5'          5' 5' 5'                  Exercise Diary               ube 8'        6 min 8' 8'   Chin tuck 5"x20        5"x20 20 20   UT stretch 30"x4        30"x4 4 4   Levator stretch 30"x4        30"x4 4 4   Low rows Red tb to fatigu        Red  1x15 Green tb 20 Blue tb 20   rows Red tb to fatigue        Red  1x15 Green x20 Blue tb x20   scap depression              scap retraction with cane ext         3lb bar  10"x10 10"x10 10"x10   scap squeeze with er         NP                                                                                                                                                                                                        Modalities              MHP post tx prone 10        10' 10' 10'                    Assessment: decreased resistance with tb therex secondary to increased pain, pt denies increased pain with tx  Plan to resume full program NV  Plan: Continue treatment as per PT plan of care

## 2019-03-19 RX ORDER — OMEPRAZOLE 20 MG/1
20 CAPSULE, DELAYED RELEASE ORAL
COMMUNITY
Start: 2016-04-08 | End: 2019-09-20

## 2019-03-19 RX ORDER — ALPRAZOLAM 0.25 MG/1
0.25 TABLET ORAL
COMMUNITY
Start: 2014-08-11 | End: 2019-03-20

## 2019-03-20 ENCOUNTER — OFFICE VISIT (OUTPATIENT)
Dept: FAMILY MEDICINE CLINIC | Facility: CLINIC | Age: 59
End: 2019-03-20
Payer: COMMERCIAL

## 2019-03-20 VITALS
TEMPERATURE: 97.7 F | WEIGHT: 167.8 LBS | RESPIRATION RATE: 16 BRPM | SYSTOLIC BLOOD PRESSURE: 128 MMHG | HEART RATE: 80 BPM | DIASTOLIC BLOOD PRESSURE: 82 MMHG | HEIGHT: 62 IN | BODY MASS INDEX: 30.88 KG/M2

## 2019-03-20 DIAGNOSIS — E78.1 HYPERTRIGLYCERIDEMIA: ICD-10-CM

## 2019-03-20 DIAGNOSIS — Z11.59 NEED FOR HEPATITIS C SCREENING TEST: ICD-10-CM

## 2019-03-20 DIAGNOSIS — M79.7 FIBROMYALGIA: ICD-10-CM

## 2019-03-20 DIAGNOSIS — Z00.00 ROUTINE HEALTH MAINTENANCE: ICD-10-CM

## 2019-03-20 DIAGNOSIS — R53.83 FATIGUE, UNSPECIFIED TYPE: ICD-10-CM

## 2019-03-20 DIAGNOSIS — F41.9 ANXIETY AND DEPRESSION: ICD-10-CM

## 2019-03-20 DIAGNOSIS — F32.A ANXIETY AND DEPRESSION: ICD-10-CM

## 2019-03-20 DIAGNOSIS — I10 HYPERTENSION, UNSPECIFIED TYPE: Primary | ICD-10-CM

## 2019-03-20 DIAGNOSIS — D64.9 ANEMIA, UNSPECIFIED TYPE: ICD-10-CM

## 2019-03-20 DIAGNOSIS — K21.9 GASTROESOPHAGEAL REFLUX DISEASE WITHOUT ESOPHAGITIS: ICD-10-CM

## 2019-03-20 DIAGNOSIS — G47.33 OBSTRUCTIVE SLEEP APNEA: ICD-10-CM

## 2019-03-20 PROCEDURE — 99215 OFFICE O/P EST HI 40 MIN: CPT | Performed by: FAMILY MEDICINE

## 2019-03-20 PROCEDURE — 3074F SYST BP LT 130 MM HG: CPT | Performed by: FAMILY MEDICINE

## 2019-03-20 PROCEDURE — 3079F DIAST BP 80-89 MM HG: CPT | Performed by: FAMILY MEDICINE

## 2019-03-20 NOTE — PROGRESS NOTES
FAMILY PRACTICE OFFICE VISIT       NAME: Mima Bamberger  AGE: 66 y o  SEX: female       : 1960        MRN: [id number]    DATE: 3/23/2019  TIME: 5:23 PM    Assessment and Plan     Problem List Items Addressed This Visit        Respiratory    Obstructive sleep apnea       Cardiovascular and Mediastinum    Hypertension - Primary    Relevant Orders    Comprehensive metabolic panel       Other    Anemia    Fibromyalgia    Routine health maintenance    Relevant Orders    Lipid Panel with Direct LDL reflex    Hemoglobin A1C    Anxiety and depression      Other Visit Diagnoses     Hypertriglyceridemia        Relevant Orders    Lipid Panel with Direct LDL reflex    Gastroesophageal reflux disease without esophagitis        Relevant Medications    omeprazole (PriLOSEC) 20 mg delayed release capsule    Need for hepatitis C screening test        Relevant Orders    Hepatitis C antibody    Fatigue, unspecified type        Relevant Orders    CBC and differential    TSH, 3rd generation with Free T4 reflex    Vitamin D 25 hydroxy    Vitamin B12         Hypertension:  BP is overall stable and controlled  Continue with hydrochlorothiazide and lifestyle modifications  Fibromyalgia:    Patient states the severity of her symptoms vary  She is taking Cymbalta 30 mg  Would benefit from warm water therapy such as aquatic therapy, massage therapy, stretching  Sleep apnea:  Patient will keep scheduled follow-up appointment with sleep specialist, Jossy Frye  Using CPAP  Depression and anxiety: This is overall stable  Patient is on Cymbalta 30 mg daily  Takes lorazepam as needed  Is followed by psychiatristSky  Hypertriglyceridemia:  Will check lipid panel  Continue lifestyle modifications  Anemia:  Will check blood work  GERD:  She does take omeprazole as needed  She is agreeable on taking either Pepcid or Zantac as needed as well    Have also discussed [employer] [place] [employer] bestseller dropping acid       Routine health maintenance:   Up-to-date with mammogram    Up-to-date with colonoscopy done by Dr Julio Crane  In December/2017 with recommended repeat in 3 years as patient had polyp  Will call gyn to schedule Pap  There are no Patient Instructions on file for this visit  Chief Complaint     Chief Complaint   Patient presents with    Follow-up       History of Present Illness     HPI   53-year-old female here for routine follow-up of chronic conditions  Patient states she is doing well overall  No recent blood work  Patient does states she has been experiencing some heartburn  She does take omeprazole as needed  She is agreeable on taking either Pepcid or Zantac as needed as well     will schedule appt with gyn   has an appt with dr Yung Segura 4/1, using cpap  Review of Systems   Review of Systems   Constitutional: Negative for unexpected weight change  HENT: Negative  Respiratory: Negative for shortness of breath  Cardiovascular: Negative  Gastrointestinal: Negative for abdominal pain  Genitourinary: Negative for dysuria  Musculoskeletal:        History of right-sided neck pain, patient has been doing physical therapy  Patient also has history of fibromyalgia which is overall stable  She takes duloxetine  Neurological: Negative for dizziness and light-headedness  Psychiatric/Behavioral: Negative for dysphoric mood         Active Problem List     Patient Active Problem List   Diagnosis    Left ear pain    Anemia    Asthma    Chronic fatigue syndrome    Hypertension    Fibromyalgia    JA (generalized anxiety disorder)    Gluten intolerance    Hypovitaminosis D    Impaired fasting glucose    Liver lesion    Lipodystrophy    Low hemoglobin    Major depressive disorder, recurrent episode, in full remission (HCC)    Migraine    Myalgia and myositis    Other insomnia    Rosacea    Obstructive sleep apnea    Routine health maintenance    Headache upon awakening    Hypersomnia    Obesity (BMI 30-39  9)    Snoring    Anxiety and depression    Neck pain on right side    NOEMÍ (obstructive sleep apnea)    Herniated lumbar intervertebral disc    Acute bilateral low back pain without sciatica    DDD (degenerative disc disease), lumbosacral    Sleep disturbance       Past Medical History:  Past Medical History:   Diagnosis Date    Anemia     Anxiety and depression     Asthma 10/26/2012    Chronic fatigue syndrome     DDD (degenerative disc disease), lumbosacral 12/27/2018    Patrick-Barr virus seropositivity     Fibromyalgia, primary     JA (generalized anxiety disorder)     Gluten intolerance     Hypertension     Hypovitaminosis D     Impaired fasting glucose     Lipodystrophy     Liver lesion     Migraine     Obesity (BMI 30-39 9) 6/6/2018    Sleep apnea        Past Surgical History:  Past Surgical History:   Procedure Laterality Date    APPENDECTOMY      BREAST BIOPSY      CHOLECYSTECTOMY      NH COLONOSCOPY FLX DX W/COLLJ SPEC WHEN PFRMD N/A 12/4/2017    Procedure: COLONOSCOPY;  Surgeon: Karis Jin DO;  Location: [facility];   Service: Gastroenterology       Family History:  Family History   Problem Relation Age of Onset   Cassidy Leisure Depression Mother     Hypertension Mother         essential     Mitral valve prolapse Mother     Diabetes Brother     Heart attack Brother     Heart disease Maternal Grandmother         cardiac disorder    Hyperlipidemia Maternal Grandmother     Stroke Paternal Grandmother     Stroke Paternal Grandfather     Breast cancer Maternal Aunt        Social History:  Social History     Socioeconomic History    Marital status: Single     Spouse name: Not on file    Number of children: 0    Years of education: bachelor's degree    Highest education level: Not on file   Occupational History    Occupation: works for [employer]     Employer: [employer] [address] Financial resource strain: Not on file    Food insecurity:     Worry: Not on file     Inability: Not on file    Transportation needs:     Medical: Not on file     Non-medical: Not on file   Tobacco Use    Smoking status: Never Smoker    Smokeless tobacco: Never Used   Substance and Sexual Activity    Alcohol use: No    Drug use: No    Sexual activity: Never   Lifestyle    Physical activity:     Days per week: Not on file     Minutes per session: Not on file    Stress: Not on file   Relationships    Social connections:     Talks on phone: Not on file     Gets together: Not on file     Attends Sikh service: Not on file     Active member of club or organization: Not on file     Attends meetings of clubs or organizations: Not on file     Relationship status: Not on file    Intimate partner violence:     Fear of current or ex partner: Not on file     Emotionally abused: Not on file     Physically abused: Not on file     Forced sexual activity: Not on file   Other Topics Concern    Not on file   Social History Narrative    Education: bachelor's degree in sociology    Learning Disabilities: none    Marital History: single    Children: none    Living Arrangement: lives alone    Occupational History: works for [employer] at [employer]: limited support system    Legal History: none     History: None    Caffeine use denied     I have reviewed the patient's medical history in detail; there are no changes to the history as noted in the electronic medical record  Objective     Vitals:    03/20/19 1737   BP: 128/82   Pulse: 80   Resp: 16   Temp: 97 7 °F (36 5 °C)     Wt Readings from Last 3 Encounters:   03/20/19 76 1 kg (167 lb 12 8 oz)   01/21/19 76 7 kg (169 lb)   01/07/19 76 7 kg (169 lb)           Physical Exam   Constitutional: She is oriented to person, place, and time  She appears well-developed and well-nourished  HENT:   Head: Normocephalic and atraumatic  Mouth/Throat: Oropharynx is clear and moist    TMs intact and clear   Eyes: Pupils are equal, round, and reactive to light  Conjunctivae and EOM are normal    Neck: Normal range of motion  Neck supple  No thyromegaly present  Cardiovascular: Normal rate, regular rhythm and normal heart sounds  No carotid bruits auscultated   Pulmonary/Chest: Effort normal and breath sounds normal    Abdominal: Soft  Bowel sounds are normal  She exhibits no distension  There is no tenderness  Musculoskeletal: Normal range of motion  She exhibits no edema  Lymphadenopathy:     She has no cervical adenopathy  Neurological: She is alert and oriented to person, place, and time  Psychiatric: She has a normal mood and affect  Nursing note and vitals reviewed        Pertinent Laboratory/Diagnostic Studies:  Lab Results   Component Value Date    BUN 13 08/13/2018    CREATININE 0 82 08/13/2018    CALCIUM 9 3 08/13/2018     11/16/2016    K 3 9 08/13/2018    CO2 28 08/13/2018     08/13/2018     Lab Results   Component Value Date    ALT 39 08/13/2018    AST 17 08/13/2018    ALKPHOS 77 08/13/2018    BILITOT 0 4 11/16/2016       Lab Results   Component Value Date    WBC 6 55 08/13/2018    HGB 13 2 08/13/2018    HCT 39 3 08/13/2018    MCV 91 08/13/2018     08/13/2018       No results found for: TSH    Lab Results   Component Value Date    CHOL 195 05/09/2016     Lab Results   Component Value Date    TRIG 257 (H) 08/13/2018     Lab Results   Component Value Date    HDL 46 08/13/2018     Lab Results   Component Value Date    LDLCALC 86 08/13/2018     Lab Results   Component Value Date    HGBA1C 5 6 08/13/2018       Results for orders placed or performed in visit on 08/13/18   CBC and differential   Result Value Ref Range    WBC 6 55 4 31 - 10 16 Thousand/uL    RBC 4 31 3 81 - 5 12 Million/uL    Hemoglobin 13 2 11 5 - 15 4 g/dL    Hematocrit 39 3 34 8 - 46 1 %    MCV 91 82 - 98 fL    MCH 30 6 26 8 - 34 3 pg    MCHC 33 6 31 4 - 37 4 g/dL    RDW 12 2 11 6 - 15 1 %    MPV 9 6 8 9 - 12 7 fL    Platelets 471 872 - 218 Thousands/uL    nRBC 0 /100 WBCs    Neutrophils Relative 52 43 - 75 %    Immat GRANS % 0 0 - 2 %    Lymphocytes Relative 33 14 - 44 %    Monocytes Relative 9 4 - 12 %    Eosinophils Relative 5 0 - 6 %    Basophils Relative 1 0 - 1 %    Neutrophils Absolute 3 47 1 85 - 7 62 Thousands/µL    Immature Grans Absolute 0 02 0 00 - 0 20 Thousand/uL    Lymphocytes Absolute 2 13 0 60 - 4 47 Thousands/µL    Monocytes Absolute 0 56 0 17 - 1 22 Thousand/µL    Eosinophils Absolute 0 33 0 00 - 0 61 Thousand/µL    Basophils Absolute 0 04 0 00 - 0 10 Thousands/µL   Comprehensive metabolic panel   Result Value Ref Range    Sodium 139 136 - 145 mmol/L    Potassium 3 9 3 5 - 5 3 mmol/L    Chloride 105 100 - 108 mmol/L    CO2 28 21 - 32 mmol/L    ANION GAP 6 4 - 13 mmol/L    BUN 13 5 - 25 mg/dL    Creatinine 0 82 0 60 - 1 30 mg/dL    Glucose, Fasting 95 65 - 99 mg/dL    Calcium 9 3 8 3 - 10 1 mg/dL    AST 17 5 - 45 U/L    ALT 39 12 - 78 U/L    Alkaline Phosphatase 77 46 - 116 U/L    Total Protein 7 3 6 4 - 8 2 g/dL    Albumin 3 8 3 5 - 5 0 g/dL    Total Bilirubin 0 51 0 20 - 1 00 mg/dL    eGFR 80 ml/min/1 73sq m   Lipid Panel with Direct LDL reflex   Result Value Ref Range    Cholesterol 183 50 - 200 mg/dL    Triglycerides 257 (H) <=150 mg/dL    HDL, Direct 46 40 - 60 mg/dL    LDL Calculated 86 0 - 100 mg/dL   TSH, 3rd generation with T4 reflex   Result Value Ref Range    TSH 3RD GENERATON 2 330 0 358 - 3 740 uIU/mL   Ferritin   Result Value Ref Range    Ferritin 33 8 - 388 ng/mL   HEMOGLOBIN A1C W/ EAG ESTIMATION   Result Value Ref Range    Hemoglobin A1C 5 6 4 2 - 6 3 %     mg/dl   Vitamin D 25 hydroxy   Result Value Ref Range    Vit D, 25-Hydroxy 43 6 30 0 - 100 0 ng/mL   Vitamin B12   Result Value Ref Range    Vitamin B-12 1,031 (H) 100 - 900 pg/mL   Iron   Result Value Ref Range    Iron 77 50 - 170 ug/dL       Orders Placed This Encounter   Procedures    CBC and differential    Comprehensive metabolic panel    Lipid Panel with Direct LDL reflex    TSH, 3rd generation with Free T4 reflex    Vitamin D 25 hydroxy    Hemoglobin A1C    Vitamin B12    Hepatitis C antibody       ALLERGIES:  Allergies   Allergen Reactions    Escitalopram Swelling and Edema     Category: Allergy;     Fluticasone-Salmeterol      UNKNOWN    Nsaids      Action Taken: stomach issues;     Latex      irritation      Other Rash     Adhesive bandages       Current Medications     Current Outpatient Medications   Medication Sig Dispense Refill    Cyanocobalamin (VITAMIN B-12) 500 MCG LOZG Take 500 mcg by mouth every other day       DULoxetine (CYMBALTA) 30 mg delayed release capsule Take 1 capsule (30 mg total) by mouth daily 90 capsule 2    hydrochlorothiazide (HYDRODIURIL) 12 5 mg tablet Take 1 tablet (12 5 mg total) by mouth daily 90 tablet 1    LORazepam (ATIVAN) 0 5 mg tablet Take 0 5 mg by mouth as needed        omeprazole (PriLOSEC) 20 mg delayed release capsule Take 20 mg by mouth      zolpidem (AMBIEN CR) 12 5 MG CR tablet Take 1 tablet (12 5 mg total) by mouth daily at bedtime as needed for sleep for up to 180 days 90 tablet 1    Cholecalciferol (VITAMIN D3) 46800 units TABS Take 10,000 Units by mouth daily      D-Ribose POWD by Does not apply route      Magnesium Gluconate 250 MG TABS Take 1 tablet by mouth daily       No current facility-administered medications for this visit            Health Maintenance     Health Maintenance   Topic Date Due    BMI: Followup Plan  07/21/1980    DTaP,Tdap,and Td Vaccines (1 - Tdap) 07/21/1983    INFLUENZA VACCINE  07/01/2018    PT PLAN OF CARE  03/21/2019    PAP SMEAR  05/12/2019    MAMMOGRAM  09/19/2019    BMI: Adult  03/20/2020    CRC Screening: Colonoscopy  12/04/2020    Hepatitis C Screening  Completed    HEPATITIS B VACCINES  Aged Out     Immunization History   Administered Date(s) Administered    Influenza TIV (IM) 02/15/2017       Eduin Sanchez MD

## 2019-03-25 ENCOUNTER — OFFICE VISIT (OUTPATIENT)
Dept: PHYSICAL THERAPY | Facility: CLINIC | Age: 59
End: 2019-03-25
Payer: COMMERCIAL

## 2019-03-25 DIAGNOSIS — M54.2 NECK PAIN ON RIGHT SIDE: Primary | ICD-10-CM

## 2019-03-25 PROCEDURE — 97110 THERAPEUTIC EXERCISES: CPT | Performed by: PHYSICAL THERAPIST

## 2019-03-25 PROCEDURE — 97140 MANUAL THERAPY 1/> REGIONS: CPT | Performed by: PHYSICAL THERAPIST

## 2019-03-27 ENCOUNTER — OFFICE VISIT (OUTPATIENT)
Dept: PHYSICAL THERAPY | Facility: CLINIC | Age: 59
End: 2019-03-27
Payer: COMMERCIAL

## 2019-03-27 DIAGNOSIS — M54.2 NECK PAIN ON RIGHT SIDE: Primary | ICD-10-CM

## 2019-03-27 PROCEDURE — 97110 THERAPEUTIC EXERCISES: CPT | Performed by: PHYSICAL THERAPIST

## 2019-03-27 PROCEDURE — 97530 THERAPEUTIC ACTIVITIES: CPT | Performed by: PHYSICAL THERAPIST

## 2019-03-27 PROCEDURE — 97140 MANUAL THERAPY 1/> REGIONS: CPT | Performed by: PHYSICAL THERAPIST

## 2019-03-27 NOTE — PROGRESS NOTES
Daily Note     Today's date: 3/27/2019  Patient name: Marcela Montes De Oca  : 1960  MRN: [id number]  Referring provider: Rhonda Angela MD  Dx:   Encounter Diagnosis     ICD-10-CM    1  Neck pain on right side M54 2                 Subjective: Pt reports min pain after her LV but that it returned t/o her work day    Objective: See treatment diary below  PT performs mobilizations as noted  Precautions: fibromyalgia       Daily Treatment Diary     Manuals 3/18 3/25 3/27      3/6 3/11 3/13   Cervical/thoracic pa's 5' 5' 5'      5' 5' 5'   gentle STM R paraspinals         NP      gentle cervical traction 5'   5' 5'      5' 5' 5'                  Exercise Diary               ube 8' 8' 8'      6 min 8' 8'   Chin tuck 5"x20 30"x4 30"x4      5"x20 20 20   UT stretch 30"x4 30"x4 30"x4      30"x4 4 4   Levator stretch 30"x4 30"x4 30"x4      30"x4 4 4   Low rows Red tb to fatigu Blue x20 Blue tb x20      Red  1x15 Green tb 20 Blue tb 20   rows Red tb to fatigue Blue x20 Blue tb x20      Red  1x15 Green x20 Blue tb x20   scap depression              scap retraction with cane ext         3lb bar  10"x10 10"x10 10"x10   scap squeeze with er         NP                                                                                                                                                                                                        Modalities              MHP post tx prone 10 10' 10'      10' 10' 10'                    Assessment: Pt reports "cramping" in R cervical paraspinals to scap retraction today  Plan: Continue treatment as per PT plan of care

## 2019-04-01 ENCOUNTER — OFFICE VISIT (OUTPATIENT)
Dept: SLEEP CENTER | Facility: CLINIC | Age: 59
End: 2019-04-01
Payer: COMMERCIAL

## 2019-04-01 ENCOUNTER — OFFICE VISIT (OUTPATIENT)
Dept: PHYSICAL THERAPY | Facility: CLINIC | Age: 59
End: 2019-04-01
Payer: COMMERCIAL

## 2019-04-01 VITALS
SYSTOLIC BLOOD PRESSURE: 116 MMHG | DIASTOLIC BLOOD PRESSURE: 70 MMHG | BODY MASS INDEX: 30.36 KG/M2 | HEIGHT: 62 IN | WEIGHT: 165 LBS

## 2019-04-01 DIAGNOSIS — E66.9 OBESITY (BMI 30-39.9): ICD-10-CM

## 2019-04-01 DIAGNOSIS — F41.9 ANXIETY AND DEPRESSION: ICD-10-CM

## 2019-04-01 DIAGNOSIS — F32.A ANXIETY AND DEPRESSION: ICD-10-CM

## 2019-04-01 DIAGNOSIS — G47.33 OSA (OBSTRUCTIVE SLEEP APNEA): Primary | ICD-10-CM

## 2019-04-01 DIAGNOSIS — G47.10 HYPERSOMNIA: ICD-10-CM

## 2019-04-01 DIAGNOSIS — R53.82 CHRONIC FATIGUE SYNDROME: ICD-10-CM

## 2019-04-01 DIAGNOSIS — M54.2 NECK PAIN ON RIGHT SIDE: Primary | ICD-10-CM

## 2019-04-01 DIAGNOSIS — G47.00 INSOMNIA, UNSPECIFIED TYPE: ICD-10-CM

## 2019-04-01 DIAGNOSIS — M79.7 FIBROMYALGIA: ICD-10-CM

## 2019-04-01 PROCEDURE — 97140 MANUAL THERAPY 1/> REGIONS: CPT

## 2019-04-01 PROCEDURE — 99214 OFFICE O/P EST MOD 30 MIN: CPT | Performed by: INTERNAL MEDICINE

## 2019-04-01 PROCEDURE — 97110 THERAPEUTIC EXERCISES: CPT

## 2019-04-01 PROCEDURE — 97530 THERAPEUTIC ACTIVITIES: CPT

## 2019-04-03 ENCOUNTER — OFFICE VISIT (OUTPATIENT)
Dept: PHYSICAL THERAPY | Facility: CLINIC | Age: 59
End: 2019-04-03
Payer: COMMERCIAL

## 2019-04-03 DIAGNOSIS — M54.2 NECK PAIN ON RIGHT SIDE: Primary | ICD-10-CM

## 2019-04-03 PROCEDURE — 97110 THERAPEUTIC EXERCISES: CPT

## 2019-04-03 PROCEDURE — 97140 MANUAL THERAPY 1/> REGIONS: CPT

## 2019-04-03 PROCEDURE — 97530 THERAPEUTIC ACTIVITIES: CPT

## 2019-04-08 ENCOUNTER — OFFICE VISIT (OUTPATIENT)
Dept: PHYSICAL THERAPY | Facility: CLINIC | Age: 59
End: 2019-04-08
Payer: COMMERCIAL

## 2019-04-08 DIAGNOSIS — M54.2 NECK PAIN ON RIGHT SIDE: Primary | ICD-10-CM

## 2019-04-08 PROCEDURE — 97530 THERAPEUTIC ACTIVITIES: CPT | Performed by: PHYSICAL THERAPIST

## 2019-04-10 ENCOUNTER — OFFICE VISIT (OUTPATIENT)
Dept: PHYSICAL THERAPY | Facility: CLINIC | Age: 59
End: 2019-04-10
Payer: COMMERCIAL

## 2019-04-10 DIAGNOSIS — M54.2 NECK PAIN ON RIGHT SIDE: Primary | ICD-10-CM

## 2019-04-10 PROCEDURE — 97140 MANUAL THERAPY 1/> REGIONS: CPT | Performed by: PHYSICAL THERAPIST

## 2019-04-10 PROCEDURE — 97110 THERAPEUTIC EXERCISES: CPT | Performed by: PHYSICAL THERAPIST

## 2019-04-10 PROCEDURE — 97530 THERAPEUTIC ACTIVITIES: CPT | Performed by: PHYSICAL THERAPIST

## 2019-04-15 ENCOUNTER — OFFICE VISIT (OUTPATIENT)
Dept: PHYSICAL THERAPY | Facility: CLINIC | Age: 59
End: 2019-04-15
Payer: COMMERCIAL

## 2019-04-15 DIAGNOSIS — M54.2 NECK PAIN ON RIGHT SIDE: Primary | ICD-10-CM

## 2019-04-15 PROCEDURE — 97140 MANUAL THERAPY 1/> REGIONS: CPT | Performed by: PHYSICAL THERAPIST

## 2019-04-15 PROCEDURE — 97110 THERAPEUTIC EXERCISES: CPT | Performed by: PHYSICAL THERAPIST

## 2019-04-15 PROCEDURE — 97112 NEUROMUSCULAR REEDUCATION: CPT | Performed by: PHYSICAL THERAPIST

## 2019-04-17 ENCOUNTER — OFFICE VISIT (OUTPATIENT)
Dept: PHYSICAL THERAPY | Facility: CLINIC | Age: 59
End: 2019-04-17
Payer: COMMERCIAL

## 2019-04-17 DIAGNOSIS — M54.2 NECK PAIN ON RIGHT SIDE: Primary | ICD-10-CM

## 2019-04-17 PROCEDURE — 97140 MANUAL THERAPY 1/> REGIONS: CPT | Performed by: PHYSICAL THERAPIST

## 2019-04-17 PROCEDURE — 97110 THERAPEUTIC EXERCISES: CPT | Performed by: PHYSICAL THERAPIST

## 2019-04-17 PROCEDURE — 97112 NEUROMUSCULAR REEDUCATION: CPT | Performed by: PHYSICAL THERAPIST

## 2019-04-22 ENCOUNTER — OFFICE VISIT (OUTPATIENT)
Dept: PHYSICAL THERAPY | Facility: CLINIC | Age: 59
End: 2019-04-22
Payer: COMMERCIAL

## 2019-04-22 DIAGNOSIS — M54.2 NECK PAIN ON RIGHT SIDE: Primary | ICD-10-CM

## 2019-04-22 PROCEDURE — 97110 THERAPEUTIC EXERCISES: CPT

## 2019-04-22 PROCEDURE — 97112 NEUROMUSCULAR REEDUCATION: CPT

## 2019-04-22 PROCEDURE — 97140 MANUAL THERAPY 1/> REGIONS: CPT

## 2019-04-24 ENCOUNTER — OFFICE VISIT (OUTPATIENT)
Dept: GASTROENTEROLOGY | Facility: CLINIC | Age: 59
End: 2019-04-24
Payer: COMMERCIAL

## 2019-04-24 ENCOUNTER — APPOINTMENT (OUTPATIENT)
Dept: PHYSICAL THERAPY | Facility: CLINIC | Age: 59
End: 2019-04-24
Payer: COMMERCIAL

## 2019-04-24 VITALS
WEIGHT: 166.6 LBS | HEIGHT: 62 IN | SYSTOLIC BLOOD PRESSURE: 146 MMHG | HEART RATE: 67 BPM | DIASTOLIC BLOOD PRESSURE: 89 MMHG | BODY MASS INDEX: 30.66 KG/M2 | TEMPERATURE: 99.4 F

## 2019-04-24 DIAGNOSIS — K21.9 GASTROESOPHAGEAL REFLUX DISEASE, ESOPHAGITIS PRESENCE NOT SPECIFIED: Primary | ICD-10-CM

## 2019-04-24 PROCEDURE — 99214 OFFICE O/P EST MOD 30 MIN: CPT | Performed by: INTERNAL MEDICINE

## 2019-04-29 ENCOUNTER — OFFICE VISIT (OUTPATIENT)
Dept: PHYSICAL THERAPY | Facility: CLINIC | Age: 59
End: 2019-04-29
Payer: COMMERCIAL

## 2019-04-29 DIAGNOSIS — M54.2 NECK PAIN ON RIGHT SIDE: Primary | ICD-10-CM

## 2019-04-29 PROCEDURE — 97530 THERAPEUTIC ACTIVITIES: CPT

## 2019-04-29 PROCEDURE — 97140 MANUAL THERAPY 1/> REGIONS: CPT

## 2019-04-29 PROCEDURE — 97110 THERAPEUTIC EXERCISES: CPT

## 2019-05-01 ENCOUNTER — OFFICE VISIT (OUTPATIENT)
Dept: PHYSICAL THERAPY | Facility: CLINIC | Age: 59
End: 2019-05-01
Payer: COMMERCIAL

## 2019-05-01 DIAGNOSIS — M54.2 NECK PAIN ON RIGHT SIDE: Primary | ICD-10-CM

## 2019-05-01 PROCEDURE — 97140 MANUAL THERAPY 1/> REGIONS: CPT

## 2019-05-01 PROCEDURE — 97530 THERAPEUTIC ACTIVITIES: CPT

## 2019-05-01 PROCEDURE — 97110 THERAPEUTIC EXERCISES: CPT

## 2019-05-06 ENCOUNTER — OFFICE VISIT (OUTPATIENT)
Dept: PHYSICAL THERAPY | Facility: CLINIC | Age: 59
End: 2019-05-06
Payer: COMMERCIAL

## 2019-05-06 DIAGNOSIS — M54.2 NECK PAIN ON RIGHT SIDE: Primary | ICD-10-CM

## 2019-05-06 PROCEDURE — 97530 THERAPEUTIC ACTIVITIES: CPT | Performed by: PHYSICAL THERAPIST

## 2019-05-06 PROCEDURE — 97110 THERAPEUTIC EXERCISES: CPT | Performed by: PHYSICAL THERAPIST

## 2019-05-06 PROCEDURE — 97140 MANUAL THERAPY 1/> REGIONS: CPT | Performed by: PHYSICAL THERAPIST

## 2019-05-08 ENCOUNTER — OFFICE VISIT (OUTPATIENT)
Dept: PHYSICAL THERAPY | Facility: CLINIC | Age: 59
End: 2019-05-08
Payer: COMMERCIAL

## 2019-05-08 DIAGNOSIS — M54.2 NECK PAIN ON RIGHT SIDE: Primary | ICD-10-CM

## 2019-05-08 PROCEDURE — 97530 THERAPEUTIC ACTIVITIES: CPT | Performed by: PHYSICAL THERAPIST

## 2019-05-08 PROCEDURE — 97140 MANUAL THERAPY 1/> REGIONS: CPT | Performed by: PHYSICAL THERAPIST

## 2019-05-08 PROCEDURE — 97110 THERAPEUTIC EXERCISES: CPT | Performed by: PHYSICAL THERAPIST

## 2019-05-13 ENCOUNTER — OFFICE VISIT (OUTPATIENT)
Dept: PHYSICAL THERAPY | Facility: CLINIC | Age: 59
End: 2019-05-13
Payer: COMMERCIAL

## 2019-05-13 DIAGNOSIS — M54.2 NECK PAIN ON RIGHT SIDE: Primary | ICD-10-CM

## 2019-05-13 PROCEDURE — 97140 MANUAL THERAPY 1/> REGIONS: CPT | Performed by: PHYSICAL THERAPIST

## 2019-05-15 ENCOUNTER — OFFICE VISIT (OUTPATIENT)
Dept: PHYSICAL THERAPY | Facility: CLINIC | Age: 59
End: 2019-05-15
Payer: COMMERCIAL

## 2019-05-15 DIAGNOSIS — M54.2 NECK PAIN ON RIGHT SIDE: Primary | ICD-10-CM

## 2019-05-15 PROCEDURE — 97140 MANUAL THERAPY 1/> REGIONS: CPT | Performed by: PHYSICAL THERAPIST

## 2019-05-15 PROCEDURE — 97110 THERAPEUTIC EXERCISES: CPT | Performed by: PHYSICAL THERAPIST

## 2019-05-20 ENCOUNTER — OFFICE VISIT (OUTPATIENT)
Dept: PHYSICAL THERAPY | Facility: CLINIC | Age: 59
End: 2019-05-20
Payer: COMMERCIAL

## 2019-05-20 DIAGNOSIS — M54.2 NECK PAIN ON RIGHT SIDE: Primary | ICD-10-CM

## 2019-05-20 PROCEDURE — 97140 MANUAL THERAPY 1/> REGIONS: CPT

## 2019-05-20 PROCEDURE — 97110 THERAPEUTIC EXERCISES: CPT

## 2019-05-22 ENCOUNTER — OFFICE VISIT (OUTPATIENT)
Dept: PHYSICAL THERAPY | Facility: CLINIC | Age: 59
End: 2019-05-22
Payer: COMMERCIAL

## 2019-05-22 DIAGNOSIS — M54.2 NECK PAIN ON RIGHT SIDE: Primary | ICD-10-CM

## 2019-05-22 PROCEDURE — 97140 MANUAL THERAPY 1/> REGIONS: CPT | Performed by: PHYSICAL THERAPIST

## 2019-05-22 PROCEDURE — 97110 THERAPEUTIC EXERCISES: CPT | Performed by: PHYSICAL THERAPIST

## 2019-05-29 ENCOUNTER — OFFICE VISIT (OUTPATIENT)
Dept: PHYSICAL THERAPY | Facility: CLINIC | Age: 59
End: 2019-05-29
Payer: COMMERCIAL

## 2019-05-29 DIAGNOSIS — M54.2 NECK PAIN ON RIGHT SIDE: Primary | ICD-10-CM

## 2019-05-29 PROCEDURE — 97140 MANUAL THERAPY 1/> REGIONS: CPT

## 2019-05-29 PROCEDURE — 97110 THERAPEUTIC EXERCISES: CPT

## 2019-06-03 ENCOUNTER — OFFICE VISIT (OUTPATIENT)
Dept: PHYSICAL THERAPY | Facility: CLINIC | Age: 59
End: 2019-06-03
Payer: COMMERCIAL

## 2019-06-03 DIAGNOSIS — M54.2 NECK PAIN ON RIGHT SIDE: Primary | ICD-10-CM

## 2019-06-03 PROCEDURE — 97110 THERAPEUTIC EXERCISES: CPT

## 2019-06-03 PROCEDURE — 97140 MANUAL THERAPY 1/> REGIONS: CPT

## 2019-06-05 ENCOUNTER — OFFICE VISIT (OUTPATIENT)
Dept: PHYSICAL THERAPY | Facility: CLINIC | Age: 59
End: 2019-06-05
Payer: COMMERCIAL

## 2019-06-05 DIAGNOSIS — M54.2 NECK PAIN ON RIGHT SIDE: Primary | ICD-10-CM

## 2019-06-05 PROCEDURE — 97140 MANUAL THERAPY 1/> REGIONS: CPT

## 2019-06-05 PROCEDURE — 97110 THERAPEUTIC EXERCISES: CPT

## 2019-06-10 ENCOUNTER — OFFICE VISIT (OUTPATIENT)
Dept: PHYSICAL THERAPY | Facility: CLINIC | Age: 59
End: 2019-06-10
Payer: COMMERCIAL

## 2019-06-10 DIAGNOSIS — M54.2 NECK PAIN ON RIGHT SIDE: Primary | ICD-10-CM

## 2019-06-10 PROCEDURE — 97140 MANUAL THERAPY 1/> REGIONS: CPT | Performed by: PHYSICAL THERAPIST

## 2019-06-10 PROCEDURE — 97110 THERAPEUTIC EXERCISES: CPT | Performed by: PHYSICAL THERAPIST

## 2019-06-12 ENCOUNTER — OFFICE VISIT (OUTPATIENT)
Dept: PHYSICAL THERAPY | Facility: CLINIC | Age: 59
End: 2019-06-12
Payer: COMMERCIAL

## 2019-06-12 DIAGNOSIS — M54.2 NECK PAIN ON RIGHT SIDE: Primary | ICD-10-CM

## 2019-06-12 PROCEDURE — 97140 MANUAL THERAPY 1/> REGIONS: CPT | Performed by: PHYSICAL THERAPIST

## 2019-06-12 PROCEDURE — 97110 THERAPEUTIC EXERCISES: CPT | Performed by: PHYSICAL THERAPIST

## 2019-06-17 ENCOUNTER — OFFICE VISIT (OUTPATIENT)
Dept: PHYSICAL THERAPY | Facility: CLINIC | Age: 59
End: 2019-06-17
Payer: COMMERCIAL

## 2019-06-17 DIAGNOSIS — M54.2 NECK PAIN ON RIGHT SIDE: Primary | ICD-10-CM

## 2019-06-17 PROCEDURE — 97140 MANUAL THERAPY 1/> REGIONS: CPT | Performed by: PHYSICAL THERAPIST

## 2019-06-17 PROCEDURE — 97110 THERAPEUTIC EXERCISES: CPT | Performed by: PHYSICAL THERAPIST

## 2019-06-19 ENCOUNTER — OFFICE VISIT (OUTPATIENT)
Dept: PHYSICAL THERAPY | Facility: CLINIC | Age: 59
End: 2019-06-19
Payer: COMMERCIAL

## 2019-06-19 DIAGNOSIS — M54.2 NECK PAIN ON RIGHT SIDE: Primary | ICD-10-CM

## 2019-06-19 PROCEDURE — 97110 THERAPEUTIC EXERCISES: CPT

## 2019-06-19 PROCEDURE — 97140 MANUAL THERAPY 1/> REGIONS: CPT

## 2019-06-24 ENCOUNTER — OFFICE VISIT (OUTPATIENT)
Dept: PHYSICAL THERAPY | Facility: CLINIC | Age: 59
End: 2019-06-24
Payer: COMMERCIAL

## 2019-06-24 DIAGNOSIS — M54.2 NECK PAIN ON RIGHT SIDE: Primary | ICD-10-CM

## 2019-06-24 PROCEDURE — 97110 THERAPEUTIC EXERCISES: CPT

## 2019-06-24 PROCEDURE — 97140 MANUAL THERAPY 1/> REGIONS: CPT

## 2019-06-26 ENCOUNTER — OFFICE VISIT (OUTPATIENT)
Dept: PHYSICAL THERAPY | Facility: CLINIC | Age: 59
End: 2019-06-26
Payer: COMMERCIAL

## 2019-06-26 DIAGNOSIS — M54.2 NECK PAIN ON RIGHT SIDE: Primary | ICD-10-CM

## 2019-06-26 PROCEDURE — 97110 THERAPEUTIC EXERCISES: CPT

## 2019-06-26 PROCEDURE — 97140 MANUAL THERAPY 1/> REGIONS: CPT

## 2019-06-28 ENCOUNTER — TELEPHONE (OUTPATIENT)
Dept: GASTROENTEROLOGY | Facility: MEDICAL CENTER | Age: 59
End: 2019-06-28

## 2019-07-01 ENCOUNTER — OFFICE VISIT (OUTPATIENT)
Dept: PHYSICAL THERAPY | Facility: CLINIC | Age: 59
End: 2019-07-01
Payer: COMMERCIAL

## 2019-07-01 DIAGNOSIS — M54.2 NECK PAIN ON RIGHT SIDE: Primary | ICD-10-CM

## 2019-07-01 PROCEDURE — 97110 THERAPEUTIC EXERCISES: CPT

## 2019-07-01 PROCEDURE — 97140 MANUAL THERAPY 1/> REGIONS: CPT

## 2019-07-01 NOTE — PROGRESS NOTES
Daily Note     Today's date: 2019  Patient name: Alan Leblanc  : 1960  MRN: [id number]  Referring provider: Kayla Gilbert MD  Dx:   Encounter Diagnosis     ICD-10-CM    1  Neck pain on right side M54 2                 Subjective: Patient reports neck pain is improved since last week  Objective: See treatment diary below  Precautions: fibromyalgia     Daily Treatment Diary     Manuals         Cervical/thoracic pa's 5' 5' NP NP 5'         STM R paraspinals 10' 8' 10' 10' 10'         cervical traction 5' 5' 5' NP gentle  5'                      Exercise Diary              ube  6' NP NP NP        Chin tuck 20 5"x20 5"x20 5"x20 5"x20        UT stretch 15"x5 15"x4 15"x4 15"x4 15"x4        Levator stretch 15"x4 15"x4 15"x4 15"x4 15"x4        Low rows Red tb x20 red tb  1x20 red tb  1x20 red tb  1x20 red tb  1x20        rows Red tb x20 red tb  1x20 red tb  1x20 red tb  1x20 red tb  1x20        scap depression              scap retraction with cane ext  5"x20 5"x20 NP NP         scap squeeze with er              shoulder flex & scap door /c neck roll                                                                                                                                                                                                  Modalities              MHP post tx   10'  seated 10' pre supine 10' pre  seated 10' post supine                                   Assessment: No complaints reported throughout therapeutic exercise program   Mild tenderness noted in the right upper trap during STM  Plan: Continue treatment as per PT plan of care

## 2019-07-03 ENCOUNTER — OFFICE VISIT (OUTPATIENT)
Dept: PHYSICAL THERAPY | Facility: CLINIC | Age: 59
End: 2019-07-03
Payer: COMMERCIAL

## 2019-07-03 DIAGNOSIS — M54.2 NECK PAIN ON RIGHT SIDE: Primary | ICD-10-CM

## 2019-07-03 PROCEDURE — 97110 THERAPEUTIC EXERCISES: CPT

## 2019-07-03 PROCEDURE — 97140 MANUAL THERAPY 1/> REGIONS: CPT

## 2019-07-03 NOTE — PROGRESS NOTES
Daily Note     Today's date: 7/3/2019  Patient name: Miguelina Acevedo  : 1960  MRN: [id number]  Referring provider: Tara Phillip MD  Dx:   Encounter Diagnosis     ICD-10-CM    1  Neck pain on right side M54 2                 Subjective: Patient arrives to today's treatment reporting "it's a little sore but I'm okay "  As compared to last week patient reports neck pain is "much better "    Objective: See treatment diary below  PT performs mobilizations as noted  Precautions: fibromyalgia     Daily Treatment Diary     Manuals 6/17 6/19 6/24 6/26 7/1 7/3       Cervical/thoracic pa's 5' 5' NP NP 5' 5'        STM R paraspinals 10' 8' 10' 10' 10' 10'        cervical traction 5' 5' 5' NP gentle  5' 5'                     Exercise Diary              ube  6' NP NP NP NP       Chin tuck 20 5"x20 5"x20 5"x20 5"x20 5"x20       UT stretch 15"x5 15"x4 15"x4 15"x4 15"x4 15"x4       Levator stretch 15"x4 15"x4 15"x4 15"x4 15"x4 15"x4       Low rows Red tb x20 red tb  1x20 red tb  1x20 red tb  1x20 red tb  1x20 blue tb  1x20       rows Red tb x20 red tb  1x20 red tb  1x20 red tb  1x20 red tb  1x20 red tb  1x20       scap depression              scap retraction with cane ext  5"x20 5"x20 NP NP 5"x20        scap squeeze with er              shoulder flex & scap door /c neck roll                                                                                                                                                                                                  Modalities              MHP post tx   10'  seated 10' pre supine 10' pre  seated 10' post supine 10' post  seated                                  Assessment: "Cracking" noted in the cervical spine when performing self stretches  Mild tenderness noted in the right upper trap during STM  Plan: Continue treatment as per PT plan of care

## 2019-07-08 ENCOUNTER — OFFICE VISIT (OUTPATIENT)
Dept: PHYSICAL THERAPY | Facility: CLINIC | Age: 59
End: 2019-07-08
Payer: COMMERCIAL

## 2019-07-08 DIAGNOSIS — M54.2 NECK PAIN ON RIGHT SIDE: Primary | ICD-10-CM

## 2019-07-08 PROCEDURE — 97110 THERAPEUTIC EXERCISES: CPT | Performed by: PHYSICAL THERAPIST

## 2019-07-08 PROCEDURE — 97140 MANUAL THERAPY 1/> REGIONS: CPT | Performed by: PHYSICAL THERAPIST

## 2019-07-08 NOTE — PROGRESS NOTES
Daily Note     Today's date: 2019  Patient name: Jerod Junior  : 1960  MRN: [id number]  Referring provider: Walter Brito MD  Dx:   Encounter Diagnosis     ICD-10-CM    1  Neck pain on right side M54 2                 Subjective: Patient reports a slight increase in pain since she had "a stressful day"  Reports partial compliance with her hep    Objective: See treatment diary below  PT performs mobilizations as noted  Precautions: fibromyalgia     Daily Treatment Diary     Manuals 6/17 6/19 6/24 6/26 7/1 7/3 7/8      Cervical/thoracic pa's 5' 5' NP NP 5' 5' 5'       STM R paraspinals 10' 8' 10' 10' 10' 10' 10'       cervical traction 5' 5' 5' NP gentle  5' 5' 5'                    Exercise Diary              ube  6' NP NP NP NP       Chin tuck 20 5"x20 5"x20 5"x20 5"x20 5"x20 5"x20      UT stretch 15"x5 15"x4 15"x4 15"x4 15"x4 15"x4 15"x4      Levator stretch 15"x4 15"x4 15"x4 15"x4 15"x4 15"x4 15"x4      Low rows Red tb x20 red tb  1x20 red tb  1x20 red tb  1x20 red tb  1x20 blue tb  1x20 Blue tb x20      rows Red tb x20 red tb  1x20 red tb  1x20 red tb  1x20 red tb  1x20 red tb  1x20 Red tb x20      scap depression              scap retraction with cane ext  5"x20 5"x20 NP NP 5"x20 5"x20       scap squeeze with er              shoulder flex & scap door /c neck roll                                                                                                                                                                                                  Modalities              MHP post tx   10'  seated 10' pre supine 10' pre  seated 10' post supine 10' post  seated 10"post tx seated                                 Assessment: pt reports decreased pain with manual distraction but has increased pain with R sided joint mobilizations  Plan: Continue treatment as per PT plan of care

## 2019-07-10 ENCOUNTER — OFFICE VISIT (OUTPATIENT)
Dept: PHYSICAL THERAPY | Facility: CLINIC | Age: 59
End: 2019-07-10
Payer: COMMERCIAL

## 2019-07-10 DIAGNOSIS — M54.2 NECK PAIN ON RIGHT SIDE: Primary | ICD-10-CM

## 2019-07-10 PROCEDURE — 97110 THERAPEUTIC EXERCISES: CPT | Performed by: PHYSICAL THERAPIST

## 2019-07-10 PROCEDURE — 97140 MANUAL THERAPY 1/> REGIONS: CPT | Performed by: PHYSICAL THERAPIST

## 2019-07-10 NOTE — PROGRESS NOTES
Daily Note     Today's date: 7/10/2019  Patient name: Moe Chu  : 1960  MRN: [id number]  Referring provider: Keli Somers MD  Dx:   Encounter Diagnosis     ICD-10-CM    1  Neck pain on right side M54 2                 Subjective: Pt reports min pain upon arrival to PT    Objective: See treatment diary below  PT performs mobilizations as noted  Precautions: fibromyalgia     Daily Treatment Diary     Manuals 6/17 6/19 6/24 6/26 7/1 7/3 7/8 7/10     Cervical/thoracic pa's 5' 5' NP NP 5' 5' 5' 5'      STM R paraspinals 10' 8' 10' 10' 10' 10' 10' 10'      cervical traction 5' 5' 5' NP gentle  5' 5' 5' 5'                   Exercise Diary              ube  6' NP NP NP NP       Chin tuck 20 5"x20 5"x20 5"x20 5"x20 5"x20 5"x20 5"x20     UT stretch 15"x5 15"x4 15"x4 15"x4 15"x4 15"x4 15"x4 15"x4     Levator stretch 15"x4 15"x4 15"x4 15"x4 15"x4 15"x4 15"x4 15"x4     Low rows Red tb x20 red tb  1x20 red tb  1x20 red tb  1x20 red tb  1x20 blue tb  1x20 Blue tb x20 Blue tb x30     rows Red tb x20 red tb  1x20 red tb  1x20 red tb  1x20 red tb  1x20 red tb  1x20 Red tb x20 Red tb x30     scap depression              scap retraction with cane ext  5"x20 5"x20 NP NP 5"x20 5"x20 5"x20      scap squeeze with er              shoulder flex & scap door /c neck roll                                                                                                                                                                                                  Modalities              MHP post tx   10'  seated 10' pre supine 10' pre  seated 10' post supine 10' post  seated 10"post tx seated Cp x10'                                Assessment: pt reports decreased pain with manual tx, pt request ice instead of MHP today with good pain reduction         Plan: Continue treatment as per PT plan of care

## 2019-07-15 ENCOUNTER — OFFICE VISIT (OUTPATIENT)
Dept: PHYSICAL THERAPY | Facility: CLINIC | Age: 59
End: 2019-07-15
Payer: COMMERCIAL

## 2019-07-15 DIAGNOSIS — M54.2 NECK PAIN ON RIGHT SIDE: Primary | ICD-10-CM

## 2019-07-15 PROCEDURE — 97110 THERAPEUTIC EXERCISES: CPT

## 2019-07-15 PROCEDURE — 97140 MANUAL THERAPY 1/> REGIONS: CPT

## 2019-07-15 NOTE — PROGRESS NOTES
Daily Note     Today's date: 7/15/2019  Patient name: Srini Blackwood  : 1960  MRN: [id number]  Referring provider: Dank Delaney MD  Dx:   Encounter Diagnosis     ICD-10-CM    1  Neck pain on right side M54 2                 Subjective: No significant changes to report since the last visit  Objective: See treatment diary below  Precautions: fibromyalgia     Daily Treatment Diary     Manuals 6/17 6/19 6/24 6/26 7/1 7/3 7/8 7/10 7/15    Cervical/thoracic pa's 5' 5' NP NP 5' 5' 5' 5' NP     STM R paraspinals 10' 8' 10' 10' 10' 10' 10' 10' 10'     cervical traction 5' 5' 5' NP gentle  5' 5' 5' 5' gentle  5'                  Exercise Diary              ube  6' NP NP NP NP       Chin tuck 20 5"x20 5"x20 5"x20 5"x20 5"x20 5"x20 5"x20 5"x20    UT stretch 15"x5 15"x4 15"x4 15"x4 15"x4 15"x4 15"x4 15"x4 15"x4    Levator stretch 15"x4 15"x4 15"x4 15"x4 15"x4 15"x4 15"x4 15"x4 15"x4    Low rows Red tb x20 red tb  1x20 red tb  1x20 red tb  1x20 red tb  1x20 blue tb  1x20 Blue tb x20 Blue tb x30 blue tb  3x10    rows Red tb x20 red tb  1x20 red tb  1x20 red tb  1x20 red tb  1x20 red tb  1x20 Red tb x20 Red tb x30 red tb  3x10    scap depression              scap retraction with cane ext  5"x20 5"x20 NP NP 5"x20 5"x20 5"x20 5"x20     scap squeeze with er              shoulder flex & scap door /c neck roll                                                                                                                                                                                                  Modalities              MHP post tx   10'  seated 10' pre supine 10' pre  seated 10' post supine 10' post  seated 10"post tx seated Cp x10' MHP  10'                               Assessment: Cervical pain slightly increased during manual cervical traction  Plan: Continue treatment as per PT plan of care

## 2019-07-17 ENCOUNTER — OFFICE VISIT (OUTPATIENT)
Dept: PHYSICAL THERAPY | Facility: CLINIC | Age: 59
End: 2019-07-17
Payer: COMMERCIAL

## 2019-07-17 DIAGNOSIS — M54.2 NECK PAIN ON RIGHT SIDE: Primary | ICD-10-CM

## 2019-07-17 PROCEDURE — 97110 THERAPEUTIC EXERCISES: CPT

## 2019-07-17 PROCEDURE — 97140 MANUAL THERAPY 1/> REGIONS: CPT

## 2019-07-17 NOTE — PROGRESS NOTES
Daily Note     Today's date: 2019  Patient name: Geovanni Hogan  : 1960  MRN: [id number]  Referring provider: Arianna Keane MD  Dx:   Encounter Diagnosis     ICD-10-CM    1  Neck pain on right side M54 2                 Subjective: Patient reports her right shoulder got hit by a door at work this morning which caused an increase in right sided neck pain and spasms  Objective: See treatment diary below  Precautions: fibromyalgia     Daily Treatment Diary     Manuals 6/17 6/19 6/24 6/26 7/1 7/3 7/8 7/10 7/15 7/17   Cervical/thoracic pa's 5' 5' NP NP 5' 5' 5' 5' NP 5'    STM R paraspinals 10' 8' 10' 10' 10' 10' 10' 10' 10' 10'    cervical traction 5' 5' 5' NP gentle  5' 5' 5' 5' gentle  5' gentle  5'                 Exercise Diary              ube  6' NP NP NP NP       Chin tuck 20 5"x20 5"x20 5"x20 5"x20 5"x20 5"x20 5"x20 5"x20 5"x20   UT stretch 15"x5 15"x4 15"x4 15"x4 15"x4 15"x4 15"x4 15"x4 15"x4 15"x4   Levator stretch 15"x4 15"x4 15"x4 15"x4 15"x4 15"x4 15"x4 15"x4 15"x4 15"x4   Low rows Red tb x20 red tb  1x20 red tb  1x20 red tb  1x20 red tb  1x20 blue tb  1x20 Blue tb x20 Blue tb x30 blue tb  3x10 red tb  3x10   rows Red tb x20 red tb  1x20 red tb  1x20 red tb  1x20 red tb  1x20 red tb  1x20 Red tb x20 Red tb x30 red tb  3x10 red tb  3x10   scap depression              scap retraction with cane ext  5"x20 5"x20 NP NP 5"x20 5"x20 5"x20 5"x20  5"x20   scap squeeze with er              shoulder flex & scap door /c neck roll                                                                                                                                                                                                  Modalities              MHP post tx   10'  seated 10' pre supine 10' pre  seated 10' post supine 10' post  seated 10"post tx seated Cp x10' MHP  10'  MHP  post tx   x 10'                             Assessment: Increased tenderness noted in the right upper trap during STM      Plan: Continue treatment as per PT plan of care

## 2019-07-19 DIAGNOSIS — M79.7 FIBROMYALGIA: ICD-10-CM

## 2019-07-19 DIAGNOSIS — F32.A ANXIETY AND DEPRESSION: ICD-10-CM

## 2019-07-19 DIAGNOSIS — F41.9 ANXIETY AND DEPRESSION: ICD-10-CM

## 2019-07-19 RX ORDER — DULOXETIN HYDROCHLORIDE 30 MG/1
30 CAPSULE, DELAYED RELEASE ORAL DAILY
Qty: 90 CAPSULE | Refills: 3 | Status: SHIPPED | OUTPATIENT
Start: 2019-07-19 | End: 2020-07-14

## 2019-07-22 ENCOUNTER — OFFICE VISIT (OUTPATIENT)
Dept: PHYSICAL THERAPY | Facility: CLINIC | Age: 59
End: 2019-07-22
Payer: COMMERCIAL

## 2019-07-22 DIAGNOSIS — M54.2 NECK PAIN ON RIGHT SIDE: Primary | ICD-10-CM

## 2019-07-22 PROCEDURE — 97140 MANUAL THERAPY 1/> REGIONS: CPT | Performed by: PHYSICAL THERAPIST

## 2019-07-22 PROCEDURE — 97110 THERAPEUTIC EXERCISES: CPT | Performed by: PHYSICAL THERAPIST

## 2019-07-22 PROCEDURE — 97112 NEUROMUSCULAR REEDUCATION: CPT | Performed by: PHYSICAL THERAPIST

## 2019-07-22 NOTE — PROGRESS NOTES
Daily Note     Today's date: 2019  Patient name: Teresa North  : 1960  MRN: [id number]  Referring provider: Phan Deleon MD  Dx:   Encounter Diagnosis     ICD-10-CM    1  Neck pain on right side M54 2                 Subjective: Patient reports feeling ok with some pian today no changes since last visit    Objective: See treatment diary below  Precautions: fibromyalgia     Daily Treatment Diary     Manuals 7/22     7/3 7/8 7/10 7/15 7/17   Cervical/thoracic pa's G1 5'     5' 5' 5' NP 5'    STM R paraspinals 10     10' 10' 10' 10' 10'    cervical traction Gentle 30"x5     5' 5' 5' gentle  5' gentle  5'                 Exercise Diary              ube      NP       Chin tuck 5"x20     5"x20 5"x20 5"x20 5"x20 5"x20   UT stretch 15"x4     15"x4 15"x4 15"x4 15"x4 15"x4   Levator stretch 15"x4     15"x4 15"x4 15"x4 15"x4 15"x4   Low rows red tb  3x10     blue tb  1x20 Blue tb x20 Blue tb x30 blue tb  3x10 red tb  3x10   rows Red tb 3x10     red tb  1x20 Red tb x20 Red tb x30 red tb  3x10 red tb  3x10   scap depression              scap retraction with cane ext 5"x20     5"x20 5"x20 5"x20 5"x20  5"x20   scap squeeze with er              shoulder flex & scap door /c neck roll                                                                                                                                                                                                  Modalities              MHP post tx  MHP post tx 8'     10' post  seated 10"post tx seated Cp x10' MHP  10'  MHP  post tx   x 10'                             Assessment: Tolerated session fair  Difficulty with mobilizations reporting pain with grade 1 mob and gentle traction, was able to better tolerate when divided into shorter bouts  Reported some benefit from [facility]  Able to perform exercises with minimal pain  Would benefit from continued PT  Plan: Continue treatment as per PT plan of care

## 2019-07-24 ENCOUNTER — APPOINTMENT (OUTPATIENT)
Dept: PHYSICAL THERAPY | Facility: CLINIC | Age: 59
End: 2019-07-24
Payer: COMMERCIAL

## 2019-07-29 ENCOUNTER — OFFICE VISIT (OUTPATIENT)
Dept: PHYSICAL THERAPY | Facility: CLINIC | Age: 59
End: 2019-07-29
Payer: COMMERCIAL

## 2019-07-29 DIAGNOSIS — M54.2 NECK PAIN ON RIGHT SIDE: Primary | ICD-10-CM

## 2019-07-29 PROCEDURE — 97140 MANUAL THERAPY 1/> REGIONS: CPT

## 2019-07-29 PROCEDURE — 97110 THERAPEUTIC EXERCISES: CPT

## 2019-07-29 NOTE — PROGRESS NOTES
Daily Note     Today's date: 2019  Patient name: Devante Henry  : 1960  MRN: [id number]  Referring provider: Reymundo Rojo MD  Dx:   Encounter Diagnosis     ICD-10-CM    1  Neck pain on right side M54 2                 Subjective: Patient complains of a headache today  Patient is currently undergoing a lot of stress due to her sick dog - reports needing to spend upwards of $5,000 00 on vet bills  Objective: See treatment diary below  Precautions: fibromyalgia     Daily Treatment Diary     Manuals 7/22 7/29    7/3 7/8 7/10 7/15 7/17   Cervical/thoracic pa's G1 5' 5'    5' 5' 5' NP 5'    STM R paraspinals 10 10'    10' 10' 10' 10' 10'    cervical traction Gentle 30"x5 5'    5' 5' 5' gentle  5' gentle  5'                 Exercise Diary              ube      NP       Chin tuck 5"x20 5"x20    5"x20 5"x20 5"x20 5"x20 5"x20   UT stretch 15"x4 15"x4    15"x4 15"x4 15"x4 15"x4 15"x4   Levator stretch 15"x4 15"x4    15"x4 15"x4 15"x4 15"x4 15"x4   Low rows red tb  3x10     blue tb  1x20 Blue tb x20 Blue tb x30 blue tb  3x10 red tb  3x10   rows Red tb 3x10     red tb  1x20 Red tb x20 Red tb x30 red tb  3x10 red tb  3x10   scap depression              scap retraction with cane ext 5"x20     5"x20 5"x20 5"x20 5"x20  5"x20   scap squeeze with er              shoulder flex & scap door /c neck roll                                                                                                                                                                                                  Modalities              MHP post tx  MHP post tx 8' MHP post tx   10'    10' post  seated 10"post tx seated Cp x10' MHP  10'  MHP  post tx   x 10'                             Assessment: Tenderness noted during STM to cervical paraspinals  Plan: Continue treatment as per PT plan of care

## 2019-07-31 ENCOUNTER — OFFICE VISIT (OUTPATIENT)
Dept: PHYSICAL THERAPY | Facility: CLINIC | Age: 59
End: 2019-07-31
Payer: COMMERCIAL

## 2019-07-31 DIAGNOSIS — M54.2 NECK PAIN ON RIGHT SIDE: Primary | ICD-10-CM

## 2019-07-31 PROCEDURE — 97110 THERAPEUTIC EXERCISES: CPT | Performed by: PHYSICAL THERAPIST

## 2019-07-31 PROCEDURE — 97140 MANUAL THERAPY 1/> REGIONS: CPT | Performed by: PHYSICAL THERAPIST

## 2019-07-31 NOTE — PROGRESS NOTES
Daily Note     Today's date: 2019  Patient name: Lyndsey Espinoza  : 1960  MRN: [id number]  Referring provider: Edgardo Riley MD  Dx:   Encounter Diagnosis     ICD-10-CM    1  Neck pain on right side M54 2                 Subjective: Pt reports continued pain that she attributes to stress from having to pay Vet bills    Objective: See treatment diary below  Precautions: fibromyalgia     Daily Treatment Diary     Manuals 7/22 7/29 7/31   7/3 7/8 7/10 7/15 7/17   Cervical/thoracic pa's G1 5' 5' 5'   5' 5' 5' NP 5'    STM R paraspinals 10 10' 10'   10' 10' 10' 10' 10'    cervical traction Gentle 30"x5 5' 5'   5' 5' 5' gentle  5' gentle  5'                 Exercise Diary              ube      NP       Chin tuck 5"x20 5"x20 20   5"x20 5"x20 5"x20 5"x20 5"x20   UT stretch 15"x4 15"x4 4   15"x4 15"x4 15"x4 15"x4 15"x4   Levator stretch 15"x4 15"x4 4   15"x4 15"x4 15"x4 15"x4 15"x4   Low rows red tb  3x10 Green tb x30 30   blue tb  1x20 Blue tb x20 Blue tb x30 blue tb  3x10 red tb  3x10   rows Red tb 3x10 Green tb x30 30   red tb  1x20 Red tb x20 Red tb x30 red tb  3x10 red tb  3x10   scap depression              scap retraction with cane ext 5"x20 5"x20 20   5"x20 5"x20 5"x20 5"x20  5"x20   scap squeeze with er              shoulder flex & scap door /c neck roll                                                                                                                                                                                                  Modalities              MHP post tx  MHP post tx 8' MHP post tx   10' 10'   10' post  seated 10"post tx seated Cp x10' MHP  10'  MHP  post tx   x 10'                             Assessment: Pt continues to be tender with mobilization to c3 but denies pain with therex  Plan: Continue treatment as per PT plan of care

## 2019-08-05 ENCOUNTER — OFFICE VISIT (OUTPATIENT)
Dept: PHYSICAL THERAPY | Facility: CLINIC | Age: 59
End: 2019-08-05
Payer: COMMERCIAL

## 2019-08-05 DIAGNOSIS — M54.2 NECK PAIN ON RIGHT SIDE: Primary | ICD-10-CM

## 2019-08-05 PROCEDURE — 97110 THERAPEUTIC EXERCISES: CPT | Performed by: PHYSICAL THERAPIST

## 2019-08-05 PROCEDURE — 97140 MANUAL THERAPY 1/> REGIONS: CPT | Performed by: PHYSICAL THERAPIST

## 2019-08-05 NOTE — PROGRESS NOTES
Daily Note     Today's date: 2019  Patient name: Lico Erwin  : 1960  MRN: [id number]  Referring provider: Iris West MD  Dx:   Encounter Diagnosis     ICD-10-CM    1  Neck pain on right side M54 2                 Subjective: Pt reports no sig change upon arrival to PT    Objective: See treatment diary below  Precautions: fibromyalgia     Daily Treatment Diary     Manuals          Cervical/thoracic pa's G1 5' 5' 5' 5'          STM R paraspinals 10 10' 10' 10'          cervical traction Gentle 30"x5 5' 5' 5'                       Exercise Diary              ube             Chin tuck 5"x20 5"x20 20 20         UT stretch 15"x4 15"x4 4 4         Levator stretch 15"x4 15"x4 4 4         Low rows red tb  3x10 Green tb x30 30 30         rows Red tb 3x10 Green tb x30 30 30         scap depression             scap retraction with cane ext 5"x20 5"x20 20 20         scap squeeze with er             shoulder flex & scap door /c neck roll                                                                                                                                                                                     Modalities             MHP post tx  MHP post tx 8' MHP post tx   10' 10'                               Assessment: No pain reported t/o therex, reports decreased stiffness with manual tx, progress as imelda NV  Plan: Continue treatment as per PT plan of care

## 2019-08-07 ENCOUNTER — OFFICE VISIT (OUTPATIENT)
Dept: PHYSICAL THERAPY | Facility: CLINIC | Age: 59
End: 2019-08-07
Payer: COMMERCIAL

## 2019-08-07 DIAGNOSIS — M54.2 NECK PAIN ON RIGHT SIDE: Primary | ICD-10-CM

## 2019-08-07 PROCEDURE — 97140 MANUAL THERAPY 1/> REGIONS: CPT

## 2019-08-07 PROCEDURE — 97110 THERAPEUTIC EXERCISES: CPT

## 2019-08-07 NOTE — PROGRESS NOTES
Daily Note     Today's date: 2019  Patient name: Rocio Sorensen  : 1960  MRN: [id number]  Referring provider: Clive Parada MD  Dx:   Encounter Diagnosis     ICD-10-CM    1  Neck pain on right side M54 2                 Subjective: Patient states, "I'm very sore today "  Patient reports she is fatigued and reports "everything just hurts "  Patient also reports she has a headache today  Objective: See treatment diary below  Precautions: fibromyalgia     Daily Treatment Diary     Manuals         Cervical/thoracic pa's G1 5' 5' 5' 5' 5'         STM R paraspinals 10 10' 10' 10' 10         cervical traction Gentle 30"x5 5' 5' 5' 5'                      Exercise Diary              ube             Chin tuck 5"x20 5"x20 20 20 5"x20        UT stretch 15"x4 15"x4 4 4 15"x4        Levator stretch 15"x4 15"x4 4 4 15"x4        Low rows red tb  3x10 Green tb x30 30 30 red  3x10        rows Red tb 3x10 Green tb x30 30 30 red  3x10        scap depression             scap retraction with cane ext 5"x20 5"x20 20 20 5"x20        scap squeeze with er             shoulder flex & scap door /c neck roll                                                                                                                                                                                     Modalities             MHP post tx  MHP post tx 8' MHP post tx   10' 10' 10' MHP post tx 10'                             Assessment: Treatment is tolerated well without complaints  Plan: Continue treatment as per PT plan of care

## 2019-08-12 ENCOUNTER — APPOINTMENT (OUTPATIENT)
Dept: PHYSICAL THERAPY | Facility: CLINIC | Age: 59
End: 2019-08-12
Payer: COMMERCIAL

## 2019-08-12 ENCOUNTER — OFFICE VISIT (OUTPATIENT)
Dept: PHYSICAL THERAPY | Facility: CLINIC | Age: 59
End: 2019-08-12
Payer: COMMERCIAL

## 2019-08-12 DIAGNOSIS — M54.2 NECK PAIN ON RIGHT SIDE: Primary | ICD-10-CM

## 2019-08-12 PROCEDURE — 97140 MANUAL THERAPY 1/> REGIONS: CPT | Performed by: PHYSICAL THERAPIST

## 2019-08-12 PROCEDURE — 97110 THERAPEUTIC EXERCISES: CPT | Performed by: PHYSICAL THERAPIST

## 2019-08-12 NOTE — PROGRESS NOTES
Daily Note     Today's date: 2019  Patient name: Miguelina Acevedo  : 1960  MRN: [id number]  Referring provider: Tara Phillip MD  Dx:   Encounter Diagnosis     ICD-10-CM    1  Neck pain on right side M54 2                 Subjective: Pt reports increased pain secondary to performing extensive yard work for 2 days over the weekend    Objective: See treatment diary below  Precautions: fibromyalgia     Daily Treatment Diary     Manuals        Cervical/thoracic pa's G1 5' 5' 5' 5' 5' 5'        STM R paraspinals 10 10' 10' 10' 10 10        cervical traction Gentle 30"x5 5' 5' 5' 5' 5                     Exercise Diary              ube             Chin tuck 5"x20 5"x20 20 20 5"x20 5"x20       UT stretch 15"x4 15"x4 4 4 15"x4 15"x4       Levator stretch 15"x4 15"x4 4 4 15"x4 15"x4       Low rows red tb  3x10 Green tb x30 30 30 red  3x10        rows Red tb 3x10 Green tb x30 30 30 red  3x10        scap depression             scap retraction with cane ext 5"x20 5"x20 20 20 5"x20        scap squeeze with er             shoulder flex & scap door /c neck roll                                                                                                                                                                                     Modalities             MHP post tx  MHP post tx 8' MHP post tx   10' 10' 10' MHP post tx 10' 10'                            Assessment: Pt reports good reduction in pain with manual mobs, traction and self-stretching  Avoided tb therex secondary to mm soreness prior to PT  Plan: Continue treatment as per PT plan of care

## 2019-08-14 ENCOUNTER — OFFICE VISIT (OUTPATIENT)
Dept: PHYSICAL THERAPY | Facility: CLINIC | Age: 59
End: 2019-08-14
Payer: COMMERCIAL

## 2019-08-14 DIAGNOSIS — M54.2 NECK PAIN ON RIGHT SIDE: Primary | ICD-10-CM

## 2019-08-14 PROCEDURE — 97140 MANUAL THERAPY 1/> REGIONS: CPT

## 2019-08-14 PROCEDURE — 97110 THERAPEUTIC EXERCISES: CPT

## 2019-08-14 NOTE — PROGRESS NOTES
Daily Note     Today's date: 2019  Patient name: Main Orantes  : 1960  MRN: [id number]  Referring provider: Vivek Jackson MD  Dx:   Encounter Diagnosis     ICD-10-CM    1  Neck pain on right side M54 2                 Subjective: Patient reports neck pain has been better today as compared to earlier this week  Objective: See treatment diary below  Precautions: fibromyalgia     Daily Treatment Diary     Manuals       Cervical/thoracic pa's G1 5' 5' 5' 5' 5' 5' NP       STM R paraspinals 10 10' 10' 10' 10 10 10'       cervical traction Gentle 30"x5 5' 5' 5' 5' 5 5'                    Exercise Diary              ube             Chin tuck 5"x20 5"x20 20 20 5"x20 5"x20 5"x20      UT stretch 15"x4 15"x4 4 4 15"x4 15"x4 15"x4      Levator stretch 15"x4 15"x4 4 4 15"x4 15"x4 15"x4      Low rows red tb  3x10 Green tb x30 30 30 red  3x10  red  3x10      rows Red tb 3x10 Green tb x30 30 30 red  3x10  green  3x10      scap depression             scap retraction with cane ext 5"x20 5"x20 20 20 5"x20  5"x20      scap squeeze with er             shoulder flex & scap door /c neck roll                                                                                                                                                                                     Modalities             MHP post tx  MHP post tx 8' MHP post tx   10' 10' 10' MHP post tx 10' 10' MHP post tx 10'                           Assessment: Therapeutic exercise program is tolerated well without complaints  Mild tenderness reported during STM  Plan: Continue treatment as per PT plan of care

## 2019-08-19 ENCOUNTER — APPOINTMENT (OUTPATIENT)
Dept: PHYSICAL THERAPY | Facility: CLINIC | Age: 59
End: 2019-08-19
Payer: COMMERCIAL

## 2019-08-19 ENCOUNTER — OFFICE VISIT (OUTPATIENT)
Dept: PHYSICAL THERAPY | Facility: CLINIC | Age: 59
End: 2019-08-19
Payer: COMMERCIAL

## 2019-08-19 DIAGNOSIS — M54.2 NECK PAIN ON RIGHT SIDE: Primary | ICD-10-CM

## 2019-08-19 PROCEDURE — 97110 THERAPEUTIC EXERCISES: CPT | Performed by: PHYSICAL THERAPIST

## 2019-08-19 PROCEDURE — 97140 MANUAL THERAPY 1/> REGIONS: CPT | Performed by: PHYSICAL THERAPIST

## 2019-08-19 NOTE — PROGRESS NOTES
Daily Note     Today's date: 2019  Patient name: Lyndsey Espinoza  : 1960  MRN: [id number]  Referring provider: Edgardo Riley MD  Dx:   Encounter Diagnosis     ICD-10-CM    1  Neck pain on right side M54 2                 Subjective: Patient reports neck pain has been better today as compared to earlier this week  Objective: See treatment diary below  Precautions: fibromyalgia     Daily Treatment Diary     Manuals      Cervical/thoracic pa's G1 5' 5' 5' 5' 5' 5' NP       STM R paraspinals 10 10' 10' 10' 10 10 10' 10'      cervical traction Gentle 30"x5 5' 5' 5' 5' 5 5' 5'                   Exercise Diary              ube             Chin tuck 5"x20 5"x20 20 20 5"x20 5"x20 5"x20      UT stretch 15"x4 15"x4 4 4 15"x4 15"x4 15"x4 15"x4     Levator stretch 15"x4 15"x4 4 4 15"x4 15"x4 15"x4 15"x4     Low rows red tb  3x10 Green tb x30 30 30 red  3x10  red  3x10 Green x30     rows Red tb 3x10 Green tb x30 30 30 red  3x10  green  3x10 Green x30     scap depression             scap retraction with cane ext 5"x20 5"x20 20 20 5"x20  5"x20 5"x20     scap squeeze with er             shoulder flex & scap door /c neck roll                                                                                                                                                                                     Modalities             MHP post tx  MHP post tx 8' MHP post tx   10' 10' 10' MHP post tx 10' 10' MHP post tx 10' mhp x10min                          Assessment: Therapeutic exercise program is tolerated well without complaints  Mild tenderness reported during STM  Plan: plan to progress as imelda and DC NV

## 2019-08-21 ENCOUNTER — OFFICE VISIT (OUTPATIENT)
Dept: PHYSICAL THERAPY | Facility: CLINIC | Age: 59
End: 2019-08-21
Payer: COMMERCIAL

## 2019-08-21 DIAGNOSIS — M54.2 NECK PAIN ON RIGHT SIDE: Primary | ICD-10-CM

## 2019-08-21 PROCEDURE — 97140 MANUAL THERAPY 1/> REGIONS: CPT

## 2019-08-21 PROCEDURE — 97110 THERAPEUTIC EXERCISES: CPT

## 2019-08-21 NOTE — PROGRESS NOTES
Daily Note     Today's date: 2019  Patient name: Carlos Ramey  : 1960  MRN: [id number]  Referring provider: Yoana Carbajal MD  Dx:   Encounter Diagnosis     ICD-10-CM    1  Neck pain on right side M54 2                 Subjective: No significant changes to report since the last visit  Objective: See treatment diary below  Precautions: fibromyalgia     Daily Treatment Diary     Manuals     Cervical/thoracic pa's G1 5' 5' 5' 5' 5' 5' NP  5'     STM R paraspinals 10 10' 10' 10' 10 10 10' 10' 10'     cervical traction Gentle 30"x5 5' 5' 5' 5' 5 5' 5' 5'                  Exercise Diary              ube             Chin tuck 5"x20 5"x20 20 20 5"x20 5"x20 5"x20  5"x20    UT stretch 15"x4 15"x4 4 4 15"x4 15"x4 15"x4 15"x4 15"x4    Levator stretch 15"x4 15"x4 4 4 15"x4 15"x4 15"x4 15"x4 15"x4    Low rows red tb  3x10 Green tb x30 30 30 red  3x10  red  3x10 Green x30 red  3x10    rows Red tb 3x10 Green tb x30 30 30 red  3x10  green  3x10 Green x30 red  3x10    scap depression             scap retraction with cane ext 5"x20 5"x20 20 20 5"x20  5"x20 5"x20 5"x20    scap squeeze with er             shoulder flex & scap door /c neck roll                                                                                                                                                                                     Modalities             MHP post tx  MHP post tx 8' MHP post tx   10' 10' 10' MHP post tx 10' 10' MHP post tx 10' mhp x10min MHP post tx  10'                         Assessment: Therapeutic exercise program is tolerated well without complaints  Plan: Discharge patient from PT today

## 2019-08-26 ENCOUNTER — APPOINTMENT (OUTPATIENT)
Dept: PHYSICAL THERAPY | Facility: CLINIC | Age: 59
End: 2019-08-26
Payer: COMMERCIAL

## 2019-08-28 ENCOUNTER — APPOINTMENT (OUTPATIENT)
Dept: PHYSICAL THERAPY | Facility: CLINIC | Age: 59
End: 2019-08-28
Payer: COMMERCIAL

## 2019-08-30 DIAGNOSIS — I10 HYPERTENSION, UNSPECIFIED TYPE: ICD-10-CM

## 2019-08-30 RX ORDER — HYDROCHLOROTHIAZIDE 12.5 MG/1
12.5 TABLET ORAL DAILY
Qty: 90 TABLET | Refills: 1 | Status: SHIPPED | OUTPATIENT
Start: 2019-08-30 | End: 2019-09-20

## 2019-09-06 ENCOUNTER — TELEPHONE (OUTPATIENT)
Dept: FAMILY MEDICINE CLINIC | Facility: CLINIC | Age: 59
End: 2019-09-06

## 2019-09-06 DIAGNOSIS — R53.83 FATIGUE, UNSPECIFIED TYPE: Primary | ICD-10-CM

## 2019-09-06 NOTE — TELEPHONE ENCOUNTER
Pt called wondering is she can have a Lyme disease blood draw script with the rest of the blood work that has to be done, if it is appropriate  Please call to advise

## 2019-09-10 ENCOUNTER — TELEPHONE (OUTPATIENT)
Dept: FAMILY MEDICINE CLINIC | Facility: CLINIC | Age: 59
End: 2019-09-10

## 2019-09-10 ENCOUNTER — APPOINTMENT (OUTPATIENT)
Dept: LAB | Facility: HOSPITAL | Age: 59
End: 2019-09-10
Payer: COMMERCIAL

## 2019-09-10 DIAGNOSIS — E87.6 HYPOKALEMIA: Primary | ICD-10-CM

## 2019-09-10 DIAGNOSIS — Z00.00 ROUTINE HEALTH MAINTENANCE: ICD-10-CM

## 2019-09-10 DIAGNOSIS — E78.1 HYPERTRIGLYCERIDEMIA: ICD-10-CM

## 2019-09-10 DIAGNOSIS — R53.83 FATIGUE, UNSPECIFIED TYPE: ICD-10-CM

## 2019-09-10 DIAGNOSIS — I10 HYPERTENSION, UNSPECIFIED TYPE: ICD-10-CM

## 2019-09-10 DIAGNOSIS — Z11.59 NEED FOR HEPATITIS C SCREENING TEST: ICD-10-CM

## 2019-09-10 LAB
25(OH)D3 SERPL-MCNC: 23.7 NG/ML (ref 30–100)
ALBUMIN SERPL BCP-MCNC: 3.6 G/DL (ref 3.5–5)
ALP SERPL-CCNC: 85 U/L (ref 46–116)
ALT SERPL W P-5'-P-CCNC: 35 U/L (ref 12–78)
ANION GAP SERPL CALCULATED.3IONS-SCNC: 4 MMOL/L (ref 4–13)
AST SERPL W P-5'-P-CCNC: 24 U/L (ref 5–45)
BASOPHILS # BLD AUTO: 0.04 THOUSANDS/ΜL (ref 0–0.1)
BASOPHILS NFR BLD AUTO: 1 % (ref 0–1)
BILIRUB SERPL-MCNC: 0.46 MG/DL (ref 0.2–1)
BUN SERPL-MCNC: 13 MG/DL (ref 5–25)
CALCIUM SERPL-MCNC: 9.3 MG/DL (ref 8.3–10.1)
CHLORIDE SERPL-SCNC: 105 MMOL/L (ref 100–108)
CHOLEST SERPL-MCNC: 188 MG/DL (ref 50–200)
CO2 SERPL-SCNC: 29 MMOL/L (ref 21–32)
CREAT SERPL-MCNC: 0.84 MG/DL (ref 0.6–1.3)
EOSINOPHIL # BLD AUTO: 0.24 THOUSAND/ΜL (ref 0–0.61)
EOSINOPHIL NFR BLD AUTO: 4 % (ref 0–6)
ERYTHROCYTE [DISTWIDTH] IN BLOOD BY AUTOMATED COUNT: 12.2 % (ref 11.6–15.1)
EST. AVERAGE GLUCOSE BLD GHB EST-MCNC: 114 MG/DL
GFR SERPL CREATININE-BSD FRML MDRD: 77 ML/MIN/1.73SQ M
GLUCOSE P FAST SERPL-MCNC: 93 MG/DL (ref 65–99)
HBA1C MFR BLD: 5.6 % (ref 4.2–6.3)
HCT VFR BLD AUTO: 40.2 % (ref 34.8–46.1)
HCV AB SER QL: NORMAL
HDLC SERPL-MCNC: 54 MG/DL (ref 40–60)
HGB BLD-MCNC: 13.3 G/DL (ref 11.5–15.4)
IMM GRANULOCYTES # BLD AUTO: 0.02 THOUSAND/UL (ref 0–0.2)
IMM GRANULOCYTES NFR BLD AUTO: 0 % (ref 0–2)
LDLC SERPL CALC-MCNC: 100 MG/DL (ref 0–100)
LYMPHOCYTES # BLD AUTO: 1.99 THOUSANDS/ΜL (ref 0.6–4.47)
LYMPHOCYTES NFR BLD AUTO: 32 % (ref 14–44)
MCH RBC QN AUTO: 30.2 PG (ref 26.8–34.3)
MCHC RBC AUTO-ENTMCNC: 33.1 G/DL (ref 31.4–37.4)
MCV RBC AUTO: 91 FL (ref 82–98)
MONOCYTES # BLD AUTO: 0.39 THOUSAND/ΜL (ref 0.17–1.22)
MONOCYTES NFR BLD AUTO: 6 % (ref 4–12)
NEUTROPHILS # BLD AUTO: 3.58 THOUSANDS/ΜL (ref 1.85–7.62)
NEUTS SEG NFR BLD AUTO: 57 % (ref 43–75)
NRBC BLD AUTO-RTO: 0 /100 WBCS
PLATELET # BLD AUTO: 297 THOUSANDS/UL (ref 149–390)
PMV BLD AUTO: 9.7 FL (ref 8.9–12.7)
POTASSIUM SERPL-SCNC: 3.3 MMOL/L (ref 3.5–5.3)
PROT SERPL-MCNC: 7.3 G/DL (ref 6.4–8.2)
RBC # BLD AUTO: 4.41 MILLION/UL (ref 3.81–5.12)
SODIUM SERPL-SCNC: 138 MMOL/L (ref 136–145)
TRIGL SERPL-MCNC: 172 MG/DL
TSH SERPL DL<=0.05 MIU/L-ACNC: 2.45 UIU/ML (ref 0.36–3.74)
VIT B12 SERPL-MCNC: 747 PG/ML (ref 100–900)
WBC # BLD AUTO: 6.26 THOUSAND/UL (ref 4.31–10.16)

## 2019-09-10 PROCEDURE — 84443 ASSAY THYROID STIM HORMONE: CPT

## 2019-09-10 PROCEDURE — 83036 HEMOGLOBIN GLYCOSYLATED A1C: CPT

## 2019-09-10 PROCEDURE — 85025 COMPLETE CBC W/AUTO DIFF WBC: CPT

## 2019-09-10 PROCEDURE — 82306 VITAMIN D 25 HYDROXY: CPT

## 2019-09-10 PROCEDURE — 82607 VITAMIN B-12: CPT

## 2019-09-10 PROCEDURE — 86803 HEPATITIS C AB TEST: CPT

## 2019-09-10 PROCEDURE — 80053 COMPREHEN METABOLIC PANEL: CPT

## 2019-09-10 PROCEDURE — 36415 COLL VENOUS BLD VENIPUNCTURE: CPT

## 2019-09-10 PROCEDURE — 86618 LYME DISEASE ANTIBODY: CPT

## 2019-09-10 PROCEDURE — 80061 LIPID PANEL: CPT

## 2019-09-11 LAB — B BURGDOR IGG+IGM SER-ACNC: <0.91 ISR (ref 0–0.9)

## 2019-09-11 RX ORDER — POTASSIUM CHLORIDE 750 MG/1
10 CAPSULE, EXTENDED RELEASE ORAL 2 TIMES DAILY
Qty: 30 CAPSULE | Refills: 0 | Status: SHIPPED | OUTPATIENT
Start: 2019-09-11 | End: 2019-10-01

## 2019-09-11 NOTE — TELEPHONE ENCOUNTER
I did send prescription for 30 day supply of potassium chloride to [facility] pharmacy, I printed orders for BMP, nonfasting, please advise patient to proceed with blood work 1-2 days prior to her follow-up with Dr Charan Choudhury, shana  Thank you

## 2019-09-11 NOTE — TELEPHONE ENCOUNTER
Spoke with pt, MD instructions given  Verbalized understanding of instructions  She prefers [address] [facility]

## 2019-09-11 NOTE — TELEPHONE ENCOUNTER
Please contact patient  All her blood work looks stable but her potassium level is decreased  We need to start her on potassium supplement 1 daily  I will send to the patient's pharmacy of choice  We need to repeat BMP prior to her follow-up with Dr Breonna Burgos     Please let me know which pharmacy patient prefers to use for 30 day supply of potassium chloride 10 mEq daily  Thank you

## 2019-09-18 ENCOUNTER — TRANSCRIBE ORDERS (OUTPATIENT)
Dept: LAB | Facility: HOSPITAL | Age: 59
End: 2019-09-18

## 2019-09-18 ENCOUNTER — APPOINTMENT (OUTPATIENT)
Dept: LAB | Facility: HOSPITAL | Age: 59
End: 2019-09-18
Payer: COMMERCIAL

## 2019-09-18 ENCOUNTER — TELEPHONE (OUTPATIENT)
Dept: FAMILY MEDICINE CLINIC | Facility: CLINIC | Age: 59
End: 2019-09-18

## 2019-09-18 DIAGNOSIS — E87.6 HYPOKALEMIA: ICD-10-CM

## 2019-09-18 LAB
ANION GAP SERPL CALCULATED.3IONS-SCNC: 9 MMOL/L (ref 4–13)
BUN SERPL-MCNC: 13 MG/DL (ref 5–25)
CALCIUM SERPL-MCNC: 9.5 MG/DL (ref 8.3–10.1)
CHLORIDE SERPL-SCNC: 106 MMOL/L (ref 100–108)
CO2 SERPL-SCNC: 28 MMOL/L (ref 21–32)
CREAT SERPL-MCNC: 0.77 MG/DL (ref 0.6–1.3)
GFR SERPL CREATININE-BSD FRML MDRD: 86 ML/MIN/1.73SQ M
GLUCOSE SERPL-MCNC: 80 MG/DL (ref 65–140)
POTASSIUM SERPL-SCNC: 3.1 MMOL/L (ref 3.5–5.3)
SODIUM SERPL-SCNC: 143 MMOL/L (ref 136–145)

## 2019-09-18 PROCEDURE — 80048 BASIC METABOLIC PNL TOTAL CA: CPT

## 2019-09-18 PROCEDURE — 36415 COLL VENOUS BLD VENIPUNCTURE: CPT

## 2019-09-20 ENCOUNTER — OFFICE VISIT (OUTPATIENT)
Dept: FAMILY MEDICINE CLINIC | Facility: CLINIC | Age: 59
End: 2019-09-20
Payer: COMMERCIAL

## 2019-09-20 VITALS
BODY MASS INDEX: 30.47 KG/M2 | RESPIRATION RATE: 18 BRPM | WEIGHT: 165.6 LBS | OXYGEN SATURATION: 97 % | HEART RATE: 66 BPM | DIASTOLIC BLOOD PRESSURE: 80 MMHG | HEIGHT: 62 IN | SYSTOLIC BLOOD PRESSURE: 122 MMHG

## 2019-09-20 DIAGNOSIS — G47.33 OBSTRUCTIVE SLEEP APNEA: ICD-10-CM

## 2019-09-20 DIAGNOSIS — R53.83 FATIGUE, UNSPECIFIED TYPE: ICD-10-CM

## 2019-09-20 DIAGNOSIS — K21.9 GASTROESOPHAGEAL REFLUX DISEASE WITHOUT ESOPHAGITIS: ICD-10-CM

## 2019-09-20 DIAGNOSIS — F32.A ANXIETY AND DEPRESSION: ICD-10-CM

## 2019-09-20 DIAGNOSIS — E78.1 HYPERTRIGLYCERIDEMIA: ICD-10-CM

## 2019-09-20 DIAGNOSIS — Z00.00 ROUTINE HEALTH MAINTENANCE: ICD-10-CM

## 2019-09-20 DIAGNOSIS — Z12.31 SCREENING MAMMOGRAM, ENCOUNTER FOR: ICD-10-CM

## 2019-09-20 DIAGNOSIS — M79.7 FIBROMYALGIA: ICD-10-CM

## 2019-09-20 DIAGNOSIS — F41.9 ANXIETY AND DEPRESSION: ICD-10-CM

## 2019-09-20 DIAGNOSIS — E87.6 HYPOKALEMIA: ICD-10-CM

## 2019-09-20 DIAGNOSIS — G47.09 OTHER INSOMNIA: Chronic | ICD-10-CM

## 2019-09-20 DIAGNOSIS — D64.9 ANEMIA, UNSPECIFIED TYPE: ICD-10-CM

## 2019-09-20 DIAGNOSIS — I10 HYPERTENSION, UNSPECIFIED TYPE: Primary | ICD-10-CM

## 2019-09-20 PROCEDURE — 99214 OFFICE O/P EST MOD 30 MIN: CPT | Performed by: FAMILY MEDICINE

## 2019-09-20 RX ORDER — AMLODIPINE BESYLATE 2.5 MG/1
2.5 TABLET ORAL DAILY
Qty: 30 TABLET | Refills: 5 | Status: SHIPPED | OUTPATIENT
Start: 2019-09-20 | End: 2019-10-03

## 2019-09-20 RX ORDER — ZOLPIDEM TARTRATE 12.5 MG/1
12.5 TABLET, FILM COATED, EXTENDED RELEASE ORAL
Qty: 90 TABLET | Refills: 0 | Status: SHIPPED | OUTPATIENT
Start: 2019-09-20 | End: 2020-02-12 | Stop reason: SDUPTHER

## 2019-09-20 NOTE — PROGRESS NOTES
FAMILY PRACTICE OFFICE VISIT       NAME: Souleymane Styles  AGE: 64 y o  SEX: female       : 1960        MRN: [id number]    DATE: 2019  TIME: 5:42 PM    Assessment and Plan     Problem List Items Addressed This Visit        Respiratory    Obstructive sleep apnea       Cardiovascular and Mediastinum    Hypertension - Primary    Relevant Medications    amLODIPine (NORVASC) 2 5 mg tablet       Other    Other insomnia (Chronic)    Relevant Medications    zolpidem (AMBIEN CR) 12 5 MG CR tablet    Anemia    Fibromyalgia    Routine health maintenance    Anxiety and depression      Other Visit Diagnoses     Hypertriglyceridemia        Gastroesophageal reflux disease without esophagitis        Hypokalemia        Relevant Orders    Potassium    Fatigue, unspecified type        Relevant Orders    Iron Panel (Includes Ferritin, Iron Sat%, Iron, and TIBC)    EBV acute panel    Magnesium    Screening mammogram, encounter for        Relevant Orders    Mammo screening bilateral w 3d & cad         Hypertension:  BP is overall stable and controlled  Patient has been taking hydrochlorothiazide regularly however her potassium has been low  Even on potassium supplementation, potassium was decreased  I would like to discontinue hydrochlorothiazide and start amlodipine 2 5 mg daily  I would like her to continue with lifestyle modifications  I would like to repeat potassium next Monday and have patient return in the next 2 weeks for BP check  May benefit from obtaining home BP machine as well  Return parameters discussed  Fibromyalgia:    Appears to be stable however symptoms do vary from time to time  Patient does continue with duloxetine 30 mg daily  Sleep apnea:   Using CPAP  Depression and anxiety: This is overall stable  Patient is on Cymbalta 30 mg daily  Takes lorazepam as needed  Has been followed by psychiatrist, Makayla Bar  Hypertriglyceridemia:  Reviewed recent lipid panel results  Noted improvement in triglycerides  Continue with lifestyle modifications  Anemia:  Recent hemoglobin stable  Will check iron panel  GERD:  She does take omeprazole as needed  Patient is scheduled for an EGD on 10/03 to be done by Dr Santos  Insomnia:  Patient takes zolpidem as needed  Continue with sleep hygiene habits  Routine health maintenance:   Rx for mammogram provided  Up-to-date with colonoscopy done by Dr Jose G Yu  In December/2017 with recommended repeat in 3 years as patient had polyp  Will call gyn to schedule Pap  There are no Patient Instructions on file for this visit  Chief Complaint     Chief Complaint   Patient presents with    Follow-up     Blood work       History of Present Illness     HPI   61-year-old female here for routine follow-up of chronic conditions and discuss recent blood work results  Patient is scheduled for an EGD on 10/03 to be done by Dr Santos  Up-to-date with colonoscopy on 12/2017 with recommended repeat in 3 years  Patient states she has had fatigue for the past 1 month  Takes the following supplements:  Windmill hi-potency mvi, solgar sl b12 1000 mcg, solgar vit k2 100 mcg, bilberry, ginko, lutein  Will d/c hctz   will start amlodipine 2 5 mg   juan m get bw on Monday or Tuesday    Review of Systems   Review of Systems   Constitutional: Positive for fatigue  Negative for unexpected weight change  Respiratory: Negative for shortness of breath  Cardiovascular: Negative  Gastrointestinal: Negative for abdominal pain  Genitourinary: Negative for dysuria  Neurological: Negative for dizziness and light-headedness  Psychiatric/Behavioral: Negative for dysphoric mood         Active Problem List     Patient Active Problem List   Diagnosis    Left ear pain    Anemia    Asthma    Chronic fatigue syndrome    Hypertension    Fibromyalgia    JA (generalized anxiety disorder)    Gluten intolerance    Hypovitaminosis D    Impaired fasting glucose    Liver lesion    Lipodystrophy    Major depressive disorder, recurrent episode, in full remission (HCC)    Migraine    Myalgia and myositis    Other insomnia    Rosacea    Obstructive sleep apnea    Routine health maintenance    Headache upon awakening    Hypersomnia    Obesity (BMI 30-39  9)    Snoring    Anxiety and depression    Neck pain on right side    NOEMÍ (obstructive sleep apnea)    Herniated lumbar intervertebral disc    Acute bilateral low back pain without sciatica    DDD (degenerative disc disease), lumbosacral    Sleep disturbance       Past Medical History:  Past Medical History:   Diagnosis Date    Anemia     Anxiety and depression     Asthma 10/26/2012    Chronic fatigue syndrome     DDD (degenerative disc disease), lumbosacral 12/27/2018    Patrick-Barr virus seropositivity     Fibromyalgia, primary     JA (generalized anxiety disorder)     Gluten intolerance     Hypertension     Hypovitaminosis D     Impaired fasting glucose     Lipodystrophy     Liver lesion     Migraine     Obesity (BMI 30-39 9) 6/6/2018    Sleep apnea        Past Surgical History:  Past Surgical History:   Procedure Laterality Date    APPENDECTOMY      BREAST BIOPSY      CHOLECYSTECTOMY      COLONOSCOPY      IL COLONOSCOPY FLX DX W/COLLJ SPEC WHEN PFRMD N/A 12/4/2017    Procedure: COLONOSCOPY;  Surgeon: Car Rodriguez DO;  [facility];   Service: Gastroenterology    UPPER GASTROINTESTINAL ENDOSCOPY         Family History:  Family History   Problem Relation Age of Onset    Depression Mother     Hypertension Mother         essential     Mitral valve prolapse Mother     Diabetes Brother     Heart attack Brother     Heart disease Maternal Grandmother         cardiac disorder    Hyperlipidemia Maternal Grandmother     Stroke Paternal Grandmother     Stroke Paternal Grandfather     Breast cancer Maternal Aunt        Social History:  Social History     Socioeconomic History    Marital status: Single     Spouse name: Not on file    Number of children: 0    Years of education: bachelor's degree    Highest education level: Not on file   Occupational History    Occupation: works for [employer]     Employer: [employer]   Social Needs    Financial resource strain: Not on file    Food insecurity:     Worry: Not on file     Inability: Not on file    Transportation needs:     Medical: Not on file     Non-medical: Not on file   Tobacco Use    Smoking status: Never Smoker    Smokeless tobacco: Never Used   Substance and Sexual Activity    Alcohol use: No    Drug use: No    Sexual activity: Never   Lifestyle    Physical activity:     Days per week: Not on file     Minutes per session: Not on file    Stress: Not on file   Relationships    Social connections:     Talks on phone: Not on file     Gets together: Not on file     Attends Confucianist service: Not on file     Active member of club or organization: Not on file     Attends meetings of clubs or organizations: Not on file     Relationship status: Not on file    Intimate partner violence:     Fear of current or ex partner: Not on file     Emotionally abused: Not on file     Physically abused: Not on file     Forced sexual activity: Not on file   Other Topics Concern    Not on file   Social History Narrative    Education: bachelor's degree in sociology    Learning Disabilities: none    Marital History: single    Children: none    Living Arrangement: lives alone    Occupational History: works for [employer] at [employer]: limited support system    Legal History: none     History: None    Caffeine use denied     I have reviewed the patient's medical history in detail; there are no changes to the history as noted in the electronic medical record      Objective     Vitals:    09/20/19 1254   BP: 122/80   Pulse:    Resp: SpO2:      Wt Readings from Last 3 Encounters:   09/20/19 75 1 kg (165 lb 9 6 oz)   04/24/19 75 6 kg (166 lb 9 6 oz)   04/01/19 74 8 kg (165 lb)     Vitals:    09/20/19 1138 09/20/19 1254   BP: 124/82 122/80   BP Location: Left arm    Patient Position: Sitting    Cuff Size: Adult    Pulse: 66    Resp: 18    SpO2: 97%    Weight: 75 1 kg (165 lb 9 6 oz)    Height: 5' 2" (1 575 m)          Physical Exam   Constitutional: She appears well-developed and well-nourished  HENT:   Head: Normocephalic and atraumatic  Mouth/Throat: Oropharynx is clear and moist    Eyes: Pupils are equal, round, and reactive to light  Conjunctivae and EOM are normal    Neck: Normal range of motion  Neck supple  No thyromegaly present  Cardiovascular: Normal rate, regular rhythm and normal heart sounds  Pulmonary/Chest: Effort normal and breath sounds normal    Abdominal: Soft  Bowel sounds are normal  She exhibits no distension  There is no tenderness  Musculoskeletal: Normal range of motion  She exhibits no edema  Lymphadenopathy:     She has no cervical adenopathy  Neurological: She is alert  Psychiatric: She has a normal mood and affect  Nursing note and vitals reviewed        Pertinent Laboratory/Diagnostic Studies:  Lab Results   Component Value Date    BUN 13 09/18/2019    CREATININE 0 77 09/18/2019    CALCIUM 9 5 09/18/2019     11/16/2016    K 3 1 (L) 09/18/2019    CO2 28 09/18/2019     09/18/2019     Lab Results   Component Value Date    ALT 35 09/10/2019    AST 24 09/10/2019    ALKPHOS 85 09/10/2019    BILITOT 0 4 11/16/2016       Lab Results   Component Value Date    WBC 6 26 09/10/2019    HGB 13 3 09/10/2019    HCT 40 2 09/10/2019    MCV 91 09/10/2019     09/10/2019       No results found for: TSH    Lab Results   Component Value Date    CHOL 195 05/09/2016     Lab Results   Component Value Date    TRIG 172 (H) 09/10/2019     Lab Results   Component Value Date    HDL 54 09/10/2019     Lab Results Component Value Date    LDLCALC 100 09/10/2019     Lab Results   Component Value Date    HGBA1C 5 6 09/10/2019       Results for orders placed or performed in visit on 45/57/01   Basic metabolic panel   Result Value Ref Range    Sodium 143 136 - 145 mmol/L    Potassium 3 1 (L) 3 5 - 5 3 mmol/L    Chloride 106 100 - 108 mmol/L    CO2 28 21 - 32 mmol/L    ANION GAP 9 4 - 13 mmol/L    BUN 13 5 - 25 mg/dL    Creatinine 0 77 0 60 - 1 30 mg/dL    Glucose 80 65 - 140 mg/dL    Calcium 9 5 8 3 - 10 1 mg/dL    eGFR 86 ml/min/1 73sq m       Orders Placed This Encounter   Procedures    Mammo screening bilateral w 3d & cad    Potassium    EBV acute panel    Magnesium       ALLERGIES:  Allergies   Allergen Reactions    Escitalopram Swelling and Edema     Category: Allergy;     Fluticasone-Salmeterol      UNKNOWN    Nsaids      Action Taken: stomach issues;     Latex      irritation      Other Rash     Adhesive bandages       Current Medications     Current Outpatient Medications   Medication Sig Dispense Refill    Cholecalciferol (VITAMIN D3) 81359 units TABS Take 10,000 Units by mouth daily      Cyanocobalamin (VITAMIN B-12) 500 MCG LOZG Take 500 mcg by mouth every other day       D-Ribose POWD by Does not apply route      DULoxetine (CYMBALTA) 30 mg delayed release capsule Take 1 capsule (30 mg total) by mouth daily 90 capsule 3    LORazepam (ATIVAN) 0 5 mg tablet Take 0 5 mg by mouth as needed        Magnesium Gluconate 250 MG TABS Take 1 tablet by mouth daily      potassium chloride (MICRO-K) 10 MEQ CR capsule Take 1 capsule (10 mEq total) by mouth 2 (two) times a day 30 capsule 0    amLODIPine (NORVASC) 2 5 mg tablet Take 1 tablet (2 5 mg total) by mouth daily 30 tablet 5    zolpidem (AMBIEN CR) 12 5 MG CR tablet Take 1 tablet (12 5 mg total) by mouth daily at bedtime as needed for sleep 90 tablet 0     No current facility-administered medications for this visit            Health Maintenance     Health Maintenance   Topic Date Due    BMI: Followup Plan  07/21/1980    DTaP,Tdap,and Td Vaccines (1 - Tdap) 07/21/1983    PT PLAN OF CARE  03/21/2019    PAP SMEAR  05/12/2019    INFLUENZA VACCINE  07/01/2019    MAMMOGRAM  09/19/2020    BMI: Adult  09/20/2020    CRC Screening: Colonoscopy  12/04/2020    Pneumococcal Vaccine: 65+ Years (1 of 2 - PCV13) 07/21/2027    Hepatitis C Screening  Completed    Pneumococcal Vaccine: Pediatrics (0 to 5 Years) and At-Risk Patients (6 to 59 Years)  Aged Out    HEPATITIS B VACCINES  Aged [employer] History   Administered Date(s) Administered    Influenza TIV (IM) 02/15/2017       Jen Dorsey MD

## 2019-09-30 ENCOUNTER — LAB (OUTPATIENT)
Dept: LAB | Facility: HOSPITAL | Age: 59
End: 2019-09-30
Payer: COMMERCIAL

## 2019-09-30 DIAGNOSIS — E87.6 HYPOKALEMIA: ICD-10-CM

## 2019-09-30 DIAGNOSIS — R53.83 FATIGUE, UNSPECIFIED TYPE: ICD-10-CM

## 2019-09-30 LAB
FERRITIN SERPL-MCNC: 63 NG/ML (ref 8–388)
IRON SATN MFR SERPL: 29 %
IRON SERPL-MCNC: 93 UG/DL (ref 50–170)
MAGNESIUM SERPL-MCNC: 2.2 MG/DL (ref 1.6–2.6)
POTASSIUM SERPL-SCNC: 3.5 MMOL/L (ref 3.5–5.3)
TIBC SERPL-MCNC: 323 UG/DL (ref 250–450)

## 2019-09-30 PROCEDURE — 86665 EPSTEIN-BARR CAPSID VCA: CPT

## 2019-09-30 PROCEDURE — 83550 IRON BINDING TEST: CPT

## 2019-09-30 PROCEDURE — 86663 EPSTEIN-BARR ANTIBODY: CPT

## 2019-09-30 PROCEDURE — 83735 ASSAY OF MAGNESIUM: CPT

## 2019-09-30 PROCEDURE — 82728 ASSAY OF FERRITIN: CPT

## 2019-09-30 PROCEDURE — 84132 ASSAY OF SERUM POTASSIUM: CPT

## 2019-09-30 PROCEDURE — 36415 COLL VENOUS BLD VENIPUNCTURE: CPT

## 2019-09-30 PROCEDURE — 83540 ASSAY OF IRON: CPT

## 2019-09-30 PROCEDURE — 86664 EPSTEIN-BARR NUCLEAR ANTIGEN: CPT

## 2019-10-01 ENCOUNTER — OFFICE VISIT (OUTPATIENT)
Dept: FAMILY MEDICINE CLINIC | Facility: CLINIC | Age: 59
End: 2019-10-01
Payer: COMMERCIAL

## 2019-10-01 VITALS
WEIGHT: 166.4 LBS | SYSTOLIC BLOOD PRESSURE: 126 MMHG | OXYGEN SATURATION: 100 % | TEMPERATURE: 98.6 F | HEART RATE: 67 BPM | BODY MASS INDEX: 30.62 KG/M2 | RESPIRATION RATE: 18 BRPM | DIASTOLIC BLOOD PRESSURE: 80 MMHG | HEIGHT: 62 IN

## 2019-10-01 DIAGNOSIS — R53.83 FATIGUE, UNSPECIFIED TYPE: ICD-10-CM

## 2019-10-01 DIAGNOSIS — R51.9 NONINTRACTABLE HEADACHE, UNSPECIFIED CHRONICITY PATTERN, UNSPECIFIED HEADACHE TYPE: ICD-10-CM

## 2019-10-01 DIAGNOSIS — I10 HYPERTENSION, UNSPECIFIED TYPE: Primary | ICD-10-CM

## 2019-10-01 DIAGNOSIS — E87.6 HYPOKALEMIA: ICD-10-CM

## 2019-10-01 PROCEDURE — 99213 OFFICE O/P EST LOW 20 MIN: CPT | Performed by: FAMILY MEDICINE

## 2019-10-01 NOTE — PROGRESS NOTES
FAMILY PRACTICE OFFICE VISIT       NAME: Nazanin Casas  AGE: 64 y o  SEX: female       : 1960        MRN: [id number]    DATE: 10/2/2019  TIME: 11:07 PM    Assessment and Plan     Problem List Items Addressed This Visit        Cardiovascular and Mediastinum    Hypertension - Primary      Other Visit Diagnoses     Hypokalemia        Nonintractable headache, unspecified chronicity pattern, unspecified headache type        Fatigue, unspecified type            Hypertension:  Initially mildly elevated, however upon recheck within normal range  Patient states he has also been monitoring her blood pressure which remained stable  Patient's hydrochlorothiazide was discontinued on  and amlodipine 2 5 mg started  Since starting amlodipine, patient has had nausea and headache however does not know if this is related to stress as there is a recent death in the family  I am willing to switch the medication however patient would like to hold off for now  She will let me know if her symptoms do persist   If they do, will switch to losartan  Continue with lifestyle modifications  Hypokalemia:  Potassium has improved and is within normal range  Headache:  Unclear etiology  No red flag signs noted  If headache should persist or worsen, I would like patient to let me know sooner  May be related to sinuses, versus tension versus possible lack of sleep  Fatigue: This may be multifactorial   Patient does have underlying fibromyalgia  I am awaiting results of acute EBV panel as well  Have encouraged patient to schedule follow-up appointment with sleep medicine  She will schedule appoint with Conchis Dia  Have also encouraged patient to take vitamin-D 2000 international units in double this in the winter months  There are no Patient Instructions on file for this visit          Chief Complaint     Chief Complaint   Patient presents with    Follow-up     Blood pressure       History of Present Illness HPI   79-year-old female here for follow-up of blood pressure as well as reviewed recent blood work results  Patient's hydrochlorothiazide was discontinued at last office visit due to low potassium and patient was also experiencing questionable muscle cramps or spasms  Patient was started on amlodipine 2 5 mg daily  Patient has been tolerating amlodipine however states has had nausea and headaches since starting it  She does not know however if it is due to the medicine or stress as her sister-in-law passed away and the service is this week  Patient states she does have headache that is behind her eyes and over her head  She does continue to feel tired sometimes aching and foggy  occasionaly takes excedrin migraine, last taken a few weeks ago, before she started amldipine   has trouble staying asleep   sleep study in feb, she will schedule appointment with Dr Rouse      Review of Systems   Review of Systems   Constitutional: Negative for appetite change and unexpected weight change  Eyes: Negative for visual disturbance  Respiratory: Negative for shortness of breath  Cardiovascular: Negative  Gastrointestinal: Negative for nausea  Neurological: Positive for headaches  Negative for numbness  Psychiatric/Behavioral: Positive for sleep disturbance  Active Problem List     Patient Active Problem List   Diagnosis    Left ear pain    Anemia    Asthma    Chronic fatigue syndrome    Hypertension    Fibromyalgia    JA (generalized anxiety disorder)    Gluten intolerance    Hypovitaminosis D    Impaired fasting glucose    Liver lesion    Lipodystrophy    Major depressive disorder, recurrent episode, in full remission (HCC)    Migraine    Myalgia and myositis    Other insomnia    Rosacea    Obstructive sleep apnea    Routine health maintenance    Headache upon awakening    Hypersomnia    Obesity (BMI 30-39  9)    Snoring    Anxiety and depression    Neck pain on right side    NOEMÍ (obstructive sleep apnea)    Herniated lumbar intervertebral disc    Acute bilateral low back pain without sciatica    DDD (degenerative disc disease), lumbosacral    Sleep disturbance       Past Medical History:  Past Medical History:   Diagnosis Date    Anemia     Anxiety and depression     Asthma 10/26/2012    Chronic fatigue syndrome     DDD (degenerative disc disease), lumbosacral 12/27/2018    Patrick-Barr virus seropositivity     Fibromyalgia, primary     JA (generalized anxiety disorder)     Gluten intolerance     Hypertension     Hypovitaminosis D     Impaired fasting glucose     Lipodystrophy     Liver lesion     Migraine     Obesity (BMI 30-39 9) 6/6/2018    Sleep apnea        Past Surgical History:  Past Surgical History:   Procedure Laterality Date    APPENDECTOMY      BREAST BIOPSY      CHOLECYSTECTOMY      COLONOSCOPY      HI COLONOSCOPY FLX DX W/COLLJ SPEC WHEN PFRMD N/A 12/4/2017    Procedure: COLONOSCOPY;  Surgeon: Jona Rowell DO;  [facility];   Service: Gastroenterology    UPPER GASTROINTESTINAL ENDOSCOPY         Family History:  Family History   Problem Relation Age of Onset   Julieann Frankel Depression Mother     Hypertension Mother         essential     Mitral valve prolapse Mother     Diabetes Brother     Heart attack Brother     Heart disease Maternal Grandmother         cardiac disorder    Hyperlipidemia Maternal Grandmother     Stroke Paternal Grandmother     Stroke Paternal Grandfather     Breast cancer Maternal Aunt        Social History:  Social History     Socioeconomic History    Marital status: Single     Spouse name: Not on file    Number of children: 0    Years of education: bachelor's degree    Highest education level: Not on file   Occupational History    Occupation: works for IT Department     Employer: [employer]   Social Needs    Financial resource strain: Not on file    Food insecurity:     Worry: Not on file     Inability: Not on file    Transportation needs:     Medical: Not on file     Non-medical: Not on file   Tobacco Use    Smoking status: Never Smoker    Smokeless tobacco: Never Used   Substance and Sexual Activity    Alcohol use: No    Drug use: No    Sexual activity: Never   Lifestyle    Physical activity:     Days per week: Not on file     Minutes per session: Not on file    Stress: Not on file   Relationships    Social connections:     Talks on phone: Not on file     Gets together: Not on file     Attends Taoism service: Not on file     Active member of club or organization: Not on file     Attends meetings of clubs or organizations: Not on file     Relationship status: Not on file    Intimate partner violence:     Fear of current or ex partner: Not on file     Emotionally abused: Not on file     Physically abused: Not on file     Forced sexual activity: Not on file   Other Topics Concern    Not on file   Social History Narrative    Education: bachelor's degree in sociology    Learning Disabilities: none    Marital History: single    Children: none    Living Arrangement: lives alone    Occupational History: works for [employer] at [employer]: [employer]    Legal History: none     History: None    Caffeine use denied     I have reviewed the patient's medical history in detail; there are no changes to the history as noted in the electronic medical record      Objective     Vitals:    10/01/19 1726   BP: 126/80   Pulse:    Resp:    Temp:    SpO2:      Wt Readings from Last 3 Encounters:   10/01/19 75 5 kg (166 lb 6 4 oz)   09/20/19 75 1 kg (165 lb 9 6 oz)   04/24/19 75 6 kg (166 lb 9 6 oz)     Vitals:    10/01/19 1701 10/01/19 1726   BP: 130/88 126/80   BP Location: Left arm    Patient Position: Sitting    Cuff Size: Adult    Pulse: 67    Resp: 18    Temp: 98 6 °F (37 °C)    TempSrc: Tympanic    SpO2: 100%    Weight: 75 5 kg (166 lb 6 4 oz)    Height: 5' 2" (1 575 m)          Physical Exam   Constitutional: She appears well-developed and well-nourished  HENT:   Head: Normocephalic and atraumatic  Mouth/Throat: Oropharynx is clear and moist    Neck: Normal range of motion  Neck supple  Cardiovascular: Normal rate, regular rhythm and normal heart sounds  Pulmonary/Chest: Effort normal and breath sounds normal    Musculoskeletal: Normal range of motion  She exhibits no edema  Lymphadenopathy:     She has no cervical adenopathy  Neurological: She is alert  Psychiatric: She has a normal mood and affect  Nursing note and vitals reviewed  Pertinent Laboratory/Diagnostic Studies:  Lab Results   Component Value Date    BUN 13 09/18/2019    CREATININE 0 77 09/18/2019    CALCIUM 9 5 09/18/2019     11/16/2016    K 3 5 09/30/2019    CO2 28 09/18/2019     09/18/2019     Lab Results   Component Value Date    ALT 35 09/10/2019    AST 24 09/10/2019    ALKPHOS 85 09/10/2019    BILITOT 0 4 11/16/2016       Lab Results   Component Value Date    WBC 6 26 09/10/2019    HGB 13 3 09/10/2019    HCT 40 2 09/10/2019    MCV 91 09/10/2019     09/10/2019       No results found for: TSH    Lab Results   Component Value Date    CHOL 195 05/09/2016     Lab Results   Component Value Date    TRIG 172 (H) 09/10/2019     Lab Results   Component Value Date    HDL 54 09/10/2019     Lab Results   Component Value Date    LDLCALC 100 09/10/2019     Lab Results   Component Value Date    HGBA1C 5 6 09/10/2019       Results for orders placed or performed in visit on 09/30/19   Potassium   Result Value Ref Range    Potassium 3 5 3 5 - 5 3 mmol/L   EBV acute panel   Result Value Ref Range    EBV Early Antigen Ab, IgG 46 3 (H) 0 0 - 8 9 U/mL    EBV VCA IgG >600 0 (H) 0 0 - 17 9 U/mL    EBV VCA IgM <36 0 0 0 - 35 9 U/mL    EBV Nuclear Ag Ab 20 1 (H) 0 0 - 17 9 U/mL    EBV Interp   Comment    Magnesium   Result Value Ref Range    Magnesium 2 2 1 6 - 2 6 mg/dL   Iron Saturation %   Result Value Ref Range    Iron Saturation 29 %    TIBC 323 250 - 450 ug/dL    Iron 93 50 - 170 ug/dL   Ferritin   Result Value Ref Range    Ferritin 63 8 - 388 ng/mL       No orders of the defined types were placed in this encounter  ALLERGIES:  Allergies   Allergen Reactions    Escitalopram Swelling and Edema     Category: Allergy;     Fluticasone-Salmeterol      UNKNOWN    Nsaids      Action Taken: stomach issues;     Latex      irritation      Other Rash     Adhesive bandages       Current Medications     Current Outpatient Medications   Medication Sig Dispense Refill    amLODIPine (NORVASC) 2 5 mg tablet Take 1 tablet (2 5 mg total) by mouth daily 30 tablet 5    Cholecalciferol (VITAMIN D3) 63433 units TABS Take 10,000 Units by mouth daily      Cyanocobalamin (VITAMIN B-12) 500 MCG LOZG Take 500 mcg by mouth every other day       D-Ribose POWD by Does not apply route      DULoxetine (CYMBALTA) 30 mg delayed release capsule Take 1 capsule (30 mg total) by mouth daily 90 capsule 3    LORazepam (ATIVAN) 0 5 mg tablet Take 0 5 mg by mouth as needed        Magnesium Gluconate 250 MG TABS Take 1 tablet by mouth daily      zolpidem (AMBIEN CR) 12 5 MG CR tablet Take 1 tablet (12 5 mg total) by mouth daily at bedtime as needed for sleep 90 tablet 0     No current facility-administered medications for this visit            Health Maintenance     Health Maintenance   Topic Date Due    BMI: Followup Plan  07/21/1980    DTaP,Tdap,and Td Vaccines (1 - Tdap) 07/21/1983    PT PLAN OF CARE  03/21/2019    PAP SMEAR  05/12/2019    INFLUENZA VACCINE  10/01/2020 (Originally 7/1/2019)    MAMMOGRAM  09/19/2020    BMI: Adult  10/01/2020    CRC Screening: Colonoscopy  12/04/2020    Pneumococcal Vaccine: 65+ Years (1 of 2 - PCV13) 07/21/2027    Hepatitis C Screening  Completed    Pneumococcal Vaccine: Pediatrics (0 to 5 Years) and At-Risk Patients (6 to 59 Years)  Aged [place]  HEPATITIS B VACCINES  Aged Out     Immunization History   Administered Date(s) Administered    Influenza TIV (IM) 02/15/2017       Mohamud Agosto MD

## 2019-10-02 ENCOUNTER — ANESTHESIA EVENT (OUTPATIENT)
Dept: GASTROENTEROLOGY | Facility: HOSPITAL | Age: 59
End: 2019-10-02

## 2019-10-02 LAB
EBV EA IGG SER-ACNC: 46.3 U/ML (ref 0–8.9)
EBV NA IGG SER IA-ACNC: 20.1 U/ML (ref 0–17.9)
EBV PATRN SPEC IB-IMP: ABNORMAL
EBV VCA IGG SER IA-ACNC: >600 U/ML (ref 0–17.9)
EBV VCA IGM SER IA-ACNC: <36 U/ML (ref 0–35.9)

## 2019-10-02 NOTE — ANESTHESIA PREPROCEDURE EVALUATION
Review of Systems/Medical History  Patient summary reviewed  Chart reviewed  No history of anesthetic complications (REQUIRED NEBULIZER AFTER PREVIOUS ENDOSCOPY)     Cardiovascular  Exercise tolerance (METS): good,  Hypertension , No CAD ,    Pulmonary  No asthma (REMOTE H/O ASTHMA) , No recent URI , Sleep apnea CPAP,        GI/Hepatic    GERD ,             Endo/Other    Obesity    GYN       Hematology  Anemia ,     Musculoskeletal    Arthritis     Neurology    Neuromuscular disease (CHRONIC FATIGUE SYNDROME) , No CVA , Headaches, Fibromyalgia   Psychology   Depression ,              Physical Exam    Airway    Mallampati score: II  TM Distance: >3 FB  Neck ROM: full     Dental       Cardiovascular  Rhythm: regular, Rate: normal, Cardiovascular exam normal    Pulmonary  Pulmonary exam normal Breath sounds clear to auscultation,     Other Findings  RIGHT UPPER CROWNS      Anesthesia Plan  ASA Score- 2     Anesthesia Type- IV sedation with anesthesia with ASA Monitors  Additional Monitors:   Airway Plan:         Plan Factors-    Induction- intravenous  Postoperative Plan-     Informed Consent- Anesthetic plan and risks discussed with patient  I personally reviewed this patient with the CRNA  Discussed and agreed on the Anesthesia Plan with the CRNA  Jacob Ackerman

## 2019-10-03 ENCOUNTER — TELEPHONE (OUTPATIENT)
Dept: FAMILY MEDICINE CLINIC | Facility: CLINIC | Age: 59
End: 2019-10-03

## 2019-10-03 ENCOUNTER — ANESTHESIA (OUTPATIENT)
Dept: GASTROENTEROLOGY | Facility: HOSPITAL | Age: 59
End: 2019-10-03

## 2019-10-03 ENCOUNTER — HOSPITAL ENCOUNTER (OUTPATIENT)
Dept: GASTROENTEROLOGY | Facility: HOSPITAL | Age: 59
Setting detail: OUTPATIENT SURGERY
Discharge: HOME | End: 2019-10-03
Attending: INTERNAL MEDICINE | Admitting: INTERNAL MEDICINE
Payer: COMMERCIAL

## 2019-10-03 VITALS
DIASTOLIC BLOOD PRESSURE: 63 MMHG | TEMPERATURE: 97.9 F | WEIGHT: 166 LBS | HEART RATE: 77 BPM | SYSTOLIC BLOOD PRESSURE: 100 MMHG | RESPIRATION RATE: 16 BRPM | OXYGEN SATURATION: 96 % | HEIGHT: 62 IN | BODY MASS INDEX: 30.55 KG/M2

## 2019-10-03 DIAGNOSIS — I10 HYPERTENSION, UNSPECIFIED TYPE: Primary | ICD-10-CM

## 2019-10-03 DIAGNOSIS — K21.9 GASTROESOPHAGEAL REFLUX DISEASE, ESOPHAGITIS PRESENCE NOT SPECIFIED: ICD-10-CM

## 2019-10-03 PROCEDURE — 43235 EGD DIAGNOSTIC BRUSH WASH: CPT | Performed by: INTERNAL MEDICINE

## 2019-10-03 RX ORDER — LIDOCAINE HYDROCHLORIDE 10 MG/ML
INJECTION, SOLUTION INFILTRATION; PERINEURAL AS NEEDED
Status: DISCONTINUED | OUTPATIENT
Start: 2019-10-03 | End: 2019-10-03 | Stop reason: SURG

## 2019-10-03 RX ORDER — LOSARTAN POTASSIUM 25 MG/1
25 TABLET ORAL DAILY
Qty: 30 TABLET | Refills: 5 | Status: SHIPPED | OUTPATIENT
Start: 2019-10-03 | End: 2020-01-15

## 2019-10-03 RX ORDER — PROPOFOL 10 MG/ML
INJECTION, EMULSION INTRAVENOUS AS NEEDED
Status: DISCONTINUED | OUTPATIENT
Start: 2019-10-03 | End: 2019-10-03 | Stop reason: SURG

## 2019-10-03 RX ORDER — SODIUM CHLORIDE, SODIUM LACTATE, POTASSIUM CHLORIDE, CALCIUM CHLORIDE 600; 310; 30; 20 MG/100ML; MG/100ML; MG/100ML; MG/100ML
125 INJECTION, SOLUTION INTRAVENOUS CONTINUOUS
Status: DISCONTINUED | OUTPATIENT
Start: 2019-10-03 | End: 2019-10-07 | Stop reason: HOSPADM

## 2019-10-03 RX ORDER — SODIUM CHLORIDE 9 MG/ML
50 INJECTION, SOLUTION INTRAVENOUS CONTINUOUS
Status: DISCONTINUED | OUTPATIENT
Start: 2019-10-03 | End: 2019-10-07 | Stop reason: HOSPADM

## 2019-10-03 RX ADMIN — PROPOFOL 150 MG: 10 INJECTION, EMULSION INTRAVENOUS at 08:14

## 2019-10-03 RX ADMIN — PROPOFOL 50 MG: 10 INJECTION, EMULSION INTRAVENOUS at 08:23

## 2019-10-03 RX ADMIN — PROPOFOL 40 MG: 10 INJECTION, EMULSION INTRAVENOUS at 08:20

## 2019-10-03 RX ADMIN — LIDOCAINE HYDROCHLORIDE 50 MG: 10 INJECTION, SOLUTION INFILTRATION; PERINEURAL at 08:14

## 2019-10-03 RX ADMIN — SODIUM CHLORIDE 50 ML/HR: 0.9 INJECTION, SOLUTION INTRAVENOUS at 07:41

## 2019-10-03 RX ADMIN — PROPOFOL 50 MG: 10 INJECTION, EMULSION INTRAVENOUS at 08:16

## 2019-10-03 RX ADMIN — PROPOFOL 50 MG: 10 INJECTION, EMULSION INTRAVENOUS at 08:21

## 2019-10-03 NOTE — ANESTHESIA POSTPROCEDURE EVALUATION
Post-Op Assessment Note    CV Status:  Stable  Pain Score: 0    Pain management: adequate     Mental Status:  Awake   Hydration Status:  Euvolemic   PONV Controlled:  Controlled   Airway Patency:  Patent   Post Op Vitals Reviewed: Yes      Staff: CRNA           BP 99/59 (10/03/19 0829)    Temp      Pulse 56 (10/03/19 0829)   Resp 16 (10/03/19 0829)    SpO2 95 % (10/03/19 0829)

## 2019-10-03 NOTE — TELEPHONE ENCOUNTER
I will call in a medication called losartan 25 mg daily  I would like to see her back in 2 weeks for BP check or sooner if needed

## 2019-10-03 NOTE — RESULT ENCOUNTER NOTE
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

## 2019-10-03 NOTE — TELEPHONE ENCOUNTER
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

## 2019-10-03 NOTE — H&P
History and Physical - [facility]  Cheikh Palm 64 y o  female MRN: [id number]                  HPI: Cheikh Palm is a 64y o  year old female With history of chronic GERD here for an EGD for Veloz's screening  REVIEW OF SYSTEMS: Per the HPI, and otherwise unremarkable  Historical Information   Past Medical History:   Diagnosis Date    Anemia     Anxiety and depression     Asthma 10/26/2012    Chronic fatigue syndrome     CPAP (continuous positive airway pressure) dependence     DDD (degenerative disc disease), lumbosacral 12/27/2018    Patrick-Barr virus seropositivity     Fibromyalgia, primary     JA (generalized anxiety disorder)     Gluten intolerance     Hypertension     Hypovitaminosis D     Impaired fasting glucose     Lipodystrophy     Liver lesion     Migraine     Obesity (BMI 30-39 9) 6/6/2018    Sleep apnea      Past Surgical History:   Procedure Laterality Date    APPENDECTOMY      BREAST BIOPSY      CHOLECYSTECTOMY      COLONOSCOPY      UT COLONOSCOPY FLX DX W/COLLJ SPEC WHEN PFRMD N/A 12/4/2017    Procedure: COLONOSCOPY;  Surgeon: Alisia Boyd DO;  [facility];   Service: Gastroenterology    UPPER GASTROINTESTINAL ENDOSCOPY       Social History   Social History     Substance and Sexual Activity   Alcohol Use Yes    Comment: rarely     Social History     Substance and Sexual Activity   Drug Use No     Social History     Tobacco Use   Smoking Status Never Smoker   Smokeless Tobacco Never Used     Family History   Problem Relation Age of Onset    Depression Mother     Hypertension Mother         essential     Mitral valve prolapse Mother     Diabetes Brother     Heart attack Brother     Heart disease Maternal Grandmother         cardiac disorder    Hyperlipidemia Maternal Grandmother     Stroke Paternal Grandmother     Stroke Paternal Grandfather     Breast cancer Maternal Aunt        Meds/Allergies       (Not in a hospital admission)    Allergies   Allergen Reactions    Escitalopram Swelling and Edema     Category: Allergy;     Fluticasone-Salmeterol      UNKNOWN    Nsaids      Action Taken: stomach issues;     Latex      irritation      Other Rash     Adhesive bandages       Objective     Blood pressure 125/82, pulse 85, temperature 97 9 °F (36 6 °C), temperature source Tympanic, resp  rate 18, height 5' 2" (1 575 m), weight 75 3 kg (166 lb), SpO2 97 %  PHYSICAL EXAM    Gen: NAD  CV: RRR  CHEST: Clear  ABD: soft, NT/ND  EXT: no edema      ASSESSMENT/PLAN:  Phil Suazo is a 64y o  year old female With history of chronic GERD here for an EGD for Veloz's screening  The patient is stable and optimized for the procedure, we reviewed risk and benefits   Risk include but not limited to infection, bleeding, perforation and missing a lesion

## 2019-10-22 ENCOUNTER — OFFICE VISIT (OUTPATIENT)
Dept: FAMILY MEDICINE CLINIC | Facility: CLINIC | Age: 59
End: 2019-10-22
Payer: COMMERCIAL

## 2019-10-22 VITALS
HEIGHT: 62 IN | OXYGEN SATURATION: 98 % | TEMPERATURE: 98 F | BODY MASS INDEX: 34.46 KG/M2 | RESPIRATION RATE: 18 BRPM | DIASTOLIC BLOOD PRESSURE: 80 MMHG | SYSTOLIC BLOOD PRESSURE: 126 MMHG | HEART RATE: 77 BPM | WEIGHT: 187.25 LBS

## 2019-10-22 DIAGNOSIS — I10 HYPERTENSION, UNSPECIFIED TYPE: Primary | ICD-10-CM

## 2019-10-22 PROCEDURE — 3074F SYST BP LT 130 MM HG: CPT | Performed by: FAMILY MEDICINE

## 2019-10-22 PROCEDURE — 3008F BODY MASS INDEX DOCD: CPT | Performed by: FAMILY MEDICINE

## 2019-10-22 PROCEDURE — 99213 OFFICE O/P EST LOW 20 MIN: CPT | Performed by: FAMILY MEDICINE

## 2019-10-22 PROCEDURE — 3079F DIAST BP 80-89 MM HG: CPT | Performed by: FAMILY MEDICINE

## 2019-10-22 NOTE — PROGRESS NOTES
FAMILY PRACTICE OFFICE VISIT       NAME: Nazanin Casas  AGE: 64 y o  SEX: female       : 1960        MRN: [id number]    DATE: 10/27/2019  TIME: 12:41 PM    Assessment and Plan     Problem List Items Addressed This Visit        Cardiovascular and Mediastinum    Hypertension - Primary    Relevant Orders    Basic metabolic panel       Hypertension:  Patient is here for follow-up of blood pressure  She continues take losartan 25 mg daily  BP is overall stable and controlled on the current dose  Continue with lifestyle modifications including low-sodium diet, regular exercise regimen  Will continue to monitor  Patient does continue have fatigue due to reactivation of Ebstein Laury Chappella however this is improving as per patient  There are no Patient Instructions on file for this visit  Chief Complaint     Chief Complaint   Patient presents with    Fatigue    Follow-up     B/P        History of Present Illness     HPI  53-year-old female here for follow-up of blood pressure  She has been taking losartan 25 mg daily, tolerating well  Denies any symptoms  Patient does continues experience fatigue which is getting better  Review of Systems   Review of Systems   Constitutional: Positive for fatigue  Respiratory: Negative for shortness of breath  Cardiovascular: Negative  Neurological: Negative for dizziness, light-headedness and headaches         Active Problem List     Patient Active Problem List   Diagnosis    Left ear pain    Anemia    Asthma    Chronic fatigue syndrome    Hypertension    Fibromyalgia    JA (generalized anxiety disorder)    Gluten intolerance    Hypovitaminosis D    Impaired fasting glucose    Liver lesion    Lipodystrophy    Major depressive disorder, recurrent episode, in full remission (HCC)    Migraine    Myalgia and myositis    Other insomnia    Rosacea    Obstructive sleep apnea    Routine health maintenance    Headache upon awakening    Hypersomnia    Obesity (BMI 30-39  9)    Snoring    Anxiety and depression    Neck pain on right side    NOEMÍ (obstructive sleep apnea)    Herniated lumbar intervertebral disc    Acute bilateral low back pain without sciatica    DDD (degenerative disc disease), lumbosacral    Sleep disturbance       Past Medical History:  Past Medical History:   Diagnosis Date    Anemia     Anxiety and depression     Asthma 10/26/2012    Chronic fatigue syndrome     CPAP (continuous positive airway pressure) dependence     DDD (degenerative disc disease), lumbosacral 12/27/2018    Patrick-Barr virus seropositivity     Fibromyalgia, primary     JA (generalized anxiety disorder)     Gluten intolerance     Hypertension     Hypovitaminosis D     Impaired fasting glucose     Lipodystrophy     Liver lesion     Migraine     Obesity (BMI 30-39 9) 6/6/2018    Sleep apnea        Past Surgical History:  Past Surgical History:   Procedure Laterality Date    APPENDECTOMY      BREAST BIOPSY      CHOLECYSTECTOMY      COLONOSCOPY      OK COLONOSCOPY FLX DX W/COLLJ SPEC WHEN PFRMD N/A 12/4/2017    Procedure: COLONOSCOPY;  Surgeon: Gonzalo Shirley DO;  [facility];   Service: Gastroenterology    UPPER GASTROINTESTINAL ENDOSCOPY         Family History:  Family History   Problem Relation Age of Onset   [de-identified] Depression Mother     Hypertension Mother         essential     Mitral valve prolapse Mother     Diabetes Brother     Heart attack Brother     Heart disease Maternal Grandmother         cardiac disorder    Hyperlipidemia Maternal Grandmother     Stroke Paternal Grandmother     Stroke Paternal Grandfather     Breast cancer Maternal Aunt        Social History:  Social History     Socioeconomic History    Marital status: Single     Spouse name: Not on file    Number of children: 0    Years of education: bachelor's degree    Highest education level: Not on file   Occupational History    Occupation: works for [employer]     Employer: [employer] EMPLOYEES   Social Needs    Financial resource strain: Not on file    Food insecurity:     Worry: Not on file     Inability: Not on file    Transportation needs:     Medical: Not on file     Non-medical: Not on file   Tobacco Use    Smoking status: Never Smoker    Smokeless tobacco: Never Used   Substance and Sexual Activity    Alcohol use: Yes     Comment: rarely    Drug use: No    Sexual activity: Never   Lifestyle    Physical activity:     Days per week: Not on file     Minutes per session: Not on file    Stress: Not on file   Relationships    Social connections:     Talks on phone: Not on file     Gets together: Not on file     Attends [employer] service: Not on file     Active member of club or organization: Not on file     Attends meetings of clubs or organizations: Not on file     Relationship status: Not on file    Intimate partner violence:     Fear of current or ex partner: Not on file     Emotionally abused: Not on file     Physically abused: Not on file     Forced sexual activity: Not on file   Other Topics Concern    Not on file   Social History Narrative    Education: bachelor's degree in sociology    Learning Disabilities: none    Marital History: single    Children: none    Living Arrangement: lives alone    Occupational History: works for [employer]    Legal History: none     History: None    Caffeine use denied     I have reviewed the patient's medical history in detail; there are no changes to the history as noted in the electronic medical record      Objective     Vitals:    10/22/19 1713   BP: 126/80   Pulse: 77   Resp: 18   Temp: 98 °F (36 7 °C)   SpO2: 98%     Wt Readings from Last 3 Encounters:   10/22/19 84 9 kg (187 lb 4 oz)   10/03/19 75 3 kg (166 lb)   10/01/19 75 5 kg (166 lb 6 4 oz)       Physical Exam   Constitutional: She appears well-developed and well-nourished  HENT:   Head: Normocephalic and atraumatic  Mouth/Throat: Oropharynx is clear and moist    Neck: Normal range of motion  Neck supple  Cardiovascular: Normal rate, regular rhythm and normal heart sounds  Pulmonary/Chest: Effort normal and breath sounds normal    Musculoskeletal: Normal range of motion  Lymphadenopathy:     She has no cervical adenopathy  Neurological: She is alert  Nursing note and vitals reviewed  Pertinent Laboratory/Diagnostic Studies:  Lab Results   Component Value Date    BUN 13 09/18/2019    CREATININE 0 77 09/18/2019    CALCIUM 9 5 09/18/2019     11/16/2016    K 3 5 09/30/2019    CO2 28 09/18/2019     09/18/2019     Lab Results   Component Value Date    ALT 35 09/10/2019    AST 24 09/10/2019    ALKPHOS 85 09/10/2019    BILITOT 0 4 11/16/2016       Lab Results   Component Value Date    WBC 6 26 09/10/2019    HGB 13 3 09/10/2019    HCT 40 2 09/10/2019    MCV 91 09/10/2019     09/10/2019       No results found for: TSH    Lab Results   Component Value Date    CHOL 195 05/09/2016     Lab Results   Component Value Date    TRIG 172 (H) 09/10/2019     Lab Results   Component Value Date    HDL 54 09/10/2019     Lab Results   Component Value Date    LDLCALC 100 09/10/2019     Lab Results   Component Value Date    HGBA1C 5 6 09/10/2019       Results for orders placed or performed in visit on 09/30/19   Potassium   Result Value Ref Range    Potassium 3 5 3 5 - 5 3 mmol/L   EBV acute panel   Result Value Ref Range    EBV Early Antigen Ab, IgG 46 3 (H) 0 0 - 8 9 U/mL    EBV VCA IgG >600 0 (H) 0 0 - 17 9 U/mL    EBV VCA IgM <36 0 0 0 - 35 9 U/mL    EBV Nuclear Ag Ab 20 1 (H) 0 0 - 17 9 U/mL    EBV Interp   Comment    Magnesium   Result Value Ref Range    Magnesium 2 2 1 6 - 2 6 mg/dL   Iron Saturation %   Result Value Ref Range    Iron Saturation 29 %    TIBC 323 250 - 450 ug/dL    Iron 93 50 - 170 ug/dL   Ferritin   Result Value Ref Range    Ferritin 63 8 - 388 ng/mL       Orders Placed This Encounter   Procedures    Basic metabolic panel       ALLERGIES:  Allergies   Allergen Reactions    Escitalopram Swelling and Edema     Category: Allergy;     Fluticasone-Salmeterol      UNKNOWN    Nsaids      Action Taken: stomach issues;     Latex      irritation      Other Rash     Adhesive bandages       Current Medications     Current Outpatient Medications   Medication Sig Dispense Refill    Cholecalciferol (VITAMIN D3) 82109 units TABS Take 10,000 Units by mouth daily      Cyanocobalamin (VITAMIN B-12) 500 MCG LOZG Take 500 mcg by mouth every other day       D-Ribose POWD by Does not apply route      DULoxetine (CYMBALTA) 30 mg delayed release capsule Take 1 capsule (30 mg total) by mouth daily 90 capsule 3    LORazepam (ATIVAN) 0 5 mg tablet Take 0 5 mg by mouth as needed        losartan (COZAAR) 25 mg tablet Take 1 tablet (25 mg total) by mouth daily 30 tablet 5    Magnesium Gluconate 250 MG TABS Take 1 tablet by mouth daily      zolpidem (AMBIEN CR) 12 5 MG CR tablet Take 1 tablet (12 5 mg total) by mouth daily at bedtime as needed for sleep 90 tablet 0     No current facility-administered medications for this visit            Health Maintenance     Health Maintenance   Topic Date Due    BMI: Followup Plan  07/21/1980    DTaP,Tdap,and Td Vaccines (1 - Tdap) 07/21/1983    PT PLAN OF CARE  03/21/2019    Cervical Cancer Screening  05/12/2019    INFLUENZA VACCINE  10/01/2020 (Originally 7/1/2019)    MAMMOGRAM  09/19/2020    BMI: Adult  10/22/2020    CRC Screening: Colonoscopy  12/04/2020    Pneumococcal Vaccine: 65+ Years (1 of 2 - PCV13) 07/21/2027    Hepatitis C Screening  Completed    Pneumococcal Vaccine: Pediatrics (0 to 5 Years) and At-Risk Patients (6 to 59 Years)  Aged Out    HEPATITIS B VACCINES  Aged [employer] History   Administered Date(s) Administered    Influenza TIV (IM) 02/15/2017 Kristina Anderson MD

## 2019-11-14 ENCOUNTER — LAB (OUTPATIENT)
Dept: LAB | Facility: HOSPITAL | Age: 59
End: 2019-11-14
Payer: COMMERCIAL

## 2019-11-14 DIAGNOSIS — I10 HYPERTENSION, UNSPECIFIED TYPE: ICD-10-CM

## 2019-11-14 LAB
ANION GAP SERPL CALCULATED.3IONS-SCNC: 7 MMOL/L (ref 4–13)
BUN SERPL-MCNC: 14 MG/DL (ref 5–25)
CALCIUM SERPL-MCNC: 9.3 MG/DL (ref 8.3–10.1)
CHLORIDE SERPL-SCNC: 109 MMOL/L (ref 100–108)
CO2 SERPL-SCNC: 25 MMOL/L (ref 21–32)
CREAT SERPL-MCNC: 0.74 MG/DL (ref 0.6–1.3)
GFR SERPL CREATININE-BSD FRML MDRD: 90 ML/MIN/1.73SQ M
GLUCOSE P FAST SERPL-MCNC: 95 MG/DL (ref 65–99)
POTASSIUM SERPL-SCNC: 3.6 MMOL/L (ref 3.5–5.3)
SODIUM SERPL-SCNC: 141 MMOL/L (ref 136–145)

## 2019-11-14 PROCEDURE — 80048 BASIC METABOLIC PNL TOTAL CA: CPT

## 2019-11-14 PROCEDURE — 36415 COLL VENOUS BLD VENIPUNCTURE: CPT

## 2019-11-15 ENCOUNTER — TELEPHONE (OUTPATIENT)
Dept: FAMILY MEDICINE CLINIC | Facility: CLINIC | Age: 59
End: 2019-11-15

## 2019-11-15 NOTE — TELEPHONE ENCOUNTER
----- Message from Sheron Anand MD sent at 11/15/2019 11:32 AM EST -----  Please let patient know that her blood work including fasting sugar, calcium, electrolytes, kidney function was within normal range    Thank you

## 2019-11-15 NOTE — RESULT ENCOUNTER NOTE
Please let patient know that her blood work including fasting sugar, calcium, electrolytes, kidney function was within normal range    Thank you

## 2020-01-15 ENCOUNTER — OFFICE VISIT (OUTPATIENT)
Dept: FAMILY MEDICINE CLINIC | Facility: CLINIC | Age: 60
End: 2020-01-15
Payer: COMMERCIAL

## 2020-01-15 VITALS
BODY MASS INDEX: 31.33 KG/M2 | WEIGHT: 170.25 LBS | HEART RATE: 79 BPM | SYSTOLIC BLOOD PRESSURE: 120 MMHG | DIASTOLIC BLOOD PRESSURE: 88 MMHG | TEMPERATURE: 98.7 F | HEIGHT: 62 IN | OXYGEN SATURATION: 98 %

## 2020-01-15 DIAGNOSIS — D64.9 ANEMIA, UNSPECIFIED TYPE: ICD-10-CM

## 2020-01-15 DIAGNOSIS — R53.83 FATIGUE, UNSPECIFIED TYPE: ICD-10-CM

## 2020-01-15 DIAGNOSIS — K21.9 GASTROESOPHAGEAL REFLUX DISEASE WITHOUT ESOPHAGITIS: ICD-10-CM

## 2020-01-15 DIAGNOSIS — G47.33 OBSTRUCTIVE SLEEP APNEA: ICD-10-CM

## 2020-01-15 DIAGNOSIS — E78.1 HYPERTRIGLYCERIDEMIA: ICD-10-CM

## 2020-01-15 DIAGNOSIS — F32.A ANXIETY AND DEPRESSION: ICD-10-CM

## 2020-01-15 DIAGNOSIS — E55.9 HYPOVITAMINOSIS D: ICD-10-CM

## 2020-01-15 DIAGNOSIS — F41.9 ANXIETY AND DEPRESSION: ICD-10-CM

## 2020-01-15 DIAGNOSIS — G47.09 OTHER INSOMNIA: ICD-10-CM

## 2020-01-15 DIAGNOSIS — M79.7 FIBROMYALGIA: ICD-10-CM

## 2020-01-15 DIAGNOSIS — I10 HYPERTENSION, UNSPECIFIED TYPE: Primary | ICD-10-CM

## 2020-01-15 DIAGNOSIS — Z00.00 ROUTINE HEALTH MAINTENANCE: ICD-10-CM

## 2020-01-15 PROCEDURE — 99214 OFFICE O/P EST MOD 30 MIN: CPT | Performed by: FAMILY MEDICINE

## 2020-01-15 RX ORDER — LOSARTAN POTASSIUM 50 MG/1
50 TABLET ORAL DAILY
Qty: 90 TABLET | Refills: 3 | Status: SHIPPED | OUTPATIENT
Start: 2020-01-15 | End: 2020-02-12

## 2020-01-15 NOTE — PROGRESS NOTES
FAMILY PRACTICE OFFICE VISIT       NAME: Vandana Sears  AGE: 64 y o  SEX: female       : 1960        MRN: [id number]    DATE: 2020  TIME: 6:49 PM    Assessment and Plan     Problem List Items Addressed This Visit        Respiratory    Obstructive sleep apnea       Cardiovascular and Mediastinum    Hypertension - Primary    Relevant Medications    losartan (COZAAR) 50 mg tablet    Other Relevant Orders    Comprehensive metabolic panel       Other    Other insomnia (Chronic)    Anemia    Fibromyalgia    Hypovitaminosis D    Relevant Orders    Vitamin D 25 hydroxy    Routine health maintenance    Relevant Orders    Lipid Panel with Direct LDL reflex    Hemoglobin A1C    Anxiety and depression      Other Visit Diagnoses     Hypertriglyceridemia        Relevant Orders    Lipid Panel with Direct LDL reflex    Hemoglobin A1C    Gastroesophageal reflux disease without esophagitis        Fatigue, unspecified type        Relevant Orders    CBC and differential    Comprehensive metabolic panel    TSH, 3rd generation with Free T4 reflex         Hypertension:  BP mildly elevated today  Upon recheck, BP did increase  I will increase losartan to 50 mg daily  Have encouraged with maintaining lifestyle modifications, starting routine exercise regimen  Patient has changed jobs which is more sedentary  She was previously in a job where she maintained adequate amount of steps daily  Fibromyalgia:    Appears to be stable however symptoms do vary from time to time  Patient does continue with duloxetine 30 mg daily  Sleep apnea:   Using CPAP  Depression and anxiety: This is overall stable  Patient is on Cymbalta 30 mg daily  Takes lorazepam as needed  Hypertriglyceridemia:  Will recheck lipid panel with next blood work  Continue with lifestyle modifications  Anemia:  Last iron panel in September stable  GERD:  She does take omeprazole as needed         Insomnia:  Has been using melatonin nightly   Only uses ambien as needed    Hypovitaminosis D:  Will check vitamin-D level  Routine health maintenance:  will schedule mammogram  Will also schedule appointment with gyn  Will be due for colonoscopy in December of this year  There are no Patient Instructions on file for this visit  Chief Complaint     Chief Complaint   Patient presents with    Follow-up     3 month        History of Present Illness     HPI   17-year-old female here for routine follow-up of chronic conditions  Patient states she has been doing well overall  Has been using melatonin nightly   Only uses ambien as needed  will schedule mammogram  Will also schedule appointment with gyn  Will be due for colonoscopy in December of this year  Review of Systems   Review of Systems   Constitutional: Negative for appetite change  Eyes: Negative for visual disturbance  Respiratory: Negative for shortness of breath  Gastrointestinal: Negative for abdominal pain and constipation  Genitourinary: Negative for dysuria  Neurological: Negative for dizziness and light-headedness  Psychiatric/Behavioral: Negative for dysphoric mood  Active Problem List     Patient Active Problem List   Diagnosis    Left ear pain    Anemia    Asthma    Chronic fatigue syndrome    Hypertension    Fibromyalgia    JA (generalized anxiety disorder)    Gluten intolerance    Hypovitaminosis D    Impaired fasting glucose    Liver lesion    Lipodystrophy    Major depressive disorder, recurrent episode, in full remission (HCC)    Migraine    Myalgia and myositis    Other insomnia    Rosacea    Obstructive sleep apnea    Routine health maintenance    Headache upon awakening    Hypersomnia    Obesity (BMI 30-39  9)    Snoring    Anxiety and depression    Neck pain on right side    NOEMÍ (obstructive sleep apnea)    Herniated lumbar intervertebral disc    Acute bilateral low back pain without sciatica    DDD (degenerative disc disease), lumbosacral    Sleep disturbance       Past Medical History:  Past Medical History:   Diagnosis Date    Anemia     Anxiety and depression     Asthma 10/26/2012    Chronic fatigue syndrome     CPAP (continuous positive airway pressure) dependence     DDD (degenerative disc disease), lumbosacral 12/27/2018    Patrick-Barr virus seropositivity     Fibromyalgia, primary     JA (generalized anxiety disorder)     Gluten intolerance     Hypertension     Hypovitaminosis D     Impaired fasting glucose     Lipodystrophy     Liver lesion     Migraine     Obesity (BMI 30-39 9) 6/6/2018    Sleep apnea        Past Surgical History:  Past Surgical History:   Procedure Laterality Date    APPENDECTOMY      BREAST BIOPSY      CHOLECYSTECTOMY      COLONOSCOPY      AR COLONOSCOPY FLX DX W/COLLJ SPEC WHEN PFRMD N/A 12/4/2017    Procedure: COLONOSCOPY;  Surgeon: Sandra Wood DO;  [facility];   Service: Gastroenterology    UPPER GASTROINTESTINAL ENDOSCOPY         Family History:  Family History   Problem Relation Age of Onset   Margaret Boucher Depression Mother     Hypertension Mother         essential     Mitral valve prolapse Mother     Diabetes Brother     Heart attack Brother     Heart disease Maternal Grandmother         cardiac disorder    Hyperlipidemia Maternal Grandmother     Stroke Paternal Grandmother     Stroke Paternal Grandfather     Breast cancer Maternal Aunt        Social History:  Social History     Socioeconomic History    Marital status: Single     Spouse name: Not on file    Number of children: 0    Years of education: bachelor's degree    Highest education level: Not on file   Occupational History    Occupation: works for [employer]     Employer: [employer]   Social Needs    Financial resource strain: Not on file    Food insecurity:     Worry: Not on file     Inability: Not on file    Transportation needs:     Medical: Not on file     Non-medical: Not on file   Tobacco Use    Smoking status: Never Smoker    Smokeless tobacco: Never Used   Substance and Sexual Activity    Alcohol use: Yes     Comment: rarely    Drug use: No    Sexual activity: Never   Lifestyle    Physical activity:     Days per week: Not on file     Minutes per session: Not on file    Stress: Not on file   Relationships    Social connections:     Talks on phone: Not on file     Gets together: Not on file     Attends Pentecostal service: Not on file     Active member of club or organization: Not on file     Attends meetings of clubs or organizations: Not on file     Relationship status: Not on file    Intimate partner violence:     Fear of current or ex partner: Not on file     Emotionally abused: Not on file     Physically abused: Not on file     Forced sexual activity: Not on file   Other Topics Concern    Not on file   Social History Narrative    Education: bachelor's degree in sociology    Learning Disabilities: none    Marital History: single    Children: none    Living Arrangement: lives alone    Occupational History: works for [employer] at [employer]: limited support system    Legal History: none     History: None    Caffeine use denied     I have reviewed the patient's medical history in detail; there are no changes to the history as noted in the electronic medical record  Objective     Vitals:    01/15/20 1724   BP: 120/88   Pulse: 79   Temp: 98 7 °F (37 1 °C)   SpO2: 98%     Wt Readings from Last 3 Encounters:   01/15/20 77 2 kg (170 lb 4 oz)   10/22/19 84 9 kg (187 lb 4 oz)   10/03/19 75 3 kg (166 lb)       Physical Exam   Constitutional: She appears well-developed and well-nourished  HENT:   Head: Normocephalic and atraumatic  Mouth/Throat: Oropharynx is clear and moist    TMs intact and clear   Eyes: Pupils are equal, round, and reactive to light   Conjunctivae and EOM are normal  Neck: Normal range of motion  Neck supple  No thyromegaly present  Cardiovascular: Normal rate, regular rhythm and normal heart sounds  Pulmonary/Chest: Effort normal and breath sounds normal    Abdominal: Soft  Bowel sounds are normal  She exhibits no distension  There is no tenderness  Musculoskeletal: Normal range of motion  She exhibits no edema  Lymphadenopathy:     She has no cervical adenopathy  Neurological: She is alert  Psychiatric: She has a normal mood and affect  Nursing note and vitals reviewed        Pertinent Laboratory/Diagnostic Studies:  Lab Results   Component Value Date    BUN 14 11/14/2019    CREATININE 0 74 11/14/2019    CALCIUM 9 3 11/14/2019     11/16/2016    K 3 6 11/14/2019    CO2 25 11/14/2019     (H) 11/14/2019     Lab Results   Component Value Date    ALT 35 09/10/2019    AST 24 09/10/2019    ALKPHOS 85 09/10/2019    BILITOT 0 4 11/16/2016       Lab Results   Component Value Date    WBC 6 26 09/10/2019    HGB 13 3 09/10/2019    HCT 40 2 09/10/2019    MCV 91 09/10/2019     09/10/2019       No results found for: TSH    Lab Results   Component Value Date    CHOL 195 05/09/2016     Lab Results   Component Value Date    TRIG 172 (H) 09/10/2019     Lab Results   Component Value Date    HDL 54 09/10/2019     Lab Results   Component Value Date    LDLCALC 100 09/10/2019     Lab Results   Component Value Date    HGBA1C 5 6 09/10/2019       Results for orders placed or performed in visit on 26/04/23   Basic metabolic panel   Result Value Ref Range    Sodium 141 136 - 145 mmol/L    Potassium 3 6 3 5 - 5 3 mmol/L    Chloride 109 (H) 100 - 108 mmol/L    CO2 25 21 - 32 mmol/L    ANION GAP 7 4 - 13 mmol/L    BUN 14 5 - 25 mg/dL    Creatinine 0 74 0 60 - 1 30 mg/dL    Glucose, Fasting 95 65 - 99 mg/dL    Calcium 9 3 8 3 - 10 1 mg/dL    eGFR 90 ml/min/1 73sq m       Orders Placed This Encounter   Procedures    CBC and differential    Comprehensive metabolic panel    Lipid Panel with Direct LDL reflex    TSH, 3rd generation with Free T4 reflex    Vitamin D 25 hydroxy    Hemoglobin A1C       ALLERGIES:  Allergies   Allergen Reactions    Escitalopram Swelling and Edema     Category: Allergy;     Fluticasone-Salmeterol      UNKNOWN    Nsaids      Action Taken: stomach issues;     Latex      irritation      Other Rash     Adhesive bandages       Current Medications     Current Outpatient Medications   Medication Sig Dispense Refill    Cholecalciferol (VITAMIN D3) 89005 units TABS Take 10,000 Units by mouth daily      Cyanocobalamin (VITAMIN B-12) 500 MCG LOZG Take 500 mcg by mouth every other day       D-Ribose POWD by Does not apply route      DULoxetine (CYMBALTA) 30 mg delayed release capsule Take 1 capsule (30 mg total) by mouth daily 90 capsule 3    LORazepam (ATIVAN) 0 5 mg tablet Take 0 5 mg by mouth as needed        Magnesium Gluconate 250 MG TABS Take 1 tablet by mouth daily      zolpidem (AMBIEN CR) 12 5 MG CR tablet Take 1 tablet (12 5 mg total) by mouth daily at bedtime as needed for sleep 90 tablet 0    losartan (COZAAR) 50 mg tablet Take 1 tablet (50 mg total) by mouth daily 90 tablet 3     No current facility-administered medications for this visit            Health Maintenance     Health Maintenance   Topic Date Due    Pneumococcal Vaccine: Pediatrics (0 to 5 Years) and At-Risk Patients (6 to 59 Years) (1 of 1 - PPSV23) 07/21/1968    HIV Screening  07/21/1977    BMI: Followup Plan  07/21/1980    Annual Physical  07/21/1980    PT PLAN OF CARE  03/21/2019    Cervical Cancer Screening  05/12/2019    Influenza Vaccine  10/01/2020 (Originally 7/1/2019)    MAMMOGRAM  09/19/2020    CRC Screening: Colonoscopy  12/04/2020    BMI: Adult  01/15/2021    DTaP,Tdap,and Td Vaccines (2 - Td) 02/20/2027    Pneumococcal Vaccine: 65+ Years (1 of 2 - PCV13) 07/21/2027    Hepatitis C Screening  Completed    HIB Vaccine  Aged Out    Hepatitis B Vaccine  Aged Out    IPV Vaccine  Aged Out    Hepatitis A Vaccine  Aged Out    Meningococcal ACWY Vaccine  Aged Out    HPV Vaccine  Aged Out     Immunization History   Administered Date(s) Administered    Influenza TIV (IM) 02/15/2017    Tdap 02/20/2017       Dimitri Christian MD

## 2020-01-29 ENCOUNTER — OFFICE VISIT (OUTPATIENT)
Dept: FAMILY MEDICINE CLINIC | Facility: CLINIC | Age: 60
End: 2020-01-29
Payer: COMMERCIAL

## 2020-01-29 VITALS
OXYGEN SATURATION: 98 % | WEIGHT: 174.5 LBS | BODY MASS INDEX: 32.11 KG/M2 | HEIGHT: 62 IN | TEMPERATURE: 98.2 F | SYSTOLIC BLOOD PRESSURE: 124 MMHG | HEART RATE: 67 BPM | DIASTOLIC BLOOD PRESSURE: 88 MMHG

## 2020-01-29 DIAGNOSIS — I10 HYPERTENSION, UNSPECIFIED TYPE: Primary | ICD-10-CM

## 2020-01-29 PROCEDURE — 99213 OFFICE O/P EST LOW 20 MIN: CPT | Performed by: FAMILY MEDICINE

## 2020-01-29 NOTE — PROGRESS NOTES
FAMILY PRACTICE OFFICE VISIT       NAME: Karin Angel  AGE: 64 y o  SEX: female       : 1960        MRN: [id number]    DATE: 2020  TIME: 6:15 PM    Assessment and Plan     Problem List Items Addressed This Visit        Cardiovascular and Mediastinum    Hypertension - Primary        17-year-old female here for BP check  Patient's losartan was increased to 50 mg from 25 mg at last office visit  Since increasing it to the higher dose, patient has been experiencing nausea, queasy feeling, lightheadedness, fatigue throughout the day that may start a couple of hours after she takes losartan  She admits to having more stress at work recently as her position at work has changed  Her new position also is less active however has been trying to walk more  I will go ahead and have patient take losartan 25 mg twice daily instead of 50 mg at 1 time in the morning  She will also monitor her BP at home, work on stress reduction, walk more, limit sodium  Patient was previously on hydrochlorothiazide, this was discontinued on  due to hypokalemia  Patient also tried amlodipine 2 5 mg in the past as well which has caused nausea and headache  She does have upper back/neck pain/headache that she feels is related to tension  She does do exercises that she has learned from physical therapy which helps  Recommend heat  May benefit from further physical therapy, headache program as well as well as craniosacral therapy  If symptoms should worsen, I would like her to let me know  Follow-up in 2 weeks for BP check or sooner if needed  There are no Patient Instructions on file for this visit  Chief Complaint     Chief Complaint   Patient presents with    Follow-up     2-3 week b/p check     Fatigue       History of Present Illness     HPI   17-year-old female here for BP check  Patient's losartan was increased to 50 mg from 25 mg at last office visit    Since increasing it to the higher dose, patient has been experiencing nausea, queasy feeling, lightheadedness, fatigue throughout the day that may start a couple of hours after she takes losartan  She admits to having more stress at work recently as her position at work has changed  Her new position also is less active however has been trying to walk more  Review of Systems   Review of Systems   Constitutional:        Trying to walk more   Respiratory: Negative for shortness of breath  Cardiovascular: Negative  Neurological:        Occasional headaches, states they are in her neck/upper back   Psychiatric/Behavioral:        Stressors with work       Active Problem List     Patient Active Problem List   Diagnosis    Left ear pain    Anemia    Asthma    Chronic fatigue syndrome    Hypertension    Fibromyalgia    JA (generalized anxiety disorder)    Gluten intolerance    Hypovitaminosis D    Impaired fasting glucose    Liver lesion    Lipodystrophy    Major depressive disorder, recurrent episode, in full remission (HCC)    Migraine    Myalgia and myositis    Other insomnia    Rosacea    Obstructive sleep apnea    Routine health maintenance    Headache upon awakening    Hypersomnia    Obesity (BMI 30-39  9)    Snoring    Anxiety and depression    Neck pain on right side    NOEMÍ (obstructive sleep apnea)    Herniated lumbar intervertebral disc    Acute bilateral low back pain without sciatica    DDD (degenerative disc disease), lumbosacral    Sleep disturbance       Past Medical History:  Past Medical History:   Diagnosis Date    Anemia     Anxiety and depression     Asthma 10/26/2012    Chronic fatigue syndrome     CPAP (continuous positive airway pressure) dependence     DDD (degenerative disc disease), lumbosacral 12/27/2018    Patrick-Barr virus seropositivity     Fibromyalgia, primary     JA (generalized anxiety disorder)     Gluten intolerance     Hypertension     Hypovitaminosis D     Impaired fasting glucose     Lipodystrophy     Liver lesion     Migraine     Obesity (BMI 30-39 9) 6/6/2018    Sleep apnea        Past Surgical History:  Past Surgical History:   Procedure Laterality Date    APPENDECTOMY      BREAST BIOPSY      CHOLECYSTECTOMY      COLONOSCOPY      [place] COLONOSCOPY FLX DX W/COLLJ SPEC WHEN PFRMD N/A 12/4/2017    Procedure: COLONOSCOPY;  Surgeon: Letha Jackson DO;  [facility];   Service: Gastroenterology    UPPER GASTROINTESTINAL ENDOSCOPY         Family History:  Family History   Problem Relation Age of Onset   Par[place] Depression Mother     Hypertension Mother         essential     Mitral valve prolapse Mother     Diabetes Brother     Heart attack Brother     Heart disease Maternal Grandmother         cardiac disorder    Hyperlipidemia Maternal Grandmother     Stroke Paternal Grandmother     Stroke Paternal Grandfather     Breast cancer Maternal Aunt        Social History:  Social History     Socioeconomic History    Marital status: Single     Spouse name: Not on file    Number of children: 0    Years of education: bachelor's degree    Highest education level: Not on file   Occupational History    Occupation: works for [employer]     Employer: [employer]   Social Needs    Financial resource strain: Not on file    Food insecurity:     Worry: Not on file     Inability: Not on file    Transportation needs:     Medical: Not on file     Non-medical: Not on file   Tobacco Use    Smoking status: Never Smoker    Smokeless tobacco: Never Used   Substance and Sexual Activity    Alcohol use: Yes     Comment: rarely    Drug use: No    Sexual activity: Never   Lifestyle    Physical activity:     Days per week: Not on file     Minutes per session: Not on file    Stress: Not on file   Relationships    Social connections:     Talks on phone: Not on file     Gets together: Not on file     Attends Yarsani service: Not on file     Active member of club or organization: Not on file     Attends meetings of clubs or organizations: Not on file     Relationship status: Not on file    Intimate partner violence:     Fear of current or ex partner: Not on file     Emotionally abused: Not on file     Physically abused: Not on file     Forced sexual activity: Not on file   Other Topics Concern    Not on file   Social History Narrative    Education: bachelor's degree in sociology    Learning Disabilities: none    Marital History: single    Children: none    Living Arrangement: lives alone    Occupational History: works for [employer]: limited support system    Legal History: none     History: None    Caffeine use denied     I have reviewed the patient's medical history in detail; there are no changes to the history as noted in the electronic medical record  Objective     Vitals:    01/29/20 1746   BP: 124/88   Pulse:    Temp:    SpO2:      Wt Readings from Last 3 Encounters:   01/29/20 79 2 kg (174 lb 8 oz)   01/15/20 77 2 kg (170 lb 4 oz)   10/22/19 84 9 kg (187 lb 4 oz)     Vitals:    01/29/20 1706 01/29/20 1746   BP: 132/90 124/88   BP Location: Left arm    Patient Position: Sitting    Cuff Size: Large    Pulse: 67    Temp: 98 2 °F (36 8 °C)    TempSrc: Tympanic    SpO2: 98%    Weight: 79 2 kg (174 lb 8 oz)    Height: 5' 2" (1 575 m)          Physical Exam   Constitutional: She appears well-developed and well-nourished  HENT:   Head: Normocephalic and atraumatic  Mouth/Throat: Oropharynx is clear and moist    Eyes: Pupils are equal, round, and reactive to light  Conjunctivae and EOM are normal    Neck: Normal range of motion  Neck supple  Cardiovascular: Normal rate, regular rhythm and normal heart sounds  Pulmonary/Chest: Effort normal and breath sounds normal    Musculoskeletal: Normal range of motion  She exhibits no edema  Lymphadenopathy:     She has no cervical adenopathy  Neurological: She is alert  Nursing note and vitals reviewed  Pertinent Laboratory/Diagnostic Studies:  Lab Results   Component Value Date    BUN 14 11/14/2019    CREATININE 0 74 11/14/2019    CALCIUM 9 3 11/14/2019     11/16/2016    K 3 6 11/14/2019    CO2 25 11/14/2019     (H) 11/14/2019     Lab Results   Component Value Date    ALT 35 09/10/2019    AST 24 09/10/2019    ALKPHOS 85 09/10/2019    BILITOT 0 4 11/16/2016       Lab Results   Component Value Date    WBC 6 26 09/10/2019    HGB 13 3 09/10/2019    HCT 40 2 09/10/2019    MCV 91 09/10/2019     09/10/2019       No results found for: TSH    Lab Results   Component Value Date    CHOL 195 05/09/2016     Lab Results   Component Value Date    TRIG 172 (H) 09/10/2019     Lab Results   Component Value Date    HDL 54 09/10/2019     Lab Results   Component Value Date    LDLCALC 100 09/10/2019     Lab Results   Component Value Date    HGBA1C 5 6 09/10/2019       Results for orders placed or performed in visit on 68/50/57   Basic metabolic panel   Result Value Ref Range    Sodium 141 136 - 145 mmol/L    Potassium 3 6 3 5 - 5 3 mmol/L    Chloride 109 (H) 100 - 108 mmol/L    CO2 25 21 - 32 mmol/L    ANION GAP 7 4 - 13 mmol/L    BUN 14 5 - 25 mg/dL    Creatinine 0 74 0 60 - 1 30 mg/dL    Glucose, Fasting 95 65 - 99 mg/dL    Calcium 9 3 8 3 - 10 1 mg/dL    eGFR 90 ml/min/1 73sq m       No orders of the defined types were placed in this encounter  ALLERGIES:  Allergies   Allergen Reactions    Escitalopram Swelling and Edema     Category:  Allergy;     Fluticasone-Salmeterol      UNKNOWN    Nsaids      Action Taken: stomach issues;     Latex      irritation      Other Rash     Adhesive bandages       Current Medications     Current Outpatient Medications   Medication Sig Dispense Refill    Cholecalciferol (VITAMIN D3) 69802 units TABS Take 10,000 Units by mouth daily      Cyanocobalamin (VITAMIN B-12) 500 MCG LOZG Take 500 mcg by mouth every other day       DULoxetine (CYMBALTA) 30 mg delayed release capsule Take 1 capsule (30 mg total) by mouth daily 90 capsule 3    LORazepam (ATIVAN) 0 5 mg tablet Take 0 5 mg by mouth as needed        losartan (COZAAR) 50 mg tablet Take 1 tablet (50 mg total) by mouth daily 90 tablet 3    Magnesium Gluconate 250 MG TABS Take 1 tablet by mouth daily      zolpidem (AMBIEN CR) 12 5 MG CR tablet Take 1 tablet (12 5 mg total) by mouth daily at bedtime as needed for sleep 90 tablet 0     No current facility-administered medications for this visit            Health Maintenance     Health Maintenance   Topic Date Due    Pneumococcal Vaccine: Pediatrics (0 to 5 Years) and At-Risk Patients (6 to 59 Years) (1 of 1 - PPSV23) 07/21/1968    HIV Screening  07/21/1977    BMI: Followup Plan  07/21/1980    Annual Physical  07/21/1980    PT PLAN OF CARE  03/21/2019    Cervical Cancer Screening  05/12/2019    Influenza Vaccine  10/01/2020 (Originally 7/1/2019)    MAMMOGRAM  09/19/2020    CRC Screening: Colonoscopy  12/04/2020    BMI: Adult  01/15/2021    DTaP,Tdap,and Td Vaccines (2 - Td) 02/20/2027    Pneumococcal Vaccine: 65+ Years (1 of 2 - PCV13) 07/21/2027    Hepatitis C Screening  Completed    HIB Vaccine  Aged Out    Hepatitis B Vaccine  Aged Out    IPV Vaccine  Aged Out    Hepatitis A Vaccine  Aged Out    Meningococcal ACWY Vaccine  Aged Out    HPV Vaccine  Aged Out     Immunization History   Administered Date(s) Administered    Influenza TIV (IM) 02/15/2017    Tdap 02/20/2017       Mercedes Hughes MD

## 2020-02-12 ENCOUNTER — OFFICE VISIT (OUTPATIENT)
Dept: FAMILY MEDICINE CLINIC | Facility: CLINIC | Age: 60
End: 2020-02-12
Payer: COMMERCIAL

## 2020-02-12 VITALS
HEIGHT: 62 IN | DIASTOLIC BLOOD PRESSURE: 90 MMHG | OXYGEN SATURATION: 98 % | HEART RATE: 76 BPM | BODY MASS INDEX: 32.64 KG/M2 | RESPIRATION RATE: 18 BRPM | WEIGHT: 177.38 LBS | TEMPERATURE: 98.4 F | SYSTOLIC BLOOD PRESSURE: 148 MMHG

## 2020-02-12 DIAGNOSIS — G47.09 OTHER INSOMNIA: Chronic | ICD-10-CM

## 2020-02-12 DIAGNOSIS — I10 HYPERTENSION, UNSPECIFIED TYPE: Primary | ICD-10-CM

## 2020-02-12 PROCEDURE — 99213 OFFICE O/P EST LOW 20 MIN: CPT | Performed by: FAMILY MEDICINE

## 2020-02-12 PROCEDURE — 3077F SYST BP >= 140 MM HG: CPT | Performed by: FAMILY MEDICINE

## 2020-02-12 PROCEDURE — 3080F DIAST BP >= 90 MM HG: CPT | Performed by: FAMILY MEDICINE

## 2020-02-12 PROCEDURE — 1036F TOBACCO NON-USER: CPT | Performed by: FAMILY MEDICINE

## 2020-02-12 RX ORDER — HYDROCHLOROTHIAZIDE 12.5 MG/1
12.5 TABLET ORAL DAILY
Qty: 90 TABLET | Refills: 3 | Status: SHIPPED | OUTPATIENT
Start: 2020-02-12 | End: 2020-02-12 | Stop reason: SDUPTHER

## 2020-02-12 RX ORDER — ZOLPIDEM TARTRATE 12.5 MG/1
12.5 TABLET, FILM COATED, EXTENDED RELEASE ORAL
Qty: 30 TABLET | Refills: 0 | Status: SHIPPED | OUTPATIENT
Start: 2020-02-12 | End: 2020-07-13 | Stop reason: ALTCHOICE

## 2020-02-12 RX ORDER — POTASSIUM CHLORIDE 750 MG/1
10 TABLET, EXTENDED RELEASE ORAL DAILY
Qty: 30 TABLET | Refills: 1 | Status: SHIPPED | OUTPATIENT
Start: 2020-02-12 | End: 2020-02-12 | Stop reason: SDUPTHER

## 2020-02-12 RX ORDER — HYDROCHLOROTHIAZIDE 12.5 MG/1
12.5 TABLET ORAL DAILY
Qty: 90 TABLET | Refills: 3 | Status: SHIPPED | OUTPATIENT
Start: 2020-02-12 | End: 2021-04-14

## 2020-02-12 RX ORDER — POTASSIUM CHLORIDE 750 MG/1
10 TABLET, EXTENDED RELEASE ORAL DAILY
Qty: 30 TABLET | Refills: 1 | Status: SHIPPED | OUTPATIENT
Start: 2020-02-12 | End: 2021-06-02 | Stop reason: SDUPTHER

## 2020-02-12 NOTE — PROGRESS NOTES
FAMILY PRACTICE OFFICE VISIT       NAME: Shimon Baugh  AGE: 64 y o  SEX: female       : 1960        MRN: [id number]    DATE: 2020  TIME: 11:13 AM    Assessment and Plan     Problem List Items Addressed This Visit        Cardiovascular and Mediastinum    Hypertension - Primary    Relevant Medications    hydrochlorothiazide (HYDRODIURIL) 12 5 mg tablet    potassium chloride (K-DUR,KLOR-CON) 10 mEq tablet    Other Relevant Orders    Basic metabolic panel       Other    Other insomnia (Chronic)    Relevant Medications    zolpidem (AMBIEN CR) 12 5 MG CR tablet        Hypertension:  Patient states she is not able to tolerate the higher dose of losartan 50 mg   BP does remain higher  I feel she would benefit from restarting hydrochlorothiazide, patient would like to do this  I will start her on potassium as well  Continue lifestyle modifications  Will check BMP next week and have patient return in 2 weeks for BP check  She will also keep an eye on it at home  Return parameters discussed  She is currently asymptomatic  There are no Patient Instructions on file for this visit  Chief Complaint     Chief Complaint   Patient presents with    Follow-up     b/p        History of Present Illness     HPI   82-year-old female presents today for follow-up of blood pressure  Patient feels she is not able to tolerate the higher dose of losartan  Denies chest pain, shortness of breath, lightheadedness, dizziness, visual disturbance  Patient states she does have some stressors due to work as his new job  Review of Systems   Review of Systems   Respiratory: Negative for shortness of breath  Cardiovascular: Negative  Neurological: Negative for dizziness and light-headedness     Psychiatric/Behavioral:        Has some stressors and anxiety due to work       Active Problem List     Patient Active Problem List   Diagnosis    Left ear pain    Anemia    Asthma    Chronic fatigue syndrome    Hypertension    Fibromyalgia    JA (generalized anxiety disorder)    Gluten intolerance    Hypovitaminosis D    Impaired fasting glucose    Liver lesion    Lipodystrophy    Major depressive disorder, recurrent episode, in full remission (HCC)    Migraine    Myalgia and myositis    Other insomnia    Rosacea    Obstructive sleep apnea    Routine health maintenance    Headache upon awakening    Hypersomnia    Obesity (BMI 30-39  9)    Snoring    Anxiety and depression    Neck pain on right side    NOEMÍ (obstructive sleep apnea)    Herniated lumbar intervertebral disc    Acute bilateral low back pain without sciatica    DDD (degenerative disc disease), lumbosacral    Sleep disturbance       Past Medical History:  Past Medical History:   Diagnosis Date    Anemia     Anxiety and depression     Asthma 10/26/2012    Chronic fatigue syndrome     CPAP (continuous positive airway pressure) dependence     DDD (degenerative disc disease), lumbosacral 12/27/2018    Patrick-Barr virus seropositivity     Fibromyalgia, primary     JA (generalized anxiety disorder)     Gluten intolerance     Hypertension     Hypovitaminosis D     Impaired fasting glucose     Lipodystrophy     Liver lesion     Migraine     Obesity (BMI 30-39 9) 6/6/2018    Sleep apnea        Past Surgical History:  Past Surgical History:   Procedure Laterality Date    APPENDECTOMY      BREAST BIOPSY      CHOLECYSTECTOMY      COLONOSCOPY      AK COLONOSCOPY FLX DX W/COLLJ SPEC WHEN PFRMD N/A 12/4/2017    Procedure: COLONOSCOPY;  Surgeon: Erum De Souza DO;  [facility]: [facility];   Service: Gastroenterology    UPPER GASTROINTESTINAL ENDOSCOPY         Family History:  Family History   Problem Relation Age of Onset    Depression Mother     Hypertension Mother         essential     Mitral valve prolapse Mother     Diabetes Brother     Heart attack Brother     Heart disease Maternal Grandmother         cardiac disorder    Hyperlipidemia Maternal Grandmother     Stroke Paternal Grandmother     Stroke Paternal Grandfather     Breast cancer Maternal Aunt        Social History:  Social History     Socioeconomic History    Marital status: Single     Spouse name: Not on file    Number of children: 0    Years of education: bachelor's degree    Highest education level: Not on file   Occupational History    Occupation: works for [employer]     Employer: [employer]   Social Needs    Financial resource strain: Not on file    Food insecurity:     Worry: Not on file     Inability: Not on file    Transportation needs:     Medical: Not on file     Non-medical: Not on file   Tobacco Use    Smoking status: Never Smoker    Smokeless tobacco: Never Used   Substance and Sexual Activity    Alcohol use: Yes     Comment: rarely    Drug use: No    Sexual activity: Never   Lifestyle    Physical activity:     Days per week: Not on file     Minutes per session: Not on file    Stress: Not on file   Relationships    Social connections:     Talks on phone: Not on file     Gets together: Not on file     Attends [employer] service: Not on file     Active member of club or organization: Not on file     Attends meetings of clubs or organizations: Not on file     Relationship status: Not on file    Intimate partner violence:     Fear of current or ex partner: Not on file     Emotionally abused: Not on file     Physically abused: Not on file     Forced sexual activity: Not on file   Other Topics Concern    Not on file   Social History Narrative    Education: bachelor's degree in sociology    Learning Disabilities: none    Marital History: single    Children: none    Living Arrangement: lives alone    Occupational History: works for [employer]: limited support system    Legal History: none     History: None    Caffeine use denied     I have reviewed the patient's medical history in detail; there are no changes to the history as noted in the electronic medical record  Objective     Vitals:    02/12/20 1802   BP: 148/90   Pulse:    Resp:    Temp:    SpO2:      Wt Readings from Last 3 Encounters:   02/12/20 80 5 kg (177 lb 6 oz)   01/29/20 79 2 kg (174 lb 8 oz)   01/15/20 77 2 kg (170 lb 4 oz)     Vitals:    02/12/20 1739 02/12/20 1802   BP: 150/90 148/90   BP Location: Left arm    Patient Position: Sitting    Cuff Size: Adult    Pulse: 76    Resp: 18    Temp: 98 4 °F (36 9 °C)    TempSrc: Tympanic    SpO2: 98%    Weight: 80 5 kg (177 lb 6 oz)    Height: 5' 2" (1 575 m)          Physical Exam   Constitutional: She appears well-developed and well-nourished  HENT:   Head: Normocephalic and atraumatic  Mouth/Throat: Oropharynx is clear and moist    Eyes: Pupils are equal, round, and reactive to light  Conjunctivae and EOM are normal    Neck: Normal range of motion  Neck supple  Cardiovascular: Normal rate, regular rhythm and normal heart sounds  Pulmonary/Chest: Effort normal and breath sounds normal    Musculoskeletal: Normal range of motion  She exhibits no edema  Lymphadenopathy:     She has no cervical adenopathy  Neurological: She is alert  Skin: No rash noted  Psychiatric: She has a normal mood and affect  Nursing note and vitals reviewed        Pertinent Laboratory/Diagnostic Studies:  Lab Results   Component Value Date    BUN 14 11/14/2019    CREATININE 0 74 11/14/2019    CALCIUM 9 3 11/14/2019     11/16/2016    K 3 6 11/14/2019    CO2 25 11/14/2019     (H) 11/14/2019     Lab Results   Component Value Date    ALT 35 09/10/2019    AST 24 09/10/2019    ALKPHOS 85 09/10/2019    BILITOT 0 4 11/16/2016       Lab Results   Component Value Date    WBC 6 26 09/10/2019    HGB 13 3 09/10/2019    HCT 40 2 09/10/2019    MCV 91 09/10/2019     09/10/2019       No results found for: TSH    Lab Results   Component Value Date CHOL 195 05/09/2016     Lab Results   Component Value Date    TRIG 172 (H) 09/10/2019     Lab Results   Component Value Date    HDL 54 09/10/2019     Lab Results   Component Value Date    LDLCALC 100 09/10/2019     Lab Results   Component Value Date    HGBA1C 5 6 09/10/2019       Results for orders placed or performed in visit on 34/67/43   Basic metabolic panel   Result Value Ref Range    Sodium 141 136 - 145 mmol/L    Potassium 3 6 3 5 - 5 3 mmol/L    Chloride 109 (H) 100 - 108 mmol/L    CO2 25 21 - 32 mmol/L    ANION GAP 7 4 - 13 mmol/L    BUN 14 5 - 25 mg/dL    Creatinine 0 74 0 60 - 1 30 mg/dL    Glucose, Fasting 95 65 - 99 mg/dL    Calcium 9 3 8 3 - 10 1 mg/dL    eGFR 90 ml/min/1 73sq m       Orders Placed This Encounter   Procedures    Basic metabolic panel       ALLERGIES:  Allergies   Allergen Reactions    Escitalopram Swelling and Edema     Category:  Allergy;     Fluticasone-Salmeterol      UNKNOWN    Nsaids      Action Taken: stomach issues;     Latex      irritation      Other Rash     Adhesive bandages       Current Medications     Current Outpatient Medications   Medication Sig Dispense Refill    Cholecalciferol (VITAMIN D3) 25139 units TABS Take 10,000 Units by mouth daily      Cyanocobalamin (VITAMIN B-12) 500 MCG LOZG Take 500 mcg by mouth every other day       DULoxetine (CYMBALTA) 30 mg delayed release capsule Take 1 capsule (30 mg total) by mouth daily 90 capsule 3    LORazepam (ATIVAN) 0 5 mg tablet Take 0 5 mg by mouth as needed        Magnesium Gluconate 250 MG TABS Take 1 tablet by mouth daily      zolpidem (AMBIEN CR) 12 5 MG CR tablet Take 1 tablet (12 5 mg total) by mouth daily at bedtime as needed for sleep 30 tablet 0    hydrochlorothiazide (HYDRODIURIL) 12 5 mg tablet Take 1 tablet (12 5 mg total) by mouth daily 90 tablet 3    potassium chloride (K-DUR,KLOR-CON) 10 mEq tablet Take 1 tablet (10 mEq total) by mouth daily 30 tablet 1     No current facility-administered medications for this visit            Health Maintenance     Health Maintenance   Topic Date Due    Pneumococcal Vaccine: Pediatrics (0 to 5 Years) and At-Risk Patients (6 to 59 Years) (1 of 1 - PPSV23) 07/21/1968    HIV Screening  07/21/1977    BMI: Followup Plan  07/21/1980    Annual Physical  07/21/1980    PT PLAN OF CARE  03/21/2019    Cervical Cancer Screening  05/12/2019    Influenza Vaccine  10/01/2020 (Originally 7/1/2019)    MAMMOGRAM  09/19/2020    CRC Screening: Colonoscopy  12/04/2020    BMI: Adult  02/12/2021    DTaP,Tdap,and Td Vaccines (2 - Td) 02/20/2027    Pneumococcal Vaccine: 65+ Years (1 of 2 - PCV13) 07/21/2027    Hepatitis C Screening  Completed    HIB Vaccine  Aged Out    Hepatitis B Vaccine  Aged Out    IPV Vaccine  Aged Out    Hepatitis A Vaccine  Aged Out    Meningococcal ACWY Vaccine  Aged Out    HPV Vaccine  Aged Out     Immunization History   Administered Date(s) Administered    Influenza TIV (IM) 02/15/2017    Tdap 02/20/2017       Roshni Phillip MD

## 2020-02-26 ENCOUNTER — OFFICE VISIT (OUTPATIENT)
Dept: FAMILY MEDICINE CLINIC | Facility: CLINIC | Age: 60
End: 2020-02-26
Payer: COMMERCIAL

## 2020-02-26 DIAGNOSIS — I10 HYPERTENSION, UNSPECIFIED TYPE: Primary | ICD-10-CM

## 2020-02-26 DIAGNOSIS — M79.621 PAIN IN RIGHT UPPER ARM: ICD-10-CM

## 2020-02-26 PROCEDURE — 99213 OFFICE O/P EST LOW 20 MIN: CPT | Performed by: FAMILY MEDICINE

## 2020-02-26 PROCEDURE — 1036F TOBACCO NON-USER: CPT | Performed by: FAMILY MEDICINE

## 2020-02-26 PROCEDURE — 3077F SYST BP >= 140 MM HG: CPT | Performed by: FAMILY MEDICINE

## 2020-02-26 PROCEDURE — 3079F DIAST BP 80-89 MM HG: CPT | Performed by: FAMILY MEDICINE

## 2020-02-26 RX ORDER — LOSARTAN POTASSIUM 25 MG/1
25 TABLET ORAL DAILY
Qty: 30 TABLET | Refills: 3 | Status: SHIPPED | OUTPATIENT
Start: 2020-02-26 | End: 2020-08-20

## 2020-02-26 NOTE — PROGRESS NOTES
FAMILY PRACTICE OFFICE VISIT       NAME: Tejas Chambers  AGE: 64 y o  SEX: female       : 1960        MRN: [id number]    DATE: 3/1/2020  TIME: 10:49 PM    Assessment and Plan     Problem List Items Addressed This Visit        Cardiovascular and Mediastinum    Hypertension - Primary    Relevant Medications    losartan (COZAAR) 25 mg tablet       Other    Pain in right upper arm    Relevant Orders    US extremity soft tissue        Hypertension:  BP elevated today however upon recheck decreased although not optimal   Patient was taking hydrochlorothiazide 12 5 mg along with potassium chloride 10 mEq daily  I would like her to restart losartan 25 mg  Continue with lifestyle modifications  Patient has lost weight, has been more active  She has been watching what she eats  Continue to limit sodium in the diet as well  Follow-up in 2 weeks for BP check or sooner if needed  She is currently asymptomatic  Pain of right triceps region times years:  Unclear etiology  I will start off by obtaining soft tissue ultrasound to determine if there are any abnormalities  May benefit from evaluation by physical medicine rehabilitation? There are no Patient Instructions on file for this visit  Chief Complaint     Chief Complaint   Patient presents with    Follow-up     2 week     Annual Exam       History of Present Illness     HPI   61-year-old female here for follow-up of blood pressure  Has been taking hydrochlorothiazide 12 5 mg daily however has not been taking losartan 25 mg daily  She has been taking potassium 10 mEq daily as well  I would like her to restart losartan 25 mg daily along with the hydrochlorothiazide 12 5 mg daily  She is agreeable with this  Is inquiring about hoang mckeon and interactions  She has heard it helps lower blood pressure  Has had Right triceps pain x yrs  Denies injury    Sq lesion  Has improved with compression sleeve  May consider referral top Sleep apnea        Past Surgical History:  Past Surgical History:   Procedure Laterality Date    APPENDECTOMY      BREAST BIOPSY      CHOLECYSTECTOMY      COLONOSCOPY      AL COLONOSCOPY FLX DX W/COLLJ SPEC WHEN PFRMD N/A 12/4/2017    Procedure: COLONOSCOPY;  Surgeon: Rosanna Will DO;  [facility];   Service: Gastroenterology    UPPER GASTROINTESTINAL ENDOSCOPY         Family History:  Family History   Problem Relation Age of Onset   Rachael Stout Depression Mother     Hypertension Mother         essential     Mitral valve prolapse Mother     Diabetes Brother     Heart attack Brother     Heart disease Maternal Grandmother         cardiac disorder    Hyperlipidemia Maternal Grandmother     Stroke Paternal Grandmother     Stroke Paternal Grandfather     Breast cancer Maternal Aunt        Social History:  Social History     Socioeconomic History    Marital status: Single     Spouse name: Not on file    Number of children: 0    Years of education: bachelor's degree    Highest education level: Not on file   Occupational History    Occupation: works for [employer]     Employer: [employer]   Social Needs    Financial resource strain: Not on file    Food insecurity:     Worry: Not on file     Inability: Not on file    Transportation needs:     Medical: Not on file     Non-medical: Not on file   Tobacco Use    Smoking status: Never Smoker    Smokeless tobacco: Never Used   Substance and Sexual Activity    Alcohol use: Yes     Comment: rarely    Drug use: No    Sexual activity: Never   Lifestyle    Physical activity:     Days per week: Not on file     Minutes per session: Not on file    Stress: Not on file   Relationships    Social connections:     Talks on phone: Not on file     Gets together: Not on file     Attends Christianity service: Not on file     Active member of club or organization: Not on file     Attends meetings of clubs or organizations: Not on file Relationship status: Not on file    Intimate partner violence:     Fear of current or ex partner: Not on file     Emotionally abused: Not on file     Physically abused: Not on file     Forced sexual activity: Not on file   Other Topics Concern    Not on file   Social History Narrative    Education: bachelor's degree in sociology    Learning Disabilities: none    Marital History: single    Children: none    Living Arrangement: lives alone    Occupational History: works for [employer] at [employer]: limited support system    Legal History: none     History: None    Caffeine use denied     I have reviewed the patient's medical history in detail; there are no changes to the history as noted in the electronic medical record  Objective     Vitals:    03/01/20 2244   BP: 140/84   Pulse:    Resp:    Temp:    SpO2:      Wt Readings from Last 3 Encounters:   02/26/20 78 1 kg (172 lb 4 oz)   02/12/20 80 5 kg (177 lb 6 oz)   01/29/20 79 2 kg (174 lb 8 oz)     Vitals:    02/26/20 1751 03/01/20 2244   BP: (!) 140/101 140/84   BP Location: Left arm    Patient Position: Sitting    Cuff Size: Adult    Pulse: 70    Resp: 18    Temp: 97 9 °F (36 6 °C)    TempSrc: Tympanic    SpO2: 98%    Weight: 78 1 kg (172 lb 4 oz)    Height: 5' 2" (1 575 m)        Physical Exam   Constitutional: She appears well-developed and well-nourished  HENT:   Head: Normocephalic and atraumatic  Mouth/Throat: Oropharynx is clear and moist    Eyes: Pupils are equal, round, and reactive to light  Conjunctivae and EOM are normal    Neck: Normal range of motion  Neck supple  Cardiovascular: Normal rate, regular rhythm and normal heart sounds  Pulmonary/Chest: Effort normal and breath sounds normal    Musculoskeletal: Normal range of motion  She exhibits no edema  Tender to palpation over right triceps    There a couple of subcutaneous small areas noted that her nontender   Lymphadenopathy: She has no cervical adenopathy  Neurological: She is alert  Nursing note and vitals reviewed  Pertinent Laboratory/Diagnostic Studies:  Lab Results   Component Value Date    BUN 14 11/14/2019    CREATININE 0 74 11/14/2019    CALCIUM 9 3 11/14/2019     11/16/2016    K 3 6 11/14/2019    CO2 25 11/14/2019     (H) 11/14/2019     Lab Results   Component Value Date    ALT 35 09/10/2019    AST 24 09/10/2019    ALKPHOS 85 09/10/2019    BILITOT 0 4 11/16/2016       Lab Results   Component Value Date    WBC 6 26 09/10/2019    HGB 13 3 09/10/2019    HCT 40 2 09/10/2019    MCV 91 09/10/2019     09/10/2019       No results found for: TSH    Lab Results   Component Value Date    CHOL 195 05/09/2016     Lab Results   Component Value Date    TRIG 172 (H) 09/10/2019     Lab Results   Component Value Date    HDL 54 09/10/2019     Lab Results   Component Value Date    LDLCALC 100 09/10/2019     Lab Results   Component Value Date    HGBA1C 5 6 09/10/2019       Results for orders placed or performed in visit on 24/48/76   Basic metabolic panel   Result Value Ref Range    Sodium 141 136 - 145 mmol/L    Potassium 3 6 3 5 - 5 3 mmol/L    Chloride 109 (H) 100 - 108 mmol/L    CO2 25 21 - 32 mmol/L    ANION GAP 7 4 - 13 mmol/L    BUN 14 5 - 25 mg/dL    Creatinine 0 74 0 60 - 1 30 mg/dL    Glucose, Fasting 95 65 - 99 mg/dL    Calcium 9 3 8 3 - 10 1 mg/dL    eGFR 90 ml/min/1 73sq m       Orders Placed This Encounter   Procedures    US extremity soft tissue       ALLERGIES:  Allergies   Allergen Reactions    Escitalopram Swelling and Edema     Category:  Allergy;     Fluticasone-Salmeterol      UNKNOWN    Nsaids      Action Taken: stomach issues;     Latex      irritation      Other Rash     Adhesive bandages       Current Medications     Current Outpatient Medications   Medication Sig Dispense Refill    Cholecalciferol (VITAMIN D3) 25000 units TABS Take 10,000 Units by mouth daily      Cyanocobalamin (VITAMIN B-12) 500 MCG LOZG Take 500 mcg by mouth every other day       DULoxetine (CYMBALTA) 30 mg delayed release capsule Take 1 capsule (30 mg total) by mouth daily 90 capsule 3    hydrochlorothiazide (HYDRODIURIL) 12 5 mg tablet Take 1 tablet (12 5 mg total) by mouth daily 90 tablet 3    LORazepam (ATIVAN) 0 5 mg tablet Take 0 5 mg by mouth as needed        Magnesium Gluconate 250 MG TABS Take 1 tablet by mouth daily      potassium chloride (K-DUR,KLOR-CON) 10 mEq tablet Take 1 tablet (10 mEq total) by mouth daily 30 tablet 1    zolpidem (AMBIEN CR) 12 5 MG CR tablet Take 1 tablet (12 5 mg total) by mouth daily at bedtime as needed for sleep 30 tablet 0    losartan (COZAAR) 25 mg tablet Take 1 tablet (25 mg total) by mouth daily 30 tablet 3     No current facility-administered medications for this visit            Health Maintenance     Health Maintenance   Topic Date Due    Pneumococcal Vaccine: Pediatrics (0 to 5 Years) and At-Risk Patients (6 to 59 Years) (1 of 1 - PPSV23) 07/21/1968    HIV Screening  07/21/1977    BMI: Followup Plan  07/21/1980    Annual Physical  07/21/1980    PT PLAN OF CARE  03/21/2019    Cervical Cancer Screening  05/12/2019    Influenza Vaccine  10/01/2020 (Originally 7/1/2019)    MAMMOGRAM  09/19/2020    CRC Screening: Colonoscopy  12/04/2020    BMI: Adult  02/26/2021    DTaP,Tdap,and Td Vaccines (2 - Td) 02/20/2027    Pneumococcal Vaccine: 65+ Years (1 of 2 - PCV13) 07/21/2027    Hepatitis C Screening  Completed    HIB Vaccine  Aged Out    Hepatitis B Vaccine  Aged Out    IPV Vaccine  Aged Out    Hepatitis A Vaccine  Aged Out    Meningococcal ACWY Vaccine  Aged Out    HPV Vaccine  Aged Out     Immunization History   Administered Date(s) Administered    Influenza TIV (IM) 02/15/2017    Tdap 02/20/2017       Marina Garcia MD

## 2020-02-27 ENCOUNTER — DOCUMENTATION (OUTPATIENT)
Dept: FAMILY MEDICINE CLINIC | Facility: CLINIC | Age: 60
End: 2020-02-27

## 2020-02-27 NOTE — LETTER
Merry Horan,     Dr Graciela Moore wanted me to look into Kirt Extract to treat your high blood pressure since some of the medicines she has tried have had side effects for you  There is very limited data for supplements and for this particular one it has mixed reviews  I'm not sure it will help your blood pressure based on the trials but it does not appear to have a large risk profile and it does not interact with your current medicines so I think it is safe to trial      The specific product that was tested in the literature and shown to have mild diastolic benefit can be found here: [employer]    The key is the dose (1200mg once daily) and the fact that it has around 2% flavonoids  Please let us know if this is something you plan on taking and we will add it to your medication profile  Please call me if you have any other questions [phone]  Zahra Blake, Pharm  D  Clinical Integration Pharmacist  [address]

## 2020-02-27 NOTE — PROGRESS NOTES
[facility]  The following is per review of patient's pertinent medical/medication history for addition of Pia Bur to treat hypertension in addition to HCTZ and losartan due to multiple medication intolerances with uncontrolled hypertension  Patient's insurance coverage: Payor: Abbey Avila / Plan: Anne-Marie Fenton / Product Type: TPA and Charles Watson /     Findings:   ·  in patients with diabetes and hypertension, taking a specific hawthorn extract () 1,200mg daily for 16 weeks significantly decreased diastolic blood pressure compared to placebo; however, it had no effect on systolic blood pressure   ·  mild, and transient adverse effects, including abdominal discomfort, gastrointestinal complains, nausea, agitation, dizziness, headache/migraine, fatigue, dyspnea, skin rash, insomnia, diaphoresis, and tachycardia/palpitations  · Nikos AF, Demarcus G, Main E, et al  Hypotensive effects of hawthorn for patients with diabetes taking prescription drugs: a randomised controlled trial  Br J Gen  Pract [id number]  Recommendations: It seems unlikely that this will significantly reduce blood pressure based on the available literature, but there are no reported interactions with her current medications so patient could consider a trial of  1,200mg extract standardized to 2 2% flavonoids to be taken daily for 16 weeks  At that point I would evaluate efficacy and make a decision regarding continuing therapy       Link to specific product sent to patient: [employer] loaiza    Demographics  Interaction Method: Phone    Topic(s) Addressed  Hypertension    Intervention(s) Made  Pharmacologic: Medication Initiation, Prevent or Manage Adverse Drug Reaction and Drug Interaction    Comments: MedInit: Kirt Extract  ROSE:Kirt Extract  DDI: Kirt Extract Losartan HCTZ        Tool(s) Used  Other    Time Spent in Direct Patient Care Activities and Care Coordination: 16-30 Minutes    Recommendation(s) Accepted by the Patient/Caregiver:  All Accepted

## 2020-03-01 VITALS
SYSTOLIC BLOOD PRESSURE: 140 MMHG | HEIGHT: 62 IN | OXYGEN SATURATION: 98 % | TEMPERATURE: 97.9 F | DIASTOLIC BLOOD PRESSURE: 84 MMHG | HEART RATE: 70 BPM | RESPIRATION RATE: 18 BRPM | BODY MASS INDEX: 31.7 KG/M2 | WEIGHT: 172.25 LBS

## 2020-03-01 PROBLEM — M79.621 PAIN IN RIGHT UPPER ARM: Status: ACTIVE | Noted: 2020-03-01

## 2020-03-11 ENCOUNTER — OFFICE VISIT (OUTPATIENT)
Dept: FAMILY MEDICINE CLINIC | Facility: CLINIC | Age: 60
End: 2020-03-11
Payer: COMMERCIAL

## 2020-03-11 VITALS
RESPIRATION RATE: 16 BRPM | HEART RATE: 79 BPM | BODY MASS INDEX: 31.74 KG/M2 | TEMPERATURE: 98.5 F | HEIGHT: 62 IN | OXYGEN SATURATION: 97 % | DIASTOLIC BLOOD PRESSURE: 82 MMHG | SYSTOLIC BLOOD PRESSURE: 124 MMHG | WEIGHT: 172.5 LBS

## 2020-03-11 DIAGNOSIS — D64.9 ANEMIA, UNSPECIFIED TYPE: ICD-10-CM

## 2020-03-11 DIAGNOSIS — F41.9 ANXIETY AND DEPRESSION: ICD-10-CM

## 2020-03-11 DIAGNOSIS — E55.9 HYPOVITAMINOSIS D: ICD-10-CM

## 2020-03-11 DIAGNOSIS — Z00.00 ROUTINE HEALTH MAINTENANCE: Primary | ICD-10-CM

## 2020-03-11 DIAGNOSIS — M79.7 FIBROMYALGIA: ICD-10-CM

## 2020-03-11 DIAGNOSIS — F32.A ANXIETY AND DEPRESSION: ICD-10-CM

## 2020-03-11 DIAGNOSIS — Z23 ENCOUNTER FOR IMMUNIZATION: ICD-10-CM

## 2020-03-11 DIAGNOSIS — I10 HYPERTENSION, UNSPECIFIED TYPE: ICD-10-CM

## 2020-03-11 DIAGNOSIS — G47.09 OTHER INSOMNIA: ICD-10-CM

## 2020-03-11 DIAGNOSIS — G47.33 OBSTRUCTIVE SLEEP APNEA: ICD-10-CM

## 2020-03-11 DIAGNOSIS — L60.9 NAIL ABNORMALITY: ICD-10-CM

## 2020-03-11 DIAGNOSIS — E78.1 HYPERTRIGLYCERIDEMIA: ICD-10-CM

## 2020-03-11 PROCEDURE — 99396 PREV VISIT EST AGE 40-64: CPT | Performed by: FAMILY MEDICINE

## 2020-03-11 NOTE — PATIENT INSTRUCTIONS

## 2020-03-11 NOTE — PROGRESS NOTES
FAMILY PRACTICE OFFICE VISIT       NAME: Rowena Jerome  AGE: 64 y o  SEX: female       : 1960        MRN: [id number]    DATE: 3/17/2020  TIME: 10:26 AM    Assessment and Plan     Problem List Items Addressed This Visit        Respiratory    Obstructive sleep apnea       Cardiovascular and Mediastinum    Hypertension       Other    Other insomnia (Chronic)    Anemia    Fibromyalgia    Hypovitaminosis D    Routine health maintenance - Primary    Anxiety and depression      Other Visit Diagnoses     Hypertriglyceridemia        Gastroesophageal reflux disease without esophagitis        Nail abnormality        Relevant Orders    Vitamin B12    Iron Panel (Includes Ferritin, Iron Sat%, Iron, and TIBC)    Magnesium    Encounter for immunization            Routine health maintenance:  will schedule mammogram  Will also schedule appointment with gyn  Will be due for colonoscopy in December of this year  Up-to-date with Tdap     Hypertension:  BP has improved since last office visit  Patient has been taking losartan 25 mg as well as hydrochlorothiazide 12 5 mg daily  She is also taking potassium 10 mEq  She will get her blood work done  Discussed taking alex patient was interested  We will hold off for now as she does have some stressors going on with her brother  She may consider this in the future  Fibromyalgia:    Appears to be stable however symptoms do vary from time to time  Patient does continue with duloxetine 30 mg daily  Sleep apnea:    Stable  Using CPAP  Depression and anxiety: This is overall stable  Patient is on Cymbalta 30 mg daily  Has taken  lorazepam as needed  Hypertriglyceridemia:  Will recheck lipid panel with next blood work  Continue with lifestyle modifications  Anemia:  Will check iron panel  Insomnia:  Has been using melatonin nightly   Only uses ambien as needed    Hypovitaminosis D:  Will check vitamin-D level      Nail abnormality:  Patient states her Nails have ridges and lines several yrs  I will go ahead and check blood work including vitamin levels, iron panel  Patient Instructions     Wellness Visit for Adults   AMBULATORY CARE:   A wellness visit  is when you see your healthcare provider to get screened for health problems  You can also get advice on how to stay healthy  Write down your questions so you remember to ask them  Ask your healthcare provider how often you should have a wellness visit  What happens at a wellness visit:  Your healthcare provider will ask about your health, and your family history of health problems  This includes high blood pressure, heart disease, and cancer  He or she will ask if you have symptoms that concern you, if you smoke, and about your mood  You may also be asked about your intake of medicines, supplements, food, and alcohol  Any of the following may be done:  · Your weight  will be checked  Your height may also be checked so your body mass index (BMI) can be calculated  Your BMI shows if you are at a healthy weight  · Your blood pressure  and heart rate will be checked  Your temperature may also be checked  · Blood and urine tests  may be done  Blood tests may be done to check your cholesterol levels  Abnormal cholesterol levels increase your risk for heart disease and stroke  You may also need a blood or urine test to check for diabetes if you are at increased risk  Urine tests may be done to look for signs of an infection or kidney disease  · A physical exam  includes checking your heartbeat and lungs with a stethoscope  Your healthcare provider may also check your skin to look for sun damage  · Screening tests  may be recommended  A screening test is done to check for diseases that may not cause symptoms  The screening tests you may need depend on your age, gender, family history, and lifestyle habits   For example, colorectal screening may be recommended if you are 48years old or older  Screening tests you need if you are a woman:   · A Pap smear  is used to screen for cervical cancer  Pap smears are usually done every 3 to 5 years depending on your age  You may need them more often if you have had abnormal Pap smear test results in the past  Ask your healthcare provider how often you should have a Pap smear  · A mammogram  is an x-ray of your breasts to screen for breast cancer  Experts recommend mammograms every 2 years starting at age 48 years  You may need a mammogram at age 52 years or younger if you have an increased risk for breast cancer  Talk to your healthcare provider about when you should start having mammograms and how often you need them  Vaccines you may need:   · Get an influenza vaccine  every year  The influenza vaccine protects you from the flu  Several types of viruses cause the flu  The viruses change over time, so new vaccines are made each year  · Get a tetanus-diphtheria (Td) booster vaccine  every 10 years  This vaccine protects you against tetanus and diphtheria  Tetanus is a severe infection that may cause painful muscle spasms and lockjaw  Diphtheria is a severe bacterial infection that causes a thick covering in the back of your mouth and throat  · Get a human papillomavirus (HPV) vaccine  if you are female and aged 23 to 32 or male 23 to 24 and never received it  This vaccine protects you from HPV infection  HPV is the most common infection spread by sexual contact  HPV may also cause vaginal, penile, and anal cancers  · Get a pneumococcal vaccine  if you are aged 72 years or older  The pneumococcal vaccine is an injection given to protect you from pneumococcal disease  Pneumococcal disease is an infection caused by pneumococcal bacteria  The infection may cause pneumonia, meningitis, or an ear infection  · Get a shingles vaccine  if you are aged 61 or older, even if you have had shingles before   The shingles vaccine is an injection to protect you from the varicella-zoster virus  This is the same virus that causes chickenpox  Shingles is a painful rash that develops in people who had chickenpox or have been exposed to the virus  How to eat healthy:  My Plate is a model for planning healthy meals  It shows the types and amounts of foods that should go on your plate  Fruits and vegetables make up about half of your plate, and grains and protein make up the other half  A serving of dairy is included on the side of your plate  The amount of calories and serving sizes you need depends on your age, gender, weight, and height  Examples of healthy foods are listed below:  · Eat a variety of vegetables  such as dark green, red, and orange vegetables  You can also include canned vegetables low in sodium (salt) and frozen vegetables without added butter or sauces  · Eat a variety of fresh fruits , canned fruit in 100% juice, frozen fruit, and dried fruit  · Include whole grains  At least half of the grains you eat should be whole grains  Examples include whole-wheat bread, wheat pasta, brown rice, and whole-grain cereals such as oatmeal     · Eat a variety of protein foods such as seafood (fish and shellfish), lean meat, and poultry without skin (turkey and chicken)  Examples of lean meats include pork leg, shoulder, or tenderloin, and beef round, sirloin, tenderloin, and extra lean ground beef  Other protein foods include eggs and egg substitutes, beans, peas, soy products, nuts, and seeds  · Choose low-fat dairy products such as skim or 1% milk or low-fat yogurt, cheese, and cottage cheese  · Limit unhealthy fats  such as butter, hard margarine, and shortening  Exercise:  Exercise at least 30 minutes per day on most days of the week  Some examples of exercise include walking, biking, dancing, and swimming   You can also fit in more physical activity by taking the stairs instead of the elevator or parking farther away from stores  Include muscle strengthening activities 2 days each week  Regular exercise provides many health benefits  It helps you manage your weight, and decreases your risk for type 2 diabetes, heart disease, stroke, and high blood pressure  Exercise can also help improve your mood  Ask your healthcare provider about the best exercise plan for you  General health and safety guidelines:   · Do not smoke  Nicotine and other chemicals in cigarettes and cigars can cause lung damage  Ask your healthcare provider for information if you currently smoke and need help to quit  E-cigarettes or smokeless tobacco still contain nicotine  Talk to your healthcare provider before you use these products  · Limit alcohol  A drink of alcohol is 12 ounces of beer, 5 ounces of wine, or 1½ ounces of liquor  · Lose weight, if needed  Being overweight increases your risk of certain health conditions  These include heart disease, high blood pressure, type 2 diabetes, and certain types of cancer  · Protect your skin  Do not sunbathe or use tanning beds  Use sunscreen with a SPF 15 or higher  Apply sunscreen at least 15 minutes before you go outside  Reapply sunscreen every 2 hours  Wear protective clothing, hats, and sunglasses when you are outside  · Drive safely  Always wear your seatbelt  Make sure everyone in your car wears a seatbelt  A seatbelt can save your life if you are in an accident  Do not use your cell phone when you are driving  This could distract you and cause an accident  Pull over if you need to make a call or send a text message  · Practice safe sex  Use latex condoms if are sexually active and have more than one partner  Your healthcare provider may recommend screening tests for sexually transmitted infections (STIs)  · Wear helmets, lifejackets, and protective gear  Always wear a helmet when you ride a bike or motorcycle, go skiing, or play sports that could cause a head injury   Wear protective equipment when you play sports  Wear a lifejacket when you are on a boat or doing water sports  © 2017 [address]  Information is for End User's use only and may not be sold, redistributed or otherwise used for commercial purposes  All illustrations and images included in CareNotes® are the copyrighted property of [employer] Inc  or [employer]  The above information is an  only  It is not intended as medical advice for individual conditions or treatments  Talk to your doctor, nurse or pharmacist before following any medical regimen to see if it is safe and effective for you  Chief Complaint     Chief Complaint   Patient presents with    Annual Exam    Follow-up     b/w        History of Present Illness     HPI   68-year-old female here for routine health maintenance and for blood pressure follow-up  Patient has been taking losartan 25 mg as well as hydrochlorothiazide 12 5 mg daily  She is also taking potassium 10 mEq  She will get her blood work done  Unfortunately her brother had open-heart surgery and will be going today to take care from for few days  Otherwise, patient does not have any complaints today  Patient does maintain well-balanced diet, has been trying to lose weight and exercise/walk more  Patient states her Nails have ridges and lines x several yrs      Review of Systems   Review of Systems   Eyes: Negative for visual disturbance  Respiratory: Negative for shortness of breath  Cardiovascular: Negative  Gastrointestinal: Negative for abdominal pain and constipation  Skin:        Nail ridges     Neurological: Negative for dizziness, light-headedness and headaches         Active Problem List     Patient Active Problem List   Diagnosis    Left ear pain    Anemia    Asthma    Chronic fatigue syndrome    Hypertension    Fibromyalgia    JA (generalized anxiety disorder)    Gluten intolerance    Hypovitaminosis D  Impaired fasting glucose    Liver lesion    Lipodystrophy    Major depressive disorder, recurrent episode, in full remission (HCC)    Migraine    Myalgia and myositis    Other insomnia    Rosacea    Obstructive sleep apnea    Routine health maintenance    Headache upon awakening    Hypersomnia    Obesity (BMI 30-39  9)    Snoring    Anxiety and depression    Neck pain on right side    NOEMÍ (obstructive sleep apnea)    Herniated lumbar intervertebral disc    Acute bilateral low back pain without sciatica    DDD (degenerative disc disease), lumbosacral    Sleep disturbance    Pain in right upper arm       Past Medical History:  Past Medical History:   Diagnosis Date    Anemia     Anxiety and depression     Asthma 10/26/2012    Chronic fatigue syndrome     CPAP (continuous positive airway pressure) dependence     DDD (degenerative disc disease), lumbosacral 12/27/2018    Patrick-Barr virus seropositivity     Fibromyalgia, primary     JA (generalized anxiety disorder)     Gluten intolerance     Hypertension     Hypovitaminosis D     Impaired fasting glucose     Lipodystrophy     Liver lesion     Migraine     Obesity (BMI 30-39 9) 6/6/2018    Sleep apnea        Past Surgical History:  Past Surgical History:   Procedure Laterality Date    APPENDECTOMY      BREAST BIOPSY      CHOLECYSTECTOMY      COLONOSCOPY      MN COLONOSCOPY FLX DX W/COLLJ SPEC WHEN PFRMD N/A 12/4/2017    Procedure: COLONOSCOPY;  Surgeon: Jacqueline Blackman DO;  [facility];   Service: Gastroenterology    UPPER GASTROINTESTINAL ENDOSCOPY         Family History:  Family History   Problem Relation Age of Onset    Depression Mother     Hypertension Mother         essential     Mitral valve prolapse Mother     Diabetes Brother     Heart attack Brother     Heart disease Maternal Grandmother         cardiac disorder    Hyperlipidemia Maternal Grandmother     Stroke Paternal Grandmother     Stroke Paternal Grandfather     Breast cancer Maternal Aunt        Social History:  Social History     Socioeconomic History    Marital status: Single     Spouse name: Not on file    Number of children: 0    Years of education: bachelor's degree    Highest education level: Not on file   Occupational History    Occupation: works for [employer]     Employer: [employer]   Social Needs    Financial resource strain: Not on file    Food insecurity:     Worry: Not on file     Inability: Not on file    Transportation needs:     Medical: Not on file     Non-medical: Not on file   Tobacco Use    Smoking status: Never Smoker    Smokeless tobacco: Never Used   Substance and Sexual Activity    Alcohol use: Yes     Comment: rarely    Drug use: No    Sexual activity: Never   Lifestyle    Physical activity:     Days per week: Not on file     Minutes per session: Not on file    Stress: Not on file   Relationships    Social connections:     Talks on phone: Not on file     Gets together: Not on file     Attends Yarsani service: Not on file     Active member of club or organization: Not on file     Attends meetings of clubs or organizations: Not on file     Relationship status: Not on file    Intimate partner violence:     Fear of current or ex partner: Not on file     Emotionally abused: Not on file     Physically abused: Not on file     Forced sexual activity: Not on file   Other Topics Concern    Not on file   Social History Narrative    Education: bachelor's degree in sociology    Learning Disabilities: none    Marital History: single    Children: none    Living Arrangement: lives alone    Occupational History: works for [employer] at [employer]: limited support system    Legal History: none     History: None    Caffeine use denied     I have reviewed the patient's medical history in detail; there are no changes to the history as noted in the electronic medical record  Objective     Vitals:    03/11/20 1712   BP: 124/82   Pulse:    Resp:    Temp:    SpO2:      Wt Readings from Last 3 Encounters:   03/11/20 78 2 kg (172 lb 8 oz)   02/26/20 78 1 kg (172 lb 4 oz)   02/12/20 80 5 kg (177 lb 6 oz)     Vitals:    03/11/20 1627 03/11/20 1712   BP: 120/84 124/82   BP Location: Right arm    Patient Position: Sitting    Cuff Size: Large    Pulse: 79    Resp: 16    Temp: 98 5 °F (36 9 °C)    TempSrc: Tympanic    SpO2: 97%    Weight: 78 2 kg (172 lb 8 oz)    Height: 5' 2" (1 575 m)          Physical Exam   Constitutional: She appears well-developed and well-nourished  HENT:   Head: Normocephalic and atraumatic  Mouth/Throat: Oropharynx is clear and moist    Eyes: Pupils are equal, round, and reactive to light  Conjunctivae and EOM are normal    Neck: Normal range of motion  Neck supple  Cardiovascular: Normal rate, regular rhythm and normal heart sounds  Pulmonary/Chest: Effort normal and breath sounds normal    Musculoskeletal: Normal range of motion  She exhibits no edema  Lymphadenopathy:     She has no cervical adenopathy  Neurological: She is alert  Nursing note and vitals reviewed        Pertinent Laboratory/Diagnostic Studies:  Lab Results   Component Value Date    BUN 14 11/14/2019    CREATININE 0 74 11/14/2019    CALCIUM 9 3 11/14/2019     11/16/2016    K 3 6 11/14/2019    CO2 25 11/14/2019     (H) 11/14/2019     Lab Results   Component Value Date    ALT 35 09/10/2019    AST 24 09/10/2019    ALKPHOS 85 09/10/2019    BILITOT 0 4 11/16/2016       Lab Results   Component Value Date    WBC 6 26 09/10/2019    HGB 13 3 09/10/2019    HCT 40 2 09/10/2019    MCV 91 09/10/2019     09/10/2019       No results found for: TSH    Lab Results   Component Value Date    CHOL 195 05/09/2016     Lab Results   Component Value Date    TRIG 172 (H) 09/10/2019     Lab Results   Component Value Date    HDL 54 09/10/2019     Lab Results Component Value Date    LDLCALC 100 09/10/2019     Lab Results   Component Value Date    HGBA1C 5 6 09/10/2019       Results for orders placed or performed in visit on 03/87/77   Basic metabolic panel   Result Value Ref Range    Sodium 141 136 - 145 mmol/L    Potassium 3 6 3 5 - 5 3 mmol/L    Chloride 109 (H) 100 - 108 mmol/L    CO2 25 21 - 32 mmol/L    ANION GAP 7 4 - 13 mmol/L    BUN 14 5 - 25 mg/dL    Creatinine 0 74 0 60 - 1 30 mg/dL    Glucose, Fasting 95 65 - 99 mg/dL    Calcium 9 3 8 3 - 10 1 mg/dL    eGFR 90 ml/min/1 73sq m       Orders Placed This Encounter   Procedures    Vitamin B12    Magnesium       ALLERGIES:  Allergies   Allergen Reactions    Escitalopram Swelling and Edema     Category: Allergy;     Fluticasone-Salmeterol      UNKNOWN    Nsaids      Action Taken: stomach issues;     Latex      irritation      Other Rash     Adhesive bandages       Current Medications     Current Outpatient Medications   Medication Sig Dispense Refill    Cholecalciferol (VITAMIN D3) 78588 units TABS Take 10,000 Units by mouth daily      Cyanocobalamin (VITAMIN B-12) 500 MCG LOZG Take 500 mcg by mouth every other day       DULoxetine (CYMBALTA) 30 mg delayed release capsule Take 1 capsule (30 mg total) by mouth daily 90 capsule 3    hydrochlorothiazide (HYDRODIURIL) 12 5 mg tablet Take 1 tablet (12 5 mg total) by mouth daily 90 tablet 3    LORazepam (ATIVAN) 0 5 mg tablet Take 0 5 mg by mouth as needed        losartan (COZAAR) 25 mg tablet Take 1 tablet (25 mg total) by mouth daily 30 tablet 3    Magnesium Gluconate 250 MG TABS Take 1 tablet by mouth daily      potassium chloride (K-DUR,KLOR-CON) 10 mEq tablet Take 1 tablet (10 mEq total) by mouth daily 30 tablet 1    zolpidem (AMBIEN CR) 12 5 MG CR tablet Take 1 tablet (12 5 mg total) by mouth daily at bedtime as needed for sleep 30 tablet 0     No current facility-administered medications for this visit            [facility] Maintenance   Topic Date Due    Pneumococcal Vaccine: Pediatrics (0 to 5 Years) and At-Risk Patients (6 to 59 Years) (1 of 1 - PPSV23) 07/21/1968    HIV Screening  07/21/1977    BMI: Followup Plan  07/21/1980    Annual Physical  07/21/1980    PT PLAN OF CARE  03/21/2019    Cervical Cancer Screening  05/12/2019    Influenza Vaccine  10/01/2020 (Originally 7/1/2019)    MAMMOGRAM  09/19/2020    CRC Screening: Colonoscopy  12/04/2020    BMI: Adult  03/11/2021    DTaP,Tdap,and Td Vaccines (2 - Td) 02/20/2027    Pneumococcal Vaccine: 65+ Years (1 of 2 - PCV13) 07/21/2027    Hepatitis C Screening  Completed    HIB Vaccine  Aged Out    Hepatitis B Vaccine  Aged Out    IPV Vaccine  Aged Out    Hepatitis A Vaccine  Aged Out    Meningococcal ACWY Vaccine  Aged Out    HPV Vaccine  Aged Out     Immunization History   Administered Date(s) Administered    Influenza TIV (IM) 02/15/2017    Tdap 02/20/2017       Vicki Rodriguez MD

## 2020-03-17 ENCOUNTER — TELEPHONE (OUTPATIENT)
Dept: FAMILY MEDICINE CLINIC | Facility: CLINIC | Age: 60
End: 2020-03-17

## 2020-03-17 NOTE — TELEPHONE ENCOUNTER
Please let patient know that I have gone ahead and ordered additional blood work including vitamin a and zinc to her other blood work to be done    Thank you

## 2020-03-20 ENCOUNTER — LAB (OUTPATIENT)
Dept: LAB | Facility: CLINIC | Age: 60
End: 2020-03-20
Payer: COMMERCIAL

## 2020-03-20 DIAGNOSIS — L60.9 NAIL ABNORMALITY: ICD-10-CM

## 2020-03-20 LAB
FERRITIN SERPL-MCNC: 80 NG/ML (ref 8–388)
IRON SATN MFR SERPL: 27 %
IRON SERPL-MCNC: 93 UG/DL (ref 50–170)
MAGNESIUM SERPL-MCNC: 1.7 MG/DL (ref 1.6–2.6)
TIBC SERPL-MCNC: 343 UG/DL (ref 250–450)
VIT B12 SERPL-MCNC: 695 PG/ML (ref 100–900)

## 2020-03-20 PROCEDURE — 83735 ASSAY OF MAGNESIUM: CPT

## 2020-03-20 PROCEDURE — 36415 COLL VENOUS BLD VENIPUNCTURE: CPT

## 2020-03-20 PROCEDURE — 82728 ASSAY OF FERRITIN: CPT

## 2020-03-20 PROCEDURE — 84590 ASSAY OF VITAMIN A: CPT

## 2020-03-20 PROCEDURE — 83540 ASSAY OF IRON: CPT

## 2020-03-20 PROCEDURE — 84630 ASSAY OF ZINC: CPT

## 2020-03-20 PROCEDURE — 82607 VITAMIN B-12: CPT

## 2020-03-20 PROCEDURE — 83550 IRON BINDING TEST: CPT

## 2020-03-24 LAB — ZINC SERPL-MCNC: 100 UG/DL (ref 56–134)

## 2020-03-25 LAB — VIT A SERPL-MCNC: 65.2 UG/DL (ref 20.1–62)

## 2020-04-03 ENCOUNTER — TELEPHONE (OUTPATIENT)
Dept: FAMILY MEDICINE CLINIC | Facility: CLINIC | Age: 60
End: 2020-04-03

## 2020-04-03 DIAGNOSIS — R79.89 HIGH SERUM VITAMIN A: Primary | ICD-10-CM

## 2020-05-07 ENCOUNTER — OFFICE VISIT (OUTPATIENT)
Dept: PHYSICAL THERAPY | Facility: CLINIC | Age: 60
End: 2020-05-07
Payer: COMMERCIAL

## 2020-05-07 DIAGNOSIS — M54.50 LUMBAR SPINE PAIN: Primary | ICD-10-CM

## 2020-05-07 PROCEDURE — 97161 PT EVAL LOW COMPLEX 20 MIN: CPT | Performed by: PHYSICAL THERAPIST

## 2020-05-07 PROCEDURE — 97140 MANUAL THERAPY 1/> REGIONS: CPT | Performed by: PHYSICAL THERAPIST

## 2020-05-11 ENCOUNTER — OFFICE VISIT (OUTPATIENT)
Dept: PHYSICAL THERAPY | Facility: CLINIC | Age: 60
End: 2020-05-11
Payer: COMMERCIAL

## 2020-05-11 DIAGNOSIS — M54.50 LUMBAR SPINE PAIN: Primary | ICD-10-CM

## 2020-05-11 PROCEDURE — 97110 THERAPEUTIC EXERCISES: CPT | Performed by: PHYSICAL THERAPIST

## 2020-05-11 PROCEDURE — 97140 MANUAL THERAPY 1/> REGIONS: CPT | Performed by: PHYSICAL THERAPIST

## 2020-05-13 ENCOUNTER — OFFICE VISIT (OUTPATIENT)
Dept: PHYSICAL THERAPY | Facility: CLINIC | Age: 60
End: 2020-05-13
Payer: COMMERCIAL

## 2020-05-13 DIAGNOSIS — M54.50 LUMBAR SPINE PAIN: Primary | ICD-10-CM

## 2020-05-13 PROCEDURE — 97140 MANUAL THERAPY 1/> REGIONS: CPT | Performed by: PHYSICAL THERAPIST

## 2020-05-13 PROCEDURE — 97110 THERAPEUTIC EXERCISES: CPT | Performed by: PHYSICAL THERAPIST

## 2020-05-18 ENCOUNTER — OFFICE VISIT (OUTPATIENT)
Dept: PHYSICAL THERAPY | Facility: CLINIC | Age: 60
End: 2020-05-18
Payer: COMMERCIAL

## 2020-05-18 DIAGNOSIS — M54.50 LUMBAR SPINE PAIN: Primary | ICD-10-CM

## 2020-05-18 PROCEDURE — 97110 THERAPEUTIC EXERCISES: CPT | Performed by: PHYSICAL THERAPIST

## 2020-05-18 PROCEDURE — 97140 MANUAL THERAPY 1/> REGIONS: CPT | Performed by: PHYSICAL THERAPIST

## 2020-05-20 ENCOUNTER — OFFICE VISIT (OUTPATIENT)
Dept: PHYSICAL THERAPY | Facility: CLINIC | Age: 60
End: 2020-05-20
Payer: COMMERCIAL

## 2020-05-20 DIAGNOSIS — M54.50 LUMBAR SPINE PAIN: Primary | ICD-10-CM

## 2020-05-20 PROCEDURE — 97140 MANUAL THERAPY 1/> REGIONS: CPT | Performed by: PHYSICAL THERAPIST

## 2020-05-20 PROCEDURE — 97110 THERAPEUTIC EXERCISES: CPT | Performed by: PHYSICAL THERAPIST

## 2020-05-26 ENCOUNTER — APPOINTMENT (OUTPATIENT)
Dept: PHYSICAL THERAPY | Facility: CLINIC | Age: 60
End: 2020-05-26
Payer: COMMERCIAL

## 2020-06-01 ENCOUNTER — OFFICE VISIT (OUTPATIENT)
Dept: PHYSICAL THERAPY | Facility: CLINIC | Age: 60
End: 2020-06-01
Payer: COMMERCIAL

## 2020-06-01 DIAGNOSIS — M54.50 LUMBAR SPINE PAIN: Primary | ICD-10-CM

## 2020-06-01 PROCEDURE — 97110 THERAPEUTIC EXERCISES: CPT | Performed by: PHYSICAL THERAPIST

## 2020-06-03 ENCOUNTER — OFFICE VISIT (OUTPATIENT)
Dept: PHYSICAL THERAPY | Facility: CLINIC | Age: 60
End: 2020-06-03
Payer: COMMERCIAL

## 2020-06-03 DIAGNOSIS — M54.50 LUMBAR SPINE PAIN: Primary | ICD-10-CM

## 2020-06-03 PROCEDURE — 97110 THERAPEUTIC EXERCISES: CPT | Performed by: PHYSICAL THERAPIST

## 2020-06-03 PROCEDURE — 97112 NEUROMUSCULAR REEDUCATION: CPT | Performed by: PHYSICAL THERAPIST

## 2020-06-08 ENCOUNTER — OFFICE VISIT (OUTPATIENT)
Dept: PHYSICAL THERAPY | Facility: CLINIC | Age: 60
End: 2020-06-08
Payer: COMMERCIAL

## 2020-06-08 DIAGNOSIS — M54.50 LUMBAR SPINE PAIN: Primary | ICD-10-CM

## 2020-06-08 PROCEDURE — 97110 THERAPEUTIC EXERCISES: CPT | Performed by: PHYSICAL THERAPIST

## 2020-06-08 PROCEDURE — 97112 NEUROMUSCULAR REEDUCATION: CPT | Performed by: PHYSICAL THERAPIST

## 2020-06-10 ENCOUNTER — OFFICE VISIT (OUTPATIENT)
Dept: PHYSICAL THERAPY | Facility: CLINIC | Age: 60
End: 2020-06-10
Payer: COMMERCIAL

## 2020-06-10 DIAGNOSIS — M54.50 LUMBAR SPINE PAIN: Primary | ICD-10-CM

## 2020-06-10 PROCEDURE — 97110 THERAPEUTIC EXERCISES: CPT | Performed by: PHYSICAL THERAPIST

## 2020-06-10 PROCEDURE — 97112 NEUROMUSCULAR REEDUCATION: CPT | Performed by: PHYSICAL THERAPIST

## 2020-06-15 ENCOUNTER — OFFICE VISIT (OUTPATIENT)
Dept: PHYSICAL THERAPY | Facility: CLINIC | Age: 60
End: 2020-06-15
Payer: COMMERCIAL

## 2020-06-15 DIAGNOSIS — M54.50 LUMBAR SPINE PAIN: Primary | ICD-10-CM

## 2020-06-15 PROCEDURE — 97112 NEUROMUSCULAR REEDUCATION: CPT

## 2020-06-15 PROCEDURE — 97110 THERAPEUTIC EXERCISES: CPT

## 2020-06-17 ENCOUNTER — OFFICE VISIT (OUTPATIENT)
Dept: PHYSICAL THERAPY | Facility: CLINIC | Age: 60
End: 2020-06-17
Payer: COMMERCIAL

## 2020-06-17 DIAGNOSIS — M54.50 LUMBAR SPINE PAIN: Primary | ICD-10-CM

## 2020-06-17 PROCEDURE — 97112 NEUROMUSCULAR REEDUCATION: CPT | Performed by: PHYSICAL THERAPIST

## 2020-06-17 PROCEDURE — 97110 THERAPEUTIC EXERCISES: CPT | Performed by: PHYSICAL THERAPIST

## 2020-06-22 ENCOUNTER — OFFICE VISIT (OUTPATIENT)
Dept: PHYSICAL THERAPY | Facility: CLINIC | Age: 60
End: 2020-06-22
Payer: COMMERCIAL

## 2020-06-22 DIAGNOSIS — M54.50 LUMBAR SPINE PAIN: Primary | ICD-10-CM

## 2020-06-22 PROCEDURE — 97110 THERAPEUTIC EXERCISES: CPT | Performed by: PHYSICAL THERAPIST

## 2020-06-22 PROCEDURE — 97112 NEUROMUSCULAR REEDUCATION: CPT | Performed by: PHYSICAL THERAPIST

## 2020-06-24 ENCOUNTER — OFFICE VISIT (OUTPATIENT)
Dept: PHYSICAL THERAPY | Facility: CLINIC | Age: 60
End: 2020-06-24
Payer: COMMERCIAL

## 2020-06-24 DIAGNOSIS — M54.50 LUMBAR SPINE PAIN: Primary | ICD-10-CM

## 2020-06-24 PROCEDURE — 97110 THERAPEUTIC EXERCISES: CPT | Performed by: PHYSICAL THERAPIST

## 2020-06-24 PROCEDURE — 97112 NEUROMUSCULAR REEDUCATION: CPT | Performed by: PHYSICAL THERAPIST

## 2020-06-29 ENCOUNTER — OFFICE VISIT (OUTPATIENT)
Dept: PHYSICAL THERAPY | Facility: CLINIC | Age: 60
End: 2020-06-29
Payer: COMMERCIAL

## 2020-06-29 DIAGNOSIS — M54.50 LUMBAR SPINE PAIN: Primary | ICD-10-CM

## 2020-06-29 PROCEDURE — 97110 THERAPEUTIC EXERCISES: CPT | Performed by: PHYSICAL THERAPIST

## 2020-06-29 PROCEDURE — 97112 NEUROMUSCULAR REEDUCATION: CPT | Performed by: PHYSICAL THERAPIST

## 2020-07-01 ENCOUNTER — OFFICE VISIT (OUTPATIENT)
Dept: PHYSICAL THERAPY | Facility: CLINIC | Age: 60
End: 2020-07-01
Payer: COMMERCIAL

## 2020-07-01 DIAGNOSIS — M54.50 LUMBAR SPINE PAIN: Primary | ICD-10-CM

## 2020-07-01 PROCEDURE — 97110 THERAPEUTIC EXERCISES: CPT

## 2020-07-01 PROCEDURE — 97112 NEUROMUSCULAR REEDUCATION: CPT

## 2020-07-01 NOTE — PROGRESS NOTES
Daily Note     Today's date: 2020  Patient name: Raven Feliciano  : 1960  MRN: [id number]  Referring provider: Marlene Majano, PT  Dx:   Encounter Diagnosis     ICD-10-CM    1  Lumbar spine pain M54 5                   Subjective: Patient states, "I feel like I got hit by a truck "  Patient complains her back is bothering her more today - patient states, "I even feel it in my toes "  Patient attributes these symptoms to performing increased repetitions throughout therapeutic exercise program last visit  Objective: See treatment diary below  Assessment: Decreased repetitions for several therapeutic exercises as noted - patient tolerates modified therapeutic exercise program well  Relief of back pain reported with therapeutic laser treatment  Plan: Continue treatment as per PT plan of care         Precautions: OA, chronic lumbar pain    Manuals 6/22 6/24 6/29 7/1   6/3 6/8 6/10 6/15                             laser 5'  5' 5'   5' 5' 5' 5'                Neuro Re-Ed          TaA  TaA           TaA glute set  5"x15 5"x20          TaA bridge 5"x20 with ball squeeze 5"x20 5"x20          Prone TaA hip ir/er             TaA prone hip ext             TaA ball squeeze seated on ball 5"x20 5"x20 5"x20 seated on pball  5"x20   seated 5"x30 5"x30 5"x30 seated on pball  5"x20   TaA clamshell blue tb x30 seated Blue tb x30 Blue x30 blue  20   seated blue tb 5"x20 Blue tb 5"x20 blue tb 30 seated seated  blue tb 30   TaA  20 20    20 20 20    TaA alt UE 20 20 20    20 20 20    TaA alt UE/LE pball 20      20 20 20 seated on pball   TaA shoulder flexion       y tb x20 R tb x20     TaA LPD green x30 Green x30 red x30 red  20   y tb x20 R tb x20 r tb x20 red tb  20   Step outs green x20 Green x20 Green x20 red  20   y tb x10ea r tb x20 r tb x20 red tb  20   pball seated  20 20 20   10 20 20 20   pball shoulder flexion 1#x20 1#x20 1#x20 1#  20    2#x10 2#x10 2#  20   TaA rockerboard M/l 20 20 30 M/L  20         Leg press 20#x20 20#x20 20#x20 20#  20         Ther Ex             Repeated ext standing             rows green tb x20 Green tb 5"x20 Green x20 red  20         ltr np 5"x20           Gluteal stretch  15"x4           clamshells             Prone IR ball squeeze                                       Ther Activity             Recumbent bike np       8'  5'                Gait Training                                       Modalities             MHP prn             CP post tx

## 2020-07-06 ENCOUNTER — OFFICE VISIT (OUTPATIENT)
Dept: PHYSICAL THERAPY | Facility: CLINIC | Age: 60
End: 2020-07-06
Payer: COMMERCIAL

## 2020-07-06 DIAGNOSIS — M54.50 LUMBAR SPINE PAIN: Primary | ICD-10-CM

## 2020-07-06 PROCEDURE — 97112 NEUROMUSCULAR REEDUCATION: CPT

## 2020-07-06 PROCEDURE — 97110 THERAPEUTIC EXERCISES: CPT

## 2020-07-06 NOTE — PROGRESS NOTES
Daily Note     Today's date: 2020  Patient name: Romy Keys  : 1960  MRN: [id number]  Referring provider: Norm Barnes, PT  Dx:   Encounter Diagnosis     ICD-10-CM    1  Lumbar spine pain M54 5                   Subjective: Patient states, "I still have my pain in the mornings but it usually works itself out after a few hours "    Objective: See treatment diary below  Assessment: Patient is very comfortable performing the stationary bike today after not performing this for several visits  Therapeutic exercise program is tolerated well without complaints  Plan: Continue treatment as per PT plan of care         Precautions: OA, chronic lumbar pain    Manuals 6/22 6/24 6/29 7/1 7/6   6/8 6/10 6/15                             laser 5'  5' 5' 5'   5' 5' 5'                Neuro Re-Ed         TaA  TaA           TaA glute set  5"x15 5"x20          TaA bridge 5"x20 with ball squeeze 5"x20 5"x20          Prone TaA hip ir/er             TaA prone hip ext             TaA ball squeeze seated on ball 5"x20 5"x20 5"x20 seated on pball  5"x20 seated on pball  5"x30   5"x30 5"x30 seated on pball  5"x20   TaA clamshell blue tb x30 seated Blue tb x30 Blue x30 blue  20 blue  20   Blue tb 5"x20 blue tb 30 seated seated  blue tb 30   TaA  20 20     20 20    TaA alt UE 20 20 20     20 20    TaA alt UE/LE pball 20       20 20 seated on pball   TaA shoulder flexion        R tb x20     TaA LPD green x30 Green x30 red x30 red  20 red  20   R tb x20 r tb x20 red tb  20   Step outs green x20 Green x20 Green x20 red  20 red  20    r tb x20 r tb x20 red tb  20   pball seated  20 20 20 20   20 20 20   pball shoulder flexion 1#x20 1#x20 1#x20 1#  20 1#  20   2#x10 2#x10 2#  20   TaA rockerboard M/l 20 20 30 M/L  20 M/L  20        Leg press 20#x20 20#x20 20#x20 20#  20 20#  30        Ther Ex             Repeated ext standing             rows green tb x20 Green tb 5"x20 Green x20 red  20 red  20        ltr np 5"x20           Gluteal stretch  15"x4           clamshells             Prone IR ball squeeze                                       Ther Activity             Recumbent bike np    sci fit  5'   8'  5'                Gait Training                                       Modalities             MHP prn             CP post tx

## 2020-07-08 ENCOUNTER — OFFICE VISIT (OUTPATIENT)
Dept: PHYSICAL THERAPY | Facility: CLINIC | Age: 60
End: 2020-07-08
Payer: COMMERCIAL

## 2020-07-08 DIAGNOSIS — M54.50 LUMBAR SPINE PAIN: Primary | ICD-10-CM

## 2020-07-08 PROCEDURE — 97112 NEUROMUSCULAR REEDUCATION: CPT

## 2020-07-08 PROCEDURE — 97110 THERAPEUTIC EXERCISES: CPT

## 2020-07-08 NOTE — PROGRESS NOTES
Daily Note     Today's date: 2020  Patient name: Mele Tyler  : 1960  MRN: [id number]  Referring provider: Keanu Parker, PT  Dx:   Encounter Diagnosis     ICD-10-CM    1  Lumbar spine pain M54 5                   Subjective: Patient reports her back is feeling good today - patient states, "After the last visit was the best I've felt "    Objective: See treatment diary below  Assessment: Therapeutic exercise program is tolerated well with the exception of performing step outs when patient reports feeling slightly dizzy  Balance is improving during medial/lateral rockerboard exercise  Plan: Continue treatment as per PT plan of care         Precautions: OA, chronic lumbar pain    Manuals 6/22 6/24 6/29 7/1 7/6 7/8   6/10 6/15                             laser 5'  5' 5' 5' 5'   5' 5'                Neuro Re-Ed        TaA  TaA           TaA glute set  5"x15 5"x20          TaA bridge 5"x20 with ball squeeze 5"x20 5"x20          Prone TaA hip ir/er             TaA prone hip ext             TaA ball squeeze seated on ball 5"x20 5"x20 5"x20 seated on pball  5"x20 seated on pball  5"x30 seated on pball  5"x30   5"x30 seated on pball  5"x20   TaA clamshell blue tb x30 seated Blue tb x30 Blue x30 blue  20 blue  20 blue  30   blue tb 30 seated seated  blue tb 30   TaA  20 20      20    TaA alt UE 20 20 20      20    TaA alt UE/LE pball 20        20 seated on pball   TaA shoulder flexion             TaA LPD green x30 Green x30 red x30 red  20 red  20 red  30   r tb x20 red tb  20   Step outs green x20 Green x20 Green x20 red  20 red  20  red  20   r tb x20 red tb  20   pball seated  20 20 20 20 30   20 20   pball shoulder flexion 1#x20 1#x20 1#x20 1#  20 1#  20 1#  30   2#x10 2#  20   TaA rockerboard M/l 20 20 30 M/L  20 M/L  20 M/L  30       Leg press 20#x20 20#x20 20#x20 20#  20 20#  30 20#  30       Ther Ex             Repeated ext standing             rows green tb x20 Green tb 5"x20 Green x20 red  20 red  20 red  30       ltr np 5"x20           Gluteal stretch  15"x4           clamshells             Prone IR ball squeeze                                       Ther Activity             Recumbent bike np    sci fit  5' sci fit  5'  8'  5'                Gait Training                                       Modalities             MHP prn             CP post tx

## 2020-07-09 ENCOUNTER — TRANSCRIBE ORDERS (OUTPATIENT)
Dept: ADMINISTRATIVE | Facility: HOSPITAL | Age: 60
End: 2020-07-09

## 2020-07-09 DIAGNOSIS — G47.33 OBSTRUCTIVE SLEEP APNEA (ADULT) (PEDIATRIC): Primary | ICD-10-CM

## 2020-07-13 ENCOUNTER — OFFICE VISIT (OUTPATIENT)
Dept: SLEEP CENTER | Facility: CLINIC | Age: 60
End: 2020-07-13
Payer: COMMERCIAL

## 2020-07-13 VITALS
WEIGHT: 174.2 LBS | SYSTOLIC BLOOD PRESSURE: 123 MMHG | BODY MASS INDEX: 32.06 KG/M2 | HEIGHT: 62 IN | DIASTOLIC BLOOD PRESSURE: 80 MMHG | TEMPERATURE: 98 F

## 2020-07-13 DIAGNOSIS — M79.7 FIBROMYALGIA: ICD-10-CM

## 2020-07-13 DIAGNOSIS — E66.9 OBESITY (BMI 30-39.9): ICD-10-CM

## 2020-07-13 DIAGNOSIS — R53.82 CHRONIC FATIGUE SYNDROME: ICD-10-CM

## 2020-07-13 DIAGNOSIS — J45.20 MILD INTERMITTENT ASTHMA WITHOUT COMPLICATION: ICD-10-CM

## 2020-07-13 DIAGNOSIS — F41.9 ANXIETY AND DEPRESSION: ICD-10-CM

## 2020-07-13 DIAGNOSIS — F32.A ANXIETY AND DEPRESSION: ICD-10-CM

## 2020-07-13 DIAGNOSIS — G47.33 OBSTRUCTIVE SLEEP APNEA (ADULT) (PEDIATRIC): Primary | ICD-10-CM

## 2020-07-13 DIAGNOSIS — G47.00 INSOMNIA, UNSPECIFIED TYPE: ICD-10-CM

## 2020-07-13 PROCEDURE — 1036F TOBACCO NON-USER: CPT | Performed by: INTERNAL MEDICINE

## 2020-07-13 PROCEDURE — 3079F DIAST BP 80-89 MM HG: CPT | Performed by: INTERNAL MEDICINE

## 2020-07-13 PROCEDURE — 99214 OFFICE O/P EST MOD 30 MIN: CPT | Performed by: INTERNAL MEDICINE

## 2020-07-13 PROCEDURE — 3008F BODY MASS INDEX DOCD: CPT | Performed by: INTERNAL MEDICINE

## 2020-07-13 PROCEDURE — 3074F SYST BP LT 130 MM HG: CPT | Performed by: INTERNAL MEDICINE

## 2020-07-13 RX ORDER — MELATONIN 200 MCG
TABLET ORAL
COMMUNITY
End: 2021-09-02 | Stop reason: ALTCHOICE

## 2020-07-13 NOTE — PROGRESS NOTES
Review of Systems      Genitourinary none   Cardiology none   Gastrointestinal frequent heartburn/acid reflux   Neurology muscle weakness and difficulty with memory   Constitutional fatigue and weight change   Integumentary itching   Psychiatry none   Musculoskeletal joint pain, muscle aches, back pain, sciatica and leg cramps   Pulmonary none   ENT throat clearing and ringing in ears   Endocrine none   Hematological none

## 2020-07-13 NOTE — PROGRESS NOTES
Follow-up Note - Sleep Center   Sandra Stephens  64 y o  female  :1960  IQQ:[id number]    I saw Kenney Seymour in sleep clinic today for her sleep complaints & comorbidities  Salts of prior studies:  Home sleep testing in 2018 demonstrated a respiratory event index of 8 9 per hour  Minimum oxygen saturation was 81%  Snore index was 17 3%  The preceding therapeutic study demonstrated sleep disordered breathing was sufficiently remediated with nasal CPAP at 8 cm H2O  She was observed during stage REM but not while supine  PFSH, Problem List, Medications & Allergies were reviewed in EMR  Interval changes: none reported  She  has a past medical history of Anemia, Anxiety and depression, Asthma (10/26/2012), Chronic fatigue syndrome, CPAP (continuous positive airway pressure) dependence, DDD (degenerative disc disease), lumbosacral (2018), Patrick-Barr virus seropositivity, Fibromyalgia, primary, JA (generalized anxiety disorder), Gluten intolerance, Hypertension, Hypovitaminosis D, Impaired fasting glucose, Lipodystrophy, Liver lesion, Migraine, Obesity (BMI 30-39 9) (2018), and Sleep apnea  She has a current medication list which includes the following prescription(s): vitamin d3, vitamin b-12, duloxetine, hydrochlorothiazide, losartan, magnesium gluconate, melatonin, and potassium chloride  ROS: constitutional, psychiatric, ENT, respiratory,CVS, GI, UGS, CNS, MSK, integumentary, endocrine, hematological reviewed  Significant for acid reflux, difficulty memory, muscle weakness, weight change, fatigue, leg cramps, sciatica  She feels mood is stable on current medication  HPI: Received her last APAP 1 year ago  Compliance data shows use for more than 4 hours 100% of the time  Respiratory event index is 4 per hour at 90th percentile pressure of 9 6 cm H2O  Sleep Routine:  She is spending around 9 hours in bed    She uses melatonin as a sleep aid because she has difficulty both initiating and maintaining sleep  She is not always refreshed on awakening  [She has occasional   excessive drowsiness and rated herself at Total score: 1212 /24 on the [place] sleepiness scale ]    Denies taking naps during the day    Habits:  reports that she has never smoked  She has never used smokeless tobacco  She reports that she drinks alcohol  She reports that she does not use drugs  She uses limited amount of caffeine  She does not exercise regularly except for walking at work  EXAM: /80   Temp 98 °F (36 7 °C)   Ht 5' 2" (1 575 m)   Wt 79 kg (174 lb 3 2 oz)   BMI 31 86 kg/m²     Patient is well groomed; well appearing  Skin/Extrem: warm & dry; col & hydration normal; no edema  Psych: cooperativeand in no distress  Mental state appears normal   CNS: Alert, orientated, clear & coherent speech  H&N: EOMI; NC/AT:no facial pressure marks, no rashes  ENMT Mucosa appearsnormal Nasal airway:patent  Oral airway:  crowded  Resp:effort is normal CVS: RRR ABD: truncal obesity MSK:Gait normal     IMPRESSION: Primary Sleep/Secondary(to Medical or Psych conditions) & comorbidities   1  Obstructive sleep apnea (adult) (pediatric)  Ambulatory referral to Sleep Medicine    PAP DME Pressure Change    PAP DME Resupply/Reorder   2  Insomnia, unspecified type     3  Fibromyalgia     4  Anxiety and depression     5  Chronic fatigue syndrome     6  Mild intermittent asthma without complication     7  Obesity (BMI 30-39  9)         PLAN:    1  I reviewed results of the Sleep studies with the patient  2  With respect to above conditions, I counseled on pathophysiology, diagnosis, treatment options, risks and benefits; inter-relationship and effects on symptoms and comorbidities; risks of no treatment; costs and insurance aspects  3  Patient elected positive airway pressure therapy and care coordinated with the DME provider for set up    4  Need for compliance with therapy and weight reduction were emphasized  5  Cognitive behavioral therapy was initiated, Sleep Hygiene and behavioral techniques to manage Insomnia were discussed  6  Follow-up to be scheduled in 1 year or sooner if needed to monitor compliance, progress and to adjust therapy  Thank you for allowing me to participate in the care of this patient  I will keep you apprised of developments      Sincerely,    Authenticated electronically by Adam Blancas MD on 81/79/45   Board Certified Specialist

## 2020-07-13 NOTE — PATIENT INSTRUCTIONS

## 2020-07-14 ENCOUNTER — TELEPHONE (OUTPATIENT)
Dept: SLEEP CENTER | Facility: CLINIC | Age: 60
End: 2020-07-14

## 2020-07-14 DIAGNOSIS — F41.9 ANXIETY AND DEPRESSION: ICD-10-CM

## 2020-07-14 DIAGNOSIS — F32.A ANXIETY AND DEPRESSION: ICD-10-CM

## 2020-07-14 DIAGNOSIS — M79.7 FIBROMYALGIA: ICD-10-CM

## 2020-07-14 RX ORDER — DULOXETIN HYDROCHLORIDE 30 MG/1
CAPSULE, DELAYED RELEASE ORAL
Qty: 90 CAPSULE | Refills: 3 | Status: SHIPPED | OUTPATIENT
Start: 2020-07-14 | End: 2021-07-19

## 2020-07-14 RX ORDER — DULOXETIN HYDROCHLORIDE 30 MG/1
CAPSULE, DELAYED RELEASE ORAL
Qty: 90 CAPSULE | Refills: 3 | Status: SHIPPED | OUTPATIENT
Start: 2020-07-14 | End: 2020-07-14

## 2020-07-20 ENCOUNTER — OFFICE VISIT (OUTPATIENT)
Dept: PHYSICAL THERAPY | Facility: CLINIC | Age: 60
End: 2020-07-20
Payer: COMMERCIAL

## 2020-07-20 DIAGNOSIS — M54.50 LUMBAR SPINE PAIN: Primary | ICD-10-CM

## 2020-07-20 PROCEDURE — 97110 THERAPEUTIC EXERCISES: CPT

## 2020-07-22 ENCOUNTER — OFFICE VISIT (OUTPATIENT)
Dept: PHYSICAL THERAPY | Facility: CLINIC | Age: 60
End: 2020-07-22
Payer: COMMERCIAL

## 2020-07-22 DIAGNOSIS — M54.50 LUMBAR SPINE PAIN: Primary | ICD-10-CM

## 2020-07-22 PROCEDURE — 97110 THERAPEUTIC EXERCISES: CPT | Performed by: PHYSICAL THERAPIST

## 2020-07-22 PROCEDURE — 97112 NEUROMUSCULAR REEDUCATION: CPT | Performed by: PHYSICAL THERAPIST

## 2020-07-22 NOTE — PROGRESS NOTES
Daily Note     Today's date: 2020  Patient name: Mele Tyler  : 1960  MRN: [id number]  Referring provider: Keanu Parker, PT  Dx:   Encounter Diagnosis     ICD-10-CM    1  Lumbar spine pain M54 5                   Subjective: Pt reports "I almost feel normal" with min pain upon arrival to Pt  Objective: See treatment diary below  Assessment: Increased reps and resistance as listed, pt conitnues to fatigue quickly with step outs  Plan: Continue treatment as per PT plan of care         Precautions: OA, chronic lumbar pain    Manuals                                laser 5'  5' 5' 5' 5' 5' 5'                  Neuro Re-Ed      TaA  TaA           TaA glute set  5"x15 5"x20          TaA bridge 5"x20 with ball squeeze 5"x20 5"x20          Prone TaA hip ir/er             TaA prone hip ext             TaA ball squeeze seated on ball 5"x20 5"x20 5"x20 seated on pball  5"x20 seated on pball  5"x30 seated on pball  5"x30 seated on pball  5"x30 5"x30     TaA clamshell blue tb x30 seated Blue tb x30 Blue x30 blue  20 blue  20 blue  30 blue  30 Black x20     TaA  20 20          TaA alt UE 20 20 20          TaA alt UE/LE pball 20            TaA shoulder flexion             TaA LPD green x30 Green x30 red x30 red  20 red  20 red  30 green  20 Green x20     Step outs green x20 Green x20 Green x20 red  20 red  20  red  20 green  20 Green x20     pball seated  20 20 20 20 30 30 Alt ue/le x30     pball shoulder flexion 1#x20 1#x20 1#x20 1#  20 1#  20 1#  30 1#  30 1#x30     TaA rockerboard M/l 20 20 30 M/L  20 M/L  20 M/L  30 M/L  30 M/l x30     Leg press 20#x20 20#x20 20#x20 20#  20 20#  30 20#  30 20#  30 20#x30     Ther Ex             Repeated ext standing             rows green tb x20 Green tb 5"x20 Green x20 red  20 red  20 red  30 green  20 Green x20     ltr np 5"x20           Gluteal stretch  15"x4           clamshells Prone IR ball squeeze                                       Ther Activity             Recumbent bike np    sci fit  5' sci fit  5' sci fit  7' sci fit 8'                  Gait Training                                       Modalities             MHP prn             CP post tx

## 2020-07-27 ENCOUNTER — OFFICE VISIT (OUTPATIENT)
Dept: PHYSICAL THERAPY | Facility: CLINIC | Age: 60
End: 2020-07-27
Payer: COMMERCIAL

## 2020-07-27 DIAGNOSIS — M54.50 LUMBAR SPINE PAIN: Primary | ICD-10-CM

## 2020-07-27 PROCEDURE — 97110 THERAPEUTIC EXERCISES: CPT

## 2020-07-27 NOTE — PROGRESS NOTES
Daily Note     Today's date: 2020  Patient name: Lillian Avila  : 1960  MRN: [id number]  Referring provider: Adrien Wilson, PT  Dx:   Encounter Diagnosis     ICD-10-CM    1  Lumbar spine pain M54 5                   Subjective: Patient reports her back and right shoulder are feeling more sore today for an unknown reason  Patient also reports she is fatigued - reports she slept through her alarm this morning  Objective: See treatment diary below  Assessment: Therapeutic exercise program is tolerated well  Back pain is slightly improved with therapeutic laser treatment  Plan: Continue treatment as per PT plan of care         Precautions: OA, chronic lumbar pain    Manuals                               laser 5'  5' 5' 5' 5' 5' 5' 5'                 Neuro Re-Ed     TaA  TaA           TaA glute set  5"x15 5"x20          TaA bridge 5"x20 with ball squeeze 5"x20 5"x20          Prone TaA hip ir/er             TaA prone hip ext             TaA ball squeeze seated on ball 5"x20 5"x20 5"x20 seated on pball  5"x20 seated on pball  5"x30 seated on pball  5"x30 seated on pball  5"x30 5"x30 seated on pball  5"x30    TaA clamshell blue tb x30 seated Blue tb x30 Blue x30 blue  20 blue  20 blue  30 blue  30 Black x20 seated on pball  black  30    TaA  20          TaA alt UE 20 20 20          TaA alt UE/LE pball 20            TaA shoulder flexion             TaA LPD green x30 Green x30 red x30 red  20 red  20 red  30 green  20 Green x20 green  20    Step outs green x20 Green x20 Green x20 red  20 red  20  red  20 green  20 Green x20 with press out facing the mirror  red  20    pball seated  20 20 20 20 30 30 Alt ue/le x30 alt ue/le   30    pball shoulder flexion 1#x20 1#x20 1#x20 1#  20 1#  20 1#  30 1#  30 1#x30 1#  20    TaA rockerboard M/l 20 20 30 M/L  20 M/L  20 M/L  30 M/L  30 M/l x30 M/L  30    Leg press 20#x20 20#x20 20#x20 20#  20 20#  30 20#  30 20#  30 20#x30 20#  30    Ther Ex             Repeated ext standing             rows green tb x20 Green tb 5"x20 Green x20 red  20 red  20 red  30 green  20 Green x20 green  20    ltr np 5"x20           Gluteal stretch  15"x4           clamshells             Prone IR ball squeeze                                       Ther Activity             Recumbent bike np    sci fit  5' sci fit  5' sci fit  7' sci fit 8' sci fit  7'                 Gait Training                                       Modalities             MHP prn             CP post tx

## 2020-07-29 ENCOUNTER — OFFICE VISIT (OUTPATIENT)
Dept: PHYSICAL THERAPY | Facility: CLINIC | Age: 60
End: 2020-07-29
Payer: COMMERCIAL

## 2020-07-29 DIAGNOSIS — M54.50 LUMBAR SPINE PAIN: Primary | ICD-10-CM

## 2020-07-29 PROCEDURE — 97110 THERAPEUTIC EXERCISES: CPT

## 2020-07-29 NOTE — PROGRESS NOTES
Daily Note     Today's date: 2020  Patient name: Clau Escobar  : 1960  MRN: [id number]  Referring provider: Patricia Soler, PT  Dx:   Encounter Diagnosis     ICD-10-CM    1  Lumbar spine pain M54 5                   Subjective: Patient reports "I have good and bad days "  Patient reports she has been sitting on the hard concrete floor for 2 days working - complains bilateral hips hurt because of this  Patient reports bilateral shoulder pain "is not as bad today "    Objective: See treatment diary below  Assessment: Therapeutic exercise program is tolerated well  Balance improved when performing medial/lateral rockerboard exercise  Plan: Continue treatment as per PT plan of care         Precautions: OA, chronic lumbar pain    Manuals                              laser 5'  5' 5' 5' 5' 5' 5' 5' 5'                Neuro Re-Ed    TaA  TaA           TaA glute set  5"x15 5"x20          TaA bridge 5"x20 with ball squeeze 5"x20 5"x20          Prone TaA hip ir/er             TaA prone hip ext             TaA ball squeeze seated on ball 5"x20 5"x20 5"x20 seated on pball  5"x20 seated on pball  5"x30 seated on pball  5"x30 seated on pball  5"x30 5"x30 seated on pball  5"x30 seated on pball  5"x30   TaA clamshell blue tb x30 seated Blue tb x30 Blue x30 blue  20 blue  20 blue  30 blue  30 Black x20 seated on pball  black  30 seated on pball  black  30   TaA  20          TaA alt UE 20 20 20          TaA alt UE/LE pball 20            TaA shoulder flexion             TaA LPD green x30 Green x30 red x30 red  20 red  20 red  30 green  20 Green x20 green  20 green  20   Step outs green x20 Green x20 Green x20 red  20 red  20  red  20 green  20 Green x20 with press out facing the mirror  red  20 red  20   pball seated  20 20 20 30 30 Alt ue/le x30 alt ue/le   30 alt ue/le   30   pball shoulder flexion 1#x20 1#x20 1#x20 1#  20 1#  20 1#  30 1#  30 1#x30 1#  20 1#  20   TaA rockerboard M/l 20 20 30 M/L  20 M/L  20 M/L  30 M/L  30 M/l x30 M/L  30 M/L  30   Leg press 20#x20 20#x20 20#x20 20#  20 20#  30 20#  30 20#  30 20#x30 20#  30 20#  30   Ther Ex             Repeated ext standing             rows green tb x20 Green tb 5"x20 Green x20 red  20 red  20 red  30 green  20 Green x20 green  20 blue  20   ltr np 5"x20           Gluteal stretch  15"x4           clamshells             Prone IR ball squeeze                                       Ther Activity             Recumbent bike np    sci fit  5' sci fit  5' sci fit  7' sci fit 8' sci fit  7' sci fit  7'                Gait Training                                       Modalities             MHP prn             CP post tx

## 2020-08-03 ENCOUNTER — OFFICE VISIT (OUTPATIENT)
Dept: PHYSICAL THERAPY | Facility: CLINIC | Age: 60
End: 2020-08-03
Payer: COMMERCIAL

## 2020-08-03 DIAGNOSIS — M54.50 LUMBAR SPINE PAIN: Primary | ICD-10-CM

## 2020-08-03 PROCEDURE — 97110 THERAPEUTIC EXERCISES: CPT

## 2020-08-03 NOTE — PROGRESS NOTES
Daily Note     Today's date: 8/3/2020  Patient name: Leslee Castillo  : 1960  MRN: [id number]  Referring provider: Magnolia Tai, PT  Dx:   Encounter Diagnosis     ICD-10-CM    1  Lumbar spine pain  M54 5                   Subjective: Patient reports her back is feeling stiff today however "it's not too bad "  Patient reports she hurt her right hand while changing a tire over the weekend - some light bruising noted around her knuckles  Objective: See treatment diary below  Assessment: Therapeutic exercise program is tolerated well without complaints  Plan: Continue treatment as per PT plan of care         Precautions: OA, chronic lumbar pain    Manuals 8/3    7/6 7/8 7/20 7/22 7/27 7/29                             laser 5'    5' 5' 5' 5' 5' 5'                Neuro Re-Ed 8/3    7/6 7/8 7/20 7/22 7/27 7/29   TaA             TaA glute set             TaA bridge             Prone TaA hip ir/er             TaA prone hip ext             TaA ball squeeze seated on pball  5"x30    seated on pball  5"x30 seated on pball  5"x30 seated on pball  5"x30 5"x30 seated on pball  5"x30 seated on pball  5"x30   TaA clamshell seated on pball  black  30    blue  20 blue  30 blue  30 Black x20 seated on pball  black  30 seated on pball  black  30   TaA march             TaA alt UE             TaA alt UE/LE             TaA shoulder flexion             TaA LPD green  20    red  20 red  30 green  20 Green x20 green  20 green  20   Step outs red  20    red  20  red  20 green  20 Green x20 with press out facing the mirror  red  20 red  20   pball seated march alt ue/le   30    20 30 30 Alt ue/le x30 alt ue/le   30 alt ue/le   30   pball shoulder flexion 1#  20    1#  20 1#  30 1#  30 1#x30 1#  20 1#  20   TaA rockerboard M/L  30    M/L  20 M/L  30 M/L  30 M/l x30 M/L  30 M/L  30   Leg press 20#  30    20#  30 20#  30 20#  30 20#x30 20#  30 20#  30   Ther Ex             Repeated ext standing             rows blue  20 red  20 red  30 green  20 Green x20 green  20 blue  20   ltr             Gluteal stretch             clamshells             Prone IR ball squeeze                                       Ther Activity             Recumbent bike sci fit  7'    sci fit  5' sci fit  5' sci fit  7' sci fit 8' sci fit  7' sci fit  7'                Gait Training                                       Modalities             MHP prn             CP post tx

## 2020-08-05 ENCOUNTER — OFFICE VISIT (OUTPATIENT)
Dept: PHYSICAL THERAPY | Facility: CLINIC | Age: 60
End: 2020-08-05
Payer: COMMERCIAL

## 2020-08-05 DIAGNOSIS — M54.50 LUMBAR SPINE PAIN: Primary | ICD-10-CM

## 2020-08-05 PROCEDURE — 97110 THERAPEUTIC EXERCISES: CPT

## 2020-08-05 NOTE — PROGRESS NOTES
Daily Note     Today's date: 2020  Patient name: Ann Ellington  : 1960  MRN: [id number]  Referring provider: Emerald Dee, PT  Dx:   Encounter Diagnosis     ICD-10-CM    1  Lumbar spine pain  M54 5                   Subjective: Patient complains she has been experiencing increased bilateral hip pain over the last 2 days - reports the right hip is worse adding "I had an injury to that hip many years ago "  Patient states, "My back has been pretty good "    Objective: See treatment diary below  Assessment: Modified therapeutic exercise program as noted due to complaints of increased bilateral hip pain - she reports right hip pain is slightly less after performing medial/lateral exercise on the rockerboard  Plan: Continue treatment as per PT plan of care         Precautions: OA, chronic lumbar pain    Manuals 8/3 8/5   7/6 7/8 7/20 7/22 7/27 7/29                             laser 5' 5'   5' 5' 5' 5' 5' 5'                Neuro Re-Ed 8/3 8/5   7/6 7/8 7/20 7/22 7/27 7/29   TaA             TaA glute set             TaA bridge             Prone TaA hip ir/er             TaA prone hip ext             TaA ball squeeze seated on pball  5"x30 seated on pball  5"x30   seated on pball  5"x30 seated on pball  5"x30 seated on pball  5"x30 5"x30 seated on pball  5"x30 seated on pball  5"x30   TaA clamshell seated on pball  black  30 seated on pball  black  30   blue  20 blue  30 blue  30 Black x20 seated on pball  black  30 seated on pball  black  30   TaA march             TaA alt UE             TaA alt UE/LE             TaA shoulder flexion             TaA LPD green  20 green  20   red  20 red  30 green  20 Green x20 green  20 green  20   Step outs red  20 NP   red  20  red  20 green  20 Green x20 with press out facing the mirror  red  20 red  20   pball seated march alt ue/le   30    20 30 30 Alt ue/le x30 alt ue/le   30 alt ue/le   30   pball shoulder flexion 1#  20 1#  20   1#  20 1#  30 1#  30 1#x30 1#  20 1#  20   TaA rockerboard M/L  30 M/L  30   M/L  20 M/L  30 M/L  30 M/l x30 M/L  30 M/L  30   Leg press 20#  30 pball wall squat  20   20#  30 20#  30 20#  30 20#x30 20#  30 20#  30   Ther Ex             Repeated ext standing             rows blue  20 blue  20   red  20 red  30 green  20 Green x20 green  20 blue  20   ltr             Gluteal stretch             clamshells             Prone IR ball squeeze                                       Ther Activity             Recumbent bike sci fit  7' NP   sci fit  5' sci fit  5' sci fit  7' sci fit 8' sci fit  7' sci fit  7'                Gait Training                                       Modalities             MHP prn             CP post tx

## 2020-08-10 ENCOUNTER — OFFICE VISIT (OUTPATIENT)
Dept: PHYSICAL THERAPY | Facility: CLINIC | Age: 60
End: 2020-08-10
Payer: COMMERCIAL

## 2020-08-10 DIAGNOSIS — M54.50 LUMBAR SPINE PAIN: Primary | ICD-10-CM

## 2020-08-10 PROCEDURE — 97110 THERAPEUTIC EXERCISES: CPT

## 2020-08-10 NOTE — PROGRESS NOTES
Daily Note     Today's date: 8/10/2020  Patient name: Suri Martinez  : 1960  MRN: [id number]  Referring provider: Vin Chung, PT  Dx:   Encounter Diagnosis     ICD-10-CM    1  Lumbar spine pain  M54 5                   Subjective: Patient reports bilateral hips continue to be painful at times - reports she would like to resume bike and leg press machine but would like to avoid clamshells and ball squeezes  Objective: See treatment diary below  Assessment: Pt exhibits improved balance seated on pball  Cramping noted posterior right thigh during  exercise  Plan: Continue treatment as per PT plan of care         Precautions: OA, chronic lumbar pain    Manuals 8/3 8/5 8/10  7/6 7/8 7/20 7/22 7/27 7/29                             laser 5' 5' 5'  5' 5' 5' 5' 5' 5'                Neuro Re-Ed 8/3 8/5 8/10  7/6 7/8 7/20 7/22 7/27 7/29   TaA             TaA glute set             TaA bridge             Prone TaA hip ir/er             TaA prone hip ext             TaA ball squeeze seated on pball  5"x30 seated on pball  5"x30 NP  seated on pball  5"x30 seated on pball  5"x30 seated on pball  5"x30 5"x30 seated on pball  5"x30 seated on pball  5"x30   TaA clamshell seated on pball  black  30 seated on pball  black  30 NP  blue  20 blue  30 blue  30 Black x20 seated on pball  black  30 seated on pball  black  30   TaA march             TaA alt UE             TaA alt UE/LE             TaA shoulder flexion             TaA LPD green  20 green  20 Black x 20  red  20 red  30 green  20 Green x20 green  20 green  20   Step outs red  20 NP NP  red  20  red  20 green  20 Green x20 with press out facing the mirror  red  20 red  20   pball seated march alt ue/le   30  Alt UE/LE x 30  20 30 30 Alt ue/le x30 alt ue/le   30 alt ue/le   30   pball shoulder flexion 1#  20 1#  20 1# x 20  1#  20 1#  30 1#  30 1#x30 1#  20 1#  20   TaA rockerboard M/L  30 M/L  30 M/L 30  M/L  20 M/L  30 M/L  30 M/l x30 M/L  30 M/L  30   Leg press 20#  30 pball wall squat  20 20# x 30  20#  30 20#  30 20#  30 20#x30 20#  30 20#  30   Ther Ex             Repeated ext standing             rows blue  20 blue  20 Black x 20  red  20 red  30 green  20 Green x20 green  20 blue  20   ltr             Gluteal stretch             clamshells             Prone IR ball squeeze                                       Ther Activity             Recumbent bike sci fit  7' NP sci fit 7'  sci fit  5' sci fit  5' sci fit  7' sci fit 8' sci fit  7' sci fit  7'                Gait Training                                       Modalities             MHP prn             CP post tx                   [employer], SPTA  [employer], PTA

## 2020-08-12 ENCOUNTER — OFFICE VISIT (OUTPATIENT)
Dept: PHYSICAL THERAPY | Facility: CLINIC | Age: 60
End: 2020-08-12
Payer: COMMERCIAL

## 2020-08-12 DIAGNOSIS — M54.50 LUMBAR SPINE PAIN: Primary | ICD-10-CM

## 2020-08-12 PROCEDURE — 97530 THERAPEUTIC ACTIVITIES: CPT | Performed by: PHYSICAL THERAPIST

## 2020-08-12 PROCEDURE — 97110 THERAPEUTIC EXERCISES: CPT | Performed by: PHYSICAL THERAPIST

## 2020-08-12 NOTE — PROGRESS NOTES
Daily Note     Today's date: 2020  Patient name: Ann Ellington  : 1960  MRN: [id number]  Referring provider: Emerald Dee, PT  Dx:   Encounter Diagnosis     ICD-10-CM    1  Lumbar spine pain  M54 5                   Subjective: Pt reports that the soreness in her hips is subsiding but still present  Objective: See treatment diary below  Assessment: continued with modified program secondary to continued hip symptoms, plan to resume full program NV if able  Plan: Continue treatment as per PT plan of care         Precautions: OA, chronic lumbar pain    Manuals 8/3 8/5 8/10 8/12 7/6 7/8 7/20 7/22 7/27 7/29                             laser 5' 5' 5' 5' 5' 5' 5' 5' 5' 5'                Neuro Re-Ed 8/3 8/5 8/10 8/12 7/6 7/8 7/20 7/22 7/27 7/29   TaA             TaA glute set             TaA bridge             Prone TaA hip ir/er             TaA prone hip ext             TaA ball squeeze seated on pball  5"x30 seated on pball  5"x30 NP  seated on pball  5"x30 seated on pball  5"x30 seated on pball  5"x30 5"x30 seated on pball  5"x30 seated on pball  5"x30   TaA clamshell seated on pball  black  30 seated on pball  black  30 NP  blue  20 blue  30 blue  30 Black x20 seated on pball  black  30 seated on pball  black  30   TaA march             TaA alt UE             TaA alt UE/LE             TaA shoulder flexion             TaA LPD green  20 green  20 Black x 20 Black x20 red  20 red  30 green  20 Green x20 green  20 green  20   Step outs red  20 NP NP  red  20  red  20 green  20 Green x20 with press out facing the mirror  red  20 red  20   pball seated march alt ue/le   30  Alt UE/LE x 30 30 20 30 30 Alt ue/le x30 alt ue/le   30 alt ue/le   30   pball shoulder flexion 1#  20 1#  20 1# x 20 1#x20 1#  20 1#  30 1#  30 1#x30 1#  20 1#  20   TaA rockerboard M/L  30 M/L  30 M/L 30 30 M/L  20 M/L  30 M/L  30 M/l x30 M/L  30 M/L  30   Leg press 20#  30 pball wall squat  20 20# x 30 30#x30 20#  30 20#  30 20#  30 20#x30 20#  30 20#  30   Ther Ex             Repeated ext standing             rows blue  20 blue  20 Black x 20 Black x20 red  20 red  30 green  20 Green x20 green  20 blue  20   ltr             Gluteal stretch             clamshells             Prone IR ball squeeze                                       Ther Activity             Recumbent bike sci fit  7' NP sci fit 7'  sci fit  5' sci fit  5' sci fit  7' sci fit 8' sci fit  7' sci fit  7'                Gait Training                                       Modalities             MHP prn             CP post tx                   [employer], SPTA  [employer], PTA

## 2020-08-17 ENCOUNTER — APPOINTMENT (OUTPATIENT)
Dept: PHYSICAL THERAPY | Facility: CLINIC | Age: 60
End: 2020-08-17
Payer: COMMERCIAL

## 2020-08-19 ENCOUNTER — APPOINTMENT (OUTPATIENT)
Dept: PHYSICAL THERAPY | Facility: CLINIC | Age: 60
End: 2020-08-19
Payer: COMMERCIAL

## 2020-08-20 ENCOUNTER — OFFICE VISIT (OUTPATIENT)
Dept: OBGYN CLINIC | Facility: CLINIC | Age: 60
End: 2020-08-20
Payer: COMMERCIAL

## 2020-08-20 ENCOUNTER — LAB (OUTPATIENT)
Dept: LAB | Facility: CLINIC | Age: 60
End: 2020-08-20
Payer: COMMERCIAL

## 2020-08-20 ENCOUNTER — TRANSCRIBE ORDERS (OUTPATIENT)
Dept: LAB | Facility: CLINIC | Age: 60
End: 2020-08-20

## 2020-08-20 VITALS
SYSTOLIC BLOOD PRESSURE: 132 MMHG | HEIGHT: 60 IN | BODY MASS INDEX: 34.32 KG/M2 | DIASTOLIC BLOOD PRESSURE: 78 MMHG | TEMPERATURE: 98.7 F | WEIGHT: 174.8 LBS

## 2020-08-20 DIAGNOSIS — I10 HYPERTENSION, UNSPECIFIED TYPE: ICD-10-CM

## 2020-08-20 DIAGNOSIS — Z12.4 SCREENING FOR MALIGNANT NEOPLASM OF THE CERVIX: ICD-10-CM

## 2020-08-20 DIAGNOSIS — R53.83 FATIGUE, UNSPECIFIED TYPE: ICD-10-CM

## 2020-08-20 DIAGNOSIS — Z01.419 ENCOUNTER FOR WELL WOMAN EXAM WITH ROUTINE GYNECOLOGICAL EXAM: Primary | ICD-10-CM

## 2020-08-20 DIAGNOSIS — R79.89 HIGH SERUM VITAMIN A: ICD-10-CM

## 2020-08-20 DIAGNOSIS — Z00.00 ROUTINE HEALTH MAINTENANCE: ICD-10-CM

## 2020-08-20 DIAGNOSIS — Z12.31 ENCOUNTER FOR SCREENING MAMMOGRAM FOR MALIGNANT NEOPLASM OF BREAST: ICD-10-CM

## 2020-08-20 DIAGNOSIS — E78.1 HYPERTRIGLYCERIDEMIA: ICD-10-CM

## 2020-08-20 DIAGNOSIS — Z11.51 SCREENING FOR HPV (HUMAN PAPILLOMAVIRUS): ICD-10-CM

## 2020-08-20 DIAGNOSIS — E55.9 HYPOVITAMINOSIS D: ICD-10-CM

## 2020-08-20 LAB
25(OH)D3 SERPL-MCNC: 37.7 NG/ML (ref 30–100)
ALBUMIN SERPL BCP-MCNC: 3.7 G/DL (ref 3.5–5)
ALP SERPL-CCNC: 76 U/L (ref 46–116)
ALT SERPL W P-5'-P-CCNC: 48 U/L (ref 12–78)
ANION GAP SERPL CALCULATED.3IONS-SCNC: 11 MMOL/L (ref 4–13)
AST SERPL W P-5'-P-CCNC: 21 U/L (ref 5–45)
BASOPHILS # BLD AUTO: 0.02 THOUSANDS/ΜL (ref 0–0.1)
BASOPHILS NFR BLD AUTO: 0 % (ref 0–1)
BILIRUB SERPL-MCNC: 0.34 MG/DL (ref 0.2–1)
BUN SERPL-MCNC: 13 MG/DL (ref 5–25)
CALCIUM SERPL-MCNC: 8.9 MG/DL (ref 8.3–10.1)
CHLORIDE SERPL-SCNC: 104 MMOL/L (ref 100–108)
CHOLEST SERPL-MCNC: 204 MG/DL (ref 50–200)
CO2 SERPL-SCNC: 26 MMOL/L (ref 21–32)
CREAT SERPL-MCNC: 0.87 MG/DL (ref 0.6–1.3)
EOSINOPHIL # BLD AUTO: 0.27 THOUSAND/ΜL (ref 0–0.61)
EOSINOPHIL NFR BLD AUTO: 5 % (ref 0–6)
ERYTHROCYTE [DISTWIDTH] IN BLOOD BY AUTOMATED COUNT: 12 % (ref 11.6–15.1)
EST. AVERAGE GLUCOSE BLD GHB EST-MCNC: 117 MG/DL
GFR SERPL CREATININE-BSD FRML MDRD: 74 ML/MIN/1.73SQ M
GLUCOSE P FAST SERPL-MCNC: 97 MG/DL (ref 65–99)
HBA1C MFR BLD: 5.7 %
HCT VFR BLD AUTO: 38.2 % (ref 34.8–46.1)
HDLC SERPL-MCNC: 52 MG/DL
HGB BLD-MCNC: 12.8 G/DL (ref 11.5–15.4)
IMM GRANULOCYTES # BLD AUTO: 0.01 THOUSAND/UL (ref 0–0.2)
IMM GRANULOCYTES NFR BLD AUTO: 0 % (ref 0–2)
LDLC SERPL CALC-MCNC: 125 MG/DL (ref 0–100)
LYMPHOCYTES # BLD AUTO: 1.83 THOUSANDS/ΜL (ref 0.6–4.47)
LYMPHOCYTES NFR BLD AUTO: 32 % (ref 14–44)
MCH RBC QN AUTO: 30.6 PG (ref 26.8–34.3)
MCHC RBC AUTO-ENTMCNC: 33.5 G/DL (ref 31.4–37.4)
MCV RBC AUTO: 91 FL (ref 82–98)
MONOCYTES # BLD AUTO: 0.34 THOUSAND/ΜL (ref 0.17–1.22)
MONOCYTES NFR BLD AUTO: 6 % (ref 4–12)
NEUTROPHILS # BLD AUTO: 3.27 THOUSANDS/ΜL (ref 1.85–7.62)
NEUTS SEG NFR BLD AUTO: 57 % (ref 43–75)
NRBC BLD AUTO-RTO: 0 /100 WBCS
PLATELET # BLD AUTO: 301 THOUSANDS/UL (ref 149–390)
PMV BLD AUTO: 9.1 FL (ref 8.9–12.7)
POTASSIUM SERPL-SCNC: 3.1 MMOL/L (ref 3.5–5.3)
PROT SERPL-MCNC: 7.2 G/DL (ref 6.4–8.2)
RBC # BLD AUTO: 4.18 MILLION/UL (ref 3.81–5.12)
SODIUM SERPL-SCNC: 141 MMOL/L (ref 136–145)
TRIGL SERPL-MCNC: 135 MG/DL
TSH SERPL DL<=0.05 MIU/L-ACNC: 2.09 UIU/ML (ref 0.36–3.74)
WBC # BLD AUTO: 5.74 THOUSAND/UL (ref 4.31–10.16)

## 2020-08-20 PROCEDURE — 80061 LIPID PANEL: CPT

## 2020-08-20 PROCEDURE — 85025 COMPLETE CBC W/AUTO DIFF WBC: CPT

## 2020-08-20 PROCEDURE — 83036 HEMOGLOBIN GLYCOSYLATED A1C: CPT

## 2020-08-20 PROCEDURE — 84443 ASSAY THYROID STIM HORMONE: CPT

## 2020-08-20 PROCEDURE — G0145 SCR C/V CYTO,THINLAYER,RESCR: HCPCS | Performed by: OBSTETRICS & GYNECOLOGY

## 2020-08-20 PROCEDURE — 36415 COLL VENOUS BLD VENIPUNCTURE: CPT

## 2020-08-20 PROCEDURE — 84590 ASSAY OF VITAMIN A: CPT

## 2020-08-20 PROCEDURE — 99386 PREV VISIT NEW AGE 40-64: CPT | Performed by: OBSTETRICS & GYNECOLOGY

## 2020-08-20 PROCEDURE — 87624 HPV HI-RISK TYP POOLED RSLT: CPT | Performed by: OBSTETRICS & GYNECOLOGY

## 2020-08-20 PROCEDURE — 80053 COMPREHEN METABOLIC PANEL: CPT

## 2020-08-20 PROCEDURE — 82306 VITAMIN D 25 HYDROXY: CPT

## 2020-08-20 RX ORDER — LOSARTAN POTASSIUM 25 MG/1
TABLET ORAL
Qty: 30 TABLET | Refills: 5 | Status: SHIPPED | OUTPATIENT
Start: 2020-08-20 | End: 2021-04-14

## 2020-08-20 NOTE — PROGRESS NOTES
ASSESSMENT & PLAN: Carina Cervantes is a 64 y o  Delon Vanessa with normal gynecologic exam     1   Routine well woman exam done today  2    Pap and HPV: Pap with HPV was done today  Current ASCCP Guidelines reviewed  Last Pap 2014 :  normal, neg HPV  3  Mammogram ordered  Recommend yearly mammography  4   Colonoscopy performed 2017 - repeat in 3 years  5  The patient is sexually active  6  The following were reviewed in today's visit: mammography screening ordered, menopause, adequate intake of calcium and vitamin D, exercise and healthy diet  7  Patient to return to office in 12 months for annual gyn exam      All questions have been answered to her satisfaction  CC:  Annual Gynecologic Examination    HPI: Carina Cervantes is a 64 y o  Delon Vanessa who presents for annual gynecologic examination  She has the following concerns:    - new patient  Establish gyn care  Denies any gyn concerns  Reports menopause occurs approximately 4-5 years ago  She reports mild hot flashes mainly at night that not negatively affect her quality of life  She denies any changes in her mood reports that she has had no vaginal bleeding since menopause occurred  She is not sexually active and states that she has not been sexually active since 2008  Health Maintenance:    She exercises 2 days per week  She wears her seatbelt routinely  She does practice breast awareness  She feels safe at home  Patients does try to follow a balanced diet      Last mammogram:  2018 - BI-RADS 1, negative  Last colonoscopy: 2017 - repeat in 3 years recommended       Past Medical History:   Diagnosis Date    Anemia     Anxiety and depression     Asthma 10/26/2012    Chronic fatigue syndrome     CPAP (continuous positive airway pressure) dependence     DDD (degenerative disc disease), lumbosacral 12/27/2018    Patrick-Barr virus seropositivity     Fibromyalgia, primary     JA (generalized anxiety disorder)     Gluten intolerance     Hypertension     Hypovitaminosis D     Impaired fasting glucose     Lipodystrophy     Liver lesion     Migraine     Obesity (BMI 30-39 9) 2018    Sleep apnea        Past Surgical History:   Procedure Laterality Date    APPENDECTOMY      BREAST BIOPSY      CHOLECYSTECTOMY      COLONOSCOPY      TX COLONOSCOPY FLX DX W/COLLJ SPEC WHEN PFRMD N/A 2017    Procedure: COLONOSCOPY;  Surgeon: Gayle Cain DO;  [facility]; Service: Gastroenterology    UPPER GASTROINTESTINAL ENDOSCOPY         Past OB/Gyn History:  Period Cycle (Days): (postmenopausal)No LMP recorded  Patient is postmenopausal   Menstrual History:  OB History        0    Para   0    Term   0       0    AB   0    Living   0       SAB   0    TAB   0    Ectopic   0    Multiple   0    Live Births   0           Obstetric Comments   Menarche: 17            Menarche age:  19yo  No LMP recorded  Patient is postmenopausal   Period Cycle (Days): (postmenopausal)    Menstrual history: Patient is post menopausal    History of sexually transmitted infection No  Patient is not currently sexually active  heterosexual Birth control: postmenopausal  Last Pap 2014 :  no abnormalities      Family History   Problem Relation Age of Onset   [place] Molinat Depression Mother     Hypertension Mother         essential     Mitral valve prolapse Mother     Diabetes Brother     Heart attack Brother     Heart disease Maternal Grandmother         cardiac disorder    Hyperlipidemia Maternal Grandmother     Stroke Paternal Grandmother     Stroke Paternal Grandfather     Breast cancer Maternal Aunt     No Known Problems Father        Social History:  Social History     Socioeconomic History    Marital status: Single     Spouse name: Not on file    Number of children: 0    Years of education: bachelor's degree    Highest education level: Not on file   Occupational History    Occupation: works for [employer]     Employer: [employer]   Social Needs    Financial resource strain: Not on file    Food insecurity     Worry: Not on file     Inability: Not on file    Transportation needs     Medical: Not on file     Non-medical: Not on file   Tobacco Use    Smoking status: Never Smoker    Smokeless tobacco: Never Used   Substance and Sexual Activity    Alcohol use: Yes     Comment: rarely    Drug use: No    Sexual activity: Not Currently   Lifestyle    Physical activity     Days per week: Not on file     Minutes per session: Not on file    Stress: Not on file   Relationships    Social connections     Talks on phone: Not on file     Gets together: Not on file     Attends Orthodoxy service: Not on file     Active member of club or organization: Not on file     Attends meetings of clubs or organizations: Not on file     Relationship status: Not on file    Intimate partner violence     Fear of current or ex partner: Not on file     Emotionally abused: Not on file     Physically abused: Not on file     Forced sexual activity: Not on file   Other Topics Concern    Not on file   Social History Narrative    Education: bachelor's degree in sociology    Learning Disabilities: none    Marital History: single    Children: none    Living Arrangement: lives alone    Occupational History: works for [employer] at [employer]: limited support system    Legal History: none     History: None    Caffeine use denied     Presently lives with self  Patient is single  Patient is currently employed in IT department at [employer]  Allergies   Allergen Reactions    Escitalopram Swelling and Edema     Category:  Allergy;     Fluticasone-Salmeterol      UNKNOWN    Nsaids      Action Taken: stomach issues;     Latex      irritation      Other Rash     Adhesive bandages       Current Outpatient Medications:     Ascorbic Acid (VITAMIN C PO), Take 1 tablet by mouth daily, Disp: , Rfl:    Cholecalciferol (VITAMIN D3) 48906 units TABS, Take 10,000 Units by mouth daily, Disp: , Rfl:     Cyanocobalamin (VITAMIN B-12) 500 MCG LOZG, Take 500 mcg by mouth every other day , Disp: , Rfl:     DULoxetine (CYMBALTA) 30 mg delayed release capsule, TAKE 1 CAPSULE DAILY, Disp: 90 capsule, Rfl: 3    hydrochlorothiazide (HYDRODIURIL) 12 5 mg tablet, Take 1 tablet (12 5 mg total) by mouth daily, Disp: 90 tablet, Rfl: 3    Magnesium Gluconate 250 MG TABS, Take 1 tablet by mouth daily, Disp: , Rfl:     Melatonin 3 MG SUBL, Place under the tongue, Disp: , Rfl:     potassium chloride (K-DUR,KLOR-CON) 10 mEq tablet, Take 1 tablet (10 mEq total) by mouth daily, Disp: 30 tablet, Rfl: 1    Zinc 50 MG CAPS, Take 1 capsule by mouth daily, Disp: , Rfl:     losartan (COZAAR) 25 mg tablet, TAKE ONE TABLET BY MOUTH DAILY, Disp: 30 tablet, Rfl: 5    Review of Systems:  Review of Systems   Constitutional: Positive for activity change (less walking with change in work duties and position)  Negative for appetite change and unexpected weight change  Eyes: Negative for visual disturbance  Respiratory: Negative for cough and shortness of breath  Cardiovascular: Negative for chest pain  Gastrointestinal: Negative for abdominal pain, constipation, diarrhea, nausea and vomiting  Endocrine: Negative for polyuria  Genitourinary: Negative for difficulty urinating, dysuria, menstrual problem, pelvic pain, vaginal bleeding, vaginal discharge and vaginal pain  Musculoskeletal: Negative for back pain  Neurological: Negative for dizziness, weakness, light-headedness and headaches  Psychiatric/Behavioral: Negative  Physical Exam:  /78 (BP Location: Left arm, Patient Position: Sitting, Cuff Size: Large)   Temp 98 7 °F (37 1 °C) (Temporal) Comment (Src): Infrared  Ht 5' (1 524 m)   Wt 79 3 kg (174 lb 12 8 oz)   BMI 34 14 kg/m²    Physical Exam  Constitutional:       General: She is not in acute distress  Appearance: She is well-developed  She is not diaphoretic  Genitourinary:      Pelvic exam was performed with patient in the lithotomy position  Vulva, urethra, bladder, vagina, uterus, right adnexa and left adnexa normal       No vulval lesion, tenderness, ulcerations or rash noted  No inguinal adenopathy present in the right or left side  No urethral prolapse or mass present  Bladder is not tender  No lesions in the vagina  Vaginal atrophic mucosa present  No vaginal discharge, erythema, tenderness, bleeding or rugosity  Cervical pinkness present  No cervical motion tenderness, discharge, friability, lesion or polyp  Uterus is mobile  Uterus is not enlarged or tender  No uterine mass detected  Uterus is anteverted and regular  No right or left adnexal mass present  Right adnexa not tender or full  Left adnexa not tender or full  Genitourinary Comments: Urethra midline, no masses  Urethral meatus normal in appearance  Bladder nontender to palpation  Perineum normal in appearance, no lacerations, no ulcerations, no lesions visualized  Rectum:      No tenderness or external hemorrhoid  HENT:      Head: Normocephalic and atraumatic  Cardiovascular:      Rate and Rhythm: Normal rate and regular rhythm  Heart sounds: Normal heart sounds  No murmur  No friction rub  Pulmonary:      Effort: Pulmonary effort is normal  No respiratory distress  Breath sounds: Normal breath sounds  No wheezing or rales  Chest:      Breasts: Breasts are symmetrical          Right: No mass, skin change or tenderness  Left: No mass, skin change or tenderness  Abdominal:      Palpations: Abdomen is soft  There is no mass  Tenderness: There is no abdominal tenderness  There is no guarding  Musculoskeletal: Normal range of motion  General: No tenderness  Lymphadenopathy:      Lower Body: No right inguinal adenopathy  No left inguinal adenopathy  Neurological:      Mental Status: She is alert and oriented to person, place, and time  Skin:     General: Skin is warm and dry  Coloration: Skin is not pale  Findings: No erythema  Psychiatric:         Mood and Affect: Mood normal          Behavior: Behavior normal          Thought Content: Thought content normal          Judgment: Judgment normal    Vitals signs and nursing note reviewed

## 2020-08-21 DIAGNOSIS — E87.6 HYPOKALEMIA: Primary | ICD-10-CM

## 2020-08-21 DIAGNOSIS — Z13.6 ENCOUNTER FOR LIPID SCREENING FOR CARDIOVASCULAR DISEASE: ICD-10-CM

## 2020-08-21 DIAGNOSIS — Z13.220 ENCOUNTER FOR LIPID SCREENING FOR CARDIOVASCULAR DISEASE: ICD-10-CM

## 2020-08-21 NOTE — RESULT ENCOUNTER NOTE
Please let patient know that her hemoglobin A1c is 5 7, in the very early prediabetic range  I would like her to work on avoiding sweets, limiting carbohydrates as well as regular exercise  In addition, other blood work including kidneys, liver, thyroid was within normal range  Her total cholesterol is 204, increased from 188 and her bad cholesterol is 125, increased from 100  Her good cholesterol however does remain good and her triglycerides are within normal range  I would like her to work on increasing heart healthy fats the diet by incorporating the Mediterranean diet, getting regular exercise as well as working on weight loss  I would like to keep a close eye and recheck this again in 3 months  I will print this out  In addition, her vitamin-D level has increased, noted to be 37 7  Can you ask her how much she is taking? Her potassium was lower at 3 1  I would like to recheck this again on Monday to ensure that it is back to normal range  I would like her to increase potassium rich foods in her diet as well  Can you ask her if she is still taking potassium chloride 10 mEq daily?   Thank you

## 2020-08-23 LAB — VIT A SERPL-MCNC: 33 UG/DL (ref 20.1–62)

## 2020-08-24 ENCOUNTER — OFFICE VISIT (OUTPATIENT)
Dept: PHYSICAL THERAPY | Facility: CLINIC | Age: 60
End: 2020-08-24
Payer: COMMERCIAL

## 2020-08-24 DIAGNOSIS — M54.50 LUMBAR SPINE PAIN: Primary | ICD-10-CM

## 2020-08-24 LAB
HPV HR 12 DNA CVX QL NAA+PROBE: NEGATIVE
HPV16 DNA CVX QL NAA+PROBE: NEGATIVE
HPV18 DNA CVX QL NAA+PROBE: NEGATIVE

## 2020-08-24 PROCEDURE — 97110 THERAPEUTIC EXERCISES: CPT

## 2020-08-24 NOTE — PROGRESS NOTES
Daily Note     Today's date: 2020  Patient name: Kevin Chavez  : 1960  MRN: [id number]  Referring provider: Enzo James, PT  Dx:   Encounter Diagnosis     ICD-10-CM    1  Lumbar spine pain  M54 5                   Subjective: Patient states, "My hips just hurt all the time "  Patient feels strengthening exercises sometimes aggravate bilateral hip pain  Patient reports she has been stretching in the morning at home which seems to offer some pain relief  Patient reports back pain feels "as good as it's going to get "      Objective: See treatment diary below  Assessment: Pt is independent in performing self glute stretches  Pt felt better following LTRs, but did not tolerate clamshells due to increased bilateral hip pain  Pt demonstrated B/L hamstring and quad PROM WNL  Pt provided with HEP  Plan: Continue treatment as per PT plan of care         Precautions: OA, chronic lumbar pain    Manuals 8/3 8/5 8/10 8/12 8/24   7/22 7/27 7/29                             laser 5' 5' 5' 5' np   5' 5' 5'                Neuro Re-Ed 8/3 8/5 8/10 8/12    7/22 7/27 7/29   TaA             TaA glute set             TaA bridge             Prone TaA hip ir/er             TaA prone hip ext             TaA ball squeeze seated on pball  5"x30 seated on pball  5"x30 NP     5"x30 seated on pball  5"x30 seated on pball  5"x30   TaA clamshell seated on pball  black  30 seated on pball  black  30 NP     Black x20 seated on pball  black  30 seated on pball  black  30   TaA march             TaA alt UE             TaA alt UE/LE             TaA shoulder flexion             TaA LPD green  20 green  20 Black x 20 Black x20    Green x20 green  20 green  20   Step outs red  20 NP NP     Green x20 with press out facing the mirror  red  20 red  20   pball seated march alt ue/le   30  Alt UE/LE x 30 30    Alt ue/le x30 alt ue/le   30 alt ue/le   30   pball shoulder flexion 1#  20 1#  20 1# x 20 1#x20    1#x30 1#  20 1#  20 TaA rockerboard M/L  30 M/L  30 M/L 30 30    M/l x30 M/L  30 M/L  30   Leg press 20#  30 pball wall squat  20 20# x 30 30#x30    20#x30 20#  30 20#  30   Ther Ex             Repeated ext standing             rows blue  20 blue  20 Black x 20 Black x20    Green x20 green  20 blue  20   ltr     5" x 10        Gluteal stretch             clamshells             Prone IR ball squeeze             Supine ER fallouts     5" x 10        HS stretch /w strap     30" x 4        Quad stretch     30" x 4                                               Ther Activity             Recumbent bike sci fit  7' NP sci fit 7'     sci fit 8' sci fit  7' sci fit  7'   treadmill     10 min                     Gait Training                                       Modalities             MHP prn             CP post tx                   [employer], SPTA  Ronaldo Paige, PTA

## 2020-08-25 ENCOUNTER — TELEPHONE (OUTPATIENT)
Dept: FAMILY MEDICINE CLINIC | Facility: CLINIC | Age: 60
End: 2020-08-25

## 2020-08-25 NOTE — TELEPHONE ENCOUNTER
----- Message from Ildefonso Madrid MD sent at 8/25/2020  3:36 PM EDT -----  Can you please remind patient to have her potassium drawn? Can you ask her if she is still taking potassium chloride 10 mEq daily?   Thank you

## 2020-08-25 NOTE — RESULT ENCOUNTER NOTE
Can you please remind patient to have her potassium drawn? Can you ask her if she is still taking potassium chloride 10 mEq daily?   Thank you

## 2020-08-26 ENCOUNTER — OFFICE VISIT (OUTPATIENT)
Dept: PHYSICAL THERAPY | Facility: CLINIC | Age: 60
End: 2020-08-26
Payer: COMMERCIAL

## 2020-08-26 DIAGNOSIS — M54.50 LUMBAR SPINE PAIN: Primary | ICD-10-CM

## 2020-08-26 PROCEDURE — 97110 THERAPEUTIC EXERCISES: CPT

## 2020-08-26 NOTE — PROGRESS NOTES
Daily Note     Today's date: 2020  Patient name: Lillian Avila  : 1960  MRN: [id number]  Referring provider: Adrien Wilson, PT  Dx:   Encounter Diagnosis     ICD-10-CM    1  Lumbar spine pain  M54 5                   Subjective: Patient states, "I think we are kind of onto something with the stretching "  Patient reports performing clamshells in sidelying caused bilateral hip pain - "It was like lifting a 100 pound weight "  Patient reports back pain has been "not too bad" despite doing a lot of standing at work  Objective: See treatment diary below  Assessment: Pt tolerated the addition of supine SLRs into flexion well, and would benefit from further strengthening to reduce dysfunction  Plan: Continue treatment as per PT plan of care  Resume strengthening exercises as tolerated         Precautions: OA, chronic lumbar pain    Manuals 8/3 8/5 8/10 8/12 8/24 8/26  7/22 7/27 7/29                             laser 5' 5' 5' 5' np   5' 5' 5'                Neuro Re-Ed 8/3 8/5 8/10 8/12 8/24 8/26  7/22 7/27 7/29   TaA             TaA glute set             TaA bridge             Prone TaA hip ir/er             TaA prone hip ext             TaA ball squeeze seated on pball  5"x30 seated on pball  5"x30 NP     5"x30 seated on pball  5"x30 seated on pball  5"x30   TaA clamshell seated on pball  black  30 seated on pball  black  30 NP     Black x20 seated on pball  black  30 seated on pball  black  30   TaA march             TaA alt UE             TaA alt UE/LE             TaA shoulder flexion             TaA LPD green  20 green  20 Black x 20 Black x20    Green x20 green  20 green  20   Step outs red  20 NP NP     Green x20 with press out facing the mirror  red  20 red  20   pball seated march alt ue/le   30  Alt UE/LE x 30 30    Alt ue/le x30 alt ue/le   30 alt ue/le   30   pball shoulder flexion 1#  20 1#  20 1# x 20 1#x20    1#x30 1#  20 1#  20   TaA rockerboard M/L  30 M/L  30 M/L 30 30 M/l x30 M/L  30 M/L  30   Leg press 20#  30 pball wall squat  20 20# x 30 30#x30    20#x30 20#  30 20#  30   Ther Ex             Repeated ext standing             rows blue  20 blue  20 Black x 20 Black x20    Green x20 green  20 blue  20   ltr     5" x 10 5" x 20       Gluteal stretch      30" x 4       clamshells             Prone IR ball squeeze             Supine ER fallouts     5" x 10 5" x 10       HS stretch /w strap     30" x 4 30" x 4       Quad stretch     30" x 4 30" x 4       Supine flex SLR      10                                 Ther Activity             Recumbent bike sci fit  7' NP sci fit 7'     sci fit 8' sci fit  7' sci fit  7'   treadmill     10 min 8'                    Gait Training                                       Modalities             MHP prn             CP post tx               [employer], SPTA  Mohan Hernandez, PTA

## 2020-08-27 LAB
LAB AP GYN PRIMARY INTERPRETATION: NORMAL
Lab: NORMAL

## 2020-08-28 ENCOUNTER — TELEPHONE (OUTPATIENT)
Dept: FAMILY MEDICINE CLINIC | Facility: CLINIC | Age: 60
End: 2020-08-28

## 2020-08-28 NOTE — TELEPHONE ENCOUNTER
Left patient detailed message to remind her about having her potassium drawn and to call the office back and let us know the status

## 2020-08-28 NOTE — TELEPHONE ENCOUNTER
----- Message from Fortino Davis MD sent at 8/28/2020  2:50 PM EDT -----  Can you please remind patient to have her potassium drawn?   Thank you

## 2020-09-02 ENCOUNTER — OFFICE VISIT (OUTPATIENT)
Dept: PHYSICAL THERAPY | Facility: CLINIC | Age: 60
End: 2020-09-02

## 2020-09-02 DIAGNOSIS — M54.50 LUMBAR SPINE PAIN: Primary | ICD-10-CM

## 2020-09-02 NOTE — PROGRESS NOTES
Daily Note     Today's date: 2020  Patient name: Ann Ellington  : 1960  MRN: [id number]  Referring provider: Emerald Dee, PT  Dx:   Encounter Diagnosis     ICD-10-CM    1  Lumbar spine pain  M54 5                   Subjective: Pt states, "I seem to be better when I'm not here "  Pt reports back pain is better however she has been experiencing bilateral shoulder pain which she feels may require PT treatment in the future  Objective: No treatment performed today  Assessment: Patient is independent with HEP  Plan: Discharge patient from outpatient PT         Precautions: OA, chronic lumbar pain    Manuals 8/3 8/5 8/10 8/12 8/24 8/26                                 laser 5' 5' 5' 5' np                     Neuro Re-Ed 8/3 8/5 8/10 8/12 8/24 8/26       TaA             TaA glute set             TaA bridge             Prone TaA hip ir/er             TaA prone hip ext             TaA ball squeeze seated on pball  5"x30 seated on pball  5"x30 NP          TaA clamshell seated on pball  black  30 seated on pball  black  30 NP          TaA march             TaA alt UE             TaA alt UE/LE             TaA shoulder flexion             TaA LPD green  20 green  20 Black x 20 Black x20         Step outs red  20 NP NP          pball seated march alt ue/le   30  Alt UE/LE x 30 30         pball shoulder flexion 1#  20 1#  20 1# x 20 1#x20         TaA rockerboard M/L  30 M/L  30 M/L 30 30         Leg press 20#  30 pball wall squat  20 20# x 30 30#x30         Ther Ex             Repeated ext standing             rows blue  20 blue  20 Black x 20 Black x20         ltr     5" x 10 5" x 20       Gluteal stretch      30" x 4       clamshells             Prone IR ball squeeze             Supine ER fallouts     5" x 10 5" x 10       HS stretch /w strap     30" x 4 30" x 4       Quad stretch     30" x 4 30" x 4       Supine flex SLR      10                                 Ther Activity             Recumbent bike sci fit  7' NP sci fit 7'          treadmill     10 min 8'                    Gait Training                                       Modalities             MHP prn             CP post tx               [employer], SPTA  Jean-Pierre Lan, PTA

## 2020-09-03 ENCOUNTER — TELEPHONE (OUTPATIENT)
Dept: FAMILY MEDICINE CLINIC | Facility: CLINIC | Age: 60
End: 2020-09-03

## 2020-09-03 ENCOUNTER — LAB (OUTPATIENT)
Dept: LAB | Facility: CLINIC | Age: 60
End: 2020-09-03
Payer: COMMERCIAL

## 2020-09-03 DIAGNOSIS — E87.6 HYPOKALEMIA: ICD-10-CM

## 2020-09-03 LAB — POTASSIUM SERPL-SCNC: 3.7 MMOL/L (ref 3.5–5.3)

## 2020-09-03 PROCEDURE — 36415 COLL VENOUS BLD VENIPUNCTURE: CPT

## 2020-09-03 PROCEDURE — 84132 ASSAY OF SERUM POTASSIUM: CPT

## 2020-09-17 ENCOUNTER — HOSPITAL ENCOUNTER (OUTPATIENT)
Dept: RADIOLOGY | Facility: HOSPITAL | Age: 60
Discharge: HOME | End: 2020-09-17
Payer: COMMERCIAL

## 2020-09-17 VITALS — WEIGHT: 174 LBS | HEIGHT: 60 IN | BODY MASS INDEX: 34.16 KG/M2

## 2020-09-17 DIAGNOSIS — Z12.31 SCREENING MAMMOGRAM, ENCOUNTER FOR: ICD-10-CM

## 2020-09-17 PROCEDURE — 77067 SCR MAMMO BI INCL CAD: CPT

## 2020-09-17 PROCEDURE — 77063 BREAST TOMOSYNTHESIS BI: CPT

## 2020-09-23 DIAGNOSIS — Z86.010 HISTORY OF COLON POLYPS: Primary | ICD-10-CM

## 2020-09-23 NOTE — TELEPHONE ENCOUNTER
Patient stopped in wanting to schedule her repeat colonoscopy for December  Please call patient to schedule   Thanks

## 2020-09-24 RX ORDER — SODIUM, POTASSIUM,MAG SULFATES 17.5-3.13G
SOLUTION, RECONSTITUTED, ORAL ORAL
Qty: 2 BOTTLE | Refills: 0 | Status: SHIPPED | OUTPATIENT
Start: 2020-09-24 | End: 2021-03-17

## 2020-09-24 NOTE — TELEPHONE ENCOUNTER
Colonoscopy scheduled for 12/17/20 with Dr Cornell [facility]  Madhu Yanez instructions given verbally/mailed to patient

## 2020-09-29 ENCOUNTER — HOSPITAL ENCOUNTER (OUTPATIENT)
Dept: MAMMOGRAPHY | Facility: CLINIC | Age: 60
Discharge: HOME | End: 2020-09-29
Payer: COMMERCIAL

## 2020-09-29 ENCOUNTER — HOSPITAL ENCOUNTER (OUTPATIENT)
Dept: ULTRASOUND IMAGING | Facility: CLINIC | Age: 60
Discharge: HOME | End: 2020-09-29
Payer: COMMERCIAL

## 2020-09-29 VITALS — WEIGHT: 174 LBS | BODY MASS INDEX: 34.16 KG/M2 | HEIGHT: 60 IN | TEMPERATURE: 96.8 F

## 2020-09-29 DIAGNOSIS — R92.8 ABNORMAL MAMMOGRAM: ICD-10-CM

## 2020-09-29 PROCEDURE — 77065 DX MAMMO INCL CAD UNI: CPT

## 2020-09-29 PROCEDURE — G0279 TOMOSYNTHESIS, MAMMO: HCPCS

## 2020-09-29 PROCEDURE — 76642 ULTRASOUND BREAST LIMITED: CPT

## 2020-11-25 ENCOUNTER — TELEPHONE (OUTPATIENT)
Dept: GASTROENTEROLOGY | Facility: CLINIC | Age: 60
End: 2020-11-25

## 2020-12-18 ENCOUNTER — TELEPHONE (OUTPATIENT)
Dept: GASTROENTEROLOGY | Facility: CLINIC | Age: 60
End: 2020-12-18

## 2020-12-23 ENCOUNTER — TELEPHONE (OUTPATIENT)
Dept: DERMATOLOGY | Facility: CLINIC | Age: 60
End: 2020-12-23

## 2021-02-04 ENCOUNTER — ANESTHESIA EVENT (OUTPATIENT)
Dept: GASTROENTEROLOGY | Facility: AMBULARY SURGERY CENTER | Age: 61
End: 2021-02-04

## 2021-02-11 NOTE — PROGRESS NOTES
Daily Note     Today's date: 2018  Patient name: Yoselin De Sanitago  : 1960  MRN: [id number]  Referring provider: Jaxson Moe PA-C  Dx:   Encounter Diagnosis     ICD-10-CM    1  Lumbar pain M54 5                 Subjective: Pt reports that she is having a sig increase in back pain over the weekend, insidious onset  Objective: See treatment diary below  Precautions: chronic neck pain     Daily Treatment Diary         Manuals               Lumbar PA's  NP                      STM R paraspianls       10'  10'                                                             Exercise Diary                        ltr  5"x15  5"x15  5"x15  15  15             SKTC  30"x3  30"x4  30"x4  4               TaA  5"x10  5"x10  10                 TaA bridge  5"x10 Royalty Boga Royalty Boga                 Repeated ext standing  NP                      TaA wall squat  1x10  10  10                                                                                                                                                                                                                                                                 Modalities                        MHP/ESTIM  post tx 10 min  10'  10'  10'  10'                                       Assessment: min therex performed today secondary to increased pain, pt is scheduled to see her physician this week regarding continued pain  Progress per poc NV,   Plan: Continue treatment as per PT plan of care  No

## 2021-02-18 ENCOUNTER — ANESTHESIA (OUTPATIENT)
Dept: GASTROENTEROLOGY | Facility: AMBULARY SURGERY CENTER | Age: 61
End: 2021-02-18

## 2021-03-17 ENCOUNTER — HOSPITAL ENCOUNTER (OUTPATIENT)
Dept: GASTROENTEROLOGY | Facility: AMBULARY SURGERY CENTER | Age: 61
Setting detail: OUTPATIENT SURGERY
Discharge: HOME | End: 2021-03-17
Attending: INTERNAL MEDICINE | Admitting: INTERNAL MEDICINE
Payer: COMMERCIAL

## 2021-03-17 VITALS
RESPIRATION RATE: 16 BRPM | SYSTOLIC BLOOD PRESSURE: 108 MMHG | TEMPERATURE: 98.7 F | DIASTOLIC BLOOD PRESSURE: 69 MMHG | HEIGHT: 62 IN | HEART RATE: 72 BPM | BODY MASS INDEX: 30.18 KG/M2 | OXYGEN SATURATION: 98 % | WEIGHT: 164 LBS

## 2021-03-17 DIAGNOSIS — Z86.010 HISTORY OF COLON POLYPS: ICD-10-CM

## 2021-03-17 PROBLEM — Z98.890 PONV (POSTOPERATIVE NAUSEA AND VOMITING): Status: ACTIVE | Noted: 2021-03-17

## 2021-03-17 PROBLEM — R11.2 PONV (POSTOPERATIVE NAUSEA AND VOMITING): Status: ACTIVE | Noted: 2021-03-17

## 2021-03-17 PROCEDURE — 88305 TISSUE EXAM BY PATHOLOGIST: CPT | Performed by: PATHOLOGY

## 2021-03-17 PROCEDURE — 45385 COLONOSCOPY W/LESION REMOVAL: CPT | Performed by: INTERNAL MEDICINE

## 2021-03-17 RX ORDER — PROPOFOL 10 MG/ML
INJECTION, EMULSION INTRAVENOUS AS NEEDED
Status: DISCONTINUED | OUTPATIENT
Start: 2021-03-17 | End: 2021-03-17

## 2021-03-17 RX ORDER — LIDOCAINE HYDROCHLORIDE 10 MG/ML
INJECTION, SOLUTION EPIDURAL; INFILTRATION; INTRACAUDAL; PERINEURAL AS NEEDED
Status: DISCONTINUED | OUTPATIENT
Start: 2021-03-17 | End: 2021-03-17

## 2021-03-17 RX ORDER — SODIUM CHLORIDE, SODIUM LACTATE, POTASSIUM CHLORIDE, CALCIUM CHLORIDE 600; 310; 30; 20 MG/100ML; MG/100ML; MG/100ML; MG/100ML
75 INJECTION, SOLUTION INTRAVENOUS CONTINUOUS
Status: DISCONTINUED | OUTPATIENT
Start: 2021-03-17 | End: 2021-03-21 | Stop reason: HOSPADM

## 2021-03-17 RX ORDER — PROPOFOL 10 MG/ML
INJECTION, EMULSION INTRAVENOUS CONTINUOUS PRN
Status: DISCONTINUED | OUTPATIENT
Start: 2021-03-17 | End: 2021-03-17

## 2021-03-17 RX ADMIN — PROPOFOL 100 MG: 10 INJECTION, EMULSION INTRAVENOUS at 11:07

## 2021-03-17 RX ADMIN — PROPOFOL 50 MG: 10 INJECTION, EMULSION INTRAVENOUS at 11:12

## 2021-03-17 RX ADMIN — PROPOFOL 100 MCG/KG/MIN: 10 INJECTION, EMULSION INTRAVENOUS at 11:07

## 2021-03-17 RX ADMIN — LIDOCAINE HYDROCHLORIDE 50 MG: 10 INJECTION, SOLUTION EPIDURAL; INFILTRATION; INTRACAUDAL at 11:07

## 2021-03-17 RX ADMIN — SODIUM CHLORIDE, SODIUM LACTATE, POTASSIUM CHLORIDE, AND CALCIUM CHLORIDE 75 ML/HR: .6; .31; .03; .02 INJECTION, SOLUTION INTRAVENOUS at 10:12

## 2021-03-17 NOTE — H&P
History and Physical - [facility]  Yang Navarro 64 y o  female MRN: [id number]                  HPI: Yang Navarro is a 64y o  year old female who presents for colonoscopy due to tubular adenomas  REVIEW OF SYSTEMS: Per the HPI, and otherwise unremarkable  Historical Information   Past Medical History:   Diagnosis Date    Anemia     Anxiety and depression     Chronic fatigue syndrome     Colon polyp     CPAP (continuous positive airway pressure) dependence     DDD (degenerative disc disease), lumbosacral 12/27/2018    Patrick-Barr virus seropositivity     Fibromyalgia, primary     JA (generalized anxiety disorder)     Gluten intolerance     Hypertension     Hypovitaminosis D     Impaired fasting glucose     Lipodystrophy     Liver lesion     Migraine     Obesity (BMI 30-39 9) 6/6/2018    Sleep apnea      Past Surgical History:   Procedure Laterality Date    APPENDECTOMY      BREAST BIOPSY Right 2006    not sure of the date    CHOLECYSTECTOMY      COLONOSCOPY      PA COLONOSCOPY FLX DX W/COLLJ SPEC WHEN PFRMD N/A 12/4/2017    Procedure: COLONOSCOPY;  Surgeon: Mandy Mar DO;  [facility];   Service: Gastroenterology    UPPER GASTROINTESTINAL ENDOSCOPY       Social History   Social History     Substance and Sexual Activity   Alcohol Use Yes    Comment: rarely     Social History     Substance and Sexual Activity   Drug Use No     Social History     Tobacco Use   Smoking Status Never Smoker   Smokeless Tobacco Never Used     Family History   Problem Relation Age of Onset    Depression Mother     Hypertension Mother         essential     Mitral valve prolapse Mother     Diabetes Brother     Heart attack Brother     Heart disease Maternal Grandmother         cardiac disorder    Hyperlipidemia Maternal Grandmother     Stroke Paternal Grandmother     Stroke Paternal Grandfather     Breast cancer Maternal Aunt 48    No Known Problems Father        Meds/Allergies       Current Outpatient Medications:     Ascorbic Acid (VITAMIN C PO)    Cholecalciferol (VITAMIN D3) 31995 units TABS    Cyanocobalamin (VITAMIN B-12) 500 MCG LOZG    DULoxetine (CYMBALTA) 30 mg delayed release capsule    hydrochlorothiazide (HYDRODIURIL) 12 5 mg tablet    losartan (COZAAR) 25 mg tablet    Magnesium Gluconate 250 MG TABS    potassium chloride (K-DUR,KLOR-CON) 10 mEq tablet    Zinc 50 MG CAPS    Melatonin 3 MG SUBL    Current Facility-Administered Medications:     lactated ringers infusion, 75 mL/hr, Intravenous, Continuous, 75 mL/hr at 03/17/21 1012    Allergies   Allergen Reactions    Escitalopram Swelling and Edema     Category: Allergy;     Fluticasone-Salmeterol      UNKNOWN    Nsaids      Action Taken: stomach issues;     Gluten Meal Diarrhea    Latex      irritation      Other Rash     Adhesive bandages       Objective     /73   Pulse 83   Temp (!) 97 1 °F (36 2 °C) (Temporal)   Resp 18   Ht 5' 2" (1 575 m)   Wt 74 4 kg (164 lb)   SpO2 93%   BMI 30 00 kg/m²       PHYSICAL EXAM    Gen: NAD  CV: RRR  CHEST: Clear  ABD: soft, NT/ND  EXT: no edema      ASSESSMENT/PLAN:  This is a 64y o  year old female here for colonoscopy, and she is stable and optimized for her procedure

## 2021-03-17 NOTE — ANESTHESIA PREPROCEDURE EVALUATION
Procedure:  COLONOSCOPY    Relevant Problems   ANESTHESIA   (+) PONV (postoperative nausea and vomiting)      CARDIO   (+) Hypertension   (+) Migraine      HEMATOLOGY   (+) Anemia      MUSCULOSKELETAL   (+) Acute bilateral low back pain without sciatica   (+) DDD (degenerative disc disease), lumbosacral   (+) Fibromyalgia      NEURO/PSYCH   (+) Anxiety and depression   (+) Fibromyalgia   (+) JA (generalized anxiety disorder)   (+) Headache upon awakening   (+) Major depressive disorder, recurrent episode, in full remission (HCC)      PULMONARY   (+) Asthma   (+) Obstructive sleep apnea      Other   (+) CPAP (continuous positive airway pressure) dependence        Physical Exam    Airway    Mallampati score: II  TM Distance: >3 FB  Neck ROM: full     Dental       Cardiovascular  Rhythm: regular, Rate: normal,     Pulmonary  Breath sounds clear to auscultation,     Other Findings        Anesthesia Plan  ASA Score- 2     Anesthesia Type- IV sedation with anesthesia with ASA Monitors  Additional Monitors:   Airway Plan:           Plan Factors-    Chart reviewed  Patient is not a current smoker  Induction- intravenous  Postoperative Plan-     Informed Consent- Anesthetic plan and risks discussed with patient  I personally reviewed this patient with the CRNA  Discussed and agreed on the Anesthesia Plan with the CRNA  Dimitris Kingston

## 2021-03-17 NOTE — ANESTHESIA POSTPROCEDURE EVALUATION
Post-Op Assessment Note    CV Status:  Stable  Pain Score: 0    Pain management: adequate     Mental Status:  Sleepy   Hydration Status:  Euvolemic   PONV Controlled:  Controlled   Airway Patency:  Patent      Post Op Vitals Reviewed: Yes      Staff: CRNA         No complications documented      BP   143/89   Temp     Pulse  75   Resp   20   SpO2   100

## 2021-04-13 DIAGNOSIS — I10 HYPERTENSION, UNSPECIFIED TYPE: ICD-10-CM

## 2021-04-14 DIAGNOSIS — I10 HYPERTENSION, UNSPECIFIED TYPE: ICD-10-CM

## 2021-04-14 RX ORDER — LOSARTAN POTASSIUM 25 MG/1
TABLET ORAL
Qty: 30 TABLET | Refills: 0 | Status: SHIPPED | OUTPATIENT
Start: 2021-04-14 | End: 2021-05-22 | Stop reason: SDUPTHER

## 2021-04-14 RX ORDER — LOSARTAN POTASSIUM 25 MG/1
TABLET ORAL
Qty: 90 TABLET | Refills: 0 | Status: SHIPPED | OUTPATIENT
Start: 2021-04-14 | End: 2021-04-14

## 2021-04-14 RX ORDER — HYDROCHLOROTHIAZIDE 12.5 MG/1
TABLET ORAL
Qty: 90 TABLET | Refills: 0 | Status: SHIPPED | OUTPATIENT
Start: 2021-04-14 | End: 2022-01-12

## 2021-04-14 RX ORDER — HYDROCHLOROTHIAZIDE 12.5 MG/1
TABLET ORAL
Qty: 90 TABLET | Refills: 0 | Status: SHIPPED | OUTPATIENT
Start: 2021-04-14 | End: 2021-04-14

## 2021-04-14 NOTE — TELEPHONE ENCOUNTER
Please let patient know that she is due for follow-up appointment with me  I can see her in person or virtually    Thank you

## 2021-05-10 ENCOUNTER — LAB (OUTPATIENT)
Dept: LAB | Facility: HOSPITAL | Age: 61
End: 2021-05-10
Payer: COMMERCIAL

## 2021-05-10 ENCOUNTER — APPOINTMENT (OUTPATIENT)
Dept: LAB | Facility: HOSPITAL | Age: 61
End: 2021-05-10
Payer: COMMERCIAL

## 2021-05-10 DIAGNOSIS — Z13.6 ENCOUNTER FOR LIPID SCREENING FOR CARDIOVASCULAR DISEASE: ICD-10-CM

## 2021-05-10 DIAGNOSIS — Z00.8 HEALTH EXAMINATION IN POPULATION SURVEY: Primary | ICD-10-CM

## 2021-05-10 DIAGNOSIS — Z13.220 ENCOUNTER FOR LIPID SCREENING FOR CARDIOVASCULAR DISEASE: ICD-10-CM

## 2021-05-10 LAB
CHOLEST SERPL-MCNC: 212 MG/DL (ref 50–200)
EST. AVERAGE GLUCOSE BLD GHB EST-MCNC: 114 MG/DL
HBA1C MFR BLD: 5.6 %
HDLC SERPL-MCNC: 57 MG/DL
LDLC SERPL CALC-MCNC: 132 MG/DL (ref 0–100)
TRIGL SERPL-MCNC: 117 MG/DL

## 2021-05-10 PROCEDURE — 83036 HEMOGLOBIN GLYCOSYLATED A1C: CPT

## 2021-05-10 PROCEDURE — 80061 LIPID PANEL: CPT

## 2021-05-10 PROCEDURE — 36415 COLL VENOUS BLD VENIPUNCTURE: CPT

## 2021-05-14 NOTE — RESULT ENCOUNTER NOTE
Will discuss cholesterol blood work at upcoming appointment  Cholesterol is mildly increased however ASCVD risk score is low at 2 6%

## 2021-05-22 ENCOUNTER — OFFICE VISIT (OUTPATIENT)
Dept: FAMILY MEDICINE CLINIC | Facility: CLINIC | Age: 61
End: 2021-05-22
Payer: COMMERCIAL

## 2021-05-22 VITALS
HEART RATE: 79 BPM | TEMPERATURE: 97.5 F | WEIGHT: 177.13 LBS | SYSTOLIC BLOOD PRESSURE: 124 MMHG | BODY MASS INDEX: 32.59 KG/M2 | DIASTOLIC BLOOD PRESSURE: 70 MMHG | HEIGHT: 62 IN | RESPIRATION RATE: 18 BRPM | OXYGEN SATURATION: 96 %

## 2021-05-22 DIAGNOSIS — F32.A ANXIETY AND DEPRESSION: ICD-10-CM

## 2021-05-22 DIAGNOSIS — Z00.00 ROUTINE HEALTH MAINTENANCE: ICD-10-CM

## 2021-05-22 DIAGNOSIS — D64.9 ANEMIA, UNSPECIFIED TYPE: ICD-10-CM

## 2021-05-22 DIAGNOSIS — R53.83 FATIGUE, UNSPECIFIED TYPE: ICD-10-CM

## 2021-05-22 DIAGNOSIS — Z12.31 SCREENING MAMMOGRAM, ENCOUNTER FOR: ICD-10-CM

## 2021-05-22 DIAGNOSIS — M54.40 LOW BACK PAIN WITH SCIATICA, SCIATICA LATERALITY UNSPECIFIED, UNSPECIFIED BACK PAIN LATERALITY, UNSPECIFIED CHRONICITY: ICD-10-CM

## 2021-05-22 DIAGNOSIS — E55.9 HYPOVITAMINOSIS D: ICD-10-CM

## 2021-05-22 DIAGNOSIS — G47.09 OTHER INSOMNIA: ICD-10-CM

## 2021-05-22 DIAGNOSIS — M79.7 FIBROMYALGIA: ICD-10-CM

## 2021-05-22 DIAGNOSIS — G47.33 OBSTRUCTIVE SLEEP APNEA: ICD-10-CM

## 2021-05-22 DIAGNOSIS — F41.9 ANXIETY AND DEPRESSION: ICD-10-CM

## 2021-05-22 DIAGNOSIS — I10 HYPERTENSION, UNSPECIFIED TYPE: Primary | ICD-10-CM

## 2021-05-22 DIAGNOSIS — M25.542 ARTHRALGIA OF LEFT HAND: ICD-10-CM

## 2021-05-22 DIAGNOSIS — E78.1 HYPERTRIGLYCERIDEMIA: ICD-10-CM

## 2021-05-22 PROCEDURE — 99214 OFFICE O/P EST MOD 30 MIN: CPT | Performed by: FAMILY MEDICINE

## 2021-05-22 RX ORDER — LOSARTAN POTASSIUM 25 MG/1
25 TABLET ORAL DAILY
Qty: 90 TABLET | Refills: 3 | Status: SHIPPED | OUTPATIENT
Start: 2021-05-22 | End: 2022-05-16

## 2021-05-22 NOTE — PROGRESS NOTES
FAMILY PRACTICE OFFICE VISIT       NAME: Lissett Flanagan  AGE: 64 y o  SEX: female       : 1960        MRN: [id number]    DATE: 2021  TIME: 5:41 PM    Assessment and Plan     Problem List Items Addressed This Visit        Respiratory    Obstructive sleep apnea    Relevant Orders    CBC and differential       Cardiovascular and Mediastinum    Hypertension - Primary    Relevant Medications    losartan (COZAAR) 25 mg tablet    Other Relevant Orders    Comprehensive metabolic panel       Other    Other insomnia (Chronic)    Anemia    Relevant Orders    Iron Panel (Includes Ferritin, Iron Sat%, Iron, and TIBC)    Fibromyalgia    Relevant Orders    CBC and differential    Hypovitaminosis D    Relevant Orders    Vitamin D 25 hydroxy    Anxiety and depression      Other Visit Diagnoses     Hypertriglyceridemia        Fatigue, unspecified type        Relevant Orders    CBC and differential    Comprehensive metabolic panel    TSH, 3rd generation with Free T4 reflex    Vitamin D 25 hydroxy    Low back pain with sciatica, sciatica laterality unspecified, unspecified back pain laterality, unspecified chronicity        Relevant Orders    MRI lumbar spine wo contrast    HLA-B27 antigen    Ambulatory referral to Physical Therapy    Ambulatory referral to Pain Management    Screening mammogram, encounter for        Relevant Orders    Mammo screening bilateral w 3d & cad    Arthralgia of left hand        Relevant Orders    RF Screen w/ Reflex to Titer    Cyclic citrul peptide antibody, IgG    C-reactive protein    ALBERT Screen w/ Reflex to Titer/Pattern        Hypertension:  BP is controlled and stable  Continue losartan 25 mg as well as lifestyle modifications  Fibromyalgia:  Patient does continue with duloxetine 30 mg daily  Sleep apnea:  Using CPAP  Depression and anxiety: This is overall stable  Patient is on Cymbalta 30 mg daily        Hypertriglyceridemia:  Triglycerides are within normal range now     Anemia:  Will check iron panel  She will continue to try in consume iron rich foods  Insomnia:  Has been using melatonin recently as she has not been sleeping well  Hypovitaminosis D:  Will check vitamin-D level  Low back pain:  Patient has history of spondylolisthesis  No red flag signs noted however she does have pain radiating occasionally down her biotics to anterior thighs as well as lateral lower extremity with occasional/ intermittent tingling, numbness  Motor exam normal today  We will get an updated MRI lumbar spine  I feel she would benefit from evaluation by pain management as well as physical therapy  Return parameters discussed  Left 5th digit joint pain x2 months:  Likely osteoarthritis however would also benefit from checking autoimmune and inflammatory markers as she does have low back pain as well  Routine health maintenance:  will schedule mammogram  Will also schedule appointment with gyn  Up-to-date with colonoscopy in March/2021 with recommended repeat in 5 years  Up-to-date with Tdap  BMI Counseling: Body mass index is 32 4 kg/m²  The BMI is above normal  Nutrition recommendations include increasing intake of lean protein  Exercise recommendations include moderate aerobic physical activity for 150 minutes/week  There are no Patient Instructions on file for this visit  Chief Complaint     Chief Complaint   Patient presents with    Follow-up     refill medication        History of Present Illness     HPI   68-year-old female presents today for routine follow-up and requesting refill on losartan  Patient has had history of back pain however states it has worsened the past couple of months  She does stretching exercises  Back pain is worsened with sitting, walking, standing  The back pain is located in her low back which per cc her buttocks and travels down the front of her thighs and lateral part of her lower extremity    She does have occasional tingling, numbness, weakness  Denies bowel or bladder incontinence  She has done physical therapy in the past which has helped  Started taking melatonin recenty as she has not been sleeping as well  Patient states she is developing cataracts as noted by her eye doctor  She will schedule appointment with ophthalmologist   Patient states she has been 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, restoring stones  She recently planted her garden with vegetables   She states she will schedule her Pap  Has had left pinky joint pain x 2 months in the joint farthest away  States she Is unable to bend it completely  Review of Systems   Review of Systems   Constitutional: Negative for appetite change  Respiratory: Negative for shortness of breath  Cardiovascular: Negative  Gastrointestinal: Negative for abdominal pain and constipation  Musculoskeletal: Positive for back pain  Psychiatric/Behavioral: Positive for sleep disturbance  Active Problem List     Patient Active Problem List   Diagnosis    Left ear pain    Anemia    Asthma    Chronic fatigue syndrome    Hypertension    Fibromyalgia    JA (generalized anxiety disorder)    Gluten intolerance    Hypovitaminosis D    Impaired fasting glucose    Liver lesion    Lipodystrophy    Major depressive disorder, recurrent episode, in full remission (HCC)    Migraine    Myalgia and myositis    Other insomnia    Rosacea    Obstructive sleep apnea    Routine health maintenance    Headache upon awakening    Hypersomnia    Obesity (BMI 30-39  9)    Snoring    Anxiety and depression    Neck pain on right side    NOEMÍ (obstructive sleep apnea)    Herniated lumbar intervertebral disc    Acute bilateral low back pain without sciatica    DDD (degenerative disc disease), lumbosacral    Sleep disturbance    Pain in right upper arm    CPAP (continuous positive airway pressure) dependence    PONV (postoperative nausea and vomiting)       Past Medical History:  Past Medical History:   Diagnosis Date    Anemia     Anxiety and depression     Chronic fatigue syndrome     Colon polyp     CPAP (continuous positive airway pressure) dependence     DDD (degenerative disc disease), lumbosacral 12/27/2018    Patrick-Barr virus seropositivity     Fibromyalgia, primary     JA (generalized anxiety disorder)     Gluten intolerance     Hypertension     Hypovitaminosis D     Impaired fasting glucose     Lipodystrophy     Liver lesion     Migraine     Obesity (BMI 30-39 9) 6/6/2018    Sleep apnea        Past Surgical History:  Past Surgical History:   Procedure Laterality Date    APPENDECTOMY      BREAST BIOPSY Right 2006    not sure of the date    CHOLECYSTECTOMY      COLONOSCOPY      NV COLONOSCOPY FLX DX W/COLLJ SPEC WHEN PFRMD N/A 12/4/2017    Procedure: COLONOSCOPY;  Surgeon: [place], DO;  Location: [facility];   Service: Gastroenterology    UPPER GASTROINTESTINAL ENDOSCOPY         Family History:  Family History   Problem Relation Age of Onset   Mally Damon Depression Mother     Hypertension Mother         essential     Mitral valve prolapse Mother     Diabetes Brother     Heart attack Brother     Heart disease Maternal Grandmother         cardiac disorder    Hyperlipidemia Maternal Grandmother     Stroke Paternal Grandmother     Stroke Paternal Grandfather     Breast cancer Maternal Aunt 48    No Known Problems Father        Social History:  Social History     Socioeconomic History    Marital status: Single     Spouse name: Not on file    Number of children: 0    Years of education: bachelor's degree    Highest education level: Not on file   Occupational History    Occupation: works for [employer]     Employer: [employer]   Social Needs    Financial resource strain: Not on file    Food insecurity     Worry: Not on file     Inability: Not on file   Mally Damon Transportation needs     Medical: Not on file     Non-medical: Not on file   Tobacco Use    Smoking status: Never Smoker    Smokeless tobacco: Never Used   Substance and Sexual Activity    Alcohol use: Yes     Comment: rarely    Drug use: No    Sexual activity: Not Currently   Lifestyle    Physical activity     Days per week: Not on file     Minutes per session: Not on file    Stress: Not on file   Relationships    Social connections     Talks on phone: Not on file     Gets together: Not on file     Attends Synagogue service: Not on file     Active member of club or organization: Not on file     Attends meetings of clubs or organizations: Not on file     Relationship status: Not on file    Intimate partner violence     Fear of current or ex partner: Not on file     Emotionally abused: Not on file     Physically abused: Not on file     Forced sexual activity: Not on file   Other Topics Concern    Not on file   Social History Narrative    Education: bachelor's degree in sociology    Learning Disabilities: none    Marital History: single    Children: none    Living Arrangement: lives alone    Occupational History: works for [employer] at [employer]: limited support system    Legal History: none     History: None    Caffeine use denied     I have reviewed the patient's medical history in detail; there are no changes to the history as noted in the electronic medical record      Objective     Vitals:    05/22/21 0754   BP: 124/70   Pulse: 79   Resp: 18   Temp: 97 5 °F (36 4 °C)   SpO2: 96%     Wt Readings from Last 3 Encounters:   05/22/21 80 3 kg (177 lb 2 oz)   03/17/21 74 4 kg (164 lb)   09/29/20 78 9 kg (174 lb)     PHQ-9 Depression Screening    PHQ-9:   Frequency of the following problems over the past two weeks:      Little interest or pleasure in doing things: 0 - not at all  Feeling down, depressed, or hopeless: 0 - not at all  Trouble falling or staying asleep, or sleeping too much: 3 - nearly every day  Feeling tired or having little energy: 3 - nearly every day  Poor appetite or overeatin - not at all  Feeling bad about yourself - or that you are a failure or have let yourself or your family down: 0 - not at all  Trouble concentrating on things, such as reading the newspaper or watching television: 1 - several days  Moving or speaking so slowly that other people could have noticed  Or the opposite - being so fidgety or restless that you have been moving around a lot more than usual: 0 - not at all  Thoughts that you would be better off dead, or of hurting yourself in some way: 0 - not at all  PHQ-2 Score: 0  PHQ-9 Score: 7           Physical Exam  Vitals signs and nursing note reviewed  Constitutional:       General: She is not in acute distress  Appearance: Normal appearance  She is well-developed  HENT:      Head: Normocephalic and atraumatic  Mouth/Throat:      Mouth: Mucous membranes are moist       Pharynx: Oropharynx is clear  Eyes:      Conjunctiva/sclera: Conjunctivae normal       Pupils: Pupils are equal, round, and reactive to light  Neck:      Musculoskeletal: Normal range of motion and neck supple  Thyroid: No thyromegaly  Cardiovascular:      Rate and Rhythm: Normal rate and regular rhythm  Pulmonary:      Effort: Pulmonary effort is normal       Breath sounds: Normal breath sounds  Abdominal:      General: Bowel sounds are normal       Palpations: Abdomen is soft  Tenderness: There is no abdominal tenderness  Musculoskeletal: Normal range of motion  General: No swelling  Comments: Negative straight leg raise testing bilaterally however states she does have lower back discomfort when she did raise both legs one at a time  Lymphadenopathy:      Cervical: No cervical adenopathy  Neurological:      Mental Status: She is alert and oriented to person, place, and time     Psychiatric: Mood and Affect: Mood normal          Pertinent Laboratory/Diagnostic Studies:  Lab Results   Component Value Date    BUN 13 08/20/2020    CREATININE 0 87 08/20/2020    CALCIUM 8 9 08/20/2020     11/16/2016    K 3 7 09/03/2020    CO2 26 08/20/2020     08/20/2020     Lab Results   Component Value Date    ALT 48 08/20/2020    AST 21 08/20/2020    ALKPHOS 76 08/20/2020    BILITOT 0 4 11/16/2016       Lab Results   Component Value Date    WBC 5 74 08/20/2020    HGB 12 8 08/20/2020    HCT 38 2 08/20/2020    MCV 91 08/20/2020     08/20/2020       No results found for: TSH    Lab Results   Component Value Date    CHOL 195 05/09/2016     Lab Results   Component Value Date    TRIG 117 05/10/2021     Lab Results   Component Value Date    HDL 57 05/10/2021     Lab Results   Component Value Date    LDLCALC 132 (H) 05/10/2021     Lab Results   Component Value Date    HGBA1C 5 6 05/10/2021       Results for orders placed or performed in visit on 05/10/21   Hemoglobin A1C   Result Value Ref Range    Hemoglobin A1C 5 6 Normal 3 8-5 6%; PreDiabetic 5 7-6 4%; Diabetic >=6 5%; Glycemic control for adults with diabetes <7 0% %     mg/dl       Orders Placed This Encounter   Procedures    MRI lumbar spine wo contrast    Mammo screening bilateral w 3d & cad    CBC and differential    Comprehensive metabolic panel    TSH, 3rd generation with Free T4 reflex    Vitamin D 25 hydroxy    RF Screen w/ Reflex to Titer    Cyclic citrul peptide antibody, IgG    C-reactive protein    ALBERT Screen w/ Reflex to Titer/Pattern    HLA-B27 antigen    Ambulatory referral to Physical Therapy    Ambulatory referral to Pain Management       ALLERGIES:  Allergies   Allergen Reactions    Escitalopram Swelling and Edema     Category:  Allergy;     Fluticasone-Salmeterol      UNKNOWN    Nsaids      Action Taken: stomach issues;     Gluten Meal - Food Allergy Diarrhea    Latex      irritation      Other Rash     Adhesive bandages       Current Medications     Current Outpatient Medications   Medication Sig Dispense Refill    Ascorbic Acid (VITAMIN C PO) Take 1 tablet by mouth daily      Cholecalciferol (VITAMIN D3) 13856 units TABS Take 10,000 Units by mouth daily      Cyanocobalamin (VITAMIN B-12) 500 MCG LOZG Take 500 mcg by mouth every other day       DULoxetine (CYMBALTA) 30 mg delayed release capsule TAKE 1 CAPSULE DAILY 90 capsule 3    hydrochlorothiazide (HYDRODIURIL) 12 5 mg tablet TAKE ONE TABLET BY MOUTH DAILY 90 tablet 0    losartan (COZAAR) 25 mg tablet Take 1 tablet (25 mg total) by mouth daily 90 tablet 3    Magnesium Gluconate 250 MG TABS Take 1 tablet by mouth daily      Melatonin 3 MG SUBL Place under the tongue      potassium chloride (K-DUR,KLOR-CON) 10 mEq tablet Take 1 tablet (10 mEq total) by mouth daily 30 tablet 1    Zinc 50 MG CAPS Take 1 capsule by mouth daily       No current facility-administered medications for this visit            Health Maintenance     Health Maintenance   Topic Date Due    Pneumococcal Vaccine: Pediatrics (0 to 5 Years) and At-Risk Patients (6 to 59 Years) (1 of 1 - PPSV23) Never done    COVID-19 Vaccine (1) Never done    HIV Screening  Never done    BMI: Followup Plan  Never done    PT PLAN OF CARE  03/21/2019    Annual Physical  08/20/2021    Influenza Vaccine (Season Ended) 09/01/2021    BMI: Adult  05/22/2022    Depression Remission PHQ  05/22/2022    MAMMOGRAM  09/29/2022    Colonoscopy Surveillance  03/17/2024    Cervical Cancer Screening  08/20/2025    DTaP,Tdap,and Td Vaccines (2 - Td) 02/20/2027    Colorectal Cancer Screening  03/17/2031    Hepatitis C Screening  Completed    HIB Vaccine  Aged Out    Hepatitis B Vaccine  Aged Out    IPV Vaccine  Aged Out    Hepatitis A Vaccine  Aged Out    Meningococcal ACWY Vaccine  Aged Out    HPV Vaccine  Aged Out     Immunization History   Administered Date(s) Administered    Influenza, seasonal, injectable 02/15/2017    Tdap 02/20/2017       Laila Felix MD

## 2021-06-01 ENCOUNTER — LAB (OUTPATIENT)
Dept: LAB | Facility: HOSPITAL | Age: 61
End: 2021-06-01
Payer: COMMERCIAL

## 2021-06-01 DIAGNOSIS — I10 HYPERTENSION, UNSPECIFIED TYPE: ICD-10-CM

## 2021-06-01 DIAGNOSIS — M79.7 FIBROMYALGIA: ICD-10-CM

## 2021-06-01 DIAGNOSIS — D64.9 ANEMIA, UNSPECIFIED TYPE: ICD-10-CM

## 2021-06-01 DIAGNOSIS — M54.40 LOW BACK PAIN WITH SCIATICA, SCIATICA LATERALITY UNSPECIFIED, UNSPECIFIED BACK PAIN LATERALITY, UNSPECIFIED CHRONICITY: ICD-10-CM

## 2021-06-01 DIAGNOSIS — M25.542 ARTHRALGIA OF LEFT HAND: ICD-10-CM

## 2021-06-01 DIAGNOSIS — E55.9 HYPOVITAMINOSIS D: Primary | ICD-10-CM

## 2021-06-01 DIAGNOSIS — G47.33 OBSTRUCTIVE SLEEP APNEA: ICD-10-CM

## 2021-06-01 DIAGNOSIS — E55.9 HYPOVITAMINOSIS D: ICD-10-CM

## 2021-06-01 DIAGNOSIS — R53.83 FATIGUE, UNSPECIFIED TYPE: ICD-10-CM

## 2021-06-01 DIAGNOSIS — Z00.8 HEALTH EXAMINATION IN POPULATION SURVEY: ICD-10-CM

## 2021-06-01 DIAGNOSIS — E87.6 LOW SERUM POTASSIUM: ICD-10-CM

## 2021-06-01 LAB
25(OH)D3 SERPL-MCNC: 28.1 NG/ML (ref 30–100)
ALBUMIN SERPL BCP-MCNC: 4 G/DL (ref 3.5–5)
ALP SERPL-CCNC: 95 U/L (ref 46–116)
ALT SERPL W P-5'-P-CCNC: 46 U/L (ref 12–78)
ANION GAP SERPL CALCULATED.3IONS-SCNC: 6 MMOL/L (ref 4–13)
AST SERPL W P-5'-P-CCNC: 22 U/L (ref 5–45)
BASOPHILS # BLD AUTO: 0.03 THOUSANDS/ΜL (ref 0–0.1)
BASOPHILS NFR BLD AUTO: 1 % (ref 0–1)
BILIRUB SERPL-MCNC: 0.55 MG/DL (ref 0.2–1)
BUN SERPL-MCNC: 14 MG/DL (ref 5–25)
CALCIUM SERPL-MCNC: 9.8 MG/DL (ref 8.3–10.1)
CHLORIDE SERPL-SCNC: 105 MMOL/L (ref 100–108)
CHOLEST SERPL-MCNC: 219 MG/DL (ref 50–200)
CO2 SERPL-SCNC: 28 MMOL/L (ref 21–32)
CREAT SERPL-MCNC: 0.78 MG/DL (ref 0.6–1.3)
CRP SERPL QL: 4.4 MG/L
EOSINOPHIL # BLD AUTO: 0.17 THOUSAND/ΜL (ref 0–0.61)
EOSINOPHIL NFR BLD AUTO: 3 % (ref 0–6)
ERYTHROCYTE [DISTWIDTH] IN BLOOD BY AUTOMATED COUNT: 12.4 % (ref 11.6–15.1)
FERRITIN SERPL-MCNC: 77 NG/ML (ref 8–388)
GFR SERPL CREATININE-BSD FRML MDRD: 84 ML/MIN/1.73SQ M
GLUCOSE P FAST SERPL-MCNC: 103 MG/DL (ref 65–99)
HCT VFR BLD AUTO: 40.6 % (ref 34.8–46.1)
HDLC SERPL-MCNC: 54 MG/DL
HGB BLD-MCNC: 13.9 G/DL (ref 11.5–15.4)
IMM GRANULOCYTES # BLD AUTO: 0.02 THOUSAND/UL (ref 0–0.2)
IMM GRANULOCYTES NFR BLD AUTO: 0 % (ref 0–2)
IRON SATN MFR SERPL: 25 %
IRON SERPL-MCNC: 90 UG/DL (ref 50–170)
LDLC SERPL CALC-MCNC: 122 MG/DL (ref 0–100)
LYMPHOCYTES # BLD AUTO: 1.77 THOUSANDS/ΜL (ref 0.6–4.47)
LYMPHOCYTES NFR BLD AUTO: 30 % (ref 14–44)
MCH RBC QN AUTO: 31.2 PG (ref 26.8–34.3)
MCHC RBC AUTO-ENTMCNC: 34.2 G/DL (ref 31.4–37.4)
MCV RBC AUTO: 91 FL (ref 82–98)
MONOCYTES # BLD AUTO: 0.44 THOUSAND/ΜL (ref 0.17–1.22)
MONOCYTES NFR BLD AUTO: 8 % (ref 4–12)
NEUTROPHILS # BLD AUTO: 3.39 THOUSANDS/ΜL (ref 1.85–7.62)
NEUTS SEG NFR BLD AUTO: 58 % (ref 43–75)
NONHDLC SERPL-MCNC: 165 MG/DL
NRBC BLD AUTO-RTO: 0 /100 WBCS
PLATELET # BLD AUTO: 339 THOUSANDS/UL (ref 149–390)
PMV BLD AUTO: 9.3 FL (ref 8.9–12.7)
POTASSIUM SERPL-SCNC: 3.2 MMOL/L (ref 3.5–5.3)
PROT SERPL-MCNC: 7.7 G/DL (ref 6.4–8.2)
RBC # BLD AUTO: 4.45 MILLION/UL (ref 3.81–5.12)
RHEUMATOID FACT SER QL LA: NEGATIVE
SODIUM SERPL-SCNC: 139 MMOL/L (ref 136–145)
TIBC SERPL-MCNC: 353 UG/DL (ref 250–450)
TRIGL SERPL-MCNC: 214 MG/DL
TSH SERPL DL<=0.05 MIU/L-ACNC: 2.04 UIU/ML (ref 0.36–3.74)
WBC # BLD AUTO: 5.82 THOUSAND/UL (ref 4.31–10.16)

## 2021-06-01 PROCEDURE — 80061 LIPID PANEL: CPT

## 2021-06-01 PROCEDURE — 83540 ASSAY OF IRON: CPT

## 2021-06-01 PROCEDURE — 82728 ASSAY OF FERRITIN: CPT

## 2021-06-01 PROCEDURE — 84443 ASSAY THYROID STIM HORMONE: CPT

## 2021-06-01 PROCEDURE — 82306 VITAMIN D 25 HYDROXY: CPT

## 2021-06-01 PROCEDURE — 86430 RHEUMATOID FACTOR TEST QUAL: CPT

## 2021-06-01 PROCEDURE — 36415 COLL VENOUS BLD VENIPUNCTURE: CPT

## 2021-06-01 PROCEDURE — 85025 COMPLETE CBC W/AUTO DIFF WBC: CPT

## 2021-06-01 PROCEDURE — 80053 COMPREHEN METABOLIC PANEL: CPT

## 2021-06-01 PROCEDURE — 83550 IRON BINDING TEST: CPT

## 2021-06-01 PROCEDURE — 86140 C-REACTIVE PROTEIN: CPT

## 2021-06-01 PROCEDURE — 81374 HLA I TYPING 1 ANTIGEN LR: CPT

## 2021-06-01 PROCEDURE — 86038 ANTINUCLEAR ANTIBODIES: CPT

## 2021-06-01 PROCEDURE — 86200 CCP ANTIBODY: CPT

## 2021-06-01 NOTE — RESULT ENCOUNTER NOTE
Please let patient know that her cholesterol is overall stable  Her other blood work including kidneys, liver, thyroid was within normal range  Her potassium is low at 3 2  Can you ask her if she is taking any potassium supplementation? I would like to recheck her potassium again in the next couple of days and will print this out  I would like her to increase potassium rich foods in her diet  Please let me know if she is taking any potassium supplements  Her vitamin-D is on the lower side  Please confirm how much vitamin-D she is taking  Finally, her C reactive protein is mildly increased which signifies mild chronic inflammation  This may be due to her chronic low back pain as well as finger pain  I am awaiting other autoimmune markers and will let her know  She may benefit from seeing Rheumatology    Thank you

## 2021-06-02 DIAGNOSIS — E87.6 LOW SERUM POTASSIUM: Primary | ICD-10-CM

## 2021-06-02 DIAGNOSIS — I10 HYPERTENSION, UNSPECIFIED TYPE: ICD-10-CM

## 2021-06-02 LAB — RYE IGE QN: NEGATIVE

## 2021-06-02 RX ORDER — POTASSIUM CHLORIDE 750 MG/1
10 TABLET, EXTENDED RELEASE ORAL DAILY
Qty: 30 TABLET | Refills: 1 | Status: SHIPPED | OUTPATIENT
Start: 2021-06-02 | End: 2022-07-20 | Stop reason: SDUPTHER

## 2021-06-03 LAB — CCP AB SER IA-ACNC: 0.6

## 2021-06-04 ENCOUNTER — TELEPHONE (OUTPATIENT)
Dept: FAMILY MEDICINE CLINIC | Facility: CLINIC | Age: 61
End: 2021-06-04

## 2021-06-04 NOTE — RESULT ENCOUNTER NOTE
Please let patient know that her autoimmune markers including rheumatoid factor and ALBERT were normal   Thank you

## 2021-06-04 NOTE — TELEPHONE ENCOUNTER
----- Message from Diogenes Youngblood MD sent at 6/3/2021  9:18 PM EDT -----  Please let patient know that her autoimmune markers including rheumatoid factor and ALBERT were normal   Thank you

## 2021-06-09 ENCOUNTER — TELEPHONE (OUTPATIENT)
Dept: FAMILY MEDICINE CLINIC | Facility: CLINIC | Age: 61
End: 2021-06-09

## 2021-06-09 NOTE — TELEPHONE ENCOUNTER
----- Message from Dwight Russell MD sent at 6/8/2021 11:27 PM EDT -----   Please remind patient to have her potassium blood work done  The vitamin-D blood work will be recheck it at the beginning of September  Thank you  ----- Message -----  From: Gena Tan  Sent: 6/1/2021   5:18 PM EDT  To: Dwight Russell MD    Spoke with pt she stated that she is taking vitamin  mg daily , and potassium 595 MG daily , pt is asking if you need to recheck he vit D she will like to do both at the same time in a couple weeks   Please advice Thanks you        ----- Message -----  From: Dwight Russell MD  Sent: 6/1/2021   5:10 PM EDT  To: [facility]    Please let patient know that her cholesterol is overall stable  Her other blood work including kidneys, liver, thyroid was within normal range  Her potassium is low at 3 2  Can you ask her if she is taking any potassium supplementation? I would like to recheck her potassium again in the next couple of days and will print this out  I would like her to increase potassium rich foods in her diet  Please let me know if she is taking any potassium supplements  Her vitamin-D is on the lower side  Please confirm how much vitamin-D she is taking  Finally, her C reactive protein is mildly increased which signifies mild chronic inflammation  This may be due to her chronic low back pain as well as finger pain  I am awaiting other autoimmune markers and will let her know  She may benefit from seeing Rheumatology    Thank you

## 2021-06-10 ENCOUNTER — APPOINTMENT (OUTPATIENT)
Dept: LAB | Facility: HOSPITAL | Age: 61
End: 2021-06-10
Payer: COMMERCIAL

## 2021-06-10 DIAGNOSIS — E87.6 LOW SERUM POTASSIUM: ICD-10-CM

## 2021-06-10 LAB — HLA-B27 QL NAA+PROBE: NEGATIVE

## 2021-06-10 PROCEDURE — 36415 COLL VENOUS BLD VENIPUNCTURE: CPT

## 2021-06-11 ENCOUNTER — TRANSCRIBE ORDERS (OUTPATIENT)
Dept: LAB | Facility: HOSPITAL | Age: 61
End: 2021-06-11

## 2021-06-11 DIAGNOSIS — E87.6 LOW SERUM POTASSIUM: Primary | ICD-10-CM

## 2021-06-14 ENCOUNTER — TELEPHONE (OUTPATIENT)
Dept: FAMILY MEDICINE CLINIC | Facility: CLINIC | Age: 61
End: 2021-06-14

## 2021-06-14 NOTE — TELEPHONE ENCOUNTER
----- Message from Jen Dorsey MD sent at 6/11/2021 10:03 AM EDT -----  Regarding: FW: Cancellation of Order # [id number]  Can you ask patient if she is aware that her potassium blood test was canceled? Can you please ask the lab this they are able to scented or does she need to drop-off another one? Thank you  ----- Message -----  From: Danii Plata  Sent: 6/11/2021   7:48 AM EDT  To: Jen Dorsey MD  Subject: Cancellation of Order # [id number]                Order number [id number] for the procedure POTASSIUM Verita Leech has   been canceled by Danii Plata [[id number]]  This procedure   was ordered by you on Abdon 10, 2021 for the patient Venkatesh Cain [[id number]]  The reason for cancellation was "Improperly   Preserved/Processed"

## 2021-06-15 ENCOUNTER — APPOINTMENT (OUTPATIENT)
Dept: LAB | Facility: HOSPITAL | Age: 61
End: 2021-06-15
Payer: COMMERCIAL

## 2021-06-15 DIAGNOSIS — E87.6 LOW SERUM POTASSIUM: ICD-10-CM

## 2021-06-15 LAB — POTASSIUM SERPL-SCNC: 3.9 MMOL/L (ref 3.5–5.3)

## 2021-06-15 PROCEDURE — 84132 ASSAY OF SERUM POTASSIUM: CPT

## 2021-06-15 PROCEDURE — 36415 COLL VENOUS BLD VENIPUNCTURE: CPT

## 2021-06-15 NOTE — TELEPHONE ENCOUNTER
Spoke with pt, lab had already made her aware she needed to BW redrawn  She will proceed with BW ASAP

## 2021-06-16 ENCOUNTER — EVALUATION (OUTPATIENT)
Dept: PHYSICAL THERAPY | Facility: CLINIC | Age: 61
End: 2021-06-16
Payer: COMMERCIAL

## 2021-06-16 DIAGNOSIS — M54.40 LOW BACK PAIN WITH SCIATICA, SCIATICA LATERALITY UNSPECIFIED, UNSPECIFIED BACK PAIN LATERALITY, UNSPECIFIED CHRONICITY: Primary | ICD-10-CM

## 2021-06-16 PROCEDURE — 97161 PT EVAL LOW COMPLEX 20 MIN: CPT | Performed by: PHYSICAL THERAPIST

## 2021-06-21 ENCOUNTER — HOSPITAL ENCOUNTER (OUTPATIENT)
Dept: MRI IMAGING | Facility: HOSPITAL | Age: 61
Discharge: HOME | End: 2021-06-21
Payer: COMMERCIAL

## 2021-06-21 ENCOUNTER — OFFICE VISIT (OUTPATIENT)
Dept: PHYSICAL THERAPY | Facility: CLINIC | Age: 61
End: 2021-06-21
Payer: COMMERCIAL

## 2021-06-21 DIAGNOSIS — M54.40 LOW BACK PAIN WITH SCIATICA, SCIATICA LATERALITY UNSPECIFIED, UNSPECIFIED BACK PAIN LATERALITY, UNSPECIFIED CHRONICITY: Primary | ICD-10-CM

## 2021-06-21 DIAGNOSIS — M54.40 LOW BACK PAIN WITH SCIATICA, SCIATICA LATERALITY UNSPECIFIED, UNSPECIFIED BACK PAIN LATERALITY, UNSPECIFIED CHRONICITY: ICD-10-CM

## 2021-06-21 PROCEDURE — 72148 MRI LUMBAR SPINE W/O DYE: CPT

## 2021-06-21 PROCEDURE — 97140 MANUAL THERAPY 1/> REGIONS: CPT | Performed by: PHYSICAL THERAPIST

## 2021-06-21 PROCEDURE — 97110 THERAPEUTIC EXERCISES: CPT | Performed by: PHYSICAL THERAPIST

## 2021-06-21 PROCEDURE — G1004 CDSM NDSC: HCPCS

## 2021-06-21 NOTE — PROGRESS NOTES
Daily Note     Today's date: 2021  Patient name: Washington Mcdonald  : 1960  MRN: [id number]  Referring provider: Cathy Quiroz MD  Dx: No diagnosis found  Subjective: pt reports complaince with her hep and that she is having no difficulty with it  Had MRI this morning      Objective: See treatment diary below      Assessment: initiated tx as listed  Pt reports slight disomfort with tb step forward but reports decreased stiffness post-tx  Monitor response and progress as imelda NV  Plan: Continue per plan of care  Precautions: spondylolisthesis          Manuals             laser kl            STM L piriformis kl                                      Neuro Re-Ed             TaA             TaA leg press 20#x20            TaA LPD                                                                 Ther Ex             ltr 5"x20            glute stretch 30x4            pball flexion stretch 10"x10                                                                             Ther Activity             bike                          Gait Training                                       Modalities             [facility] 10'

## 2021-06-24 ENCOUNTER — OFFICE VISIT (OUTPATIENT)
Dept: PHYSICAL THERAPY | Facility: CLINIC | Age: 61
End: 2021-06-24
Payer: COMMERCIAL

## 2021-06-24 DIAGNOSIS — M54.40 LOW BACK PAIN WITH SCIATICA, SCIATICA LATERALITY UNSPECIFIED, UNSPECIFIED BACK PAIN LATERALITY, UNSPECIFIED CHRONICITY: Primary | ICD-10-CM

## 2021-06-24 PROCEDURE — 97140 MANUAL THERAPY 1/> REGIONS: CPT

## 2021-06-24 PROCEDURE — 97110 THERAPEUTIC EXERCISES: CPT

## 2021-06-24 NOTE — RESULT ENCOUNTER NOTE
Please let patient know that her MRI lumbar spine is stable when compared to prior MRI as per radiologist   She does have disc bulging at the level of L3-L4 and L4-L5  She does have loss of disc height at L5-S1 and would like to order bone density scan  She does have severe arthritic changes at the level of L4-L5  Even though findings are overall stable as per radiologist, I would like her to be further evaluated by pain management as well  I did provide her with referral at her last office visit    Thank you

## 2021-06-24 NOTE — PROGRESS NOTES
Daily Note     Today's date: 2021  Patient name: Thania Arrington  : 1960  MRN: [id number]  Referring provider: Patrick Haines MD  Dx:   Encounter Diagnosis     ICD-10-CM    1  Low back pain with sciatica, sciatica laterality unspecified, unspecified back pain laterality, unspecified chronicity  M54 40                   Subjective: Pt reports LBP level "is about an 8/10, I didn't sleep well last night "  Notes she had a spasm L prox HS  Objective: See treatment diary below      Assessment: Performed new exercise w/o increasing symptoms  Responded well to manual therapies  Relief after tx  Will monitor  Patient would benefit from continued PT      Plan: Continue per plan of care  Precautions: spondylolisthesis          Manuals            laser kl MO           STM L piriformis kl MO                                     Neuro Re-Ed             TaA  5"x10           TaA leg press 20#x20 20#  x20           TaA LPD                                                                 Ther Ex             ltr 5"x20 5"x20           glute stretch 30x4 30"x4           pball flexion stretch 10"x10 10"x10                                                                            Ther Activity             bike  7'                        Gait Training                                       Modalities             [facility] 10'  MV

## 2021-06-25 ENCOUNTER — TELEPHONE (OUTPATIENT)
Dept: FAMILY MEDICINE CLINIC | Facility: CLINIC | Age: 61
End: 2021-06-25

## 2021-06-25 NOTE — TELEPHONE ENCOUNTER
----- Message from Mallika Angelo MD sent at 6/24/2021  7:31 PM EDT -----  Please let patient know that her MRI lumbar spine is stable when compared to prior MRI as per radiologist   She does have disc bulging at the level of L3-L4 and L4-L5  She does have loss of disc height at L5-S1 and would like to order bone density scan  She does have severe arthritic changes at the level of L4-L5  Even though findings are overall stable as per radiologist, I would like her to be further evaluated by pain management as well  I did provide her with referral at her last office visit    Thank you

## 2021-06-28 ENCOUNTER — OFFICE VISIT (OUTPATIENT)
Dept: PHYSICAL THERAPY | Facility: CLINIC | Age: 61
End: 2021-06-28
Payer: COMMERCIAL

## 2021-06-28 DIAGNOSIS — M54.40 LOW BACK PAIN WITH SCIATICA, SCIATICA LATERALITY UNSPECIFIED, UNSPECIFIED BACK PAIN LATERALITY, UNSPECIFIED CHRONICITY: Primary | ICD-10-CM

## 2021-06-28 PROCEDURE — 97110 THERAPEUTIC EXERCISES: CPT

## 2021-06-28 PROCEDURE — 97140 MANUAL THERAPY 1/> REGIONS: CPT

## 2021-06-28 PROCEDURE — 97112 NEUROMUSCULAR REEDUCATION: CPT

## 2021-06-28 NOTE — TELEPHONE ENCOUNTER
Spoke with patient  Made aware of  results and provider's instructions  Patient verbalized understanding and was agreeable w/ plan

## 2021-06-28 NOTE — PROGRESS NOTES
Daily Note     Today's date: 2021  Patient name: Stephanie Carroll  : 1960  MRN: [id number]  Referring provider: Michael Clark MD  Dx:   Encounter Diagnosis     ICD-10-CM    1  Low back pain with sciatica, sciatica laterality unspecified, unspecified back pain laterality, unspecified chronicity  M54 40                   Subjective: Patient reports she is feeling "not too bad today "      Objective: See treatment diary below  Assessment: Progression of therapeutic exercise program is tolerated well  Tenderness is not significant during STM  Plan: Continue treatment as per PT plan of care         Precautions: spondylolisthesis      Manuals           laser kl MO 5'          STM L piriformis kl MO JLW 5'                                    Neuro Re-Ed             TaA  5"x10 5"x10          TaA leg press 20#x20 20#  x20 20#  25          TaA LPD seated on pball   red  20          rows seated on pball   red  20                                                 Ther Ex             ltr 5"x20 5"x20 5"x20          glute stretch 30x4 30"x4 30"x3          pball flexion stretch 10"x10 10"x10 10"x10          bike   sci fit  7'                                                              Ther Activity             bike  7' see above                       Gait Training                                       Modalities             MHP 10'  NV 10' post tx

## 2021-06-30 ENCOUNTER — OFFICE VISIT (OUTPATIENT)
Dept: PHYSICAL THERAPY | Facility: CLINIC | Age: 61
End: 2021-06-30
Payer: COMMERCIAL

## 2021-06-30 DIAGNOSIS — M54.40 LOW BACK PAIN WITH SCIATICA, SCIATICA LATERALITY UNSPECIFIED, UNSPECIFIED BACK PAIN LATERALITY, UNSPECIFIED CHRONICITY: Primary | ICD-10-CM

## 2021-06-30 PROCEDURE — 97112 NEUROMUSCULAR REEDUCATION: CPT

## 2021-06-30 PROCEDURE — 97110 THERAPEUTIC EXERCISES: CPT

## 2021-06-30 PROCEDURE — 97140 MANUAL THERAPY 1/> REGIONS: CPT

## 2021-06-30 NOTE — PROGRESS NOTES
Daily Note     Today's date: 2021  Patient name: Black Warren  : 1960  MRN: [id number]  Referring provider: Skyler Horan MD  Dx:   Encounter Diagnosis     ICD-10-CM    1  Low back pain with sciatica, sciatica laterality unspecified, unspecified back pain laterality, unspecified chronicity  M54 40                   Subjective: No significant changes to report since the last PT treatment  Objective: See treatment diary below  Assessment: Therapeutic exercise program is tolerated well  Tenderness is not significant during STM  Plan: Continue treatment as per PT plan of care         Precautions: spondylolisthesis      Manuals          laser kl MO 5' 5'         STM L piriformis kl MO JLW 5' JLW 5'                                   Neuro Re-Ed             TaA  5"x10 5"x10 5"x10         TaA leg press 20#x20 20#  x20 20#  25 20#  20         TaA LPD seated on pball   red  20 red  20         rows seated on pball   red  20 red  20                                                Ther Ex             ltr 5"x20 5"x20 5"x20 5"x20         glute stretch 30x4 30"x4 30"x3 30"x3         pball flexion stretch 10"x10 10"x10 10"x10 10"x10         bike   sci fit  7' sci fit  7'                                                             Ther Activity             bike  7' see above                       Gait Training                                       Modalities             MHP 10'  NV 10' post tx 10' post tx

## 2021-07-08 ENCOUNTER — OFFICE VISIT (OUTPATIENT)
Dept: PHYSICAL THERAPY | Facility: CLINIC | Age: 61
End: 2021-07-08
Payer: COMMERCIAL

## 2021-07-08 DIAGNOSIS — M54.40 LOW BACK PAIN WITH SCIATICA, SCIATICA LATERALITY UNSPECIFIED, UNSPECIFIED BACK PAIN LATERALITY, UNSPECIFIED CHRONICITY: Primary | ICD-10-CM

## 2021-07-08 PROCEDURE — 97140 MANUAL THERAPY 1/> REGIONS: CPT

## 2021-07-08 PROCEDURE — 97112 NEUROMUSCULAR REEDUCATION: CPT

## 2021-07-08 NOTE — PROGRESS NOTES
Daily Note     Today's date: 2021  Patient name: Emre Emanuel  : 1960  MRN: [id number]  Referring provider: Clive Parada MD  Dx:   Encounter Diagnosis     ICD-10-CM    1  Low back pain with sciatica, sciatica laterality unspecified, unspecified back pain laterality, unspecified chronicity  M54 40                   Subjective: "I definitely feel better "  Pain level upon arrival to therapy is 3/10  Notes when she doesn't sleep well it becomes painful  Objective: See treatment diary below      Assessment: Performed exercise program w/o difficulty or discomfort  Demonstrates good knowledge of same  Responded well to manual therapies  Tolerated treatment well  Will monitor  Patient would benefit from continued PT      Plan: Continue per plan of care        Precautions: spondylolisthesis      Manuals         laser kl MO 5' 5' 5'        STM L piriformis kl MO JLW 5' JLW 5' 5' MO                                  Neuro Re-Ed             TaA  5"x10 5"x10 5"x10 5"x10        TaA leg press 20#x20 20#  x20 20#  25 20#  20 20#  30        TaA LPD seated on pball   red  20 red  20 Red  20        rows seated on pball   red  20 red  20 Red  20                                               Ther Ex             ltr 5"x20 5"x20 5"x20 5"x20 5"x  20        glute stretch 30x4 30"x4 30"x3 30"x3 3x  30"        pball flexion stretch 10"x10 10"x10 10"x10 10"x10 10"x10        bike   sci fit  7' sci fit  7' sci fit  7'                                                            Ther Activity             bike  7' see above                       Gait Training                                       Modalities             MHP 10'  NV 10' post tx 10' post tx 10'  Post tx

## 2021-07-12 ENCOUNTER — OFFICE VISIT (OUTPATIENT)
Dept: PHYSICAL THERAPY | Facility: CLINIC | Age: 61
End: 2021-07-12
Payer: COMMERCIAL

## 2021-07-12 DIAGNOSIS — M54.40 LOW BACK PAIN WITH SCIATICA, SCIATICA LATERALITY UNSPECIFIED, UNSPECIFIED BACK PAIN LATERALITY, UNSPECIFIED CHRONICITY: Primary | ICD-10-CM

## 2021-07-12 PROCEDURE — 97110 THERAPEUTIC EXERCISES: CPT | Performed by: PHYSICAL THERAPIST

## 2021-07-12 NOTE — PROGRESS NOTES
Daily Note     Today's date: 2021  Patient name: Fiorella Epps  : 1960  MRN: [id number]  Referring provider: Hannah Galdamez MD  Dx:   Encounter Diagnosis     ICD-10-CM    1  Low back pain with sciatica, sciatica laterality unspecified, unspecified back pain laterality, unspecified chronicity  M54 40                   Subjective: pt reports that she is having sig less pain since starting PT  Objective: See treatment diary below      Assessment: increased resistance with there as listed, continue trigger points noted t/o piriformis but pt has good tolerance to manual tx  Plan: Continue per plan of care        Precautions: spondylolisthesis      Manuals        laser kl MO 5' 5' 5' 5'       STM L piriformis kl MO JLW 5' JLW 5' 5' MO kl                                 Neuro Re-Ed             TaA  5"x10 5"x10 5"x10 5"x10        TaA leg press 20#x20 20#  x20 20#  25 20#  20 20#  30 30#x30       TaA LPD seated on pball   red  20 red  20 Red  20 green x20       rows seated on pball   red  20 red  20 Red  20 green x20                                              Ther Ex             ltr 5"x20 5"x20 5"x20 5"x20 5"x  20 5"x20       glute stretch 30x4 30"x4 30"x3 30"x3 3x  30" 30"x3       pball flexion stretch 10"x10 10"x10 10"x10 10"x10 10"x10 10"x10       bike   sci fit  7' sci fit  7' sci fit  7' 8'                                                           Ther Activity             bike  7' see above                       Gait Training                                       Modalities             MHP 10'  NV 10' post tx 10' post tx 10'  Post tx 10'

## 2021-07-14 ENCOUNTER — OFFICE VISIT (OUTPATIENT)
Dept: PHYSICAL THERAPY | Facility: CLINIC | Age: 61
End: 2021-07-14
Payer: COMMERCIAL

## 2021-07-14 DIAGNOSIS — M54.40 LOW BACK PAIN WITH SCIATICA, SCIATICA LATERALITY UNSPECIFIED, UNSPECIFIED BACK PAIN LATERALITY, UNSPECIFIED CHRONICITY: Primary | ICD-10-CM

## 2021-07-14 PROCEDURE — 97112 NEUROMUSCULAR REEDUCATION: CPT

## 2021-07-14 PROCEDURE — 97140 MANUAL THERAPY 1/> REGIONS: CPT

## 2021-07-14 PROCEDURE — 97110 THERAPEUTIC EXERCISES: CPT

## 2021-07-14 NOTE — PROGRESS NOTES
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  Objective: See treatment diary below  Assessment: Right sided lower back pain noted when performing LTR to the left side  Plan: Continue treatment as per PT plan of care         Precautions: spondylolisthesis      Manuals       laser kl MO 5' 5' 5' 5' JLW 5'      STM L piriformis kl MO JLW 5' JLW 5' 5' MO kl JLW 5'                                Neuro Re-Ed             TaA  5"x10 5"x10 5"x10 5"x10        TaA leg press 20#x20 20#  x20 20#  25 20#  20 20#  30 30#x30 30#  30      TaA LPD seated on pball   red  20 red  20 Red  20 green x20 green  20      rows seated on pball   red  20 red  20 Red  20 green x20 green  20                                             Ther Ex             ltr 5"x20 5"x20 5"x20 5"x20 5"x  20 5"x20 5"x20      glute stretch 30x4 30"x4 30"x3 30"x3 3x  30" 30"x3 30"x3      pball flexion stretch 10"x10 10"x10 10"x10 10"x10 10"x10 10"x10 10"x10      bike   sci fit  7' sci fit  7' sci fit  7' 8' recum  8'                                                          Ther Activity             bike  7' see above                       Gait Training                                       Modalities             MHP 10'  NV 10' post tx 10' post tx 10'  Post tx 10' 10' post tx

## 2021-07-19 ENCOUNTER — OFFICE VISIT (OUTPATIENT)
Dept: PHYSICAL THERAPY | Facility: CLINIC | Age: 61
End: 2021-07-19
Payer: COMMERCIAL

## 2021-07-19 DIAGNOSIS — M54.40 LOW BACK PAIN WITH SCIATICA, SCIATICA LATERALITY UNSPECIFIED, UNSPECIFIED BACK PAIN LATERALITY, UNSPECIFIED CHRONICITY: Primary | ICD-10-CM

## 2021-07-19 PROCEDURE — 97110 THERAPEUTIC EXERCISES: CPT

## 2021-07-19 PROCEDURE — 97112 NEUROMUSCULAR REEDUCATION: CPT

## 2021-07-19 PROCEDURE — 97140 MANUAL THERAPY 1/> REGIONS: CPT

## 2021-07-19 NOTE — PROGRESS NOTES
Daily Note     Today's date: 2021  Patient name: César Alas  : 1960  MRN: [id number]  Referring provider: Blanca Pachceo MD  Dx:   Encounter Diagnosis     ICD-10-CM    1  Low back pain with sciatica, sciatica laterality unspecified, unspecified back pain laterality, unspecified chronicity  M54 40                   Subjective: Patient reports her back has been "not too bad "      Objective: See treatment diary below  Assessment: Lower trunk rotations not performed due to complaints of back pain - remainder of today's treatment session is tolerated well  Plan: Continue treatment as per PT plan of care         Precautions: spondylolisthesis      Manuals      laser kl MO 5' 5' 5' 5' JLW 5'      STM L piriformis kl MO JLW 5' JLW 5' 5' MO kl JLW 5' JLW 8'                               Neuro Re-Ed             TaA  5"x10 5"x10 5"x10 5"x10        TaA leg press 20#x20 20#  x20 20#  25 20#  20 20#  30 30#x30 30#  30 30#  30     TaA LPD seated on pball   red  20 red  20 Red  20 green x20 green  20 green  20     rows seated on pball   red  20 red  20 Red  20 green x20 green  20 green  20                                            Ther Ex             ltr 5"x20 5"x20 5"x20 5"x20 5"x  20 5"x20 5"x20 NP     glute stretch 30x4 30"x4 30"x3 30"x3 3x  30" 30"x3 30"x3 30"x3     pball flexion stretch 10"x10 10"x10 10"x10 10"x10 10"x10 10"x10 10"x10 10"x10     bike   sci fit  7' sci fit  7' sci fit  7' 8' recum  8' recum  8'                                                         Ther Activity             bike  7' see above                       Gait Training                                       Modalities             MHP 10'  NV 10' post tx 10' post tx 10'  Post tx 10' 10' post tx 10' post tx

## 2021-07-21 ENCOUNTER — OFFICE VISIT (OUTPATIENT)
Dept: PHYSICAL THERAPY | Facility: CLINIC | Age: 61
End: 2021-07-21
Payer: COMMERCIAL

## 2021-07-21 DIAGNOSIS — M54.40 LOW BACK PAIN WITH SCIATICA, SCIATICA LATERALITY UNSPECIFIED, UNSPECIFIED BACK PAIN LATERALITY, UNSPECIFIED CHRONICITY: Primary | ICD-10-CM

## 2021-07-21 PROCEDURE — 97110 THERAPEUTIC EXERCISES: CPT | Performed by: PHYSICAL THERAPIST

## 2021-07-21 PROCEDURE — 97140 MANUAL THERAPY 1/> REGIONS: CPT | Performed by: PHYSICAL THERAPIST

## 2021-07-21 NOTE — PROGRESS NOTES
Daily Note     Today's date: 2021  Patient name: Guerry Soulier  : 1960  MRN: [id number]  Referring provider: Chanda Pitts MD  Dx:   Encounter Diagnosis     ICD-10-CM    1  Low back pain with sciatica, sciatica laterality unspecified, unspecified back pain laterality, unspecified chronicity  M54 40                   Subjective: Patient reports "winston been better, i've been worse"    Objective: See treatment diary below  Assessment: able to resume LTR, pt reports slight discomfort with the last few reps  Pt is more tender t/o R  piriformis today then usual         Plan: Continue treatment as per PT plan of care         Precautions: spondylolisthesis      Manuals     laser kl MO 5' 5' 5' 5' JLW 5'      STM L piriformis kl MO JLW 5' JLW 5' 5' MO kl JLW 5' JLW 8' KL L and R                              Neuro Re-Ed             TaA  5"x10 5"x10 5"x10 5"x10        TaA leg press 20#x20 20#  x20 20#  25 20#  20 20#  30 30#x30 30#  30 30#  30 30#x30    TaA LPD seated on pball   red  20 red  20 Red  20 green x20 green  20 green  20 Green x30    rows seated on pball   red  20 red  20 Red  20 green x20 green  20 green  20 Green x30                                           Ther Ex             ltr 5"x20 5"x20 5"x20 5"x20 5"x  20 5"x20 5"x20 NP x20ea    glute stretch 30x4 30"x4 30"x3 30"x3 3x  30" 30"x3 30"x3 30"x3 30"x3    pball flexion stretch 10"x10 10"x10 10"x10 10"x10 10"x10 10"x10 10"x10 10"x10 10"x10    bike   sci fit  7' sci fit  7' sci fit  7' 8' recum  8' recum  8' 10'                                                        Ther Activity             bike  7' see above                       Gait Training                                       Modalities             MHP 10'  NV 10' post tx 10' post tx 10'  Post tx 10' 10' post tx 10' post tx 10' post-tx seated

## 2021-07-26 ENCOUNTER — CONSULT (OUTPATIENT)
Dept: PAIN MEDICINE | Facility: CLINIC | Age: 61
End: 2021-07-26
Payer: COMMERCIAL

## 2021-07-26 ENCOUNTER — OFFICE VISIT (OUTPATIENT)
Dept: PHYSICAL THERAPY | Facility: CLINIC | Age: 61
End: 2021-07-26
Payer: COMMERCIAL

## 2021-07-26 VITALS
SYSTOLIC BLOOD PRESSURE: 144 MMHG | HEART RATE: 71 BPM | WEIGHT: 175 LBS | DIASTOLIC BLOOD PRESSURE: 91 MMHG | BODY MASS INDEX: 32.01 KG/M2

## 2021-07-26 DIAGNOSIS — M51.26 LUMBAR DISC HERNIATION: ICD-10-CM

## 2021-07-26 DIAGNOSIS — M54.50 CHRONIC BILATERAL LOW BACK PAIN WITHOUT SCIATICA: Primary | ICD-10-CM

## 2021-07-26 DIAGNOSIS — M47.816 LUMBAR SPONDYLOSIS: ICD-10-CM

## 2021-07-26 DIAGNOSIS — M43.16 SPONDYLOLISTHESIS OF LUMBAR REGION: ICD-10-CM

## 2021-07-26 DIAGNOSIS — M54.40 LOW BACK PAIN WITH SCIATICA, SCIATICA LATERALITY UNSPECIFIED, UNSPECIFIED BACK PAIN LATERALITY, UNSPECIFIED CHRONICITY: Primary | ICD-10-CM

## 2021-07-26 DIAGNOSIS — M48.061 DEGENERATIVE LUMBAR SPINAL STENOSIS: ICD-10-CM

## 2021-07-26 DIAGNOSIS — G89.29 CHRONIC BILATERAL LOW BACK PAIN WITHOUT SCIATICA: Primary | ICD-10-CM

## 2021-07-26 PROCEDURE — 99244 OFF/OP CNSLTJ NEW/EST MOD 40: CPT | Performed by: ANESTHESIOLOGY

## 2021-07-26 PROCEDURE — 97110 THERAPEUTIC EXERCISES: CPT

## 2021-07-26 PROCEDURE — 97140 MANUAL THERAPY 1/> REGIONS: CPT

## 2021-07-26 PROCEDURE — 97112 NEUROMUSCULAR REEDUCATION: CPT

## 2021-07-26 NOTE — PROGRESS NOTES
Assessment  1  Chronic bilateral low back pain without sciatica    2  Spondylolisthesis of lumbar region    3  Lumbar disc herniation    4  Lumbar spondylosis    5  Degenerative lumbar spinal stenosis        Plan  The patient's symptoms, history/physical are consistent with pain that is multifactorial in origin but predominantly result of her underlying spinal stenosis at L4-5 secondary to spondylolisthesis and disc herniation  Her symptoms have largely gone back to baseline following physical therapy  I did discuss that should symptoms worsen she would be candidate for an epidural steroid injection which would act to decrease swelling inflammation around the stenosis  For now, I will prescribe her Voltaren gel which he can use topically as needed  Since is topical, there is minimal systemic absorption and therefore minimal systemic side effects that would affect the stomach  My impressions and treatment recommendations were discussed in detail with the patient who verbalized understanding and had no further questions  Discharge instructions were provided  I personally saw and examined the patient and I agree with the above discussed plan of care  No orders of the defined types were placed in this encounter  New Medications Ordered This Visit   Medications    Diclofenac Sodium (VOLTAREN) 1 %     Sig: Apply 2 g topically 4 (four) times a day     Dispense:  100 g     Refill:  5       History of Present Illness    Abbe Akhtar is a 63 y o  female referred by Dr Jennifer Cisneros who presents for consultation in regards to low back pain  Symptoms have been present for many years chronically but became aggravated several months ago  Pain is moderate to severe rated 4-8/10 on numeric rating scale and felt nearly constant  Symptoms are worse the morning described to be dull, aching, sharp, shooting cramping and burning in the lower back with some pain radiating to the buttocks    She feels weakness the legs   Pain symptoms are aggravated standing, sitting, walking, coughing, sneezing  Treatment history has included physical therapy and exercise which have provided moderate relief  She has tried lidocaine patches and Flector patches with relief  She is unable to take oral NSAIDs secondary to ulcers  I have personally reviewed and/or updated the patient's past medical history, past surgical history, family history, social history, current medications, allergies, and vital signs today  Review of Systems   Constitutional: Negative for fever and unexpected weight change  HENT: Negative for trouble swallowing  Eyes: Negative for visual disturbance  Respiratory: Negative for shortness of breath and wheezing  Cardiovascular: Negative for chest pain and palpitations  Gastrointestinal: Negative for constipation, diarrhea, nausea and vomiting  Endocrine: Negative for cold intolerance, heat intolerance and polydipsia  Genitourinary: Negative for difficulty urinating and frequency  Musculoskeletal: Positive for back pain, gait problem and joint swelling  Negative for arthralgias and myalgias  Skin: Negative for rash  Neurological: Negative for dizziness, seizures, syncope, weakness and headaches  Hematological: Does not bruise/bleed easily  Psychiatric/Behavioral: Negative for dysphoric mood  All other systems reviewed and are negative        Patient Active Problem List   Diagnosis    Left ear pain    Anemia    Asthma    Chronic fatigue syndrome    Hypertension    Fibromyalgia    JA (generalized anxiety disorder)    Gluten intolerance    Hypovitaminosis D    Impaired fasting glucose    Liver lesion    Lipodystrophy    Major depressive disorder, recurrent episode, in full remission (HCC)    Migraine    Myalgia and myositis    Other insomnia    Rosacea    Obstructive sleep apnea    Routine health maintenance    Headache upon awakening    Hypersomnia    Obesity (BMI 30-39  9)    Snoring    Anxiety and depression    Neck pain on right side    NOEMÍ (obstructive sleep apnea)    Herniated lumbar intervertebral disc    Acute bilateral low back pain without sciatica    DDD (degenerative disc disease), lumbosacral    Sleep disturbance    Pain in right upper arm    CPAP (continuous positive airway pressure) dependence    PONV (postoperative nausea and vomiting)    Spondylolisthesis of lumbar region    Degenerative lumbar spinal stenosis       Past Medical History:   Diagnosis Date    Anemia     Anxiety and depression     Chronic fatigue syndrome     Colon polyp     CPAP (continuous positive airway pressure) dependence     DDD (degenerative disc disease), lumbosacral 12/27/2018    Patrick-Barr virus seropositivity     Fibromyalgia, primary     JA (generalized anxiety disorder)     Gluten intolerance     Hypertension     Hypovitaminosis D     Impaired fasting glucose     Lipodystrophy     Liver lesion     Migraine     Obesity (BMI 30-39 9) 6/6/2018    Sleep apnea        Past Surgical History:   Procedure Laterality Date    APPENDECTOMY      BREAST BIOPSY Right 2006    not sure of the date    CHOLECYSTECTOMY      COLONOSCOPY      WI COLONOSCOPY FLX DX W/COLLJ SPEC WHEN PFRMD N/A 12/4/2017    Procedure: COLONOSCOPY;  Surgeon: Emre Starks DO;  [facility];   Service: Gastroenterology    UPPER GASTROINTESTINAL ENDOSCOPY         Family History   Problem Relation Age of Onset    Depression Mother     Hypertension Mother         essential     Mitral valve prolapse Mother     Diabetes Brother     Heart attack Brother     Heart disease Maternal Grandmother         cardiac disorder    Hyperlipidemia Maternal Grandmother     Stroke Paternal Grandmother     Stroke Paternal Grandfather     Breast cancer Maternal Aunt 48    No Known Problems Father        Social History     Occupational History    Occupation: works for [employer] Department Employer: [employer]   Tobacco Use    Smoking status: Never Smoker    Smokeless tobacco: Never Used   Vaping Use    Vaping Use: Never used   Substance and Sexual Activity    Alcohol use: Yes     Comment: rarely    Drug use: No    Sexual activity: Not Currently       Current Outpatient Medications on File Prior to Visit   Medication Sig    Ascorbic Acid (VITAMIN C PO) Take 1 tablet by mouth daily    Cholecalciferol (VITAMIN D3) 15890 units TABS Take 10,000 Units by mouth daily    Cyanocobalamin (VITAMIN B-12) 500 MCG LOZG Take 500 mcg by mouth every other day     DULoxetine (CYMBALTA) 30 mg delayed release capsule TAKE 1 CAPSULE DAILY    hydrochlorothiazide (HYDRODIURIL) 12 5 mg tablet TAKE ONE TABLET BY MOUTH DAILY    losartan (COZAAR) 25 mg tablet Take 1 tablet (25 mg total) by mouth daily    Magnesium Gluconate 250 MG TABS Take 1 tablet by mouth daily    potassium chloride (K-DUR,KLOR-CON) 10 mEq tablet Take 1 tablet (10 mEq total) by mouth daily    Zinc 50 MG CAPS Take 1 capsule by mouth daily    Melatonin 3 MG SUBL Place under the tongue     No current facility-administered medications on file prior to visit  Allergies   Allergen Reactions    Escitalopram Swelling and Edema     Category: Allergy;     Fluticasone-Salmeterol      UNKNOWN    Nsaids      Action Taken: stomach issues;     Gluten Meal - Food Allergy Diarrhea    Latex      irritation      Other Rash     Adhesive bandages       Physical Exam    /91   Pulse 71   Wt 79 4 kg (175 lb)   BMI 32 01 kg/m²     Constitutional: normal, well developed, well nourished, alert, in no distress and non-toxic and no overt pain behavior   and obese  Eyes: anicteric  HEENT: grossly intact  Neck: supple, symmetric, trachea midline and no masses   Pulmonary:even and unlabored  Cardiovascular:No edema or pitting edema present  Skin:Normal without rashes or lesions and well hydrated  Psychiatric:Mood and affect appropriate  Neurologic:Cranial Nerves II-XII grossly intact  Musculoskeletal:normal     Lumbar Spine Exam  Appearance:  Normal lordosis  Palpation/Tenderness:  left lumbar paraspinal tenderness  right lumbar paraspinal tenderness  right piriformis tenderness  Range of Motion:  Flexion:  No limitation  without pain  Extension:  Severely limited  with pain  Motor Strength:  Left hip flexion:  5/5  Left hip extension:  5/5  Right hip flexion:  5/5  Right hip extension:  5/5  Left knee flexion:  5/5  Left knee extension:  5/5  Right knee flexion:  5/5  Right knee extension:  5/5  Left foot dorsiflexion:  5/5  Left foot plantar flexion:  5/5  Right foot dorsiflexion:  5/5  Right foot plantar flexion:  5/5  Reflexes:  Left Patellar:  2+   Right Patellar:  2+   Left Achilles:  2+   Right Achilles:  2+     Imaging    MRI LUMBAR SPINE WITHOUT CONTRAST (6/21/2021)     INDICATION: M54 40: Lumbago with sciatica, unspecified side      COMPARISON:  12/1/2018     TECHNIQUE:  Sagittal T1, sagittal T2, sagittal inversion recovery, axial T1 and axial T2, coronal T2     IMAGE QUALITY:  Diagnostic     FINDINGS:     VERTEBRAL BODIES:  There are 5 lumbar type vertebral bodies  Normal alignment of the lumbar spine  L4 on L5 and L5 on S1 grade 1 anterolisthesis appears secondary to facet arthropathy  Possible L5 pars defects  No scoliosis  No compression fracture  Normal marrow signal is identified within the visualized bony structures  No discrete marrow lesion      SACRUM:  Normal signal within the sacrum   No evidence of insufficiency or stress fracture      DISTAL CORD AND CONUS:  Normal size and signal within the distal cord and conus      PARASPINAL SOFT TISSUES:  Paraspinal soft tissues are unremarkable      LOWER THORACIC DISC SPACES:  Normal disc height and signal   No disc herniation, canal stenosis or foraminal narrowing      LUMBAR DISC SPACES:     L1-L2:  Normal      L2-L3:  Normal      L3-L4:  Diffuse bulge with bilateral facet arthrosis  Minimal central canal narrowing without foraminal encroachment, stable      L4-L5:  Diffuse disc bulge with superimposed central disc extrusion exhibiting minimal cranial migration and severe bilateral facet arthrosis  Moderate central canal encroachment with right ventral distortion of the thecal sac  Mild right foraminal   narrowing  Correlate clinically for exiting right L4 radicular symptoms  Findings are stable      L5-S1:  Chronic disc desiccation and complete loss of disc height  Anterolisthesis as above with annular bulge and unroofing of the disc  Bilateral facet hypertrophy noted foramina remain patent at this level        IMPRESSION:  1   Stable grade 1 L4 on L5 and L5 on S1 anterolisthesis with questionable bilateral L5 pars defects  2   Spondylotic changes of the lower lumbar spine similar to prior examination and without critical stenosis

## 2021-07-26 NOTE — PATIENT INSTRUCTIONS

## 2021-07-26 NOTE — PROGRESS NOTES
Daily Note     Today's date: 2021  Patient name: Marc Frias  : 1960  MRN: [id number]  Referring provider: Tavon Prajapati MD  Dx:   Encounter Diagnosis     ICD-10-CM    1  Low back pain with sciatica, sciatica laterality unspecified, unspecified back pain laterality, unspecified chronicity  M54 40                   Subjective: Patient reports she went kayaking yesterday - "I thought I would be really sore and I'm not "  Patient [facility] - reports she was given Voltaren cream       Objective: See treatment diary below  Assessment: Lower trunk rotations continue to cause discomfort in the low back - remainder of today's PT treatment is tolerated well  Patient would benefit from continued PT to improve back pain and function  Plan: Continue treatment as per PT plan of care         Precautions: spondylolisthesis      Manuals    laser kl MO 5' 5' 5' 5' JLW 5'   JLW 5'   STM L piriformis kl MO JLW 5' JLW 5' 5' MO kl JLW 5' JLW 8' KL L and R JLW 8'                             Neuro Re-Ed             TaA  5"x10 5"x10 5"x10 5"x10        TaA leg press 20#x20 20#  x20 20#  25 20#  20 20#  30 30#x30 30#  30 30#  30 30#x30 30#  30   TaA LPD seated on pball   red  20 red  20 Red  20 green x20 green  20 green  20 Green x30 green  30   rows seated on pball   red  20 red  20 Red  20 green x20 green  20 green  20 Green x30 green  30                                          Ther Ex             ltr 5"x20 5"x20 5"x20 5"x20 5"x  20 5"x20 5"x20 NP x20ea NP   glute stretch 30x4 30"x4 30"x3 30"x3 3x  30" 30"x3 30"x3 30"x3 30"x3 30"x3   pball flexion stretch 10"x10 10"x10 10"x10 10"x10 10"x10 10"x10 10"x10 10"x10 10"x10 10"x10   bike   sci fit  7' sci fit  7' sci fit   8' recum  8' recum  8' 10' recum  10'                                                       Ther Activity             bike  7' see above                       Gait Training                                       Modalities             MHP 10'  NV 10' post tx 10' post tx 10'  Post tx 10' 10' post tx 10' post tx 10' post-tx seated 10' post tx  seated

## 2021-07-28 ENCOUNTER — OFFICE VISIT (OUTPATIENT)
Dept: PHYSICAL THERAPY | Facility: CLINIC | Age: 61
End: 2021-07-28
Payer: COMMERCIAL

## 2021-07-28 DIAGNOSIS — M54.40 LOW BACK PAIN WITH SCIATICA, SCIATICA LATERALITY UNSPECIFIED, UNSPECIFIED BACK PAIN LATERALITY, UNSPECIFIED CHRONICITY: Primary | ICD-10-CM

## 2021-07-28 PROCEDURE — 97110 THERAPEUTIC EXERCISES: CPT | Performed by: PHYSICAL THERAPIST

## 2021-07-28 PROCEDURE — 97140 MANUAL THERAPY 1/> REGIONS: CPT | Performed by: PHYSICAL THERAPIST

## 2021-07-28 NOTE — PROGRESS NOTES
Daily Note     Today's date: 2021  Patient name: Liam Carroll  : 1960  MRN: [id number]  Referring provider: Humberto Ho MD  Dx:   Encounter Diagnosis     ICD-10-CM    1  Low back pain with sciatica, sciatica laterality unspecified, unspecified back pain laterality, unspecified chronicity  M54 40                   Subjective: Pt reports that she is having slightly more pain today secondary to walking for longer periods of time at work then usual but overall feels she is improving with PT      Objective: See treatment diary below  Assessment: trunk rotations L continues to be painful and pt reprots that she feels this more in her hip then in her back  No pain reports with other therex, continue to progress as imelda Nv  Plan: Continue treatment as per PT plan of care         Precautions: spondylolisthesis      Manuals    laser kl MO 5' 5' 5' 5' JLW 5'   JLW 5' kl   STM L piriformis kl MO JLW 5' JLW 5' 5' MO kl JLW 5' JLW 8' KL L and R JLW 8' kl                               Neuro Re-Ed              TaA  5"x10 5"x10 5"x10 5"x10         TaA leg press 20#x20 20#  x20 20#  25 20#  20 20#  30 30#x30 30#  30 30#  30 30#x30 30#  30 30#x30   TaA LPD seated on pball   red  20 red  20 Red  20 green x20 green  20 green  20 Green x30 green  30    rows seated on pball   red  20 red  20 Red  20 green x20 green  20 green  20 Green x30 green  30                                              Ther Ex              ltr 5"x20 5"x20 5"x20 5"x20 5"x  20 5"x20 5"x20 NP x20ea NP    glute stretch 30x4 30"x4 30"x3 30"x3 3x  30" 30"x3 30"x3 30"x3 30"x3 30"x3 30"x3   pball flexion stretch 10"x10 10"x10 10"x10 10"x10 10"x10 10"x10 10"x10 10"x10 10"x10 10"x10 10"x10   bike   sci fit  7' sci fit  7' sci fit  7' 8' recum  8' recum  8' 10' recum  10' 10'                                                           Ther Activity              bike  7' see above Gait Training                                          Modalities              MHP 10'  NV 10' post tx 10' post tx 10'  Post tx 10' 10' post tx 10' post tx 10' post-tx seated 10' post tx  seated 10' seated

## 2021-08-02 ENCOUNTER — OFFICE VISIT (OUTPATIENT)
Dept: PHYSICAL THERAPY | Facility: CLINIC | Age: 61
End: 2021-08-02
Payer: COMMERCIAL

## 2021-08-02 DIAGNOSIS — M54.40 LOW BACK PAIN WITH SCIATICA, SCIATICA LATERALITY UNSPECIFIED, UNSPECIFIED BACK PAIN LATERALITY, UNSPECIFIED CHRONICITY: Primary | ICD-10-CM

## 2021-08-02 PROCEDURE — 97140 MANUAL THERAPY 1/> REGIONS: CPT

## 2021-08-02 PROCEDURE — 97110 THERAPEUTIC EXERCISES: CPT

## 2021-08-02 PROCEDURE — 97112 NEUROMUSCULAR REEDUCATION: CPT

## 2021-08-02 NOTE — PROGRESS NOTES
Daily Note     Today's date: 2021  Patient name: Liam Carroll  : 1960  MRN: [id number]  Referring provider: Humberto Ho MD  Dx:   Encounter Diagnosis     ICD-10-CM    1  Low back pain with sciatica, sciatica laterality unspecified, unspecified back pain laterality, unspecified chronicity  M54 40        Start Time: 1700  Stop Time: 1750  Total time in clinic (min): 50 minutes       Subjective: Patient reports her back is feeing "not too bad "      Objective: See treatment diary below  Assessment: Discomfort noted in patient's low back when coming out of the stretch during pball lumbar flexion  Manual therapy is effective in relieving low back pain and stiffness  Plan: Continue treatment as per PT plan of care          Precautions: spondylolisthesis      Manuals    laser JLW 5'   5' 5' 5' JLW 5'   JLW 5' kl   STM B/L piriformis JLW 10'   JLW 5' 5' MO kl JLW 5' JLW 8' KL L and R JLW 8' kl                               Neuro Re-Ed              TaA    5"x10 5"x10         TaA leg press 30#  20   20#  20 20#  30 30#x30 30#  30 30#  30 30#x30 30#  30 30#x30   TaA LPD seated on pball green  30   red  20 Red  20 green x20 green  20 green  20 Green x30 green  30    rows seated on pball green  30   red  20 Red  20 green x20 green  20 green  20 Green x30 green  30                                              Ther Ex              ltr NP   5"x20 5"x  20 5"x20 5"x20 NP x20ea NP    glute stretch 30"x3    30"x3 3x  30" 30"x3 30"x3 30"x3 30"x3 30"x3 30"x3   pball flexion stretch 10"x10   10"x10 10"x10 10"x10 10"x10 10"x10 10"x10 10"x10 10"x10   bike recum  10'   sci fit  7' sci fit  7' 8' recum  8' recum  8' 10' recum  10' 10'                                                           Ther Activity              bike                            Gait Training                                          Modalities              MHP post tx 10'   seated   10' post tx 10'  Post tx 10' 10' post tx 10' post tx 10' post-tx seated 10' post tx  seated 10' seated

## 2021-08-04 ENCOUNTER — OFFICE VISIT (OUTPATIENT)
Dept: PHYSICAL THERAPY | Facility: CLINIC | Age: 61
End: 2021-08-04
Payer: COMMERCIAL

## 2021-08-04 DIAGNOSIS — M54.40 LOW BACK PAIN WITH SCIATICA, SCIATICA LATERALITY UNSPECIFIED, UNSPECIFIED BACK PAIN LATERALITY, UNSPECIFIED CHRONICITY: Primary | ICD-10-CM

## 2021-08-04 PROCEDURE — 97140 MANUAL THERAPY 1/> REGIONS: CPT

## 2021-08-04 PROCEDURE — 97110 THERAPEUTIC EXERCISES: CPT

## 2021-08-04 PROCEDURE — 97112 NEUROMUSCULAR REEDUCATION: CPT

## 2021-08-04 NOTE — PROGRESS NOTES
Daily Note     Today's date: 2021  Patient name: Sahra Jasso  : 1960  MRN: [id number]  Referring provider: Keli Somers MD  Dx:   Encounter Diagnosis     ICD-10-CM    1  Low back pain with sciatica, sciatica laterality unspecified, unspecified back pain laterality, unspecified chronicity  M54 40                   Subjective: No significant changes to report since the last PT treatment  Objective: See treatment diary below  Assessment: Piriformis/glute stretching is better tolerated in sitting versus supine  Patient would benefit from continued PT to maintain her current level of function  Plan: Continue treatment as per PT plan of care         Precautions: spondylolisthesis      Manuals    laser JLW 5' JLW 5'  5' 5' 5' JLW 5'   JLW 5' kl   STM B/L piriformis JLW 10' JLW 10'  JLW 5' 5' MO kl JLW 5' JLW 8' KL L and R JLW 8' kl                               Neuro Re-Ed              TaA    5"x10 5"x10         TaA leg press 30#  20 30#  20  20#  20 20#  30 30#x30 30#  30 30#  30 30#x30 30#  30 30#x30   TaA LPD seated on pball green  30 green  30  red  20 Red  20 green x20 green  20 green  20 Green x30 green  30    rows seated on pball green  30 green  30  red  20 Red  20 green x20 green  20 green  20 Green x30 green  30                                              Ther Ex              ltr NP   5"x20 5"x  20 5"x20 5"x20 NP x20ea NP    glute stretch 30"x3    30"x3 3x  30" 30"x3 30"x3 30"x3 30"x3 30"x3 30"x3   pball flexion stretch 10"x10   10"x10 10"x10 10"x10 10"x10 10"x10 10"x10 10"x10 10"x10   bike recum  10' recum  10'  sci fit  7' sci fit  7' 8' recum  8' recum  8' 10' recum  10' 10'   seated piriformis stretch  30"x3 ea                                                      Ther Activity              bike                            Gait Training                                          Modalities              MHP post tx 10'   seated 10'  seated  10' post tx 10'  Post tx 10' 10' post tx 10' post tx 10' post-tx seated 10' post tx  seated 10' seated

## 2021-08-09 ENCOUNTER — OFFICE VISIT (OUTPATIENT)
Dept: PHYSICAL THERAPY | Facility: CLINIC | Age: 61
End: 2021-08-09
Payer: COMMERCIAL

## 2021-08-09 DIAGNOSIS — M54.40 LOW BACK PAIN WITH SCIATICA, SCIATICA LATERALITY UNSPECIFIED, UNSPECIFIED BACK PAIN LATERALITY, UNSPECIFIED CHRONICITY: Primary | ICD-10-CM

## 2021-08-09 PROCEDURE — 97112 NEUROMUSCULAR REEDUCATION: CPT

## 2021-08-09 PROCEDURE — 97110 THERAPEUTIC EXERCISES: CPT

## 2021-08-09 PROCEDURE — 97140 MANUAL THERAPY 1/> REGIONS: CPT

## 2021-08-09 NOTE — PROGRESS NOTES
Daily Note     Today's date: 2021  Patient name: Yanna Avina  : 1960  MRN: [id number]  Referring provider: Edgardo Riley MD  Dx:   Encounter Diagnosis     ICD-10-CM    1  Low back pain with sciatica, sciatica laterality unspecified, unspecified back pain laterality, unspecified chronicity  M54 40                   Subjective: Patient reports her back is feeling "okay" however her neck has been hurting, possibly from stress  Objective: See treatment diary below  Assessment: Patient reports upright bike seemed "a little more comfortable" versus recumbent bike  Patient would benefit from continued PT to maintain her current level of function  Plan: Continue treatment as per PT plan of care         Precautions: spondylolisthesis      Manuals    laser JLW 5' JLW 5' JLW 5'   5' JLW 5'   JLW 5' kl   STM B/L piriformis JLW 10' JLW 10' JLW 10'   kl JLW 5' JLW 8' KL L and R JLW 8' kl                               Neuro Re-Ed              TaA              TaA leg press 30#  20 30#  20 30#  20   30#x30 30#  30 30#  30 30#x30 30#  30 30#x30   TaA LPD seated on pball green  30 green  30 green  30   green x20 green  20 green  20 Green x30 green  30    rows seated on pball green  30 green  30 green  30   green x20 green  20 green  20 Green x30 green  30                                              Ther Ex              ltr NP     5"x20 5"x20 NP x20ea NP    glute stretch 30"x3   30"x3    30"x3 30"x3 30"x3 30"x3 30"x3 30"x3   pball flexion stretch 10"x10     10"x10 10"x10 10"x10 10"x10 10"x10 10"x10   bike recum  10' recum  10'  upright  10'   8' recum  8' recum  8' 10' recum  10' 10'   seated piriformis stretch  30"x3 ea                                                      Ther Activity              bike                            Gait Training                                          Modalities              MHP post tx 10'   seated 10'  seated 10' seated 10' 10' post tx 10' post tx 10' post-tx seated 10' post tx  seated 10' seated

## 2021-08-11 ENCOUNTER — OFFICE VISIT (OUTPATIENT)
Dept: PHYSICAL THERAPY | Facility: CLINIC | Age: 61
End: 2021-08-11
Payer: COMMERCIAL

## 2021-08-11 DIAGNOSIS — M54.40 LOW BACK PAIN WITH SCIATICA, SCIATICA LATERALITY UNSPECIFIED, UNSPECIFIED BACK PAIN LATERALITY, UNSPECIFIED CHRONICITY: Primary | ICD-10-CM

## 2021-08-11 PROCEDURE — 97112 NEUROMUSCULAR REEDUCATION: CPT

## 2021-08-11 PROCEDURE — 97140 MANUAL THERAPY 1/> REGIONS: CPT

## 2021-08-11 PROCEDURE — 97110 THERAPEUTIC EXERCISES: CPT

## 2021-08-11 NOTE — PROGRESS NOTES
Daily Note     Today's date: 2021  Patient name: Sahra Jasso  : 1960  MRN: [id number]  Referring provider: Keli Somers MD  Dx:   Encounter Diagnosis     ICD-10-CM    1  Low back pain with sciatica, sciatica laterality unspecified, unspecified back pain laterality, unspecified chronicity  M54 40                   Subjective: Patient complains of increased tightness in the right glute while driving in the car today  Objective: See treatment diary below  Assessment: Treatment is tolerated well  Manual therapy is focused on the right lower back/glute due to prior subjective complaint  Patient would benefit from continued PT to improve back pain and function  Plan: Continue treatment as per PT plan of care         Precautions: spondylolisthesis      Manuals    laser JLW 5' JLW 5' JLW 5' JLW 5'   JLW 5'   JLW 5' kl   STM B/L piriformis JLW 10' JLW 10' JLW 10' JLW 10'   JLW 5' JLW 8' KL L and R JLW 8' kl                               Neuro Re-Ed              TaA              TaA leg press 30#  20 30#  20 30#  20 20#  20   30#  30 30#  30 30#x30 30#  30 30#x30   TaA LPD seated on pball green  30 green  30 green  30 green  30   green  20 green  20 Green x30 green  30    rows seated on pball green  30 green  30 green  30 green  30   green  20 green  20 Green x30 green  30                                              Ther Ex              ltr NP      5"x20 NP x20ea NP    glute stretch 30"x3   30"x3  30"x3   30"x3 30"x3 30"x3 30"x3 30"x3   pball flexion stretch 10"x10      10"x10 10"x10 10"x10 10"x10 10"x10   bike recum  10' recum  10'  upright  10' upright 10'   recum  8' recum  8' 10' recum  10' 10'   seated piriformis stretch  30"x3 ea                                                      Ther Activity              bike                            Gait Training                                          Modalities              MHP post tx 8' seated 10'  seated 10' seated 10'  prone   10' post tx 10' post tx 10' post-tx seated 10' post tx  seated 10' seated

## 2021-08-16 ENCOUNTER — OFFICE VISIT (OUTPATIENT)
Dept: PHYSICAL THERAPY | Facility: CLINIC | Age: 61
End: 2021-08-16
Payer: COMMERCIAL

## 2021-08-16 DIAGNOSIS — M54.40 LOW BACK PAIN WITH SCIATICA, SCIATICA LATERALITY UNSPECIFIED, UNSPECIFIED BACK PAIN LATERALITY, UNSPECIFIED CHRONICITY: Primary | ICD-10-CM

## 2021-08-16 PROCEDURE — 97112 NEUROMUSCULAR REEDUCATION: CPT

## 2021-08-16 PROCEDURE — 97140 MANUAL THERAPY 1/> REGIONS: CPT

## 2021-08-16 PROCEDURE — 97110 THERAPEUTIC EXERCISES: CPT

## 2021-08-16 NOTE — PROGRESS NOTES
Daily Note     Today's date: 2021  Patient name: Emre Emanuel  : 1960  MRN: [id number]  Referring provider: Clive Parada MD  Dx:   Encounter Diagnosis     ICD-10-CM    1  Low back pain with sciatica, sciatica laterality unspecified, unspecified back pain laterality, unspecified chronicity  M54 40                   Subjective: Patient reports her arms and legs are feeling sore and "not right" today however she denies back pain  Patient reports feeling tired today  Patient reports she had to lift 20 pound boxes at work today  Objective: See treatment diary below  Assessment: Treatment is tolerated well  Patient may benefit from progression of therapeutic exercise program focusing on increasing her overall strength as well as reviewing proper body mechanics for lifting  Plan: Continue treatment as per PT plan of care         Precautions: spondylolisthesis      Manuals    laser JLW 5' JLW 5' JLW 5' JLW 5' JLW 5'     JLW 5' kl   STM B/L piriformis JLW 10' JLW 10' JLW 10' JLW 10' JLW  10'    KL L and R JLW 8' kl                               Neuro Re-Ed              TaA              TaA leg press 30#  20 30#  20 30#  20 20#  20 20#  20    30#x30 30#  30 30#x30   TaA LPD seated on pball green  30 green  30 green  30 green  30 red  20    Green x30 green  30    rows seated on pball green  30 green  30 green  30 green  30 red  20    Green x30 green  30                                              Ther Ex              ltr NP        x20ea NP    glute stretch 30"x3   30"x3  30"x3 30"x3    30"x3 30"x3 30"x3   pball flexion stretch 10"x10        10"x10 10"x10 10"x10   bike recum  10' recum  10'  upright  10' upright 10' upright 10'    10' recum  10' 10'   seated piriformis stretch  30"x3 ea                                                      Ther Activity              bike                            Gait Training Modalities              MHP post tx 10'   seated 10'  seated 10' seated 10'  prone 10' seated  10' post tx 10' post tx 10' post-tx seated 10' post tx  seated 10' seated

## 2021-08-18 ENCOUNTER — OFFICE VISIT (OUTPATIENT)
Dept: PHYSICAL THERAPY | Facility: CLINIC | Age: 61
End: 2021-08-18
Payer: COMMERCIAL

## 2021-08-18 DIAGNOSIS — M54.40 LOW BACK PAIN WITH SCIATICA, SCIATICA LATERALITY UNSPECIFIED, UNSPECIFIED BACK PAIN LATERALITY, UNSPECIFIED CHRONICITY: Primary | ICD-10-CM

## 2021-08-18 PROCEDURE — 97110 THERAPEUTIC EXERCISES: CPT

## 2021-08-18 PROCEDURE — 97112 NEUROMUSCULAR REEDUCATION: CPT

## 2021-08-18 PROCEDURE — 97140 MANUAL THERAPY 1/> REGIONS: CPT

## 2021-08-18 NOTE — PROGRESS NOTES
Daily Note     Today's date: 2021  Patient name: Valeria Orozco  : 1960  MRN: [id number]  Referring provider: Bret Navas MD  Dx:   Encounter Diagnosis     ICD-10-CM    1  Low back pain with sciatica, sciatica laterality unspecified, unspecified back pain laterality, unspecified chronicity  M54 40        Start Time: 1400  Stop Time: 1450  Total time in clinic (min): 50 minutes       Subjective: No significant changes to report since the last PT treatment  Objective: See treatment diary below  Assessment: Therapeutic exercise program is tolerated well  Mild tenderness noted on the right side during STM  Patient may benefit from progression of therapeutic exercise program focusing on increasing her overall strength as well as reviewing proper body mechanics for lifting  Plan: Continue treatment as per PT plan of care         Precautions: spondylolisthesis      Manuals    laser JLW 5' JLW 5' JLW 5' JLW 5' JLW 5' JLW 5'    JLW 5' kl   STM B/L piriformis JLW 10' JLW 10' JLW 10' JLW 10' JLW  10' JLW  10'   KL L and R JLW 8' kl                               Neuro Re-Ed              TaA              TaA leg press 30#  20 30#  20 30#  20 20#  20 20#  20 20#  20   30#x30 30#  30 30#x30   TaA LPD seated on pball green  30 green  30 green  30 green  30 red  20 red  20   Green x30 green  30    rows seated on pball green  30 green  30 green  30 green  30 red  20 red  20   Green x30 green  30                                              Ther Ex              ltr NP        x20ea NP    glute stretch 30"x3   30"x3  30"x3 30"x3 30"x3   30"x3 30"x3 30"x3   pball flexion stretch 10"x10        10"x10 10"x10 10"x10   bike recum  10' recum  10'  upright  10' upright 10' upright 10' recum  10'   10' recum  10' 10'   seated piriformis stretch  30"x3 ea                                                      Ther Activity              bike Gait Training                                          Modalities              MHP post tx 10'   seated 10'  seated 10' seated 10'  prone 10' seated 10'   seated   10' post tx seated 10' post tx  seated 10' seated

## 2021-08-23 ENCOUNTER — OFFICE VISIT (OUTPATIENT)
Dept: PHYSICAL THERAPY | Facility: CLINIC | Age: 61
End: 2021-08-23
Payer: COMMERCIAL

## 2021-08-23 DIAGNOSIS — M54.40 LOW BACK PAIN WITH SCIATICA, SCIATICA LATERALITY UNSPECIFIED, UNSPECIFIED BACK PAIN LATERALITY, UNSPECIFIED CHRONICITY: Primary | ICD-10-CM

## 2021-08-23 PROCEDURE — 97140 MANUAL THERAPY 1/> REGIONS: CPT

## 2021-08-23 PROCEDURE — 97112 NEUROMUSCULAR REEDUCATION: CPT

## 2021-08-23 PROCEDURE — 97110 THERAPEUTIC EXERCISES: CPT

## 2021-08-23 NOTE — PROGRESS NOTES
Daily Note     Today's date: 2021  Patient name: Prakash Jones  : 1960  MRN: [id number]  Referring provider: Wilmar Garnett MD  Dx:   Encounter Diagnosis     ICD-10-CM    1  Low back pain with sciatica, sciatica laterality unspecified, unspecified back pain laterality, unspecified chronicity  M54 40        Start Time: 6063  Stop Time: 1830  Total time in clinic (min): 50 minutes       Subjective: Patient reports her back is feeling "a little stiff but it's alright "      Objective: See treatment diary below  Assessment: Therapeutic exercise program is tolerated well  Tenderness noted on the right side during STM  Plan: Continue treatment as per PT plan of care         Precautions: spondylolisthesis      Manuals    laser JLW 5' JLW 5' JLW 5' JLW 5' JLW 5' JLW 5' JLW 5'   JLW 5' kl   STM B/L piriformis JLW 10' JLW 10' JLW 10' JLW 10' JLW  10' JLW  10' JLW  10'  KL L and R JLW 8' kl                               Neuro Re-Ed              TaA              TaA leg press 30#  20 30#  20 30#  20 20#  20 20#  20 20#  20 20#  20  30#x30 30#  30 30#x30   TaA LPD seated on pball green  30 green  30 green  30 green  30 red  20 red  20 red  20  Green x30 green  30    rows seated on pball green  30 green  30 green  30 green  30 red  20 red  20 red  20  Green x30 green  30                                              Ther Ex              ltr NP        x20ea NP    glute stretch 30"x3   30"x3  30"x3 30"x3 30"x3 30"x3  30"x3 30"x3 30"x3   pball flexion stretch 10"x10        10"x10 10"x10 10"x10   bike recum  10' recum  10'  upright  10' upright 10' upright 10' recum  10' recum  10'  10' recum  10' 10'   seated piriformis stretch  30"x3 ea                                                      Ther Activity              bike                            Gait Training                                          Modalities              MHP post tx 10'   seated 10'  seated 10' seated 10'  prone 10' seated 10'   seated 10'  seated  10' post tx seated 10' post tx  seated 10' seated

## 2021-08-25 ENCOUNTER — OFFICE VISIT (OUTPATIENT)
Dept: PHYSICAL THERAPY | Facility: CLINIC | Age: 61
End: 2021-08-25
Payer: COMMERCIAL

## 2021-08-25 ENCOUNTER — IMMUNIZATIONS (OUTPATIENT)
Dept: FAMILY MEDICINE CLINIC | Facility: HOSPITAL | Age: 61
End: 2021-08-25

## 2021-08-25 DIAGNOSIS — Z23 ENCOUNTER FOR IMMUNIZATION: Primary | ICD-10-CM

## 2021-08-25 DIAGNOSIS — M54.40 LOW BACK PAIN WITH SCIATICA, SCIATICA LATERALITY UNSPECIFIED, UNSPECIFIED BACK PAIN LATERALITY, UNSPECIFIED CHRONICITY: Primary | ICD-10-CM

## 2021-08-25 PROCEDURE — 0001A SARS-COV-2 / COVID-19 MRNA VACCINE (PFIZER-BIONTECH) 30 MCG: CPT

## 2021-08-25 PROCEDURE — 97110 THERAPEUTIC EXERCISES: CPT | Performed by: PHYSICAL THERAPIST

## 2021-08-25 PROCEDURE — 97140 MANUAL THERAPY 1/> REGIONS: CPT | Performed by: PHYSICAL THERAPIST

## 2021-08-25 PROCEDURE — 91300 SARS-COV-2 / COVID-19 MRNA VACCINE (PFIZER-BIONTECH) 30 MCG: CPT

## 2021-08-25 NOTE — PROGRESS NOTES
Daily Note     Today's date: 2021  Patient name: Vivian Barr  : 1960  MRN: [id number]  Referring provider: Vivien Marquez MD  Dx:   No diagnosis found  Subjective: Patient reports that she is feeling stiff  Reports that she had her covid vaccine today and is feeling achy  Objective: See treatment diary below  Assessment: Therapeutic exercise program is tolerated well  Tenderness noted on the right side during STM  Plan: Continue treatment as per PT plan of care         Precautions: spondylolisthesis      Manuals     laser JLW 5' JLW 5' JLW 5' JLW 5' JLW 5' JLW 5' JLW 5' 5'  JLW 5' kl    STM B/L piriformis JLW 10' JLW 10' JLW 10' JLW 10' JLW  10' JLW  10' JLW  10' kl KL L and R JLW 8' kl                                  Neuro Re-Ed               TaA               TaA leg press 30#  20 30#  20 30#  20 20#  20 20#  20 20#  20 20#  20 20#x20 30#x30 30#  30 30#x30    TaA LPD seated on pball green  30 green  30 green  30 green  30 red  20 red  20 red  20 Red x20 Green x30 green  30     rows seated on pball green  30 green  30 green  30 green  30 red  20 red  20 red  20 Red x20 Green x30 green  30                                                  Ther Ex               ltr NP        x20ea NP     glute stretch 30"x3   30"x3  30"x3 30"x3 30"x3 30"x3 30"x3 30"x3 30"x3 30"x3    pball flexion stretch 10"x10        10"x10 10"x10 10"x10    bike recum  10' recum  10'  upright  10' upright 10' upright 10' recum  10' recum  10' 10' 10' recum  10' 10'    seated piriformis stretch  30"x3 ea                                                          Ther Activity               bike                              Gait Training                                             Modalities               MHP post tx 10'   seated 10'  seated 10' seated 10'  prone 10' seated 10'   seated 10'  seated 10' 10' post tx seated 10' post tx  seated 10' seated

## 2021-08-30 ENCOUNTER — OFFICE VISIT (OUTPATIENT)
Dept: PHYSICAL THERAPY | Facility: CLINIC | Age: 61
End: 2021-08-30
Payer: COMMERCIAL

## 2021-08-30 DIAGNOSIS — M54.40 LOW BACK PAIN WITH SCIATICA, SCIATICA LATERALITY UNSPECIFIED, UNSPECIFIED BACK PAIN LATERALITY, UNSPECIFIED CHRONICITY: Primary | ICD-10-CM

## 2021-08-30 PROCEDURE — 97112 NEUROMUSCULAR REEDUCATION: CPT

## 2021-08-30 PROCEDURE — 97140 MANUAL THERAPY 1/> REGIONS: CPT

## 2021-08-30 PROCEDURE — 97110 THERAPEUTIC EXERCISES: CPT

## 2021-08-30 NOTE — PROGRESS NOTES
Daily Note     Today's date: 2021  Patient name: Sathya De Leon  : 1960  MRN: [id number]  Referring provider: Poly Horan MD  Dx:   Encounter Diagnosis     ICD-10-CM    1  Low back pain with sciatica, sciatica laterality unspecified, unspecified back pain laterality, unspecified chronicity  M54 40                   Subjective: Patient reports she is feeling sore "bascially everywhere "  Patient partially attributes symptoms to doing laundry at the [place] [place]  Objective: See treatment diary below  Assessment: Therapeutic exercise program is tolerated well  Tenderness noted right lumbar paraspinals during STM  Plan: Continue treatment as per PT plan of care         Precautions: spondylolisthesis      Manuals       laser JLW 5' JLW 5' JLW 5' JLW 5' JLW 5' JLW 5' JLW 5' 5' JLW 5'      STM B/L piriformis JLW 10' JLW 10' JLW 10' JLW 10' JLW  10' JLW  10' JLW  10' kl JLW  10'                                    Neuro Re-Ed               TaA               TaA leg press 30#  20 30#  20 30#  20 20#  20 20#  20 20#  20 20#  20 20#x20 20#  20      TaA LPD seated on pball green  30 green  30 green  30 green  30 red  20 red  20 red  20 Red x20 green  20      rows seated on pball green  30 green  30 green  30 green  30 red  20 red  20 red  20 Red x20 green  20                                                   Ther Ex               ltr NP              glute stretch 30"x3   30"x3  30"x3 30"x3 30"x3 30"x3 30"x3 30"x3      pball flexion stretch 10"x10              bike recum  10' recum  10'  upright  10' upright 10' upright 10' recum  10' recum  10' 10'  10'      seated piriformis stretch  30"x3 ea                                                          Ther Activity               bike                              Gait Training                                             Modalities               MHP post tx 10'   seated 10'  seated 10' seated 10'  prone 10' seated 10'   seated 10'  seated 10' 10' post tx seated

## 2021-09-01 ENCOUNTER — OFFICE VISIT (OUTPATIENT)
Dept: PHYSICAL THERAPY | Facility: CLINIC | Age: 61
End: 2021-09-01
Payer: COMMERCIAL

## 2021-09-01 DIAGNOSIS — M54.40 LOW BACK PAIN WITH SCIATICA, SCIATICA LATERALITY UNSPECIFIED, UNSPECIFIED BACK PAIN LATERALITY, UNSPECIFIED CHRONICITY: Primary | ICD-10-CM

## 2021-09-01 PROCEDURE — 97140 MANUAL THERAPY 1/> REGIONS: CPT

## 2021-09-01 PROCEDURE — 97112 NEUROMUSCULAR REEDUCATION: CPT

## 2021-09-01 PROCEDURE — 97110 THERAPEUTIC EXERCISES: CPT

## 2021-09-01 NOTE — PROGRESS NOTES
Daily Note     Today's date: 2021  Patient name: Marc Frias  : 1960  MRN: [id number]  Referring provider: Tavon Prajapati MD  Dx:   Encounter Diagnosis     ICD-10-CM    1  Low back pain with sciatica, sciatica laterality unspecified, unspecified back pain laterality, unspecified chronicity  M54 40        Start Time: 1745  Stop Time: 182  Total time in clinic (min): 43 minutes       Subjective: Patient reports low back pain "wasn't too bad today "      Objective: See treatment diary below  Assessment: Therapeutic exercise program is tolerated well without complaints  Patient would benefit from continued PT to prevent recurrence of low back pain  Plan: Continue treatment as per PT plan of care         Precautions: spondylolisthesis      Manuals      laser JLW 5' JLW 5' JLW 5' JLW 5' JLW 5' JLW 5' JLW 5' 5' JLW 5' JLW 5'     STM B/L piriformis JLW 10' JLW 10' JLW 10' JLW 10' JLW  10' JLW  10' JLW  10' kl JLW  10' JLW  10'                                   Neuro Re-Ed               TaA               TaA leg press 30#  20 30#  20 30#  20 20#  20 20#  20 20#  20 20#  21 20#x20 20#  20 20#  20     TaA LPD seated on pball green  30 green  30 green  30 green  30 red  20 red  20 red  20 Red x20 green  20 green  20     rows seated on pball green  30 green  30 green  30 green  30 red  20 red  20 red  20 Red x20 green  20 green  20                                                  Ther Ex               ltr NP              glute stretch 30"x3   30"x3  30"x3 30"x3 30"x3 30"x3 30"x3 30"x3 30"x3     pball flexion stretch 10"x10              bike recum  10' recum  10'  upright  10' upright 10' upright 10' recum  10' recum  10' 10'  10' recum  10'     seated piriformis stretch  30"x3 ea                                                          Ther Activity               bike                              Gait Training Modalities               MHP post tx 10'   seated 10'  seated 10' seated 10'  prone 10' seated 10'   seated 10'  seated 10' 10' post tx seated 10' post tx seated

## 2021-09-02 ENCOUNTER — OFFICE VISIT (OUTPATIENT)
Dept: OBGYN CLINIC | Facility: CLINIC | Age: 61
End: 2021-09-02
Payer: COMMERCIAL

## 2021-09-02 VITALS
HEIGHT: 62 IN | SYSTOLIC BLOOD PRESSURE: 126 MMHG | WEIGHT: 174 LBS | BODY MASS INDEX: 32.02 KG/M2 | DIASTOLIC BLOOD PRESSURE: 80 MMHG

## 2021-09-02 DIAGNOSIS — Z01.419 ENCOUNTER FOR WELL WOMAN EXAM WITH ROUTINE GYNECOLOGICAL EXAM: Primary | ICD-10-CM

## 2021-09-02 PROCEDURE — 99396 PREV VISIT EST AGE 40-64: CPT | Performed by: OBSTETRICS & GYNECOLOGY

## 2021-09-02 NOTE — PATIENT INSTRUCTIONS
Guidelines for Vulvar Skin Care    NOTE:     The goal is to promote healthy vulvar skin  This is done by decreasing and/or removing any chemicals, moisture, or rubbing (friction)  Any products listed below have been suggested for use because of their past success in helping to decrease or relieve vulvar/vaginal itching and burning  LAUNDRY PRODUCTS   Use a detergent free of dyes, enzymes and perfumes (such as ALL-Free and Clear or Earth-Rite) on any clothing that comes in contact with your vulva such as your underwear, exercise clothes, towels, or pajama bottoms  Use 1/3 to 1/2 the suggested amount per load  Other clothing may be washed in the laundry soap of your choice   Do not use a fabric softener in the washer or dryer on these articles of clothing  If you do use dryer sheets with the rest of your clothes, for any loads, you must hang dry your underwear, towels, and any other clothing that comes in contact with your vulva   Stain Removing Products  Soak and rinse in clear water all underwear and towels on which you have used a stain removing product  Then wash in your regular washing cycle  This removes as much of the product as possible  CLOTHING   Wear white all cotton underwear, not nylon with a cotton crotch  Cotton allows air in and moisture out   Avoid pantyhose  If you must wear them, either cut out the estella crotch (if you cut out the crotch be sure to leave about 1/4 to 1/2 inch of fabric from the seam to prevent running) or wear thigh high hose  Many stores now carry thigh high nylons   Avoid tight clothing, especially clothing made of synthetic fabrics  Remove wet bathing and exercise clothing as soon as you can  BATHING AND HYGIENE   Avoid bath soaps, lotions, gels, etc  which contain perfumes  These may smell nice but can be irritating  This includes many baby products and feminine hygiene products marked "gentle" or "mild"   Dove-Hypoallergenic, Neutrogena, Basis, or Pears are the soaps we suggest  Do not use soap directly on the vulvar skin; just warm water and your hand will keep the vulvar area clean without irritating the skin   Avoid all bubble baths, bath salts and scented oils  You may apply a neutral (unscented, non-perfumed) oil such as Lissa Oil to damp skin after getting out of the tub or shower  Do not apply oils directly to the vulva   Do not scrub vulvar skin with a washcloth, washing with your hand and warm water is enough for good cleaning   Pat dry rather than rubbing with a towel  Or use a hairdryer on a cool setting to dry the vulva   Baking Soda soaks  Soak in lukewarm (not hot) bath water with 4-5 tablespoons of baking soda to help soothe vulvar itching and burning  Soak 1 to 3 times a day for 10-15 minutes   Cool Compresses: Apply cool compresses to the vulva for 15-20 minutes at a time, up to three times per day for burning or itching  Do not use for prolonged amounts of time as this can lead to damage to the skin   Use white, unscented toilet paper  If paper has a perfumed scent or lotion, avoid using it   Avoid all feminine hygiene sprays, perfumes, adult, or baby wipes  Pour lukewarm water over the vulva after urinating if urine causes burning of the skin  Pat dry rather than rubbing with a towel   Avoid the use of deodorized pads and tampons  Tampons should be used when the blood flow is heavy enough to soak one tampon in four hours or less  Tampons are safe for most women, but wearing them too long or when the blood flow is light may result in vaginal infection, increased discharge, odor, or toxic shock syndrome  Also, use only pads that have a cotton liner that comes in contact with your skin  You can obtain all cotton pads from [employer]   Avoid all over the counter creams or ointments, except A&D Ointment   Ask your health care provider first    Small amounts of A&D Ointment may be applied to your vulva as often as needed to protect the skin  It may also help to decrease skin irritation during your period and when you urinate  Brands that have been helpful are the [place] brand, [employer]ys [employer]  brand, [place] brand, or [facility] brand   DO NOT DOUCHE  Baking soda soaks will help rinse away extra discharge and help with odor   DO NOT SHAVE the vulvar area   Some women may have problems with chronic dampness  Keeping dry is important   Choose cotton fabrics whenever you can   Keep an extra pair of underwear with you in a small bag and change if you become damp during the day at work/school   Gold Bond Powder or Zeosorb Powder may be applied to the vulva and groin area one to two times per day to help absorb moisture  Dryness and irritation of the vagina and vulva may be helped by using an emoillent  Use a small amount of a pure vegetable oil such as Crisco or olive oil  The vegetable oils contain no chemicals to irritate vulvar/vaginal skin  Vegetable oils will rinse away with water and will not increase your chances of infection  You can also use Vitamin E oil or coconut oil  You can apply the emoillent as often as needed for dryness and irritation; I recommend at least once daily, but you can apply more frequently  You can also use the emollient during intercourse  Water-based products like K-Y Jelly are helpful, but may tend to dry before intercourse is over and also contain chemicals that can irritate your vulvar skin  It may be helpful to use a non-lubricated, non-spermicidal condom, and use vegetable oil as the lubricant  This will help keep the semen off the skin which can decrease burning and irritation after intercourse

## 2021-09-02 NOTE — PROGRESS NOTES
ASSESSMENT & PLAN: Steven Georges is a 63 y o  Shauna Meyers with normal gynecologic exam     1   Routine well woman exam done today  2   Pap and HPV:Pap with HPV was not done today  Current ASCCP Guidelines reviewed  3   Mammogram ordered  Recommend yearly mammography  4   Colonoscopy performed 3/2021  Repeat due in 5 years  5  The patient is not sexually active  6  The following were reviewed in today's visit: mammography screening ordered, menopause, exercise and healthy diet  7  Patient to return to office in 12 months for annual gyn exam    8  Vaginal dryness/atrophy   - discussed coconut oil/barrier emolients and vaginal moisturizer to help with her dryness and subsequent itching  Recommend follow up if dryness and itching not helped with conservative management  All questions have been answered to her satisfaction  CC:  Annual Gynecologic Examination    HPI: Steven Georges is a 63 y o  Shauna Meyers who presents for annual gynecologic examination  She has the following concerns:  No gyn concerns  Health Maintenance:    She exercises 2 days per week  She wears her seatbelt routinely  She does perform regular monthly self breast exams  She feels safe at home  Patients does follow a balanced diet      Last mammogram: BIRADS 2 - benign  Last colonoscopy: 3/2021 - repeat due in 5 years       Past Medical History:   Diagnosis Date    Anemia     Anxiety and depression     Chronic fatigue syndrome     Colon polyp     CPAP (continuous positive airway pressure) dependence     DDD (degenerative disc disease), lumbosacral 12/27/2018    Patrick-Barr virus seropositivity     Fibromyalgia, primary     JA (generalized anxiety disorder)     Gluten intolerance     Hypertension     Hypovitaminosis D     Impaired fasting glucose     Lipodystrophy     Liver lesion     Migraine     Obesity (BMI 30-39 9) 6/6/2018    Sleep apnea        Past Surgical History:   Procedure Laterality Date    APPENDECTOMY      BREAST BIOPSY Right     not sure of the date    CHOLECYSTECTOMY      COLONOSCOPY      NM COLONOSCOPY FLX DX W/COLLJ SPEC WHEN PFRMD N/A 2017    Procedure: COLONOSCOPY;  Surgeon: Jaquan Rivas DO;  [facility]; Service: Gastroenterology    UPPER GASTROINTESTINAL ENDOSCOPY         Past OB/Gyn History:   No LMP recorded  Patient is postmenopausal     Menstrual History:  OB History        0    Para   0    Term   0       0    AB   0    Living   0       SAB   0    TAB   0    Ectopic   0    Multiple   0    Live Births   0           Obstetric Comments   Menarche: 17            Menarche age: 19yo  No LMP recorded  Patient is postmenopausal        Menstrual history: Patient is post menopausal    History of sexually transmitted infection: No  Patient is not currently sexually active  Birth control: postmenopausal  Last Pap 2020 :  normal pap, neg HPV      Family History   Problem Relation Age of Onset   Isabell Jimenez Depression Mother     Hypertension Mother         essential     Mitral valve prolapse Mother     Diabetes Brother     Heart attack Brother     Heart disease Maternal Grandmother         cardiac disorder    Hyperlipidemia Maternal Grandmother     Stroke Paternal Grandmother     Stroke Paternal Grandfather     Breast cancer Maternal Aunt 48    No Known Problems Father        Social History:  Social History     Socioeconomic History    Marital status: Single     Spouse name: Not on file    Number of children: 0    Years of education: bachelor's degree    Highest education level: Not on file   Occupational History    Occupation: works for [employer]     Employer: [employer]   Tobacco Use    Smoking status: Never Smoker    Smokeless tobacco: Never Used   Vaping Use    Vaping Use: Never used   Substance and Sexual Activity    Alcohol use: Yes     Comment: rarely    Drug use: No    Sexual activity: Not Currently   Other Topics Concern    Not on file   Social History Narrative    Education: bachelor's degree in sociology    Learning Disabilities: none    Marital History: single    Children: none    Living Arrangement: lives alone    Occupational History: works for [employer]: limited support system    Legal History: none     History: None    Caffeine use denied     Social Determinants of Health     Financial Resource Strain:     Difficulty of Paying Living Expenses:    Food Insecurity:     Worried About Running Out of Food in the Last Year:     [address] in the Last Year:    Transportation Needs:     Lack of Transportation (Medical):  Lack of Transportation (Non-Medical):    Physical Activity:     Days of Exercise per Week:     Minutes of Exercise per Session:    Stress:     Feeling of Stress :    Social Connections:     Frequency of Communication with Friends and Family:     Frequency of Social Gatherings with Friends and Family:     Attends Mandaeism Services:     Active Member of Clubs or Organizations:     Attends Club or Organization Meetings:     Marital Status:    Intimate Partner Violence:     Fear of Current or Ex-Partner:     Emotionally Abused:     Physically Abused:     Sexually Abused:      Presently lives with self  Patient is single  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[place] [employer]  Allergies   Allergen Reactions    Escitalopram Swelling and Edema     Category:  Allergy;     Fluticasone-Salmeterol      UNKNOWN    Nsaids      Action Taken: stomach issues;     Gluten Meal - Food Allergy Diarrhea    Latex      irritation      Other Rash     Adhesive bandages         Current Outpatient Medications:     Ascorbic Acid (VITAMIN C PO), Take 1 tablet by mouth daily, Disp: , Rfl:     Cholecalciferol (VITAMIN D3) 04854 units TABS, Take 10,000 Units by mouth daily, Disp: , Rfl:     Cyanocobalamin (VITAMIN B-12) 500 MCG LOZG, Take 500 mcg by mouth every other day , Disp: , Rfl:     Diclofenac Sodium (VOLTAREN) 1 %, Apply 2 g topically 4 (four) times a day, Disp: 100 g, Rfl: 5    DULoxetine (CYMBALTA) 30 mg delayed release capsule, TAKE 1 CAPSULE DAILY, Disp: 90 capsule, Rfl: 1    Elderberry 575 MG/5ML SYRP, Take by mouth, Disp: , Rfl:     hydrochlorothiazide (HYDRODIURIL) 12 5 mg tablet, TAKE ONE TABLET BY MOUTH DAILY, Disp: 90 tablet, Rfl: 0    Magnesium Gluconate 250 MG TABS, Take 1 tablet by mouth daily, Disp: , Rfl:     potassium chloride (K-DUR,KLOR-CON) 10 mEq tablet, Take 1 tablet (10 mEq total) by mouth daily, Disp: 30 tablet, Rfl: 1    vitamin E 100 UNIT capsule, Take 100 Units by mouth daily, Disp: , Rfl:     Zinc 50 MG CAPS, Take 1 capsule by mouth daily, Disp: , Rfl:     losartan (COZAAR) 25 mg tablet, Take 1 tablet (25 mg total) by mouth daily (Patient not taking: Reported on 9/2/2021), Disp: 90 tablet, Rfl: 3    Review of Systems:  Review of Systems   Constitutional: Negative for activity change, appetite change and unexpected weight change  Respiratory: Negative for cough and shortness of breath  Cardiovascular: Negative for chest pain  Gastrointestinal: Negative for abdominal pain, constipation, diarrhea, nausea and vomiting  Genitourinary: Negative for difficulty urinating, dyspareunia, frequency, menstrual problem, pelvic pain, urgency, vaginal bleeding, vaginal discharge and vaginal pain  Musculoskeletal: Negative for back pain  Skin: Negative  Neurological: Negative for dizziness, weakness, light-headedness and headaches  Psychiatric/Behavioral: Negative  Physical Exam:  /80   Ht 5' 2" (1 575 m)   Wt 78 9 kg (174 lb)   BMI 31 83 kg/m²    Physical Exam  Constitutional:       General: She is not in acute distress  Appearance: Normal appearance  She is well-developed  She is obese  She is not diaphoretic     Genitourinary:      Pelvic exam was performed with patient in the lithotomy position  Vulva, urethra, bladder, vagina, uterus, right adnexa and left adnexa normal       No vulval lesion, tenderness, ulcerations or rash noted  No posterior fourchette tenderness or injury present  No inguinal adenopathy present in the right or left side  No urethral prolapse or mass present  Bladder is not tender  No lesions in the vagina  Vaginal atrophy present  No vaginal discharge, erythema, tenderness, bleeding or rugosity  Cervical pinkness present  No cervical motion tenderness, discharge, friability, lesion or polyp  Uterus is mobile  Uterus is not enlarged or tender  No uterine mass detected  Uterus is anteverted and regular  No right or left adnexal mass present  Right adnexa not tender or full  Left adnexa not tender or full  Genitourinary Comments: Perineum normal in appearance, no lacerations, no ulcerations, no lesions visualized  Rectum:      No tenderness or external hemorrhoid  HENT:      Head: Normocephalic and atraumatic  Neck:      Thyroid: No thyromegaly or thyroid tenderness  Cardiovascular:      Rate and Rhythm: Normal rate and regular rhythm  Heart sounds: Normal heart sounds  No murmur heard  No friction rub  Pulmonary:      Effort: Pulmonary effort is normal  No respiratory distress  Breath sounds: Normal breath sounds  No wheezing or rales  Chest:      Breasts: Breasts are symmetrical          Right: Normal  No swelling, bleeding, mass, skin change or tenderness  Left: Normal  No swelling, bleeding, mass, skin change or tenderness  Abdominal:      Palpations: Abdomen is soft  There is no mass  Tenderness: There is no abdominal tenderness  There is no guarding  Musculoskeletal:         General: No tenderness  Normal range of motion  Right lower leg: No edema  Left lower leg: No edema     Lymphadenopathy:      Lower Body: No right inguinal adenopathy  No left inguinal adenopathy  Neurological:      Mental Status: She is alert and oriented to person, place, and time  Skin:     General: Skin is warm and dry  Coloration: Skin is not pale  Findings: No erythema  Psychiatric:         Mood and Affect: Mood normal          Behavior: Behavior normal          Thought Content: Thought content normal          Judgment: Judgment normal    Vitals and nursing note reviewed

## 2021-09-08 ENCOUNTER — OFFICE VISIT (OUTPATIENT)
Dept: PHYSICAL THERAPY | Facility: CLINIC | Age: 61
End: 2021-09-08
Payer: COMMERCIAL

## 2021-09-08 DIAGNOSIS — M54.40 LOW BACK PAIN WITH SCIATICA, SCIATICA LATERALITY UNSPECIFIED, UNSPECIFIED BACK PAIN LATERALITY, UNSPECIFIED CHRONICITY: Primary | ICD-10-CM

## 2021-09-08 PROCEDURE — 97140 MANUAL THERAPY 1/> REGIONS: CPT

## 2021-09-08 PROCEDURE — 97110 THERAPEUTIC EXERCISES: CPT

## 2021-09-08 NOTE — PROGRESS NOTES
Daily Note     Today's date: 2021  Patient name: César Alas  : 1960  MRN: [id number]  Referring provider: Blanca Pacheco MD  Dx:   Encounter Diagnosis     ICD-10-CM    1  Low back pain with sciatica, sciatica laterality unspecified, unspecified back pain laterality, unspecified chronicity  M54 40                   Subjective: Patient stated that her back is not feeling too bad today  Objective: See treatment diary below      Assessment: Tolerated treatment well  Patient continues to benefit from manuals, with moderate release noted for piriformis L>R  Continued PT would be beneficial to improve function           Plan: Continue per plan of care         Precautions: spondylolisthesis      Manuals     laser JLW 5' JLW 5' JLW 5' JLW 5' JLW 5' JLW 5' JLW 5' 5' JLW 5' JLW 5' MB 5'    STM B/L piriformis JLW 10' JLW 10' JLW 10' JLW 10' JLW  10' JLW  10' JLW  10' kl JLW  10' JLW  10' MB 10'                                  Neuro Re-Ed               TaA               TaA leg press 30#  20 30#  20 30#  20 20#  20 20#  20 20#  20 20#  20 20#x20 20#  20 20#  20 20# 20x    TaA LPD seated on pball green  30 green  30 green  30 green  30 red  20 red  20 red  20 Red x20 green  20 green  20 green  20    rows seated on pball green  30 green  30 green  30 green  30 red  20 red  20 red  20 Red x20 green  20 green  20 green  20                                                 Ther Ex               ltr NP              glute stretch 30"x3   30"x3  30"x3 30"x3 30"x3 30"x3 30"x3 30"x3 30"x3 3x30"    pball flexion stretch 10"x10              bike recum  10' recum  10'  upright  10' upright 10' upright 10' recum  10' recum  10' 10'  10' recum  10' 10'    seated piriformis stretch  30"x3 ea                                                          Ther Activity               bike                              Gait Training Modalities               MHP post tx 10'   seated 10'  seated 10' seated 10'  prone 10' seated 10'   seated 10'  seated 10' 10' post tx seated 10' post tx seated def

## 2021-09-13 ENCOUNTER — OFFICE VISIT (OUTPATIENT)
Dept: PHYSICAL THERAPY | Facility: CLINIC | Age: 61
End: 2021-09-13
Payer: COMMERCIAL

## 2021-09-13 DIAGNOSIS — M54.40 LOW BACK PAIN WITH SCIATICA, SCIATICA LATERALITY UNSPECIFIED, UNSPECIFIED BACK PAIN LATERALITY, UNSPECIFIED CHRONICITY: Primary | ICD-10-CM

## 2021-09-13 PROCEDURE — 97110 THERAPEUTIC EXERCISES: CPT

## 2021-09-13 PROCEDURE — 97112 NEUROMUSCULAR REEDUCATION: CPT

## 2021-09-13 PROCEDURE — 97140 MANUAL THERAPY 1/> REGIONS: CPT

## 2021-09-13 NOTE — PROGRESS NOTES
Daily Note     Today's date: 2021  Patient name: Tj Gibbs  : 1960  MRN: [id number]  Referring provider: Rex Krishnan MD  Dx:   Encounter Diagnosis     ICD-10-CM    1  Low back pain with sciatica, sciatica laterality unspecified, unspecified back pain laterality, unspecified chronicity  M54 40                   Subjective: Patient reports her back is feeling "okay" however her right hip is feeling sore and "very tight "  Patient reports she had been helping her friend move rocks [place] a creek over the weekend  Objective: See treatment diary below  Assessment: Therapeutic exercise program is tolerated well without complaints  Patient would benefit from continued PT to improve back pain and function  Plan: Continue treatment as per PT plan of care         Precautions: spondylolisthesis      Manuals    laser JLW 5'   JLW 5' JLW 5' JLW 5' JLW 5' 5' JLW 5' JLW 5' MB 5'   STM B/L piriformis JLW 10'   JLW 10' JLW  10' JLW  10' JLW  10' kl JLW  10' JLW  10' MB 10'                               Neuro Re-Ed              TaA              TaA leg press 20#  20   20#  20 20#  20 20#  20 20#  20 20#x20 20#  20 20#  20 20# 20x   TaA LPD seated on pball green  30   green  30 red  20 red  20 red  20 Red x20 green  20 green  20 green  20   rows seated on pball green  30   green  30 red  20 red  20 red  20 Red x20 green  20 green  20 green  20                                             Ther Ex              ltr              glute stretch 30"x3    30"x3 30"x3 30"x3 30"x3 30"x3 30"x3 30"x3 3x30"   pball flexion stretch              bike upright  10'   upright 10' upright 10' recum  10' recum  10' 10'  10' recum  10' 10'   seated piriformis stretch                                                        Ther Activity              bike                            Gait Training                                          Modalities              MHP post tx 8' seated   10'  prone 10' seated 10'   seated 10'  seated 10' 10' post tx seated 10' post tx seated def

## 2021-09-15 ENCOUNTER — OFFICE VISIT (OUTPATIENT)
Dept: PHYSICAL THERAPY | Facility: CLINIC | Age: 61
End: 2021-09-15
Payer: COMMERCIAL

## 2021-09-15 DIAGNOSIS — M54.40 LOW BACK PAIN WITH SCIATICA, SCIATICA LATERALITY UNSPECIFIED, UNSPECIFIED BACK PAIN LATERALITY, UNSPECIFIED CHRONICITY: Primary | ICD-10-CM

## 2021-09-15 PROCEDURE — 97140 MANUAL THERAPY 1/> REGIONS: CPT | Performed by: PHYSICAL THERAPIST

## 2021-09-15 PROCEDURE — 97110 THERAPEUTIC EXERCISES: CPT | Performed by: PHYSICAL THERAPIST

## 2021-09-15 PROCEDURE — 97530 THERAPEUTIC ACTIVITIES: CPT | Performed by: PHYSICAL THERAPIST

## 2021-09-15 NOTE — PROGRESS NOTES
Daily Note     Today's date: 9/15/2021  Patient name: Ebb Duverney  : 1960  MRN: [id number]  Referring provider: Enrique Infante MD  Dx:   No diagnosis found  Subjective: Pt reports that she if feeling stiff after sitting during meetings at work today      Objective: See treatment diary below  Assessment: Decreased stiffness reports with laser and manual tx  Pt continues to have reports of fatigue with therex  Progress as imelda NV  Plan: Continue treatment as per PT plan of care         Precautions: spondylolisthesis      Manuals 9/13 9/15  8/11 8/16 8/18 8/23 8/25 8/30 9/1 9/8   laser JLW 5' 5'  JLW 5' JLW 5' JLW 5' JLW 5' 5' JLW 5' JLW 5' MB 5'   STM B/L piriformis JLW 10' 10'  JLW 10' JLW  10' JLW  10' JLW  10' kl JLW  10' JLW  10' MB 10'                               Neuro Re-Ed              TaA              TaA leg press 20#  20 20#x30  20#  20 20#  20 20#  20 20#  20 20#x20 20#  20 20#  20 20# 20x   TaA LPD seated on pball green  30 green x30  green  30 red  20 red  20 red  20 Red x20 green  20 green  20 green  20   rows seated on pball green  30 Green x30  green  30 red  20 red  20 red  20 Red x20 green  20 green  20 green  20                                             Ther Ex              ltr              glute stretch 30"x3  30"x3  30"x3 30"x3 30"x3 30"x3 30"x3 30"x3 30"x3 3x30"   pball flexion stretch              bike upright  10' 10'  upright 10' upright 10' recum  10' recum  10' 10'  10' recum  10' 10'   seated piriformis stretch                                                        Ther Activity              bike                            Gait Training                                          Modalities              MHP post tx 10'   seated 10' seated  10'  prone 10' seated 10'   seated 10'  seated 10' 10' post tx seated 10' post tx seated

## 2021-09-20 ENCOUNTER — OFFICE VISIT (OUTPATIENT)
Dept: PHYSICAL THERAPY | Facility: CLINIC | Age: 61
End: 2021-09-20
Payer: COMMERCIAL

## 2021-09-20 DIAGNOSIS — M54.40 LOW BACK PAIN WITH SCIATICA, SCIATICA LATERALITY UNSPECIFIED, UNSPECIFIED BACK PAIN LATERALITY, UNSPECIFIED CHRONICITY: Primary | ICD-10-CM

## 2021-09-20 PROCEDURE — 97140 MANUAL THERAPY 1/> REGIONS: CPT

## 2021-09-20 PROCEDURE — 97110 THERAPEUTIC EXERCISES: CPT

## 2021-09-20 PROCEDURE — 97112 NEUROMUSCULAR REEDUCATION: CPT

## 2021-09-22 ENCOUNTER — IMMUNIZATIONS (OUTPATIENT)
Dept: FAMILY MEDICINE CLINIC | Facility: HOSPITAL | Age: 61
End: 2021-09-22

## 2021-09-22 ENCOUNTER — EVALUATION (OUTPATIENT)
Dept: PHYSICAL THERAPY | Facility: CLINIC | Age: 61
End: 2021-09-22
Payer: COMMERCIAL

## 2021-09-22 DIAGNOSIS — M54.40 LOW BACK PAIN WITH SCIATICA, SCIATICA LATERALITY UNSPECIFIED, UNSPECIFIED BACK PAIN LATERALITY, UNSPECIFIED CHRONICITY: Primary | ICD-10-CM

## 2021-09-22 DIAGNOSIS — Z23 ENCOUNTER FOR IMMUNIZATION: Primary | ICD-10-CM

## 2021-09-22 PROCEDURE — 0002A SARS-COV-2 / COVID-19 MRNA VACCINE (PFIZER-BIONTECH) 30 MCG: CPT

## 2021-09-22 PROCEDURE — 97140 MANUAL THERAPY 1/> REGIONS: CPT | Performed by: PHYSICAL THERAPIST

## 2021-09-22 PROCEDURE — 97530 THERAPEUTIC ACTIVITIES: CPT | Performed by: PHYSICAL THERAPIST

## 2021-09-22 PROCEDURE — 97110 THERAPEUTIC EXERCISES: CPT | Performed by: PHYSICAL THERAPIST

## 2021-09-22 PROCEDURE — 91300 SARS-COV-2 / COVID-19 MRNA VACCINE (PFIZER-BIONTECH) 30 MCG: CPT

## 2021-09-22 NOTE — PROGRESS NOTES
PT Discharge       Today's date: 2021  Patient name: Abbe Akhtar  : 1960  MRN: [id number]  Referring provider: Walter Brito MD  Dx:   Encounter Diagnosis     ICD-10-CM    1  Low back pain with sciatica, sciatica laterality unspecified, unspecified back pain laterality, unspecified chronicity  M54 40                   Assessment  Assessment details: Pt has achieved a majority of her goals set at the start of therapy, is independent with her hep, and will be Dc'd at this time  Thank you for this referral         Goals  Short Term Goals:  met  Independent performance of initial hep  Decrease pain 2 points on VAS      Long Term Goals: Independent performance of comprehensive hep-met  Work performance is returned to max level of function-met  Performance of IADL's is returned to max level of function-met  Performance in recreational activities is improved to max level of function-met  Able sit or stand for >1/2 hr with min pain-partially met    Plan  Plan details: DC to hep  Planned therapy interventions: abdominal trunk stabilization, joint mobilization, manual therapy, IADL retraining, massage, strengthening, stretching, therapeutic activities, therapeutic exercise, therapeutic training, transfer training and home exercise program        Subjective Evaluation    History of Present Illness  Mechanism of injury: Pt reports an overall reduction in the intensity of her pain but that it continues to be present  Reports that she is compliant with her current hep      Pain  Current pain rating: 3  At worst pain ratin    Patient Goals  Patient goals for therapy: decreased pain, increased motion, increased strength, independence with ADLs/IADLs, return to sport/leisure activities and return to work          Objective       Lumbar spine:    ROM:   Flexion: wfl   Extension: mod limited   Right Rotation: wfl   Left Rotation: wfl   Right Lateral Flexion: mod limited   Left Lateral Flexion: min limited      improved control of abdominals noted with TaA  Hypomobility noted with spring testing    Directional testing: no change in radicular symptoms but increase pain in lumbar spine with repeated flexion and extension, no sig lateral shift noted  Decreased flexibility: glutes   Timblin's sign: pos               Precautions: spondylolisthesis            Manuals 9/13 9/15 9/20 9/22 8/16 8/18 8/23 8/25 8/30 9/1 9/8   laser JLW 5' 5' JLW 5' kl JLW 5' JLW 5' JLW 5' 5' JLW 5' JLW 5' MB 5'   STM B/L piriformis JLW 10' 10' JLW 10' kl JLW  10' JLW  10' JLW  10' kl JLW  10' JLW  10' MB 10'                               Neuro Re-Ed              TaA              TaA leg press 20#  20 20#x30 20#  30 20#x30 20#  20 20#  20 20#  20 20#x20 20#  20 20#  20 20# 20x   TaA LPD seated on pball green  30 green x30 green  30 Green x30 red  20 red  20 red  20 Red x20 green  20 green  20 green  20   rows seated on pball green  30 Green x30 green  30 Green x30 red  20 red  20 red  20 Red x20 green  20 green  20 green  20                                             Ther Ex              ltr              glute stretch 30"x3  30"x3  30"x3 30"x3 30"x3 30"x3 30"x3 30"x3 30"x3 30"x3 3x30"   pball flexion stretch              bike upright  10' 10' recum  10' 8' upright 10' recum  10' recum  10' 10'  10' recum  10' 10'   seated piriformis stretch                                                        Ther Activity              bike                            Gait Training                                          Modalities              MHP post tx 10'   seated 10' seated 10'  seated  10' seated 10'   seated 10'  seated 10' 10' post tx seated 10' post tx seated

## 2021-10-14 ENCOUNTER — HOSPITAL ENCOUNTER (OUTPATIENT)
Dept: MAMMOGRAPHY | Facility: MEDICAL CENTER | Age: 61
Discharge: HOME | End: 2021-10-14
Payer: COMMERCIAL

## 2021-10-14 VITALS — HEIGHT: 62 IN | WEIGHT: 174 LBS | BODY MASS INDEX: 32.02 KG/M2

## 2021-10-14 DIAGNOSIS — Z12.31 SCREENING MAMMOGRAM, ENCOUNTER FOR: ICD-10-CM

## 2021-10-14 PROCEDURE — 77063 BREAST TOMOSYNTHESIS BI: CPT

## 2021-10-14 PROCEDURE — 77067 SCR MAMMO BI INCL CAD: CPT

## 2021-11-19 ENCOUNTER — OFFICE VISIT (OUTPATIENT)
Dept: FAMILY MEDICINE CLINIC | Facility: CLINIC | Age: 61
End: 2021-11-19
Payer: COMMERCIAL

## 2021-11-19 VITALS
TEMPERATURE: 97.8 F | WEIGHT: 170.8 LBS | HEIGHT: 62 IN | BODY MASS INDEX: 31.43 KG/M2 | DIASTOLIC BLOOD PRESSURE: 82 MMHG | SYSTOLIC BLOOD PRESSURE: 132 MMHG | RESPIRATION RATE: 16 BRPM | OXYGEN SATURATION: 98 % | HEART RATE: 67 BPM

## 2021-11-19 DIAGNOSIS — M79.7 FIBROMYALGIA: ICD-10-CM

## 2021-11-19 DIAGNOSIS — Z13.220 LIPID SCREENING: ICD-10-CM

## 2021-11-19 DIAGNOSIS — R79.82 ELEVATED C-REACTIVE PROTEIN (CRP): ICD-10-CM

## 2021-11-19 DIAGNOSIS — E55.9 HYPOVITAMINOSIS D: ICD-10-CM

## 2021-11-19 DIAGNOSIS — Z99.89 CPAP (CONTINUOUS POSITIVE AIRWAY PRESSURE) DEPENDENCE: ICD-10-CM

## 2021-11-19 DIAGNOSIS — Z01.818 PRE-OP EXAM: Primary | ICD-10-CM

## 2021-11-19 DIAGNOSIS — I10 HYPERTENSION, UNSPECIFIED TYPE: ICD-10-CM

## 2021-11-19 PROBLEM — G47.10 HYPERSOMNIA: Status: RESOLVED | Noted: 2018-06-06 | Resolved: 2021-11-19

## 2021-11-19 PROBLEM — R51.9 HEADACHE UPON AWAKENING: Status: RESOLVED | Noted: 2018-06-06 | Resolved: 2021-11-19

## 2021-11-19 PROBLEM — F33.42 MAJOR DEPRESSIVE DISORDER, RECURRENT EPISODE, IN FULL REMISSION (HCC): Chronic | Status: RESOLVED | Noted: 2017-09-18 | Resolved: 2021-11-19

## 2021-11-19 PROBLEM — F41.9 ANXIETY AND DEPRESSION: Status: RESOLVED | Noted: 2018-09-19 | Resolved: 2021-11-19

## 2021-11-19 PROBLEM — F41.1 GAD (GENERALIZED ANXIETY DISORDER): Chronic | Status: RESOLVED | Noted: 2017-07-05 | Resolved: 2021-11-19

## 2021-11-19 PROBLEM — F32.A ANXIETY AND DEPRESSION: Status: RESOLVED | Noted: 2018-09-19 | Resolved: 2021-11-19

## 2021-11-19 PROCEDURE — 93000 ELECTROCARDIOGRAM COMPLETE: CPT | Performed by: NURSE PRACTITIONER

## 2021-11-19 PROCEDURE — 99214 OFFICE O/P EST MOD 30 MIN: CPT | Performed by: NURSE PRACTITIONER

## 2022-01-12 DIAGNOSIS — F32.A ANXIETY AND DEPRESSION: ICD-10-CM

## 2022-01-12 DIAGNOSIS — M79.7 FIBROMYALGIA: ICD-10-CM

## 2022-01-12 DIAGNOSIS — I10 HYPERTENSION, UNSPECIFIED TYPE: ICD-10-CM

## 2022-01-12 DIAGNOSIS — F41.9 ANXIETY AND DEPRESSION: ICD-10-CM

## 2022-01-12 RX ORDER — DULOXETIN HYDROCHLORIDE 30 MG/1
CAPSULE, DELAYED RELEASE ORAL
Qty: 90 CAPSULE | Refills: 0 | Status: SHIPPED | OUTPATIENT
Start: 2022-01-12 | End: 2022-04-01

## 2022-01-12 RX ORDER — HYDROCHLOROTHIAZIDE 12.5 MG/1
TABLET ORAL
Qty: 90 TABLET | Refills: 0 | Status: SHIPPED | OUTPATIENT
Start: 2022-01-12 | End: 2022-04-07

## 2022-02-23 ENCOUNTER — OFFICE VISIT (OUTPATIENT)
Dept: OTOLARYNGOLOGY | Facility: CLINIC | Age: 62
End: 2022-02-23
Payer: COMMERCIAL

## 2022-02-23 ENCOUNTER — OFFICE VISIT (OUTPATIENT)
Dept: AUDIOLOGY | Facility: CLINIC | Age: 62
End: 2022-02-23
Payer: COMMERCIAL

## 2022-02-23 VITALS — HEIGHT: 62 IN | BODY MASS INDEX: 31.83 KG/M2 | TEMPERATURE: 98.7 F | WEIGHT: 173 LBS

## 2022-02-23 DIAGNOSIS — H90.3 SENSORY HEARING LOSS, BILATERAL: Primary | ICD-10-CM

## 2022-02-23 DIAGNOSIS — H93.13 BILATERAL TINNITUS: Primary | ICD-10-CM

## 2022-02-23 DIAGNOSIS — H61.23 BILATERAL IMPACTED CERUMEN: ICD-10-CM

## 2022-02-23 PROCEDURE — 92557 COMPREHENSIVE HEARING TEST: CPT | Performed by: AUDIOLOGIST

## 2022-02-23 PROCEDURE — 99203 OFFICE O/P NEW LOW 30 MIN: CPT | Performed by: NURSE PRACTITIONER

## 2022-02-23 PROCEDURE — 92567 TYMPANOMETRY: CPT | Performed by: AUDIOLOGIST

## 2022-02-23 PROCEDURE — 69210 REMOVE IMPACTED EAR WAX UNI: CPT | Performed by: NURSE PRACTITIONER

## 2022-02-23 NOTE — ASSESSMENT & PLAN NOTE
On exam noted bilateral cerumen impaction and unable to fully view tympanic membrane  Cerumen impaction removed bilateral eac with alligator forceps, irrigation, and suction, pt tolerated procedure well  Upon removal, improved hearing and decreased clogged and tinnitus sensation of bilateral ears  Applied HC powder to both ears for itching concerns post procedure  May use HC cream to ears prn itching  Discussed routine cerumen care including avoidance of q-tips and cerumen softeners  Encourage ongoing follow up annually to monitor for cerumen and hearing

## 2022-02-23 NOTE — PROGRESS NOTES
Assessment/Plan:    Bilateral impacted cerumen    On exam noted bilateral cerumen impaction and unable to fully view tympanic membrane  Cerumen impaction removed bilateral eac with alligator forceps, irrigation, and suction, pt tolerated procedure well  Upon removal, improved hearing and decreased clogged and tinnitus sensation of bilateral ears  Applied HC powder to both ears for itching concerns post procedure  May use HC cream to ears prn itching  Discussed routine cerumen care including avoidance of q-tips and cerumen softeners  Encourage ongoing follow up annually to monitor for cerumen and hearing  Bilateral tinnitus  Bilateral tinnitus for weeks  Improved with cerumen removal    Audiogram today indicating normal bilateral hearing with borderline mild SNHL in higher frequencies, Tymps type A, Word discrimination normal   Discussed tinnitus associated with high frequency SNHL change  Hearing change most likely due to prior noise exposure  Recommend hearing protection when able  Repeat audiogram in one to two years  Diagnoses and all orders for this visit:    Bilateral tinnitus  -     Ambulatory referral to Audiology    Bilateral impacted cerumen  -     Ear cerumen removal          Subjective:      Patient ID: Eduardo Mccauley is a 63 y o  female  Presents today as a new patient due to   Symptoms began few weeks ago  Ear clogged, discomfort  Dull ache  Pulls on ear often to try clearing ear  Became worse yesterday  Tried use of Debrox and became worse  Left worse than right  Bother ears itchy  No otorrhea but ears feel wet inside  Ringining in ears usually mild  Notes became worse after underwent Coivd vaccine      Fungal infection in ears about 3 years ago, treated by Dr Rashad Ga          The following portions of the patient's history were reviewed and updated as appropriate: allergies, current medications, past family history, past medical history, past social history, past surgical history and problem list     Review of Systems   Constitutional: Negative  HENT: Positive for ear pain  Negative for congestion, ear discharge, hearing loss, nosebleeds, postnasal drip, rhinorrhea, sinus pressure, sinus pain, sore throat, tinnitus and voice change  Eyes: Negative  Respiratory: Negative for chest tightness and shortness of breath  Cardiovascular: Negative  Gastrointestinal: Negative  Endocrine: Negative  Musculoskeletal: Negative  Skin: Negative for color change  Neurological: Negative for dizziness, numbness and headaches  Psychiatric/Behavioral: Negative  Objective:      Temp 98 7 °F (37 1 °C) (Temporal)   Ht 5' 2" (1 575 m)   Wt 78 5 kg (173 lb)   BMI 31 64 kg/m²          Physical Exam  Constitutional:       Appearance: She is well-developed  HENT:      Head: Normocephalic  Right Ear: Hearing, tympanic membrane, ear canal and external ear normal  No decreased hearing noted  No drainage or tenderness  There is impacted cerumen  Tympanic membrane is not perforated or erythematous  Left Ear: Hearing, tympanic membrane, ear canal and external ear normal  No decreased hearing noted  No drainage or tenderness  There is impacted cerumen  Tympanic membrane is not perforated or erythematous  Nose: Nose normal  No nasal deformity or septal deviation  Mouth/Throat:      Mouth: Mucous membranes are not pale and not dry  No oral lesions  Dentition: Normal dentition  Pharynx: Uvula midline  No oropharyngeal exudate  Neck:      Trachea: No tracheal deviation  Cardiovascular:      Rate and Rhythm: Normal rate  Pulmonary:      Effort: Pulmonary effort is normal  No accessory muscle usage or respiratory distress  Musculoskeletal:      Right shoulder: Normal range of motion  Cervical back: Full passive range of motion without pain, normal range of motion and neck supple     Lymphadenopathy:      Cervical: No cervical adenopathy  Skin:     General: Skin is warm and dry  Neurological:      Mental Status: She is alert and oriented to person, place, and time  Cranial Nerves: No cranial nerve deficit  Sensory: No sensory deficit  Psychiatric:         Behavior: Behavior is cooperative  Ear cerumen removal    Date/Time: 2/23/2022 3:45 PM  Performed by: JOSÉ ANTONIO Lino  Authorized by: JOSÉ ANTONIO Lino   Universal Protocol:  Consent: Verbal consent obtained  Risks and benefits: risks, benefits and alternatives were discussed  Consent given by: patient  Patient understanding: patient states understanding of the procedure being performed      Patient location:  Clinic  Procedure details:     Local anesthetic:  None    Location:  L ear and R ear    Procedure type: irrigation with instrumentation      Instrumentation: suction      Approach:  External  Post-procedure details:     Complication:  None    Hearing quality:  Normal    Patient tolerance of procedure:   Tolerated well, no immediate complications

## 2022-02-23 NOTE — PROGRESS NOTES
ADULT ENT HEARING EVALUATION - [place] [address] AUDIOLOGY      Patient Name: Thania Arrington   MRN:  [id number]   :  1960   Age: 63 y o  Gender: female   DOS: 2022     HISTORY:     Thania Arrington, a 63 y o  female, was seen on 2022 at the referral of JOSÉ ANTONIO Bee,  for an evaluation of hearing as part of her ENT visit  Ms Rah Mccallum primary complaint is cerumen impaction and tinnitus  She denied otalgia, otorrhea, aural fullness, tinnitus and dizziness  Ms Nino Peña has not had her hearing tested previously  RESULTS:    Otoscopic Evaluation:   Right Ear: Clear ear canal after cerumen removal with JOSÉ ANTONIO Bee   Left Ear: Clear ear canal with traces of powder medication after cerumen  removal with JOSÉ ANTONIO Bee     Tympanometry:   Right Ear: Type A; normal middle ear pressure and static compliance    Left Ear: Type A; normal middle ear pressure and static compliance     Audiometry:  Conventional pure tone audiometry from 250 - 8000 Hz  obtained with good reliability and revealed the following:     Right Ear: normal hearing sensitivity through 4000 Hz with mild notched  hearing loss from 4274-7199 Hz   Left Ear: normal hearing sensitivity through 6000 Hz with mild hearing loss at  8000 Hz  Speech Audiometry:    Speech Reception (SRT)   Right Ear: 5 dB HL   Left Ear: 5 dB HL    Word Recognition Scores (WRS):  Right Ear: excellent (100 % correct)     Left Ear: excellent (100 % correct)   Stimuli: [id number]    *see attached audiogram*      RECOMMENDATIONS:    1 ) Follow-up with referring provider  2 ) Return to ENT  3 ) Audiologic re-evaluation in one year or in conjunction with otologic follow-up      It was a pleasure working with Ms Nino Peña today  Thank you for referring this patient  Izzy Grayson    Clinical Audiologist    [address] [address] [id number]

## 2022-02-23 NOTE — ASSESSMENT & PLAN NOTE
Bilateral tinnitus for weeks  Improved with cerumen removal    Audiogram today indicating normal bilateral hearing with borderline mild SNHL in higher frequencies, Tymps type A, Word discrimination normal   Discussed tinnitus associated with high frequency SNHL change  Hearing change most likely due to prior noise exposure  Recommend hearing protection when able  Repeat audiogram in one to two years

## 2022-03-21 ENCOUNTER — OFFICE VISIT (OUTPATIENT)
Dept: PHYSICAL THERAPY | Facility: CLINIC | Age: 62
End: 2022-03-21
Payer: COMMERCIAL

## 2022-03-21 DIAGNOSIS — G89.29 CHRONIC PAIN OF BOTH SHOULDERS: Primary | ICD-10-CM

## 2022-03-21 DIAGNOSIS — M25.511 CHRONIC PAIN OF BOTH SHOULDERS: Primary | ICD-10-CM

## 2022-03-21 DIAGNOSIS — M25.512 CHRONIC PAIN OF BOTH SHOULDERS: Primary | ICD-10-CM

## 2022-03-21 PROCEDURE — 97161 PT EVAL LOW COMPLEX 20 MIN: CPT | Performed by: PHYSICAL THERAPIST

## 2022-03-21 NOTE — PROGRESS NOTES
PT Evaluation     Today's date: 3/21/2022  Patient name: Shashank Montez  : 1960  MRN: [id number]  Referring provider: Parker Orta PT  Dx:   Encounter Diagnosis     ICD-10-CM    1  Chronic pain of both shoulders  M25 511     G89 29     M25 512                   Assessment  Assessment details: Pt is a 63 y o  female who presents to outpatient PT via direct access with pain, decreased rom, decreased strength and decreased functional mobility  She will benefit from skilled PT to address these deficits in order to achieve her goals and maximize her functional mobility  Understanding of Dx/Px/POC: good   Prognosis: good    Goals  Short Term Goals: Independent performance of initial hep  Decrease pain 2 points on VAS      Long Term Goals: Independent performance of comprehensive hep  Work performance is returned to max level of function  Performance of IADL's is returned to max level of function  Performance in recreational activities is improved to max level of function  Able to reach overhead with min pain    Plan  Planned therapy interventions: joint mobilization, manual therapy, massage, IADL retraining, patient education, postural training, home exercise program, strengthening, stretching, therapeutic activities, therapeutic exercise and therapeutic training  Frequency: 2x week  Duration in weeks: 4        Subjective Evaluation    History of Present Illness  Mechanism of injury: Pt reports that she has been having B/L shoulder pain  Reports that she has increased pain when reaching above shoulder height especially into abduction  Reports that her pain quickly reduces when she changes position  Reports occasional disturbance s at night from pain  Denies radicular symptoms  Reports that she has experienced chronic R shoulder pain but that her L shoulder is worse since being vaccinated in that arm  Denies symptoms below her shoulder      Pain  At best pain rating: 3  At worst pain rating: 8    Patient Goals  Patient goals for therapy: decreased pain, increased motion, increased strength, independence with ADLs/IADLs, return to sport/leisure activities and return to work          Objective       R shoulder:    AROM:   Flexion: 135   Abduction: 140   Pain with mid range flexion and abduction    PROM:   Flexion: 150   Abduction: 150   ER:  62   IR: 50    Strength:    Flexion: 4 /5   Abduction:  4 /5   ER:    4-/5   IR:     4/5    Slight pain with resisted ER    Buckley: pos  Neer: neg  Empty can: neg  Painful arc: pos  Scour: neg                      L shoulder  AROM:   Flexion: 135   Abduction: 140   Pain with mid range flexion and abduction    PROM:   Flexion: 150   Abduction: 150   ER:  62   IR: 50    Strength:    Flexion: 4 /5   Abduction:  4 /5   ER:    3/5   IR:     4/5    Pain with resisted ER>IR    Buckley: pos  Neer: pos  Empty can: pos  Painful arc: pos  Scour: neg             Precautions: fibromyalgia, chronic lumbar pain, chronic B/L shoulder pain  Manuals 3/21            Laser L shoulder RTC protocol                                                   Neuro Re-Ed                                                                                                        Ther Ex             pulleys             rows             Low rows             B/L tb ER             Thoracic ext stretch             scap retraction with cane ext                                           Ther Activity             ube                          Gait Training                                       Modalities

## 2022-03-23 ENCOUNTER — OFFICE VISIT (OUTPATIENT)
Dept: PHYSICAL THERAPY | Facility: CLINIC | Age: 62
End: 2022-03-23
Payer: COMMERCIAL

## 2022-03-23 DIAGNOSIS — M25.511 CHRONIC PAIN OF BOTH SHOULDERS: Primary | ICD-10-CM

## 2022-03-23 DIAGNOSIS — M25.512 CHRONIC PAIN OF BOTH SHOULDERS: Primary | ICD-10-CM

## 2022-03-23 DIAGNOSIS — G89.29 CHRONIC PAIN OF BOTH SHOULDERS: Primary | ICD-10-CM

## 2022-03-23 PROCEDURE — 97110 THERAPEUTIC EXERCISES: CPT | Performed by: PHYSICAL THERAPIST

## 2022-03-23 NOTE — PROGRESS NOTES
Daily Note     Today's date: 3/23/2022  Patient name: Leonard Ayoub  : 1960  MRN: [id number]  Referring provider: Chad Maier, HILARY  Dx:   Encounter Diagnosis     ICD-10-CM    1  Chronic pain of both shoulders  M25 511     G89 29     M25 512                   Subjective: pt reports compliance with her hep and that she is having no difficulty with it       Objective: See treatment diary below      Assessment: initiated tx as listed  Pt reports soreness with B/L er but otherwise tolerates other therex with no complaints  Monitor response and progress as imelda NV  Plan: Continue per plan of care  Precautions: fibromyalgia, chronic lumbar pain, chronic B/L shoulder pain  Manuals 3/21            Laser L shoulder RTC protocol                                                   Neuro Re-Ed                                                                                                        Ther Ex             pulleys Flex 5"x20            rows Red 5"x20            Low rows Red x10            B/L tb ER Red x10            Thoracic ext stretch             scap retraction with cane ext                                           Ther Activity             ube 2'/2'                         Gait Training                                       Modalities             Cp  10' seated L shoulder

## 2022-03-28 ENCOUNTER — OFFICE VISIT (OUTPATIENT)
Dept: PHYSICAL THERAPY | Facility: CLINIC | Age: 62
End: 2022-03-28
Payer: COMMERCIAL

## 2022-03-28 DIAGNOSIS — M25.512 CHRONIC PAIN OF BOTH SHOULDERS: Primary | ICD-10-CM

## 2022-03-28 DIAGNOSIS — G89.29 CHRONIC PAIN OF BOTH SHOULDERS: Primary | ICD-10-CM

## 2022-03-28 DIAGNOSIS — M25.511 CHRONIC PAIN OF BOTH SHOULDERS: Primary | ICD-10-CM

## 2022-03-28 PROCEDURE — 97530 THERAPEUTIC ACTIVITIES: CPT | Performed by: PHYSICAL THERAPIST

## 2022-03-28 PROCEDURE — 97110 THERAPEUTIC EXERCISES: CPT | Performed by: PHYSICAL THERAPIST

## 2022-03-28 NOTE — PROGRESS NOTES
Daily Note     Today's date: 3/28/2022  Patient name: Akil Verduzco  : 1960  MRN: [id number]  Referring provider: Mitzy Orta PT  Dx:   Encounter Diagnosis     ICD-10-CM    1  Chronic pain of both shoulders  M25 511     G89 29     M25 512                   Subjective: pt reports that she woke up over the weekend with increased cervical spine pain, she is unable to identify a trigger for this  Denies any pain after her LV  Objective: See treatment diary below      Assessment: pt is unable to tolerate tb rows today secondary to cervical spine pain  No sig shoulder pain reported t/o tx  Plan to resume full program Nv         Plan: Continue per plan of care  Precautions: fibromyalgia, chronic lumbar pain, chronic B/L shoulder pain  Manuals 3/21 3/24           Laser L shoulder RTC protocol kl                                                  Neuro Re-Ed                                                                                                        Ther Ex             pulleys Flex 5"x20 5"x20           rows Red 5"x20 np           Low rows Red x10 Red x10           B/L tb ER Red x10 Red x10           Thoracic ext stretch  nv           scap retraction with cane ext                 Table slides  Flex 5"x10                        Ther Activity             ube 2'/2' 4'/4'                        Gait Training                                       Modalities             Cp  10' seated L shoulder MHP L shoulder seated x10'

## 2022-03-30 ENCOUNTER — OFFICE VISIT (OUTPATIENT)
Dept: PHYSICAL THERAPY | Facility: CLINIC | Age: 62
End: 2022-03-30
Payer: COMMERCIAL

## 2022-03-30 DIAGNOSIS — M25.511 CHRONIC PAIN OF BOTH SHOULDERS: Primary | ICD-10-CM

## 2022-03-30 DIAGNOSIS — G89.29 CHRONIC PAIN OF BOTH SHOULDERS: Primary | ICD-10-CM

## 2022-03-30 DIAGNOSIS — M25.512 CHRONIC PAIN OF BOTH SHOULDERS: Primary | ICD-10-CM

## 2022-03-30 PROCEDURE — 97530 THERAPEUTIC ACTIVITIES: CPT

## 2022-03-30 PROCEDURE — 97110 THERAPEUTIC EXERCISES: CPT

## 2022-03-30 NOTE — PROGRESS NOTES
Daily Note     Today's date: 3/30/2022  Patient name: Ezequiel Treadwell  : 1960  MRN: [id number]  Referring provider: Geoffrey Gagnon, PT  Dx:   Encounter Diagnosis     ICD-10-CM    1  Chronic pain of both shoulders  M25 511     G89 29     M25 512        Start Time: 930  Stop Time: 1018  Total time in clinic (min): 48 minutes       Subjective: Patient states, "It's hurting but not like it was on Monday "      Objective: See treatment diary below  Assessment: Additional stretches to promote shoulder and thoracic spine extension ROM are tolerated well  Plan: Continue treatment as per PT plan of care         Precautions: fibromyalgia, chronic lumbar pain, chronic B/L shoulder pain      Manuals 3/21 3/24 3/30          Laser L shoulder RTC protocol kl JLW                                                 Neuro Re-Ed                                                                                                        Ther Ex             pulleys Flex 5"x20 5"x20 5"x20          rows Red 5"x20 np red  x10          Low rows Red x10 Red x10 red x10          B/L tb ER Red x10 Red x10 red  10          Thoracic ext stretch seated  nv 5"x10          scap retraction with cane ext    5"x10          Table slides  Flex 5"x10 flex  5"x10                       Ther Activity             ube 2'/2' 4'/4' 4'/4'                       Gait Training                                       Modalities             CP 10' seated L shoulder MHP L shoulder seated x10' MHP L shoulder seated 10'

## 2022-04-01 DIAGNOSIS — M79.7 FIBROMYALGIA: ICD-10-CM

## 2022-04-01 DIAGNOSIS — F32.A ANXIETY AND DEPRESSION: ICD-10-CM

## 2022-04-01 DIAGNOSIS — F41.9 ANXIETY AND DEPRESSION: ICD-10-CM

## 2022-04-01 RX ORDER — DULOXETIN HYDROCHLORIDE 30 MG/1
CAPSULE, DELAYED RELEASE ORAL
Qty: 90 CAPSULE | Refills: 0 | Status: SHIPPED | OUTPATIENT
Start: 2022-04-01 | End: 2022-06-30

## 2022-04-04 ENCOUNTER — OFFICE VISIT (OUTPATIENT)
Dept: PHYSICAL THERAPY | Facility: CLINIC | Age: 62
End: 2022-04-04
Payer: COMMERCIAL

## 2022-04-04 DIAGNOSIS — M25.511 CHRONIC PAIN OF BOTH SHOULDERS: Primary | ICD-10-CM

## 2022-04-04 DIAGNOSIS — G89.29 CHRONIC PAIN OF BOTH SHOULDERS: Primary | ICD-10-CM

## 2022-04-04 DIAGNOSIS — M25.512 CHRONIC PAIN OF BOTH SHOULDERS: Primary | ICD-10-CM

## 2022-04-04 PROCEDURE — 97110 THERAPEUTIC EXERCISES: CPT

## 2022-04-04 PROCEDURE — 97530 THERAPEUTIC ACTIVITIES: CPT

## 2022-04-04 NOTE — PROGRESS NOTES
Daily Note     Today's date: 2022  Patient name: Ugo Morrow  : 1960  MRN: [id number]  Referring provider: Debra Quiros, PT  Dx:   Encounter Diagnosis     ICD-10-CM    1  Chronic pain of both shoulders  M25 511     G89 29     M25 512        Start Time: 1620  Stop Time: 1715  Total time in clinic (min): 55 minutes       Subjective: Patient reports B/L shoulders feel "looser" and she is able to move more however pain continues to be a limiting factor  Objective: See treatment diary below  Assessment: Theraband low rows not tolerated well today due to left shoulder pain however pain relief reported with laser treatment  Plan: Continue treatment as per PT plan of care         Precautions: fibromyalgia, chronic lumbar pain, chronic B/L shoulder pain      Manuals 3/21 3/24 3/30 4/4         Laser L shoulder RTC protocol kl JLW JLW                                                Neuro Re-Ed                                                                                                        Ther Ex             pulleys Flex 5"x20 5"x20 5"x20 5"x20         rows Red 5"x20 np red  x10 red  20         Low rows Red x10 Red x10 red x10 red  8         B/L tb ER Red x10 Red x10 red  10 red  10         Thoracic ext stretch seated  nv 5"x10 5"x10         scap retraction with cane ext    5"x10 5"x10         Table slides  Flex 5"x10 flex  5"x10 flex  5"x10                      Ther Activity             ube 2'/2' 4'/4' 4'/4' 4'/4'                      Gait Training                                       Modalities             CP 10' seated L shoulder MHP L shoulder seated x10' MHP L shoulder seated 10' MHP L shoulder seated 10'

## 2022-04-06 ENCOUNTER — OFFICE VISIT (OUTPATIENT)
Dept: PHYSICAL THERAPY | Facility: CLINIC | Age: 62
End: 2022-04-06
Payer: COMMERCIAL

## 2022-04-06 DIAGNOSIS — M25.512 CHRONIC PAIN OF BOTH SHOULDERS: Primary | ICD-10-CM

## 2022-04-06 DIAGNOSIS — M25.511 CHRONIC PAIN OF BOTH SHOULDERS: Primary | ICD-10-CM

## 2022-04-06 DIAGNOSIS — G89.29 CHRONIC PAIN OF BOTH SHOULDERS: Primary | ICD-10-CM

## 2022-04-06 PROCEDURE — 97530 THERAPEUTIC ACTIVITIES: CPT

## 2022-04-06 PROCEDURE — 97110 THERAPEUTIC EXERCISES: CPT

## 2022-04-06 NOTE — PROGRESS NOTES
Daily Note     Today's date: 2022  Patient name: Gen Nickerson  : 1960  MRN: [id number]  Referring provider: Ruben Tam, PT  Dx:   Encounter Diagnosis     ICD-10-CM    1  Chronic pain of both shoulders  M25 511     G89 29     M25 512        Start Time: 1145  Stop Time: 1239  Total time in clinic (min): 54 minutes       Subjective: Patient reports her left shoulder is moving better  Objective: See treatment diary below  Assessment: Progression to wall slides to stretch shoulder flexion ROM is tolerated well  Plan: Continue treatment as per PT plan of care         Precautions: fibromyalgia, chronic lumbar pain, chronic B/L shoulder pain      Manuals 3/21 3/24 3/30 4/4 4/6        Laser L shoulder RTC protocol kl JLW JLW JLW                                               Neuro Re-Ed                                                                                                        Ther Ex             pulleys Flex 5"x20 5"x20 5"x20 5"x20 5"x20        rows Red 5"x20 np red  x10 red  20 red  20        Low rows Red x10 Red x10 red x10 red  8 red  20        B/L tb ER Red x10 Red x10 red  10 red  10         Thoracic ext stretch seated  nv 5"x10 5"x10 5"x10        scap retraction with cane ext    5"x10 5"x10 5"x10        Table slides  Flex 5"x10 flex  5"x10 flex  5"x10         Wall slides     flex 5"x15                     Ther Activity             ube 2'/2' 4'/4' 4'/4' 4'/4' 4'/4'                     Gait Training                                       Modalities             CP 10' seated L shoulder MHP L shoulder seated x10' MHP L shoulder seated 10' MHP L shoulder seated 10' MHP L shoulder seated 10'

## 2022-04-07 DIAGNOSIS — I10 HYPERTENSION, UNSPECIFIED TYPE: ICD-10-CM

## 2022-04-07 RX ORDER — HYDROCHLOROTHIAZIDE 12.5 MG/1
TABLET ORAL
Qty: 90 TABLET | Refills: 0 | Status: SHIPPED | OUTPATIENT
Start: 2022-04-07 | End: 2022-06-30

## 2022-04-11 ENCOUNTER — OFFICE VISIT (OUTPATIENT)
Dept: PHYSICAL THERAPY | Facility: CLINIC | Age: 62
End: 2022-04-11
Payer: COMMERCIAL

## 2022-04-11 DIAGNOSIS — M25.512 CHRONIC PAIN OF BOTH SHOULDERS: Primary | ICD-10-CM

## 2022-04-11 DIAGNOSIS — M25.511 CHRONIC PAIN OF BOTH SHOULDERS: Primary | ICD-10-CM

## 2022-04-11 DIAGNOSIS — G89.29 CHRONIC PAIN OF BOTH SHOULDERS: Primary | ICD-10-CM

## 2022-04-11 PROCEDURE — 97110 THERAPEUTIC EXERCISES: CPT

## 2022-04-11 PROCEDURE — 97530 THERAPEUTIC ACTIVITIES: CPT

## 2022-04-11 NOTE — PROGRESS NOTES
Daily Note     Today's date: 2022  Patient name: Ugo Morrow  : 1960  MRN: [id number]  Referring provider: Debra Quiros PT  Dx:   Encounter Diagnosis     ICD-10-CM    1  Chronic pain of both shoulders  M25 511     G89 29     M25 512        Start Time: 1105  Stop Time: 1150  Total time in clinic (min): 45 minutes      Subjective: Prior to today's PT treatment, patient reports her left shoulder feels "uncomfortable at the moment "      Objective: See treatment diary below  Assessment: Therapeutic exercise program modified due to discomfort in the left shoulder  Plan: Continue treatment as per PT plan of care         Precautions: fibromyalgia, chronic lumbar pain, chronic B/L shoulder pain      Manuals 3/21 3/24 3/30 4/4 4/6 4/11      Laser L shoulder RTC protocol chinedu JLW JLW JLW JLW                                          Neuro Re-Ed                                                                                                Ther Ex            pulleys Flex 5"x20 5"x20 5"x20 5"x20 5"x20 5"x20      rows Red 5"x20 np red  x10 red  20 red  20 red  12      Low rows Red x10 Red x10 red x10 red  8 red  20       B/L tb ER Red x10 Red x10 red  10 red  10        Thoracic ext stretch seated  nv 5"x10 5"x10 5"x10       scap retraction with cane ext    5"x10 5"x10 5"x10 5"x20      Table slides  Flex 5"x10 flex  5"x10 flex  5"x10        Wall slides     flex 5"x15 flex  5"x15                  Ther Activity            ube 2'/2' 4'/4' 4'/4' 4'/4' 4'/4' 4'/4'                  Gait Training                                    Modalities            CP 10' seated L shoulder MHP L shoulder seated x10' MHP L shoulder seated 10' MHP L shoulder seated 10' MHP L shoulder seated 10' MHP L shoulder seated 10'

## 2022-04-13 ENCOUNTER — OFFICE VISIT (OUTPATIENT)
Dept: PHYSICAL THERAPY | Facility: CLINIC | Age: 62
End: 2022-04-13
Payer: COMMERCIAL

## 2022-04-13 DIAGNOSIS — M25.512 CHRONIC PAIN OF BOTH SHOULDERS: Primary | ICD-10-CM

## 2022-04-13 DIAGNOSIS — G89.29 CHRONIC PAIN OF BOTH SHOULDERS: Primary | ICD-10-CM

## 2022-04-13 DIAGNOSIS — M25.511 CHRONIC PAIN OF BOTH SHOULDERS: Primary | ICD-10-CM

## 2022-04-13 PROCEDURE — 97110 THERAPEUTIC EXERCISES: CPT

## 2022-04-13 PROCEDURE — 97530 THERAPEUTIC ACTIVITIES: CPT

## 2022-04-13 NOTE — PROGRESS NOTES
Daily Note     Today's date: 2022  Patient name: Carlos Le  : 1960  MRN: [id number]  Referring provider: Lindsey Rivas, PT  Dx:   Encounter Diagnosis     ICD-10-CM    1  Chronic pain of both shoulders  M25 511     G89 29     M25 512        Start Time: 1445  Stop Time: 1540  Total time in clinic (min): 55 minutes       Subjective: Although left shoulder ROM is improved, patient complains of continued pain - worsened with lifting, pushing open a door, and getting dressed  Objective: See treatment diary below  Assessment: Left shoulder flexion wall slides produce numbness in 4th and 5th digits - symptom resolves with rest   Patient demonstrates left shoulder flexion ROM WNLs when performing pulleys and wall slides  Plan: Continue treatment as per PT plan of care         Precautions: fibromyalgia, chronic lumbar pain, chronic B/L shoulder pain      Manuals 3/21 3/24 3/30 4/4 4/6 4/11 4/13     Laser L shoulder RTC protocol kl JLW JLW JLW JLW JLW                                         Neuro Re-Ed                                                                                                Ther Ex            pulleys Flex 5"x20 5"x20 5"x20 5"x20 5"x20 5"x20 5"x20     rows Red 5"x20 np red  x10 red  20 red  20 red  12 red  20     Low rows Red x10 Red x10 red x10 red  8 red  20  red  20     B/L tb ER Red x10 Red x10 red  10 red  10        Thoracic ext stretch seated  nv 5"x10 5"x10 5"x10       scap retraction with cane ext    5"x10 5"x10 5"x10 5"x20 5"x20     Table slides  Flex 5"x10 flex  5"x10 flex  5"x10        Wall slides     flex 5"x15 flex  5"x15 flex  5"x20                 Ther Activity            ube 2'/2' 4'/4' 4'/4' 4'/4' 4'/4' 4'/4' 4'/4'                 Gait Training                                    Modalities            CP 10' seated L shoulder MHP L shoulder seated x10' MHP L shoulder seated 10' MHP L shoulder seated 10' MHP L shoulder seated 10' MHP L shoulder seated 10' MHP L shoulder seated 10'

## 2022-04-18 ENCOUNTER — OFFICE VISIT (OUTPATIENT)
Dept: PHYSICAL THERAPY | Facility: CLINIC | Age: 62
End: 2022-04-18
Payer: COMMERCIAL

## 2022-04-18 DIAGNOSIS — M25.511 CHRONIC PAIN OF BOTH SHOULDERS: Primary | ICD-10-CM

## 2022-04-18 DIAGNOSIS — M25.512 CHRONIC PAIN OF BOTH SHOULDERS: Primary | ICD-10-CM

## 2022-04-18 DIAGNOSIS — G89.29 CHRONIC PAIN OF BOTH SHOULDERS: Primary | ICD-10-CM

## 2022-04-18 PROCEDURE — 97110 THERAPEUTIC EXERCISES: CPT

## 2022-04-18 PROCEDURE — 97140 MANUAL THERAPY 1/> REGIONS: CPT

## 2022-04-18 NOTE — PROGRESS NOTES
Daily Note     Today's date: 2022  Patient name: Tona Jones  : 1960  MRN: [id number]  Referring provider: Araceli Figueroa, PT  Dx:   Encounter Diagnosis     ICD-10-CM    1  Chronic pain of both shoulders  M25 511     G89 29     M25 512                      Subjective: Patient reports right shoulder is feeling better  Continues to have left shoulder pain with overhead reach  Objective: See treatment diary below  Assessment: Initiated Graston and soft tissue mobilization to left shoulder over distal deltoid with active trigger point  Alternated between Graston and manual hand technique  Patient very tender but was able to tolerate  Patient reported decreased sharp pinching pain with overhead reach following treatment  Plan: Continue treatment as per PT plan of care         Precautions: fibromyalgia, chronic lumbar pain, chronic B/L shoulder pain      Manuals 3/21 3/24 3/30 4/4 4/6 4/11 4/13 4/18    Laser L shoulder RTC protocol kl JLW JLW JLW JLW JLW ksg    Graston left distal deltoid        ksg                            Neuro Re-Ed                                                                                                Ther Ex            pulleys Flex 5"x20 5"x20 5"x20 5"x20 5"x20 5"x20 5"x20 unable to tolerate today    rows Red 5"x20 np red  x10 red  20 red  20 red  12 red  20 Red  x20    Low rows Red x10 Red x10 red x10 red  8 red  20  red  20 Red  x20    B/L tb ER Red x10 Red x10 red  10 red  10        Thoracic ext stretch seated  nv 5"x10 5"x10 5"x10       scap retraction with cane ext    5"x10 5"x10 5"x10 5"x20 5"x20 5"x20    Table slides  Flex 5"x10 flex  5"x10 flex  5"x10        Wall slides     flex 5"x15 flex  5"x15 flex  5"x20                 Ther Activity            ube 2'/2' 4'/4' 4'/4' 4'/4' 4'/4' 4'/4' 4'/4' 4'/4'                Gait Training                                    Modalities            CP 10' seated L shoulder MHP L shoulder seated x10' MHP L shoulder seated 10' MHP L shoulder seated 10' MHP L shoulder seated 10' MHP L shoulder seated 10' MHP L shoulder seated 10'

## 2022-04-20 ENCOUNTER — OFFICE VISIT (OUTPATIENT)
Dept: PHYSICAL THERAPY | Facility: CLINIC | Age: 62
End: 2022-04-20
Payer: COMMERCIAL

## 2022-04-20 DIAGNOSIS — G89.29 CHRONIC PAIN OF BOTH SHOULDERS: Primary | ICD-10-CM

## 2022-04-20 DIAGNOSIS — M25.511 CHRONIC PAIN OF BOTH SHOULDERS: Primary | ICD-10-CM

## 2022-04-20 DIAGNOSIS — M25.512 CHRONIC PAIN OF BOTH SHOULDERS: Primary | ICD-10-CM

## 2022-04-20 PROCEDURE — 97110 THERAPEUTIC EXERCISES: CPT

## 2022-04-20 PROCEDURE — 97530 THERAPEUTIC ACTIVITIES: CPT

## 2022-04-20 NOTE — PROGRESS NOTES
Daily Note     Today's date: 2022  Patient name: Angella Landon  : 1960  MRN: [id number]  Referring provider: Nanette Hernandez, PT  Dx:   Encounter Diagnosis     ICD-10-CM    1  Chronic pain of both shoulders  M25 511     G89 29     M25 512        Start Time: 165  Stop Time: 1744  Total time in clinic (min): 45 minutes       Subjective:  Patient states, "It hurts but it's not catching like it did "      Objective: See treatment diary below  Assessment: Therapeutic exercise program is tolerated well  Due to increased tenderness, patient is unable to tolerate IASTM to the left deltoid - plan to revisit this intervention next week  Plan: Continue treatment as per PT plan of care         Precautions: fibromyalgia, chronic lumbar pain, chronic B/L shoulder pain      Manuals 3/21 3/24 3/30 4/4 4/6 4/11 4/13 4/18 4/20   Laser L shoulder RTC protocol kl JLW JLW JLW JLW JLW ksg JLW   Giannaton left distal deltoid        ksg NP                           Neuro Re-Ed                                                                                                Ther Ex            pulleys Flex 5"x20 5"x20 5"x20 5"x20 5"x20 5"x20 5"x20 unable to tolerate today 5"x20   rows Red 5"x20 np red  x10 red  20 red  20 red  12 red  20 Red  x20 red  20   Low rows Red x10 Red x10 red x10 red  8 red  20  red  20 Red  x20 red  20   B/L tb ER Red x10 Red x10 red  10 red  10        Thoracic ext stretch seated  nv 5"x10 5"x10 5"x10       scap retraction with cane ext    5"x10 5"x10 5"x10 5"x20 5"x20 5"x20 5"x20   Table slides  Flex 5"x10 flex  5"x10 flex  5"x10        Wall slides     flex 5"x15 flex  5"x15 flex  5"x20  flex  5"x20               Ther Activity            ube 2'/2' 4'/4' 4'/4' 4'/4' 4'/4' 4'/4' 4'/4' 4'/4' 4'/4'               Gait Training                                    Modalities            CP 10' seated L shoulder MHP L shoulder seated x10' MHP L shoulder seated 10' MHP L shoulder seated 10' MHP L shoulder seated 10' MHP L shoulder seated 10' MHP L shoulder seated 10'  MHP L shoulder seated 10'

## 2022-04-25 ENCOUNTER — OFFICE VISIT (OUTPATIENT)
Dept: PHYSICAL THERAPY | Facility: CLINIC | Age: 62
End: 2022-04-25
Payer: COMMERCIAL

## 2022-04-25 DIAGNOSIS — G89.29 CHRONIC PAIN OF BOTH SHOULDERS: Primary | ICD-10-CM

## 2022-04-25 DIAGNOSIS — M25.512 CHRONIC PAIN OF BOTH SHOULDERS: Primary | ICD-10-CM

## 2022-04-25 DIAGNOSIS — M25.511 CHRONIC PAIN OF BOTH SHOULDERS: Primary | ICD-10-CM

## 2022-04-25 PROCEDURE — 97110 THERAPEUTIC EXERCISES: CPT | Performed by: PHYSICAL THERAPIST

## 2022-04-25 PROCEDURE — 97530 THERAPEUTIC ACTIVITIES: CPT | Performed by: PHYSICAL THERAPIST

## 2022-04-25 PROCEDURE — 97140 MANUAL THERAPY 1/> REGIONS: CPT | Performed by: PHYSICAL THERAPIST

## 2022-04-25 NOTE — PROGRESS NOTES
Daily Note     Today's date: 2022  Patient name: David Sosa  : 1960  MRN: [id number]  Referring provider: Samantha Wyman PT  Dx:   Encounter Diagnosis     ICD-10-CM    1  Chronic pain of both shoulders  M25 511     G89 29     M25 512                      Subjective:  Pt reports that she feels she is improving but continues to have pain      Objective: See treatment diary below  Assessment: resumed graston today pt is tender t/o manual tx  Reports "catching" when performing rows today      Plan: Continue treatment as per PT plan of care         Precautions: fibromyalgia, chronic lumbar pain, chronic B/L shoulder pain      Manuals 3/21 3/24 3/30 4/4 4/6 4/11 4/13 4/18 4/20 4/25   Laser L shoulder RTC protocol kl JLW JLW JLW JLW JLW ksg JLW kl   Graston left distal deltoid        ksg NP kl                             Neuro Re-Ed                                                                                                        Ther Ex             pulleys Flex 5"x20 5"x20 5"x20 5"x20 5"x20 5"x20 5"x20 unable to tolerate today 5"x20 5"x20   rows Red 5"x20 np red  x10 red  20 red  20 red  12 red  20 Red  x20 red  20 Red x20   Low rows Red x10 Red x10 red x10 red  8 red  20  red  20 Red  x20 red  20 pain   B/L tb ER Red x10 Red x10 red  10 red  10         Thoracic ext stretch seated  nv 5"x10 5"x10 5"x10        scap retraction with cane ext    5"x10 5"x10 5"x10 5"x20 5"x20 5"x20 5"x20 5"x20   Table slides  Flex 5"x10 flex  5"x10 flex  5"x10         Wall slides     flex 5"x15 flex  5"x15 flex  5"x20  flex  5"x20 5"x20                Ther Activity             ube 2'/2' 4'/4' 4'/4' 4'/4' 4'/4' 4'/4' 4'/4' 4'/4' 4'/4' 4'/4'                Gait Training                                       Modalities             CP 10' seated L shoulder MHP L shoulder seated x10' MHP L shoulder seated 10' MHP L shoulder seated 10' MHP L shoulder seated 10' MHP L shoulder seated 10' MHP L shoulder seated 10'  MHP L shoulder seated 10' 10'

## 2022-04-27 ENCOUNTER — OFFICE VISIT (OUTPATIENT)
Dept: PHYSICAL THERAPY | Facility: CLINIC | Age: 62
End: 2022-04-27
Payer: COMMERCIAL

## 2022-04-27 DIAGNOSIS — M25.512 CHRONIC PAIN OF BOTH SHOULDERS: Primary | ICD-10-CM

## 2022-04-27 DIAGNOSIS — G89.29 CHRONIC PAIN OF BOTH SHOULDERS: Primary | ICD-10-CM

## 2022-04-27 DIAGNOSIS — M25.511 CHRONIC PAIN OF BOTH SHOULDERS: Primary | ICD-10-CM

## 2022-04-27 PROCEDURE — 97530 THERAPEUTIC ACTIVITIES: CPT | Performed by: PHYSICAL THERAPIST

## 2022-04-27 PROCEDURE — 97110 THERAPEUTIC EXERCISES: CPT | Performed by: PHYSICAL THERAPIST

## 2022-04-27 PROCEDURE — 97140 MANUAL THERAPY 1/> REGIONS: CPT | Performed by: PHYSICAL THERAPIST

## 2022-04-27 NOTE — PROGRESS NOTES
Daily Note     Today's date: 2022  Patient name: Diamond Campos  : 1960  MRN: [id number]  Referring provider: Rosario Morales, PT  Dx:   Encounter Diagnosis     ICD-10-CM    1  Chronic pain of both shoulders  M25 511     G89 29     M25 512                      Subjective:  Pt reports that she feels she is improving but continues to have pain      Objective: See treatment diary below  Assessment: less catching sensation reported today during manual tx and therex, continue to progress as imelda NV  Plan: Continue treatment as per PT plan of care         Precautions: fibromyalgia, chronic lumbar pain, chronic B/L shoulder pain      Manuals 4/27 3/24 3/30 4/4 4/6 4/11 4/13 4/18 4/20 4/25   Laser L shoulder RTC protocol kl JLW JLW JLW JLW JLW ksg JLW kl   Graston left distal deltoid kl       ksg NP kl                             Neuro Re-Ed                                                                                                        Ther Ex             pulleys Flex 5"x20 5"x20 5"x20 5"x20 5"x20 5"x20 5"x20 unable to tolerate today 5"x20 5"x20   rows Red 5"x20 np red  x10 red  20 red  20 red  12 red  20 Red  x20 red  20 Red x20   Low rows Red x10 Red x10 red x10 red  8 red  20  red  20 Red  x20 red  20 pain   B/L tb ER  Red x10 red  10 red  10         Thoracic ext stretch seated  nv 5"x10 5"x10 5"x10        scap retraction with cane ext    5"x10 5"x10 5"x10 5"x20 5"x20 5"x20 5"x20 5"x20   Table slides  Flex 5"x10 flex  5"x10 flex  5"x10         Wall slides 5"x20    flex 5"x15 flex  5"x15 flex  5"x20  flex  5"x20 5"x20                Ther Activity             ube 4'/4 4'/4' 4'/4' 4'/4' 4'/4' 4'/4' 4'/4' 4'/4' 4'/4' 4'/4'                Gait Training                                       Modalities             CP MHP 10' seated L shoulder MHP L shoulder seated x10' MHP L shoulder seated 10' MHP L shoulder seated 10' MHP L shoulder seated 10' MHP L shoulder seated 10' MHP L shoulder seated 10'  MHP L shoulder seated 10' 10'

## 2022-05-02 ENCOUNTER — OFFICE VISIT (OUTPATIENT)
Dept: PHYSICAL THERAPY | Facility: CLINIC | Age: 62
End: 2022-05-02
Payer: COMMERCIAL

## 2022-05-02 DIAGNOSIS — G89.29 CHRONIC PAIN OF BOTH SHOULDERS: Primary | ICD-10-CM

## 2022-05-02 DIAGNOSIS — M25.511 CHRONIC PAIN OF BOTH SHOULDERS: Primary | ICD-10-CM

## 2022-05-02 DIAGNOSIS — M25.512 CHRONIC PAIN OF BOTH SHOULDERS: Primary | ICD-10-CM

## 2022-05-02 PROCEDURE — 97110 THERAPEUTIC EXERCISES: CPT

## 2022-05-02 PROCEDURE — 97530 THERAPEUTIC ACTIVITIES: CPT

## 2022-05-02 PROCEDURE — 97140 MANUAL THERAPY 1/> REGIONS: CPT

## 2022-05-02 NOTE — PROGRESS NOTES
Daily Note     Today's date: 2022  Patient name: Latasha Hyde  : 1960  MRN: [id number]  Referring provider: Silvana Araya, PT  Dx:   Encounter Diagnosis     ICD-10-CM    1  Chronic pain of both shoulders  M25 511     G89 29     M25 512        Start Time: 1700  Stop Time: 1750  Total time in clinic (min): 50 minutes      Subjective: Patient states, "It hurts today "  Patient reports she carried a 12 foot boat from her neighbors yard to hers - "It didn't hurt while I was doing it "      Objective: See treatment diary below  Assessment: Due to discomfort and tenderness, IASTM to the left shoulder should be performed gently  Patient demonstrates good left shoulder flexion ROM when performing wall slides  Plan: Continue treatment as per PT plan of care         Precautions: fibromyalgia, chronic lumbar pain, chronic B/L shoulder pain      Manuals    Laser L shoulder RTC protocol JLW  JLW JLW JLW JLW ksg JLW kl   Graston left distal deltoid kl JLW      ksg NP kl                             Neuro Re-Ed                                                                                                        Ther Ex             pulleys Flex 5"x20 flex  5"x20  5"x20 5"x20 5"x20 5"x20 unable to tolerate today 5"x20 5"x20   rows Red 5"x20 red  20  red  20 red  20 red  12 red  20 Red  x20 red  20 Red x20   Low rows Red x10 red  20  red  8 red  20  red  20 Red  x20 red  20 pain   B/L tb ER    red  10         Thoracic ext stretch seated    5"x10 5"x10        scap retraction with cane ext     5"x10 5"x10 5"x20 5"x20 5"x20 5"x20 5"x20   Table slides    flex  5"x10         Wall slides 5"x20 5"x10   flex 5"x15 flex  5"x15 flex  5"x20  flex  5"x20 5"x20                Ther Activity             ube 4'/4 4'/4'  4'/4' 4'/4' 4'/4' 4'/4' 4'/4' 4'/4' 4'/4'                Gait Training                                       Modalities             MHP MHP 10' seated L shoulder post tx 10'  MHP L shoulder seated 10' MHP L shoulder seated 10' MHP L shoulder seated 10' MHP L shoulder seated 10'  MHP L shoulder seated 10' 10'

## 2022-05-09 ENCOUNTER — OFFICE VISIT (OUTPATIENT)
Dept: PHYSICAL THERAPY | Facility: CLINIC | Age: 62
End: 2022-05-09
Payer: COMMERCIAL

## 2022-05-09 DIAGNOSIS — G89.29 CHRONIC PAIN OF BOTH SHOULDERS: Primary | ICD-10-CM

## 2022-05-09 DIAGNOSIS — M25.511 CHRONIC PAIN OF BOTH SHOULDERS: Primary | ICD-10-CM

## 2022-05-09 DIAGNOSIS — M25.512 CHRONIC PAIN OF BOTH SHOULDERS: Primary | ICD-10-CM

## 2022-05-09 PROCEDURE — 97110 THERAPEUTIC EXERCISES: CPT

## 2022-05-09 PROCEDURE — 97140 MANUAL THERAPY 1/> REGIONS: CPT

## 2022-05-09 PROCEDURE — 97530 THERAPEUTIC ACTIVITIES: CPT

## 2022-05-09 NOTE — PROGRESS NOTES
Daily Note     Today's date: 2022  Patient name: Bessie Mcgowan  : 1960  MRN: [id number]  Referring provider: Lyndsey Saravia, PT  Dx:   Encounter Diagnosis     ICD-10-CM    1  Chronic pain of both shoulders  M25 511     G89 29     M25 512        Start Time: 174  Stop Time: 1840  Total time in clinic (min): 55 minutes       Subjective: Patient reports her left shoulder has been "hurting "  Patient states, "It feels like there is a needle broken off in there, or a thumb tack "  Fortunately patient reports "it feels flexible today "      Objective: See treatment diary below  Assessment: Therapeutic exercise program is tolerated well  Moderate erythema noted post IASTM  Plan: Continue treatment as per PT plan of care         Precautions: fibromyalgia, chronic lumbar pain, chronic B/L shoulder pain      Manuals    Laser L shoulder RTC protocol JLW JLW  JLW JLW JLW ksg JLW kl   Graston left distal deltoid kl JLW JLW     ksg NP kl                             Neuro Re-Ed                                                                                                        Ther Ex             pulleys Flex 5"x20 flex  5"x20 flex  5"x20  5"x20 5"x20 5"x20 unable to tolerate today 5"x20 5"x20   rows Red 5"x20 red  20 red  20  red  20 red  12 red  20 Red  x20 red  20 Red x20   Low rows Red x10 red  20 red  20  red  20  red  20 Red  x20 red  20 pain   B/L tb ER             Thoracic ext stretch seated     5"x10        scap retraction with cane ext      5"x10 5"x20 5"x20 5"x20 5"x20 5"x20   Table slides             Wall slides 5"x20 5"x10  5"x20  flex 5"x15 flex  5"x15 flex  5"x20  flex  5"x20 5"x20                Ther Activity             ube 4'/4 4'/4' 4'/4'  4'/4' 4'/4' 4'/4' 4'/4' 4'/4' 4'/4'                Gait Training                                       Modalities             MHP MHP 10' seated L shoulder post tx 10' post tx 10'  MHP L shoulder seated 10' MHP L shoulder seated 10' MHP L shoulder seated 10'  MHP L shoulder seated 10' 10'

## 2022-05-11 ENCOUNTER — OFFICE VISIT (OUTPATIENT)
Dept: PHYSICAL THERAPY | Facility: CLINIC | Age: 62
End: 2022-05-11
Payer: COMMERCIAL

## 2022-05-11 DIAGNOSIS — M25.511 CHRONIC PAIN OF BOTH SHOULDERS: Primary | ICD-10-CM

## 2022-05-11 DIAGNOSIS — M25.512 CHRONIC PAIN OF BOTH SHOULDERS: Primary | ICD-10-CM

## 2022-05-11 DIAGNOSIS — G89.29 CHRONIC PAIN OF BOTH SHOULDERS: Primary | ICD-10-CM

## 2022-05-11 PROCEDURE — 97140 MANUAL THERAPY 1/> REGIONS: CPT | Performed by: PHYSICAL THERAPIST

## 2022-05-11 PROCEDURE — 97530 THERAPEUTIC ACTIVITIES: CPT | Performed by: PHYSICAL THERAPIST

## 2022-05-11 PROCEDURE — 97110 THERAPEUTIC EXERCISES: CPT | Performed by: PHYSICAL THERAPIST

## 2022-05-11 NOTE — PROGRESS NOTES
Daily Note     Today's date: 2022  Patient name: Thong Ojeda  : 1960  MRN: [id number]  Referring provider: Jessica Potts, PT  Dx:   No diagnosis found  Subjective: Patient reports her left shoulder has been "hurting "  Patient states, "It feels like there is a needle broken off in there, or a thumb tack "  Fortunately patient reports "it feels flexible today "      Objective: See treatment diary below  Assessment: unable to perform wall slides today secondary to pain but is comfortable t/o all other therex and manual tx  Plan: Continue treatment as per PT plan of care         Precautions: fibromyalgia, chronic lumbar pain, chronic B/L shoulder pain      Manuals    Laser L shoulder RTC protocol JLW JLW kl JLW JLW JLW ksg JLW kl   Graston left distal deltoid kl JLW JLW kl    ksg NP kl                             Neuro Re-Ed                                                                                                        Ther Ex             pulleys Flex 5"x20 flex  5"x20 flex  5"x20 Flex 5"x20 5"x20 5"x20 5"x20 unable to tolerate today 5"x20 5"x20   rows Red 5"x20 red  20 red  20 Red x20 red  20 red  12 red  20 Red  x20 red  20 Red x20   Low rows Red x10 red  20 red  20 Red x20 red  20  red  20 Red  x20 red  20 pain   B/L tb ER             Thoracic ext stretch seated     5"x10        scap retraction with cane ext      5"x10 5"x20 5"x20 5"x20 5"x20 5"x20   Table slides             Wall slides 5"x20 5"x10  5"x20 Np, pain flex 5"x15 flex  5"x15 flex  5"x20  flex  5"x20 5"x20                Ther Activity             ube 4'/4 4'/4' 4'/4' 4'/4' 4'/4' 4'/4' 4'/4' 4'/4' 4'/4' 4'/4'                Gait Training                                       Modalities             MHP MHP 10' seated L shoulder post tx 10' post tx 10' Post-tx 10' MHP L shoulder seated 10' MHP L shoulder seated 10' MHP L shoulder seated 10'  MHP L shoulder seated 10' 10'

## 2022-05-16 ENCOUNTER — OFFICE VISIT (OUTPATIENT)
Dept: PHYSICAL THERAPY | Facility: CLINIC | Age: 62
End: 2022-05-16
Payer: COMMERCIAL

## 2022-05-16 DIAGNOSIS — G89.29 CHRONIC PAIN OF BOTH SHOULDERS: Primary | ICD-10-CM

## 2022-05-16 DIAGNOSIS — I10 HYPERTENSION, UNSPECIFIED TYPE: ICD-10-CM

## 2022-05-16 DIAGNOSIS — M25.511 CHRONIC PAIN OF BOTH SHOULDERS: Primary | ICD-10-CM

## 2022-05-16 DIAGNOSIS — M25.512 CHRONIC PAIN OF BOTH SHOULDERS: Primary | ICD-10-CM

## 2022-05-16 PROCEDURE — 97530 THERAPEUTIC ACTIVITIES: CPT | Performed by: PHYSICAL THERAPIST

## 2022-05-16 PROCEDURE — 97140 MANUAL THERAPY 1/> REGIONS: CPT | Performed by: PHYSICAL THERAPIST

## 2022-05-16 PROCEDURE — 97110 THERAPEUTIC EXERCISES: CPT | Performed by: PHYSICAL THERAPIST

## 2022-05-16 RX ORDER — LOSARTAN POTASSIUM 25 MG/1
TABLET ORAL
Qty: 90 TABLET | Refills: 0 | Status: SHIPPED | OUTPATIENT
Start: 2022-05-16 | End: 2022-08-10

## 2022-05-16 NOTE — PROGRESS NOTES
Daily Note     Today's date: 2022  Patient name: Kaye Davidson  : 1960  MRN: [id number]  Referring provider: Harry Gates, PT  Dx:   Encounter Diagnosis     ICD-10-CM    1  Chronic pain of both shoulders  M25 511     G89 29     M25 512                      Subjective: pt reports that she is having more pain today  Reports "maybe I slept on it wrong"  Objective: See treatment diary below  Assessment: pt reports increased mobility after Graston despite sig soreness during manual tx  Plan: Continue treatment as per PT plan of care         Precautions: fibromyalgia, chronic lumbar pain, chronic B/L shoulder pain      Manuals    Laser L shoulder RTC protocol JLW JLW kl kl JLW JLW ksg JLW kl   Graston left distal deltoid kl JLW JLW kl    ksg NP kl                             Neuro Re-Ed                                                                                                        Ther Ex             pulleys Flex 5"x20 flex  5"x20 flex  5"x20 Flex 5"x20 5"x20 5"x20 5"x20 unable to tolerate today 5"x20 5"x20   rows Red 5"x20 red  20 red  20 Red x20 red  20 red20 red  20 Red  x20 red  20 Red x20   Low rows Red x10 red  20 red  20 Red x20 red  20 Red x20 red  20 Red  x20 red  20 pain   B/L tb ER             Thoracic ext stretch seated     5"x10        scap retraction with cane ext      5"x10  5"x20 5"x20 5"x20 5"x20   Table slides             Wall slides 5"x20 5"x10  5"x20 Np, pain flex 5"x15 flex  5"x15 flex  5"x20  flex  5"x20 5"x20                Ther Activity             ube 4'/4 4'/4' 4'/4' 4'/4' 4'/4' 4'/4' 4'/4' 4'/4' 4'/4' 4'/4'                Gait Training                                       Modalities             MHP MHP 10' seated L shoulder post tx 10' post tx 10' Post-tx 10' MHP L shoulder seated 10' MHP L shoulder seated 10' cp L shoulder seated 10'  MHP L shoulder seated 10' 10'

## 2022-05-18 ENCOUNTER — OFFICE VISIT (OUTPATIENT)
Dept: PHYSICAL THERAPY | Facility: CLINIC | Age: 62
End: 2022-05-18
Payer: COMMERCIAL

## 2022-05-18 DIAGNOSIS — G89.29 CHRONIC PAIN OF BOTH SHOULDERS: Primary | ICD-10-CM

## 2022-05-18 DIAGNOSIS — M25.511 CHRONIC PAIN OF BOTH SHOULDERS: Primary | ICD-10-CM

## 2022-05-18 DIAGNOSIS — M25.512 CHRONIC PAIN OF BOTH SHOULDERS: Primary | ICD-10-CM

## 2022-05-18 PROCEDURE — 97530 THERAPEUTIC ACTIVITIES: CPT

## 2022-05-18 PROCEDURE — 97140 MANUAL THERAPY 1/> REGIONS: CPT

## 2022-05-18 PROCEDURE — 97110 THERAPEUTIC EXERCISES: CPT

## 2022-05-18 NOTE — PROGRESS NOTES
Daily Note     Today's date: 2022  Patient name: Nathalieethan Al  : 1960  MRN: [id number]  Referring provider: Don Nascimento, PT  Dx:   Encounter Diagnosis     ICD-10-CM    1  Chronic pain of both shoulders  M25 511     G89 29     M25 512        Start Time: 1441  Stop Time: 1530  Total time in clinic (min): 49 minutes      Subjective: Patient complains of continued left shoulder pain  Objective: See treatment diary below  Assessment: Patient is reluctant to perform wall slides due to left shoulder pain  Tenderness noted during IASTM to the left shoulder  Plan: Continue treatment as per PT plan of care         Precautions: fibromyalgia, chronic lumbar pain, chronic B/L shoulder pain      Manuals    Laser L shoulder RTC protocol JLW JLW kl kl JLW  ksg JLW kl   Graston L distal deltoid kl JLW JLW kl  JLW  ksg NP kl                             Neuro Re-Ed                                                                                                        Ther Ex             pulleys Flex 5"x20 flex  5"x20 flex  5"x20 Flex 5"x20 5"x20 5"x20  unable to tolerate today 5"x20 5"x20   rows Red 5"x20 red  20 red  20 Red x20 red  20 red  20  Red  x20 red  20 Red x20   Low rows Red x10 red  20 red  20 Red x20 red  20 red  20  Red  x20 red  20 pain   B/L tb ER             Thoracic ext stretch seated     5"x10        scap retraction with cane ext      5"x10 5"x10  5"x20 5"x20 5"x20   Table slides             Wall slides 5"x20 5"x10  5"x20 Np, pain flex 5"x15    flex  5"x20 5"x20                Ther Activity             ube 4'/4 4'/4' 4'/4' 4'/4' 4'/4' 4'/4'  4'/4' 4'/4' 4'/4'                Gait Training                                       Modalities             MHP MHP 10' seated L shoulder post tx 10' post tx 10' Post-tx 10' MHP L shoulder seated 10' MHP L shoulder seated 10'   MHP L shoulder seated 10' 10'

## 2022-05-23 ENCOUNTER — OFFICE VISIT (OUTPATIENT)
Dept: PHYSICAL THERAPY | Facility: CLINIC | Age: 62
End: 2022-05-23
Payer: COMMERCIAL

## 2022-05-23 DIAGNOSIS — M25.512 CHRONIC PAIN OF BOTH SHOULDERS: Primary | ICD-10-CM

## 2022-05-23 DIAGNOSIS — G89.29 CHRONIC PAIN OF BOTH SHOULDERS: Primary | ICD-10-CM

## 2022-05-23 DIAGNOSIS — M25.511 CHRONIC PAIN OF BOTH SHOULDERS: Primary | ICD-10-CM

## 2022-05-23 PROCEDURE — 97110 THERAPEUTIC EXERCISES: CPT

## 2022-05-23 PROCEDURE — 97140 MANUAL THERAPY 1/> REGIONS: CPT

## 2022-05-23 PROCEDURE — 97530 THERAPEUTIC ACTIVITIES: CPT

## 2022-05-23 NOTE — PROGRESS NOTES
Daily Note     Today's date: 2022  Patient name: Sunnyvaleethan Al  : 1960  MRN: [id number]  Referring provider: Don Nascimento, PT  Dx:   Encounter Diagnosis     ICD-10-CM    1  Chronic pain of both shoulders  M25 511     G89 29     M25 512        Start Time: 1707  Stop Time: 1750  Total time in clinic (min): 43 minutes      Subjective: Patient reports mobility in her left shoulder is better however the pain is not  Patient reports she did housework, laundry, and gardening over the weekend  Objective: See treatment diary below  Assessment: Patient demonstrates left shoulder flexion ROM WNLs  Suggested patient schedule appointment with ortho due to continued left shoulder pain  Plan: Continue treatment as per PT plan of care         Precautions: fibromyalgia, chronic lumbar pain, chronic B/L shoulder pain      Manuals       Laser L shoulder RTC protocol JLW JLW kl kl JLW JLW      Graston L distal deltoid kl JLW JLW kl  JLW JLW                                Neuro Re-Ed                                                                                                        Ther Ex             pulleys Flex 5"x20 flex  5"x20 flex  5"x20 Flex 5"x20 5"x20 5"x20       rows Red 5"x20 red  20 red  20 Red x20 red  20 red  20 red  20      Low rows Red x10 red  20 red  20 Red x20 red  20 red  20 red  20      B/L tb ER             Thoracic ext stretch seated     5"x10        scap retraction with cane ext      5"x10 5"x10 5"x10      standing cane scap       5"x10      Table slides             wall slides 5"x20 5"x10  5"x20 Np, pain flex 5"x15                     Ther Activity             ube 4'/4 4'/4' 4'/4' 4'/4' 4'/4' 4'/4' 4'/4'                   Gait Training                                       Modalities             MHP MHP 10' seated L shoulder post tx 10' post tx 10' Post-tx 10' MHP L shoulder seated 10' MHP L shoulder seated 10' MHP L shoulder seated 10'

## 2022-05-25 ENCOUNTER — APPOINTMENT (OUTPATIENT)
Dept: PHYSICAL THERAPY | Facility: CLINIC | Age: 62
End: 2022-05-25
Payer: COMMERCIAL

## 2022-05-26 ENCOUNTER — OFFICE VISIT (OUTPATIENT)
Dept: PHYSICAL THERAPY | Facility: CLINIC | Age: 62
End: 2022-05-26
Payer: COMMERCIAL

## 2022-05-26 DIAGNOSIS — M25.511 CHRONIC PAIN OF BOTH SHOULDERS: Primary | ICD-10-CM

## 2022-05-26 DIAGNOSIS — G89.29 CHRONIC PAIN OF BOTH SHOULDERS: Primary | ICD-10-CM

## 2022-05-26 DIAGNOSIS — M25.512 CHRONIC PAIN OF BOTH SHOULDERS: Primary | ICD-10-CM

## 2022-05-26 PROCEDURE — 97530 THERAPEUTIC ACTIVITIES: CPT

## 2022-05-26 PROCEDURE — 97140 MANUAL THERAPY 1/> REGIONS: CPT

## 2022-05-26 PROCEDURE — 97110 THERAPEUTIC EXERCISES: CPT

## 2022-05-26 NOTE — PROGRESS NOTES
Daily Note     Today's date: 2022  Patient name: Louise Almaguer  : 1960  MRN: [id number]  Referring provider: Chris Win, HILARY  Dx:   Encounter Diagnosis     ICD-10-CM    1  Chronic pain of both shoulders  M25 511     G89 29     M25 512                   Subjective: "The pain is the same, the mobility is better " Notes "It's just always there "    Objective: See treatment diary below      Assessment: Performed exercise program w/o complaint  Comfortable throughout laser  Restrictions/discomfort noted during IASTM to L distal deltoid  Concluded w/MHP to same, x10'  Relief after tx  Will monitor  Plan: Cont per POC       Precautions: fibromyalgia, chronic lumbar pain, chronic B/L shoulder pain      Manuals      Laser L shoulder RTC protocol JLW JLW kl kl JLW JLW MO     Graston L distal deltoid kl JLW JLW kl  JLW JLW MO                               Neuro Re-Ed                                                                                                        Ther Ex             pulleys Flex 5"x20 flex  5"x20 flex  5"x20 Flex 5"x20 5"x20 5"x20       rows Red 5"x20 red  20 red  20 Red x20 red  20 red  20 red  20 Red  20     Low rows Red x10 red  20 red  20 Red x20 red  20 red  20 red  20 Red  20     B/L tb ER             Thoracic ext stretch seated     5"x10        scap retraction with cane ext      5"x10 5"x10 5"x10 5"x10     standing cane scap       5"x10 5"x10     Table slides             wall slides 5"x20 5"x10  5"x20 Np, pain flex 5"x15                     Ther Activity             ube 4'/4 4'/4' 4'/4' 4'/4' 4'/4' 4'/4' 4'/4' 4'/4'                  Gait Training                                       Modalities             MHP MHP 10' seated L shoulder post tx 10' post tx 10' Post-tx 10' MHP L shoulder seated 10' MHP L shoulder seated 10' MHP L shoulder seated 10' MHP L should  seated 10'

## 2022-06-01 ENCOUNTER — OFFICE VISIT (OUTPATIENT)
Dept: PHYSICAL THERAPY | Facility: CLINIC | Age: 62
End: 2022-06-01
Payer: COMMERCIAL

## 2022-06-01 DIAGNOSIS — G89.29 CHRONIC PAIN OF BOTH SHOULDERS: Primary | ICD-10-CM

## 2022-06-01 DIAGNOSIS — M25.512 CHRONIC PAIN OF BOTH SHOULDERS: Primary | ICD-10-CM

## 2022-06-01 DIAGNOSIS — M25.511 CHRONIC PAIN OF BOTH SHOULDERS: Primary | ICD-10-CM

## 2022-06-01 PROCEDURE — 97140 MANUAL THERAPY 1/> REGIONS: CPT

## 2022-06-01 PROCEDURE — 97530 THERAPEUTIC ACTIVITIES: CPT

## 2022-06-01 PROCEDURE — 97110 THERAPEUTIC EXERCISES: CPT

## 2022-06-01 NOTE — PROGRESS NOTES
Daily Note     Today's date: 2022  Patient name: Norah Carreno  : 1960  MRN: [id number]  Referring provider: Yamileth Woodward, PT  Dx:   Encounter Diagnosis     ICD-10-CM    1  Chronic pain of both shoulders  M25 511     G89 29     M25 512        Start Time: 1637  Stop Time: 1723  Total time in clinic (min): 46 minutes       Subjective: Patient reports her left shoulder was significantly more painful after IASTM last session - reports she also observed bruising on the anterior aspect of her left shoulder  Objective: See treatment diary below  Assessment: No complaints offered throughout performance of therapeutic exercise program   Due to increased tenderness, used gentle pressure during IASTM to the left deltoid  Plan: Continue treatment as per PT plan of care         Precautions: fibromyalgia, chronic lumbar pain, chronic B/L shoulder pain      Manuals     Laser L shoulder RTC protocol JLW JLW kl kl JLW JLW MO JLW    Graston L distal deltoid kl JLW JLW kl  JLW JLW MO JLW                              Neuro Re-Ed                                                                                                        Ther Ex             pulleys Flex 5"x20 flex  5"x20 flex  5"x20 Flex 5"x20 5"x20 5"x20   5"x20    rows Red 5"x20 red  20 red  20 Red x20 red  20 red  20 red  20 Red  20 red  20    Low rows Red x10 red  20 red  20 Red x20 red  20 red  20 red  20 Red  20 red  20    B/L tb ER             Thoracic ext stretch seated     5"x10        scap retraction with cane ext      5"x10 5"x10 5"x10 5"x10 5"x10    standing cane scap       5"x10 5"x10     Table slides             wall slides 5"x20 5"x10  5"x20 Np, pain flex 5"x15                     Ther Activity             ube 4'/4 4'/4' 4'/4' 4'/4' 4'/4' 4'/4' 4'/4' 4'/4' 4'/4'                 Gait Training                                       Modalities             MHP MHP 10' seated L shoulder post tx 10' post tx 10' Post-tx 10' MHP L shoulder seated 10' MHP L shoulder seated 10' MHP L shoulder seated 10' MHP L should  seated 10' CP L shoulder  seated  10'

## 2022-06-06 ENCOUNTER — OFFICE VISIT (OUTPATIENT)
Dept: PHYSICAL THERAPY | Facility: CLINIC | Age: 62
End: 2022-06-06
Payer: COMMERCIAL

## 2022-06-06 DIAGNOSIS — M25.511 CHRONIC PAIN OF BOTH SHOULDERS: Primary | ICD-10-CM

## 2022-06-06 DIAGNOSIS — M25.512 CHRONIC PAIN OF BOTH SHOULDERS: Primary | ICD-10-CM

## 2022-06-06 DIAGNOSIS — G89.29 CHRONIC PAIN OF BOTH SHOULDERS: Primary | ICD-10-CM

## 2022-06-06 PROCEDURE — 97530 THERAPEUTIC ACTIVITIES: CPT | Performed by: PHYSICAL THERAPIST

## 2022-06-06 PROCEDURE — 97140 MANUAL THERAPY 1/> REGIONS: CPT | Performed by: PHYSICAL THERAPIST

## 2022-06-06 PROCEDURE — 97110 THERAPEUTIC EXERCISES: CPT | Performed by: PHYSICAL THERAPIST

## 2022-06-06 NOTE — PROGRESS NOTES
Daily Note     Today's date: 2022  Patient name: Lanita Osler  : 1960  MRN: [id number]  Referring provider: Kala Portillo, PT  Dx:   Encounter Diagnosis     ICD-10-CM    1  Chronic pain of both shoulders  M25 511     G89 29     M25 512                      Subjective: Patient reports "it feels about the same"      Objective: See treatment diary below  Assessment: good tolerance of graston today  Pt does have increased area of sensitivity along anterior shoulder over bicipital groove  Plan: Continue treatment as per PT plan of care         Precautions: fibromyalgia, chronic lumbar pain, chronic B/L shoulder pain      Manuals    Laser L shoulder RTC protocol JLW JLW kl kl JLW JLW MO JLW kl   Graston L distal deltoid kl JLW JLW kl  JLW JLW MO JLW kl                             Neuro Re-Ed                                                                                                        Ther Ex             pulleys Flex 5"x20 flex  5"x20 flex  5"x20 Flex 5"x20 5"x20 5"x20   5"x20 5"x20   rows Red 5"x20 red  20 red  20 Red x20 red  20 red  20 red  20 Red  20 red  20 Red x20   Low rows Red x10 red  20 red  20 Red x20 red  20 red  20 red  20 Red  20 red  20 Red x20   B/L tb ER             Thoracic ext stretch seated     5"x10        scap retraction with cane ext      5"x10 5"x10 5"x10 5"x10 5"x10 5"x10   standing cane scap       5"x10 5"x10     Table slides             wall slides 5"x20 5"x10  5"x20 Np, pain flex 5"x15                     Ther Activity             ube 4'/4 4'/4' 4'/4' 4'/4' 4'/4' 4'/4' 4'/4' 4'/4' 4'/4' 4'/4'                Gait Training                                       Modalities             MHP MHP 10' seated L shoulder post tx 10' post tx 10' Post-tx 10' MHP L shoulder seated 10' MHP L shoulder seated 10' MHP L shoulder seated 10' MHP L should  seated 10' CP L shoulder  seated  10' mhp x10'

## 2022-06-08 ENCOUNTER — OFFICE VISIT (OUTPATIENT)
Dept: PHYSICAL THERAPY | Facility: CLINIC | Age: 62
End: 2022-06-08
Payer: COMMERCIAL

## 2022-06-08 DIAGNOSIS — M25.511 CHRONIC PAIN OF BOTH SHOULDERS: Primary | ICD-10-CM

## 2022-06-08 DIAGNOSIS — G89.29 CHRONIC PAIN OF BOTH SHOULDERS: Primary | ICD-10-CM

## 2022-06-08 DIAGNOSIS — M25.512 CHRONIC PAIN OF BOTH SHOULDERS: Primary | ICD-10-CM

## 2022-06-08 PROCEDURE — 97140 MANUAL THERAPY 1/> REGIONS: CPT | Performed by: PHYSICAL THERAPIST

## 2022-06-08 PROCEDURE — 97110 THERAPEUTIC EXERCISES: CPT | Performed by: PHYSICAL THERAPIST

## 2022-06-08 PROCEDURE — 97530 THERAPEUTIC ACTIVITIES: CPT | Performed by: PHYSICAL THERAPIST

## 2022-06-08 NOTE — PROGRESS NOTES
Daily Note     Today's date: 2022  Patient name: Yayo Wallace  : 1960  MRN: [id number]  Referring provider: Marina Toribio, PT  Dx:   Encounter Diagnosis     ICD-10-CM    1  Chronic pain of both shoulders  M25 511     G89 29     M25 512                      Subjective: Patient reports "it feels about the same"      Objective: See treatment diary below  Assessment: increased pain reported t/o therex today and reps had to be reduced for most therex  Good tolerance to manual tx       Plan: Continue treatment as per PT plan of care         Precautions: fibromyalgia, chronic lumbar pain, chronic B/L shoulder pain      Manuals    Laser L shoulder RTC protocol JLW JLW kl kl JLW JLW MO JLW kl kl   Graston L distal deltoid kl JLW JLW kl  JLW JLW MO JLW kl kl                               Neuro Re-Ed                                                                                                                Ther Ex              pulleys Flex 5"x20 flex  5"x20 flex  5"x20 Flex 5"x20 5"x20 5"x20   5"x20 5"x20    rows Red 5"x20 red  20 red  20 Red x20 red  20 red  20 red  20 Red  20 red  20 Red x20 Red x15   Low rows Red x10 red  20 red  20 Red x20 red  20 red  20 red  20 Red  20 red  20 Red x20 Red x15   B/L tb ER              Thoracic ext stretch seated     5"x10         scap retraction with cane ext      5"x10 5"x10 5"x10 5"x10 5"x10 5"x10 5"x10   standing cane scap       5"x10 5"x10      Table slides              wall slides 5"x20 5"x10  5"x20 Np, pain flex 5"x15                       Ther Activity              ube 4'/4 4'/4' 4'/4' 4'/4' 4'/4' 4'/4' 4'/4' 4'/4' 4'/4' 4'/4' 4'/4'                 Gait Training                                          Modalities              MHP MHP 10' seated L shoulder post tx 10' post tx 10' Post-tx 10' MHP L shoulder seated 10' MHP L shoulder seated 10' MHP L shoulder seated 10' MHP L should  seated 10' CP L shoulder  seated  10' mhp x10' mhp x10'

## 2022-06-13 ENCOUNTER — OFFICE VISIT (OUTPATIENT)
Dept: PHYSICAL THERAPY | Facility: CLINIC | Age: 62
End: 2022-06-13
Payer: COMMERCIAL

## 2022-06-13 DIAGNOSIS — M25.511 CHRONIC PAIN OF BOTH SHOULDERS: Primary | ICD-10-CM

## 2022-06-13 DIAGNOSIS — M25.512 CHRONIC PAIN OF BOTH SHOULDERS: Primary | ICD-10-CM

## 2022-06-13 DIAGNOSIS — G89.29 CHRONIC PAIN OF BOTH SHOULDERS: Primary | ICD-10-CM

## 2022-06-13 PROCEDURE — 97140 MANUAL THERAPY 1/> REGIONS: CPT

## 2022-06-13 PROCEDURE — 97530 THERAPEUTIC ACTIVITIES: CPT

## 2022-06-13 PROCEDURE — 97110 THERAPEUTIC EXERCISES: CPT

## 2022-06-13 NOTE — PROGRESS NOTES
Daily Note     Today's date: 2022  Patient name: Alli Reeves  : 1960  MRN: [id number]  Referring provider: César Mcdaniels, HILARY  Dx: No diagnosis found  Subjective: Pt reports she is not as painful as LV  Objective: See treatment diary below      Assessment: Performed exercise program w/o complaint  Restrictions/discomfot noted during IASTM  Concluded w/MHP to L deltoid  Will monitor  Plan: Cont per POC     Precautions: fibromyalgia, chronic lumbar pain, chronic B/L shoulder pain      Manuals     Laser L shoulder MO  JLW kl kl JLW JLW MO JLW kl kl   Graston L distal deltoid MO  JLW kl  JLW JLW MO JLW kl kl                               Neuro Re-Ed                                                                                                                Ther Ex              pulleys 5"x20  flex  5"x20 Flex 5"x20 5"x20 5"x20   5"x20 5"x20    rows Red  x20  red  20 Red x20 red  20 red  20 red  20 Red  20 red  20 Red x20 Red x15   Low rows Red  x20  red  20 Red x20 red  20 red  20 red  20 Red  20 red  20 Red x20 Red x15   B/L tb ER              Thoracic ext stretch seated     5"x10         scap retraction with cane ext  5"x10    5"x10 5"x10 5"x10 5"x10 5"x10 5"x10 5"x10   standing cane scap       5"x10 5"x10      Table slides              wall slides    5"x20 Np, pain flex 5"x15                       Ther Activity              ube 4'/4'  4'/4' 4'/4' 4'/4' 4'/4' 4'/4' 4'/4' 4'/4' 4'/4' 4'/4'                 Gait Training                                          Modalities              MHP mhp  x10'  post tx 10' Post-tx 10' MHP L shoulder seated 10' MHP L shoulder seated 10' MHP L shoulder seated 10' MHP L should  seated 10' CP L shoulder  seated  10' mhp x10' mhp x10'

## 2022-06-15 ENCOUNTER — OFFICE VISIT (OUTPATIENT)
Dept: PHYSICAL THERAPY | Facility: CLINIC | Age: 62
End: 2022-06-15
Payer: COMMERCIAL

## 2022-06-15 DIAGNOSIS — G89.29 CHRONIC PAIN OF BOTH SHOULDERS: Primary | ICD-10-CM

## 2022-06-15 DIAGNOSIS — M25.512 CHRONIC PAIN OF BOTH SHOULDERS: Primary | ICD-10-CM

## 2022-06-15 DIAGNOSIS — M25.511 CHRONIC PAIN OF BOTH SHOULDERS: Primary | ICD-10-CM

## 2022-06-15 PROCEDURE — 97110 THERAPEUTIC EXERCISES: CPT

## 2022-06-15 PROCEDURE — 97140 MANUAL THERAPY 1/> REGIONS: CPT

## 2022-06-15 PROCEDURE — 97530 THERAPEUTIC ACTIVITIES: CPT

## 2022-06-15 NOTE — PROGRESS NOTES
Daily Note     Today's date: 6/15/2022  Patient name: Justyna Stevens  : 1960  MRN: [id number]  Referring provider: Mis Crowder, PT  Dx:   Encounter Diagnosis     ICD-10-CM    1  Chronic pain of both shoulders  M25 511     G89 29     M25 512        Start Time: 1707  Stop Time: 1757  Total time in clinic (min): 50 minutes      Subjective: No significant changes in left shoulder pain to report since the last PT treatment  Patient reports she has not yet found a new PCP  Objective: See treatment diary below      Assessment: No complaints reported throughout performance of therapeutic exercise program   Left biceps tendon is tender during IASTM  Plan: Continue treatment as per PT plan of care         Precautions: fibromyalgia, chronic lumbar pain, chronic B/L shoulder pain      Manuals 6/15  5/9 5/11 5/16 5/18 5/23 5/26 6/1 6/6 6/8   Laser L shoulder JLW  JLW kl kl JLW JLW MO JLW kl kl   Graston L distal deltoid JLW  JLW kl  JLW JLW MO JLW kl kl                               Neuro Re-Ed                                                                                                                Ther Ex              pulleys flex   5"x20  flex  5"x20 Flex 5"x20 5"x20 5"x20   5"x20 5"x20    rows red   20  red  20 Red x20 red  20 red  20 red  20 Red  20 red  20 Red x20 Red x15   Low rows red   20  red  20 Red x20 red  20 red  20 red  20 Red  20 red  20 Red x20 Red x15   B/L tb ER              Thoracic ext stretch seated     5"x10         scap retraction with cane ext  5"x10    5"x10 5"x10 5"x10 5"x10 5"x10 5"x10 5"x10   standing cane scap       5"x10 5"x10      Table slides              wall slides 5"x20   5"x20 Np, pain flex 5"x15                       Ther Activity              ube 4'/4'  4'/4' 4'/4' 4'/4' 4'/4' 4'/4' 4'/4' 4'/4' 4'/4' 4'/4'                 Gait Training                                          Modalities              MHP post tx 10'  post tx 10' Post-tx 10' MHP L shoulder seated 10' MHP L shoulder seated 10' MHP L shoulder seated 10' MHP L should  seated 10' CP L shoulder  seated  10' mhp x10' mhp x10'

## 2022-06-20 ENCOUNTER — OFFICE VISIT (OUTPATIENT)
Dept: PHYSICAL THERAPY | Facility: CLINIC | Age: 62
End: 2022-06-20
Payer: COMMERCIAL

## 2022-06-20 DIAGNOSIS — M25.511 CHRONIC PAIN OF BOTH SHOULDERS: Primary | ICD-10-CM

## 2022-06-20 DIAGNOSIS — G89.29 CHRONIC PAIN OF BOTH SHOULDERS: Primary | ICD-10-CM

## 2022-06-20 DIAGNOSIS — M25.512 CHRONIC PAIN OF BOTH SHOULDERS: Primary | ICD-10-CM

## 2022-06-20 PROCEDURE — 97140 MANUAL THERAPY 1/> REGIONS: CPT | Performed by: PHYSICAL THERAPIST

## 2022-06-20 PROCEDURE — 97110 THERAPEUTIC EXERCISES: CPT | Performed by: PHYSICAL THERAPIST

## 2022-06-20 NOTE — PROGRESS NOTES
Daily Note     Today's date: 2022  Patient name: Carlos Le  : 1960  MRN: [id number]  Referring provider: Lindsey Rivas PT  Dx:   Encounter Diagnosis     ICD-10-CM    1  Chronic pain of both shoulders  M25 511     G89 29     M25 512                     Subjective: pt reports less lateral shoulder pain but increased anterior shoulder pain that started this morning  Pt is unable to recall a trigger for this         Objective: See treatment diary below      Assessment: increased tenderness noted over long head of biceps but pt has good tolerance to graston in this area  Reports increased pain with rows therefore this is deferred  No sig pain with shoulder extension  Plan: Continue treatment as per PT plan of care         Precautions: fibromyalgia, chronic lumbar pain, chronic B/L shoulder pain      Manuals 6/15 6/20 5/9 5/11 5/16 5/18 5/23 5/26 6/1 6/6 6/8   Laser L shoulder JLW kl JLW kl kl JLW JLW MO JLW kl kl   Graston L distal deltoid JLW kl JLW kl  JLW JLW MO JLW kl kl                               Neuro Re-Ed                                                                                                                Ther Ex              pulleys flex   5"x20 Flexion 5"x20 flex  5"x20 Flex 5"x20 5"x20 5"x20   5"x20 5"x20    rows red   20 Red x20 red  20 Red x20 red  20 red  20 red  20 Red  20 red  20 Red x20 Red x15   Low rows red   20 pain red  20 Red x20 red  20 red  20 red  20 Red  20 red  20 Red x20 Red x15   B/L tb ER              Thoracic ext stretch seated     5"x10         scap retraction with cane ext  5"x10 5"x10   5"x10 5"x10 5"x10 5"x10 5"x10 5"x10 5"x10   standing cane scap       5"x10 5"x10      Table slides              wall slides 5"x20 5"x20  5"x20 Np, pain flex 5"x15                       Ther Activity              ube 4'/4' 4'/4' 4'/4' 4'/4' 4'/4' 4'/4' 4'/4' 4'/4' 4'/4' 4'/4' 4'/4'                 Gait Training                                          Modalities MHP post tx 8' 10' post tx 10' Post-tx 10' MHP L shoulder seated 10' MHP L shoulder seated 10' MHP L shoulder seated 10' MHP L should  seated 10' CP L shoulder  seated  10' mhp x10' mhp x10'

## 2022-06-22 ENCOUNTER — OFFICE VISIT (OUTPATIENT)
Dept: PHYSICAL THERAPY | Facility: CLINIC | Age: 62
End: 2022-06-22
Payer: COMMERCIAL

## 2022-06-22 DIAGNOSIS — M25.512 CHRONIC PAIN OF BOTH SHOULDERS: Primary | ICD-10-CM

## 2022-06-22 DIAGNOSIS — G89.29 CHRONIC PAIN OF BOTH SHOULDERS: Primary | ICD-10-CM

## 2022-06-22 DIAGNOSIS — M25.511 CHRONIC PAIN OF BOTH SHOULDERS: Primary | ICD-10-CM

## 2022-06-22 PROCEDURE — 97110 THERAPEUTIC EXERCISES: CPT

## 2022-06-22 PROCEDURE — 97140 MANUAL THERAPY 1/> REGIONS: CPT

## 2022-06-22 PROCEDURE — 97530 THERAPEUTIC ACTIVITIES: CPT

## 2022-06-22 NOTE — PROGRESS NOTES
Daily Note     Today's date: 2022  Patient name: Latasha Hyde  : 1960  MRN: [id number]  Referring provider: Silvana Araya, PT  Dx:   Encounter Diagnosis     ICD-10-CM    1  Chronic pain of both shoulders  M25 511     G89 29     M25 512        Start Time: 1520  Stop Time: 1607  Total time in clinic (min): 47 minutes      Subjective: Patient reports her left shoulder is feeling "okay "      Objective: See treatment diary below  Assessment: No complaints reported throughout performance of therapeutic exercise program   Intermittent tingling reported by patient in the left upper extremity during IASTM  Plan: Continue treatment as per PT plan of care         Precautions: fibromyalgia, chronic lumbar pain, chronic B/L shoulder pain      Manuals 6/15 6/20  6/22  5/16 5/18 5/23 5/26 6/1 6/6 6   Laser L shoulder JLW kl JLW  kl JLW JLW MO JLW kl kl   Graston L distal deltoid JLW kl JLW   JLW JLW MO JLW kl kl                               Neuro Re-Ed                                                                                                                Ther Ex              pulleys flex   5"x20 Flexion 5"x20 flex  5"x20  5"x20 5"x20   5"x20 5"x20    rows red   20 Red x20 red  20  red  20 red  20 red  20 Red  20 red  20 Red x20 Red x15   Low rows red   20 pain red  20  red  20 red  20 red  20 Red  20 red  20 Red x20 Red x15   B/L tb ER              Thoracic ext stretch seated     5"x10         scap retraction with cane ext  5"x10 5"x10 5"x10  5"x10 5"x10 5"x10 5"x10 5"x10 5"x10 5"x10   standing cane scap       5"x10 5"x10      Table slides              wall slides 5"x20 5"x20  5"x20  flex 5"x15                       Ther Activity              ube 4'/4' 4'/4' 4'/4'  4'/4' 4'/4' 4'/4' 4'/4' 4'/4' 4'/4' 4'/4'                 Gait Training                                          Modalities              MHP post tx 10' 10' 10'  MHP L shoulder seated 10' MHP L shoulder seated 10' MHP L shoulder seated 10' MHP L should  seated 10' CP L shoulder  seated  10' mhp x10' mhp x10'

## 2022-06-27 ENCOUNTER — OFFICE VISIT (OUTPATIENT)
Dept: PHYSICAL THERAPY | Facility: CLINIC | Age: 62
End: 2022-06-27
Payer: COMMERCIAL

## 2022-06-27 DIAGNOSIS — G89.29 CHRONIC PAIN OF BOTH SHOULDERS: Primary | ICD-10-CM

## 2022-06-27 DIAGNOSIS — M25.511 CHRONIC PAIN OF BOTH SHOULDERS: Primary | ICD-10-CM

## 2022-06-27 DIAGNOSIS — M25.512 CHRONIC PAIN OF BOTH SHOULDERS: Primary | ICD-10-CM

## 2022-06-27 PROCEDURE — 97140 MANUAL THERAPY 1/> REGIONS: CPT

## 2022-06-27 PROCEDURE — 97110 THERAPEUTIC EXERCISES: CPT

## 2022-06-27 NOTE — PROGRESS NOTES
Daily Note     Today's date: 2022  Patient name: Justyna Stevens  : 1960  MRN: [id number]  Referring provider: Mis Crowder, PT  Dx:   Encounter Diagnosis     ICD-10-CM    1  Chronic pain of both shoulders  M25 511     G89 29     M25 512        Start Time: 1705  Stop Time: 1758  Total time in clinic (min): 53 minutes    Subjective: Pt reports she is really sore today  Yesterday was a very bad day, "it was a 10 " Today its like an 8  Chief complaint is also having stiffness  Objective: See treatment diary below      Assessment: Tolerated treatment well  Pt had significant trigger point in L deltoid region, which she didn't tolerate graston tool, but she tolerated manual STM as it was done to her tolerance  Pt with noted relief post treatment following MHP  Patient demonstrated fatigue post treatment, exhibited good technique with therapeutic exercises and would benefit from continued PT  Plan: Continue per plan of care        Precautions: fibromyalgia, chronic lumbar pain, chronic B/L shoulder pain      Manuals 6/15 6/20  6/22 6/27  5/18 5/23 5/26 6/1 6/6 6/8   Laser L shoulder JLW kl JLW NA  JLW JLW MO JLW kl kl   Graston L distal deltoid JLW kl JLW NA - did manually as pt did not tolerate graston tools  JLW JLW MO JLW kl kl                               Neuro Re-Ed                                                                                                                Ther Ex              pulleys flex   5"x20 Flexion 5"x20 flex  5"x20 Flex 5"x20  5"x20   5"x20 5"x20    rows red   20 Red x20 red  20 Red 20  red  20 red  20 Red  20 red  20 Red x20 Red x15   Low rows red   20 pain red  20 Red 20  red  20 red  20 Red  20 red  20 Red x20 Red x15   B/L tb ER              Thoracic ext stretch seated              scap retraction with cane ext  5"x10 5"x10 5"x10 5"x10  5"x10 5"x10 5"x10 5"x10 5"x10 5"x10   standing cane scap       5"x10 5"x10      Table slides              wall slides 5"x20 5"x20  5"x20 5"x20                        Ther Activity              ube 4'/4' 4'/4' 4'/4' 3'/3'   4'/4' 4'/4' 4'/4' 4'/4' 4'/4' 4'/4'                 Gait Training                                          Modalities              MHP post tx 10' 10' 10' 10'   MHP L shoulder seated 10' MHP L shoulder seated 10' MHP L should  seated 10' CP L shoulder  seated  10' mhp x10' mhp x10'

## 2022-06-29 ENCOUNTER — OFFICE VISIT (OUTPATIENT)
Dept: PHYSICAL THERAPY | Facility: CLINIC | Age: 62
End: 2022-06-29
Payer: COMMERCIAL

## 2022-06-29 DIAGNOSIS — M25.512 CHRONIC PAIN OF BOTH SHOULDERS: Primary | ICD-10-CM

## 2022-06-29 DIAGNOSIS — M25.511 CHRONIC PAIN OF BOTH SHOULDERS: Primary | ICD-10-CM

## 2022-06-29 DIAGNOSIS — G89.29 CHRONIC PAIN OF BOTH SHOULDERS: Primary | ICD-10-CM

## 2022-06-29 PROCEDURE — 97530 THERAPEUTIC ACTIVITIES: CPT

## 2022-06-29 PROCEDURE — 97110 THERAPEUTIC EXERCISES: CPT

## 2022-06-29 PROCEDURE — 97140 MANUAL THERAPY 1/> REGIONS: CPT

## 2022-06-29 NOTE — PROGRESS NOTES
Daily Note     Today's date: 2022  Patient name: Kaye Davidson  : 1960  MRN: [id number]  Referring provider: Harry Gates, PT  Dx:   Encounter Diagnosis     ICD-10-CM    1  Chronic pain of both shoulders  M25 511     G89 29     M25 512        Start Time: 1700  Stop Time: 1750  Total time in clinic (min): 50 minutes      Subjective: Patient complains of B/L shoulder pain when reaching out to the side  Objective: See treatment diary below  Assessment: AAROM shoulder abduction exercises tolerated well when patient is instructed to perform these in a pain free ROM  Gentle STM to the left shoulder tolerated well  Plan: Continue treatment as per PT plan of care  Primary PT will re-evaluate patient next week         Precautions: fibromyalgia, chronic lumbar pain, chronic B/L shoulder pain      Manuals 6/15 6/20  6/22 6/27 6/29  5/23 5/26 6/1 6/6 6/8   Laser L shoulder JLW kl JLW NA JLW  JLW MO JLW kl kl   Graston L distal deltoid JLW kl JLW NA - did manually as pt did not tolerate graston tools STM  JLW  JLW MO JLW kl kl                               Neuro Re-Ed                                                                                                                Ther Ex              pulleys flex   5"x20 Flexion 5"x20 flex  5"x20 Flex 5"x20 flex  10"x10    5"x20 5"x20    rows red   20 Red x20 red  20 Red 20 red  20  red  20 Red  20 red  20 Red x20 Red x15   Low rows red   20 pain red  20 Red 20 red  20  red  20 Red  20 red  20 Red x20 Red x15   B/L tb ER     red  20         Thoracic ext stretch seated              scap retraction with cane ext  5"x10 5"x10 5"x10 5"x10   5"x10 5"x10 5"x10 5"x10 5"x10   standing cane scap     5"x10   5"x10 5"x10      Table slides              wall slides 5"x20 5"x20  5"x20 5"x20 abd  5"x10                       Ther Activity              ube 4'/4' 4'/4' 4'/4' 3'/3'  4'/4'  4'/4' 4'/4' 4'/4' 4'/4' 4'/4'                 Gait Training Modalities              MHP post tx 8' 10' 10' 10'  10'  MHP L shoulder seated 10' MHP L should  seated 10' CP L shoulder  seated  10' mhp x10' mhp x10'

## 2022-06-30 DIAGNOSIS — F41.9 ANXIETY AND DEPRESSION: ICD-10-CM

## 2022-06-30 DIAGNOSIS — M79.7 FIBROMYALGIA: ICD-10-CM

## 2022-06-30 DIAGNOSIS — F32.A ANXIETY AND DEPRESSION: ICD-10-CM

## 2022-06-30 DIAGNOSIS — I10 HYPERTENSION, UNSPECIFIED TYPE: ICD-10-CM

## 2022-06-30 RX ORDER — HYDROCHLOROTHIAZIDE 12.5 MG/1
TABLET ORAL
Qty: 90 TABLET | Refills: 0 | Status: SHIPPED | OUTPATIENT
Start: 2022-06-30 | End: 2022-09-29

## 2022-06-30 RX ORDER — DULOXETIN HYDROCHLORIDE 30 MG/1
CAPSULE, DELAYED RELEASE ORAL
Qty: 90 CAPSULE | Refills: 0 | Status: SHIPPED | OUTPATIENT
Start: 2022-06-30 | End: 2022-09-28

## 2022-07-06 ENCOUNTER — EVALUATION (OUTPATIENT)
Dept: PHYSICAL THERAPY | Facility: CLINIC | Age: 62
End: 2022-07-06
Payer: COMMERCIAL

## 2022-07-06 DIAGNOSIS — M25.511 CHRONIC PAIN OF BOTH SHOULDERS: Primary | ICD-10-CM

## 2022-07-06 DIAGNOSIS — M25.512 CHRONIC PAIN OF BOTH SHOULDERS: Primary | ICD-10-CM

## 2022-07-06 DIAGNOSIS — G89.29 CHRONIC PAIN OF BOTH SHOULDERS: Primary | ICD-10-CM

## 2022-07-06 PROCEDURE — 97530 THERAPEUTIC ACTIVITIES: CPT | Performed by: PHYSICAL THERAPIST

## 2022-07-06 PROCEDURE — 97140 MANUAL THERAPY 1/> REGIONS: CPT | Performed by: PHYSICAL THERAPIST

## 2022-07-06 PROCEDURE — 97110 THERAPEUTIC EXERCISES: CPT | Performed by: PHYSICAL THERAPIST

## 2022-07-06 NOTE — PROGRESS NOTES
PT Discharge    Today's date: 2022  Patient name: Chrissy Almodovar  : 1960  MRN: [id number]  Referring provider: Manuel Jiang PT  Dx:   Encounter Diagnosis     ICD-10-CM    1  Chronic pain of both shoulders  M25 511     G89 29     M25 512                   Assessment  Assessment details: Pt is being treated with outpatient PT  She has  made steady gains in rom, strength, pain reduction and functional mobility but continues to have deficits  She has reached her max benefit from formal PT and will be DC'd to HEP  Thank you for this referral         Understanding of Dx/Px/POC: good   Prognosis: good    Goals  Short Term Goals: -met  Independent performance of initial hep  Decrease pain 2 points on VAS      Long Term Goals: Independent performance of comprehensive hep-met  Work performance is returned to max level of function-partially met  Performance of IADL's is returned to max level of function-partially met  Performance in recreational activities is improved to max level of function-partially met  Able to reach overhead with min pain-partially [place] [address] details: DC to hep  Planned therapy interventions: joint mobilization, manual therapy, massage, IADL retraining, patient education, postural training, home exercise program, strengthening, stretching, therapeutic activities, therapeutic exercise and therapeutic training        Subjective Evaluation    History of Present Illness  Mechanism of injury: Pt reports that since starting PT that she has noticed a reduction in her L lateral shoulder pain but that she continues to have B/L shoulder pain when reaching into abduction  Reports compliance with her current hep and instructed her on the importance of continuing this program   Discussed scheduling an evaluation with ortho to assess her remaining symptoms     Pain  At best pain ratin  At worst pain ratin    Patient Goals  Patient goals for therapy: decreased pain, increased motion, increased strength, independence with ADLs/IADLs, return to sport/leisure activities and return to work          Objective       R shoulder:    AROM:   Flexion: 159   Abduction: 1155   Pain with mid range abduction    PROM:   Flexion: 160   Abduction: 160   ER: 65   IR: 50    Strength:    Flexion: 4 /5   Abduction:  4 /5   ER:    4-/5   IR:     4/5    Slight pain with resisted ER    Buckley: pos  Neer: neg  Empty can: neg  Painful arc: pos  Scour: neg                      L shoulder  AROM:   Flexion: 145   Abduction: 150   Pain with mid range flexion and abduction    PROM:   Flexion: 150   Abduction: 150   ER:  70   IR: 54    Strength:    Flexion: 4 /5   Abduction:  4 /5   ER:    3/5   IR:     4/5    Pain with resisted ER>IR    Buckley: pos  Neer: pos  Empty can: pos  Painful arc: pos  Scour: neg             Precautions: fibromyalgia, chronic lumbar pain, chronic B/L shoulder pain            Manuals 6/15 6/20  6/22 6/27 6/29 7/6 5/23 5/26 6/1 6/6 6/8   Laser L shoulder JLW kl JLW NA JLW kl JLW MO JLW kl kl   Graston L distal deltoid JLW kl JLW NA - did manually as pt did not tolerate graston tools STM  JLW STM  KL JLW MO JLW kl kl                               Neuro Re-Ed                                                                                                                Ther Ex              pulleys flex   5"x20 Flexion 5"x20 flex  5"x20 Flex 5"x20 flex  10"x10 10"x10   5"x20 5"x20    rows red   20 Red x20 red  20 Red 20 red  20 Red x20 red  20 Red  20 red  20 Red x20 Red x15   Low rows red   20 pain red  20 Red 20 red  20 Red x20 red  20 Red  20 red  20 Red x20 Red x15   B/L tb ER     red  20 Red x20        Thoracic ext stretch seated              scap retraction with cane ext  5"x10 5"x10 5"x10 5"x10   5"x10 5"x10 5"x10 5"x10 5"x10   standing cane scap     5"x10  5"x10 5"x10 5"x10      Table slides              wall slides 5"x20 5"x20  5"x20 5"x20 abd  5"x10                       Ther Activity ube 4'/4' 4'/4' 4'/4' 3'/3'  4'/4' 4'/4' 4'/4' 4'/4' 4'/4' 4'/4' 4'/4'                 Gait Training                                          Modalities              MHP post tx 10' 10' 10' 10'  10' 10' MHP L shoulder seated 10' MHP L should  seated 10' CP L shoulder  seated  10' mhp x10' mhp x10'

## 2022-07-19 ENCOUNTER — APPOINTMENT (OUTPATIENT)
Dept: LAB | Facility: AMBULARY SURGERY CENTER | Age: 62
End: 2022-07-19
Payer: COMMERCIAL

## 2022-07-19 DIAGNOSIS — I10 HYPERTENSION, UNSPECIFIED TYPE: ICD-10-CM

## 2022-07-19 DIAGNOSIS — R79.82 ELEVATED C-REACTIVE PROTEIN (CRP): ICD-10-CM

## 2022-07-19 DIAGNOSIS — Z00.8 HEALTH EXAMINATION IN POPULATION SURVEY: ICD-10-CM

## 2022-07-19 DIAGNOSIS — E55.9 HYPOVITAMINOSIS D: ICD-10-CM

## 2022-07-19 DIAGNOSIS — Z13.220 LIPID SCREENING: ICD-10-CM

## 2022-07-19 LAB
25(OH)D3 SERPL-MCNC: 29.6 NG/ML (ref 30–100)
ALBUMIN SERPL BCP-MCNC: 3.4 G/DL (ref 3.5–5)
ALP SERPL-CCNC: 70 U/L (ref 46–116)
ALT SERPL W P-5'-P-CCNC: 31 U/L (ref 12–78)
ANION GAP SERPL CALCULATED.3IONS-SCNC: 6 MMOL/L (ref 4–13)
AST SERPL W P-5'-P-CCNC: 19 U/L (ref 5–45)
BILIRUB SERPL-MCNC: 0.41 MG/DL (ref 0.2–1)
BUN SERPL-MCNC: 16 MG/DL (ref 5–25)
CALCIUM ALBUM COR SERPL-MCNC: 9.7 MG/DL (ref 8.3–10.1)
CALCIUM SERPL-MCNC: 9.2 MG/DL (ref 8.3–10.1)
CHLORIDE SERPL-SCNC: 109 MMOL/L (ref 96–108)
CHOLEST SERPL-MCNC: 182 MG/DL
CO2 SERPL-SCNC: 27 MMOL/L (ref 21–32)
CREAT SERPL-MCNC: 0.85 MG/DL (ref 0.6–1.3)
CRP SERPL QL: <3 MG/L
ERYTHROCYTE [DISTWIDTH] IN BLOOD BY AUTOMATED COUNT: 12.1 % (ref 11.6–15.1)
EST. AVERAGE GLUCOSE BLD GHB EST-MCNC: 117 MG/DL
GFR SERPL CREATININE-BSD FRML MDRD: 75 ML/MIN/1.73SQ M
GLUCOSE P FAST SERPL-MCNC: 118 MG/DL (ref 65–99)
HBA1C MFR BLD: 5.7 %
HCT VFR BLD AUTO: 39.1 % (ref 34.8–46.1)
HDLC SERPL-MCNC: 49 MG/DL
HGB BLD-MCNC: 13.1 G/DL (ref 11.5–15.4)
LDLC SERPL CALC-MCNC: 108 MG/DL (ref 0–100)
MCH RBC QN AUTO: 31 PG (ref 26.8–34.3)
MCHC RBC AUTO-ENTMCNC: 33.5 G/DL (ref 31.4–37.4)
MCV RBC AUTO: 93 FL (ref 82–98)
NONHDLC SERPL-MCNC: 133 MG/DL
PLATELET # BLD AUTO: 327 THOUSANDS/UL (ref 149–390)
PMV BLD AUTO: 9.3 FL (ref 8.9–12.7)
POTASSIUM SERPL-SCNC: 3.2 MMOL/L (ref 3.5–5.3)
PROT SERPL-MCNC: 7.1 G/DL (ref 6.4–8.4)
RBC # BLD AUTO: 4.22 MILLION/UL (ref 3.81–5.12)
SODIUM SERPL-SCNC: 142 MMOL/L (ref 135–147)
TRIGL SERPL-MCNC: 126 MG/DL
TSH SERPL DL<=0.05 MIU/L-ACNC: 1.81 UIU/ML (ref 0.45–4.5)
WBC # BLD AUTO: 5.94 THOUSAND/UL (ref 4.31–10.16)

## 2022-07-19 PROCEDURE — 36415 COLL VENOUS BLD VENIPUNCTURE: CPT

## 2022-07-19 PROCEDURE — 84443 ASSAY THYROID STIM HORMONE: CPT

## 2022-07-19 PROCEDURE — 85027 COMPLETE CBC AUTOMATED: CPT

## 2022-07-19 PROCEDURE — 82306 VITAMIN D 25 HYDROXY: CPT

## 2022-07-19 PROCEDURE — 83036 HEMOGLOBIN GLYCOSYLATED A1C: CPT

## 2022-07-19 PROCEDURE — 80061 LIPID PANEL: CPT

## 2022-07-19 PROCEDURE — 80053 COMPREHEN METABOLIC PANEL: CPT

## 2022-07-19 PROCEDURE — 86140 C-REACTIVE PROTEIN: CPT

## 2022-07-20 ENCOUNTER — TELEPHONE (OUTPATIENT)
Dept: FAMILY MEDICINE CLINIC | Facility: CLINIC | Age: 62
End: 2022-07-20

## 2022-07-20 DIAGNOSIS — E87.6 LOW SERUM POTASSIUM: ICD-10-CM

## 2022-07-20 RX ORDER — POTASSIUM CHLORIDE 750 MG/1
10 TABLET, EXTENDED RELEASE ORAL DAILY
Qty: 90 TABLET | Refills: 3 | Status: SHIPPED | OUTPATIENT
Start: 2022-07-20

## 2022-07-20 NOTE — TELEPHONE ENCOUNTER
----- Message from [id number] sent at 7/20/2022  9:15 AM EDT -----  Cholesterol is better  Vitamin D level is slightly low  Potassium is low at 3 2  Is she taking her potassium supplement? It doesn't look like it has been refilled for a long time

## 2022-07-20 NOTE — TELEPHONE ENCOUNTER
I refilled potassium in case she needs it  Take potassium 2 tablets (20 meq) twice daily for 2 days  Then go back to just 1 tablet (10 meq) daily

## 2022-07-20 NOTE — TELEPHONE ENCOUNTER
Pt aware and will start taking her potassium and vitamin d3 again  Scheduled an appointment with you next week

## 2022-07-27 ENCOUNTER — OFFICE VISIT (OUTPATIENT)
Dept: FAMILY MEDICINE CLINIC | Facility: CLINIC | Age: 62
End: 2022-07-27
Payer: COMMERCIAL

## 2022-07-27 VITALS
HEIGHT: 62 IN | TEMPERATURE: 98.2 F | WEIGHT: 172.2 LBS | HEART RATE: 77 BPM | BODY MASS INDEX: 31.69 KG/M2 | DIASTOLIC BLOOD PRESSURE: 82 MMHG | RESPIRATION RATE: 16 BRPM | OXYGEN SATURATION: 96 % | SYSTOLIC BLOOD PRESSURE: 124 MMHG

## 2022-07-27 DIAGNOSIS — E87.6 HYPOKALEMIA: ICD-10-CM

## 2022-07-27 DIAGNOSIS — R73.01 IMPAIRED FASTING GLUCOSE: ICD-10-CM

## 2022-07-27 DIAGNOSIS — M79.7 FIBROMYALGIA: ICD-10-CM

## 2022-07-27 DIAGNOSIS — E66.9 OBESITY (BMI 30-39.9): ICD-10-CM

## 2022-07-27 DIAGNOSIS — Z00.00 PHYSICAL EXAM: Primary | ICD-10-CM

## 2022-07-27 DIAGNOSIS — E55.9 HYPOVITAMINOSIS D: ICD-10-CM

## 2022-07-27 DIAGNOSIS — I10 HYPERTENSION, UNSPECIFIED TYPE: ICD-10-CM

## 2022-07-27 DIAGNOSIS — Z99.89 CPAP (CONTINUOUS POSITIVE AIRWAY PRESSURE) DEPENDENCE: ICD-10-CM

## 2022-07-27 PROCEDURE — 99396 PREV VISIT EST AGE 40-64: CPT | Performed by: NURSE PRACTITIONER

## 2022-07-27 RX ORDER — MELATONIN
1000 DAILY
COMMUNITY

## 2022-07-27 NOTE — PROGRESS NOTES
FAMILY PRACTICE OFFICE VISIT       NAME: Patricia Malcolm  AGE: 63 y o  SEX: female       : 1960        MRN: [id number]    Assessment and Plan   1  Physical exam  Comments:  Mammogram, Pap smear, colonoscopy up-to-date  COVID-19 vaccinations and Tdap up-to-date  Dental exam and eye exam up-to-date  Lipid screening up-to-date with total cholesterol 182, triglycerides 126, HDL 49,   ASCVD risk score 4 2%  2  Hypertension, unspecified type  Assessment & Plan:  Blood pressure is well controlled on losartan 25 mg and hydrochlorothiazide 12 5 mg daily  3  Hypokalemia  Comments:  Potassium 3 2 secondary to hydrochlorothiazide  Will resume potassium supplementation  Repeat BMP to 2 weeks after starting potassium  Orders:  -     Potassium; Future    4  CPAP (continuous positive airway pressure) dependence  Assessment & Plan:  Obstructive sleep apnea, stable on CPAP  5  Fibromyalgia  Assessment & Plan:  Overall stable  Poor sleep flares symptoms  6  Hypovitaminosis D  Assessment & Plan:  Vitamin-D 25 hydroxy 29 6  Gets adequate son late in the summer  Recommend taking vitamin-D 3 supplement  1000 units daily during the winter months  7  Impaired fasting glucose  Assessment & Plan:  Hemoglobin A1c stable at 5 7%  Continue to work on lifestyle modifications  8  Obesity (BMI 30-39  9)  Assessment & Plan:  BMI 31 5  Continue to work on regular exercise, healthy eating, lots of fruits and vegetables, watch portions, and weight loss  Follow-up in 6 months  Chief Complaint     Chief Complaint   Patient presents with    Physical Exam     Annual well exam       History of Present Illness     Patricia Malcolm is a 63year old female presenting today for annual exam      Portillo Begun taking Potassium is on HCTZ, and hypokalemic on recent blood work  Needs to  potassium supplement prescription, will do today       Was not taking D supplement, reason vitamin-D 25 hydroxy level 29 6  Does get good sun exposure during summer months  Fibromyalgia--deals with it  More frequent right now  Inadequate sleep is factor  Leg cramps last night  Used magnesium spray  Leg cramps also likely exacerbated by hypokalemia  CPAP stable  Exercise-- could do better  Physical job--walking and lifting  Diet could do better  Hearing--better after cerumen impaction cleared  Had hearing test, normal for age  Vision--Right eye cataract surgery completed, needs left eye cataract surgery  Dental exam up to date  Needs to have 2 teeth removed  Gyn scheduled in September  Colonosocpy up to date  Review of Systems   Review of Systems   Constitutional: Negative  HENT: Negative  Respiratory: Negative  Cardiovascular: Negative  Gastrointestinal: Negative  Genitourinary: Negative  Musculoskeletal: Negative  Skin: Negative  Neurological: Negative  Hematological: Negative  Psychiatric/Behavioral: Negative  I have reviewed the patient's medical history in detail; there are no changes to the history as noted in the electronic medical record  Objective     Vitals:    07/27/22 1724   BP: 124/82   BP Location: Left arm   Patient Position: Sitting   Cuff Size: Large   Pulse: 77   Resp: 16   Temp: 98 2 °F (36 8 °C)   TempSrc: Temporal   SpO2: 96%   Weight: 78 1 kg (172 lb 3 2 oz)   Height: 5' 2" (1 575 m)     Wt Readings from Last 3 Encounters:   07/27/22 78 1 kg (172 lb 3 2 oz)   02/23/22 78 5 kg (173 lb)   11/19/21 77 5 kg (170 lb 12 8 oz)     Physical Exam  Constitutional:       General: She is not in acute distress  Appearance: Normal appearance  She is well-developed  She is not ill-appearing  HENT:      Head: Normocephalic and atraumatic  Right Ear: Tympanic membrane normal       Left Ear: Tympanic membrane normal       Mouth/Throat:      Mouth: Mucous membranes are moist       Pharynx: Oropharynx is clear     Eyes: Conjunctiva/sclera: Conjunctivae normal       Pupils: Pupils are equal, round, and reactive to light  Neck:      Thyroid: No thyromegaly  Cardiovascular:      Rate and Rhythm: Normal rate and regular rhythm  Heart sounds: No murmur heard  Pulmonary:      Effort: Pulmonary effort is normal  No respiratory distress  Breath sounds: Normal breath sounds  No wheezing or rales  Abdominal:      General: Bowel sounds are normal       Palpations: Abdomen is soft  Tenderness: There is no abdominal tenderness  Musculoskeletal:      Cervical back: Normal range of motion and neck supple  Right lower leg: No edema  Left lower leg: No edema  Comments: Left 5th finger Heberden's node   Lymphadenopathy:      Cervical: No cervical adenopathy  Skin:     Findings: No rash  Neurological:      Mental Status: She is alert and oriented to person, place, and time  Psychiatric:         Mood and Affect: Mood normal          Depression Screening and Follow-up Plan: Patient was screened for depression during today's encounter  They screened negative with a PHQ-2 score of 0  ALLERGIES:  Allergies   Allergen Reactions    Escitalopram Swelling and Edema     Category:  Allergy;     Fluticasone-Salmeterol      UNKNOWN    Nsaids      Action Taken: stomach issues;     Gluten Meal - Food Allergy Diarrhea    Latex      irritation      Other Rash     Adhesive bandages       Current Medications     Current Outpatient Medications   Medication Sig Dispense Refill    Ascorbic Acid (VITAMIN C PO) Take 1 tablet by mouth daily      cholecalciferol (VITAMIN D3) 1,000 units tablet Take 1,000 Units by mouth daily      Cyanocobalamin (VITAMIN B-12) 500 MCG LOZG Take 500 mcg by mouth every other day       Diclofenac Sodium (VOLTAREN) 1 % Apply 2 g topically 4 (four) times a day 100 g 5    DULoxetine (CYMBALTA) 30 mg delayed release capsule TAKE 1 CAPSULE BY MOUTH DAILY 90 capsule 0    Elderberry 575 MG/5ML SYRP Take by mouth      hydrochlorothiazide (HYDRODIURIL) 12 5 mg tablet TAKE ONE TABLET BY MOUTH DAILY 90 tablet 0    losartan (COZAAR) 25 mg tablet TAKE ONE TABLET BY MOUTH DAILY 90 tablet 0    Magnesium Gluconate 250 MG TABS Take 1 tablet by mouth daily      potassium chloride (K-DUR,KLOR-CON) 10 mEq tablet Take 1 tablet (10 mEq total) by mouth daily (Patient not taking: Reported on 7/27/2022) 90 tablet 3    vitamin E 100 UNIT capsule Take 100 Units by mouth daily      Zinc 50 MG CAPS Take 1 capsule by mouth daily       No current facility-administered medications for this visit           Health Maintenance     Health Maintenance   Topic Date Due    HIV Screening  Never done    COVID-19 Vaccine (3 - Booster for Ferro Peter series) 02/22/2022    PT PLAN OF CARE  04/29/2022    Influenza Vaccine (1) 09/01/2022    Annual Physical  09/02/2022    BMI: Followup Plan  11/20/2022    Depression Screening  07/27/2023    BMI: Adult  07/27/2023    Breast Cancer Screening: Mammogram  10/14/2023    Cervical Cancer Screening  08/20/2025    Colorectal Cancer Screening  03/17/2026    DTaP,Tdap,and Td Vaccines (2 - Td or Tdap) 02/20/2027    Hepatitis C Screening  Completed    Pneumococcal Vaccine: Pediatrics (0 to 5 Years) and At-Risk Patients (6 to 59 Years)  Aged Out    HIB Vaccine  Aged Out    Hepatitis B Vaccine  Aged Out    IPV Vaccine  Aged Out    Hepatitis A Vaccine  Aged Out    Meningococcal ACWY Vaccine  Aged Out    HPV Vaccine  Aged [employer] History   Administered Date(s) Administered    COVID-19 PFIZER VACCINE 0 3 ML IM 08/25/2021, 09/22/2021    Influenza, seasonal, injectable 02/15/2017    Tdap 02/20/2017       JOSÉ ANTONIO Gomes

## 2022-07-31 NOTE — ASSESSMENT & PLAN NOTE
BMI 31 5  Continue to work on regular exercise, healthy eating, lots of fruits and vegetables, watch portions, and weight loss

## 2022-07-31 NOTE — ASSESSMENT & PLAN NOTE
Vitamin-D 25 hydroxy 29 6  Gets adequate son late in the summer  Recommend taking vitamin-D 3 supplement  1000 units daily during the winter months

## 2022-08-09 ENCOUNTER — HOSPITAL ENCOUNTER (OUTPATIENT)
Dept: RADIOLOGY | Facility: HOSPITAL | Age: 62
Discharge: HOME | End: 2022-08-09
Payer: COMMERCIAL

## 2022-08-09 ENCOUNTER — OFFICE VISIT (OUTPATIENT)
Dept: OBGYN CLINIC | Facility: HOSPITAL | Age: 62
End: 2022-08-09
Payer: COMMERCIAL

## 2022-08-09 VITALS
HEART RATE: 76 BPM | WEIGHT: 171.2 LBS | BODY MASS INDEX: 31.5 KG/M2 | DIASTOLIC BLOOD PRESSURE: 86 MMHG | HEIGHT: 62 IN | SYSTOLIC BLOOD PRESSURE: 131 MMHG

## 2022-08-09 DIAGNOSIS — I10 HYPERTENSION, UNSPECIFIED TYPE: ICD-10-CM

## 2022-08-09 DIAGNOSIS — M25.512 LEFT SHOULDER PAIN, UNSPECIFIED CHRONICITY: ICD-10-CM

## 2022-08-09 DIAGNOSIS — M25.512 LEFT SHOULDER PAIN, UNSPECIFIED CHRONICITY: Primary | ICD-10-CM

## 2022-08-09 PROCEDURE — 73030 X-RAY EXAM OF SHOULDER: CPT

## 2022-08-09 PROCEDURE — 99203 OFFICE O/P NEW LOW 30 MIN: CPT | Performed by: PHYSICIAN ASSISTANT

## 2022-08-09 RX ORDER — METHYLPREDNISOLONE 4 MG/1
TABLET ORAL
Qty: 21 TABLET | Refills: 0 | Status: SHIPPED | OUTPATIENT
Start: 2022-08-09

## 2022-08-09 NOTE — PROGRESS NOTES
Assessment:    Left chronic atraumatic lateral shoulder pain / weakness, persisting despite outpatient physical therapy strengthening program      Plan:    Order MRI left shoulder without contrast for further evaluation   Can continue home exercise program  Rx Medrol dose pack to see if this improves symptoms  Referral to Dr Griselda Tilley for follow-up and further treatment after MRI      Problem List Items Addressed This Visit    None     Visit Diagnoses     Left shoulder pain, unspecified chronicity    -  Primary    Relevant Orders    XR shoulder 2+ vw left                   Subjective:     HPI    Patient ID:  Valeriy Cuellar is a 63 y o  female presenting for evaluation of the left shoulder  According to the patient, she has a 10 month history of progressively worsening left shoulder pain and weakness  She states after her second COVID vaccine she started to experience deep muscular type pain located in the lateral shoulder deltoid region for which she did physical therapy and her symptoms improved  Somewhere along the line her symptoms worsened and she describes it as dull pain located lateral shoulder and sharp when she moves her arm in any plane  She feels like her range of motion is severely limited and she has weakness in all planes  It is affecting her everyday activities  She did do formal physical therapy for at least two months however it did not entirely improve her symptoms  She does get occasional numbness and tingling in the fingertips mostly at night however she feels like it does not radiate from her neck or arm  There was no injury or trauma to the shoulder  She has no history of shoulder surgery  She does have a history of cervical spine issues, and she states this feels different to her  She does not feel like there is any pain radiating from her cervical spine  She is unable to tolerate NSAID medications      The following portions of the patient's history were reviewed and updated as appropriate: allergies, current medications, past family history, past medical history, past social history, past surgical history and problem list     Review of Systems     Objective:    Imaging:  Left shoulder x-rays demonstrate no acute findings  No significant degenerative changes  Physical Exam     Vitals:    08/09/22 0756   BP: 131/86   Pulse: 76       Orthopedic Examination:  Left shoulder     Inspection:  No open wounds or erythema  There is no obvious deformity of the shoulder  Shoulders are symmetric  There is no Luis Alfredo deformity  There is no ecchymosis  Palpation:  There is point tenderness to palpation superior deltoid region  The lesser extent, there is mild tenderness at the acromion region  There is no tenderness to palpation left trapezius muscle or posterior scapular region  The proximal and distal biceps are nontender  The triceps nontender  Range-of-motion:  Painful range of motion in all planes with restriction    She gets to about 150 degrees of forward flexion, 90 degrees of shoulder abduction  90 degrees of external rotation, painful internal rotation    Strength:  3/5 deltoid supraspinatus infraspinatus subscapularis    Sensation:  Intact axillary muscular cutaneous median radial ulnar nerve distribution    Special Tests:  Negative Spurling's to the left  Positive Buckley/Neer's impingement  Positive empty can   Weakness with drop-arm  Negative speed's   Negative [place]'s   Negative hook test   Positive cross-body adduction test

## 2022-08-10 RX ORDER — LOSARTAN POTASSIUM 25 MG/1
TABLET ORAL
Qty: 90 TABLET | Refills: 3 | Status: SHIPPED | OUTPATIENT
Start: 2022-08-10

## 2022-09-06 ENCOUNTER — OFFICE VISIT (OUTPATIENT)
Dept: FAMILY MEDICINE CLINIC | Facility: CLINIC | Age: 62
End: 2022-09-06
Payer: COMMERCIAL

## 2022-09-06 VITALS
TEMPERATURE: 97.1 F | WEIGHT: 170.38 LBS | RESPIRATION RATE: 18 BRPM | SYSTOLIC BLOOD PRESSURE: 130 MMHG | OXYGEN SATURATION: 97 % | DIASTOLIC BLOOD PRESSURE: 86 MMHG | HEIGHT: 62 IN | BODY MASS INDEX: 31.35 KG/M2 | HEART RATE: 93 BPM

## 2022-09-06 DIAGNOSIS — R39.9 UTI SYMPTOMS: Primary | ICD-10-CM

## 2022-09-06 DIAGNOSIS — N10 ACUTE PYELONEPHRITIS: ICD-10-CM

## 2022-09-06 LAB
SL AMB  POCT GLUCOSE, UA: NORMAL
SL AMB LEUKOCYTE ESTERASE,UA: NORMAL
SL AMB POCT BILIRUBIN,UA: NORMAL
SL AMB POCT BLOOD,UA: NORMAL
SL AMB POCT CLARITY,UA: CLEAR
SL AMB POCT COLOR,UA: NORMAL
SL AMB POCT KETONES,UA: NORMAL
SL AMB POCT NITRITE,UA: NORMAL
SL AMB POCT PH,UA: 7.5
SL AMB POCT SPECIFIC GRAVITY,UA: 1.01
SL AMB POCT URINE PROTEIN: NORMAL
SL AMB POCT UROBILINOGEN: 0.2

## 2022-09-06 PROCEDURE — 81003 URINALYSIS AUTO W/O SCOPE: CPT | Performed by: FAMILY MEDICINE

## 2022-09-06 PROCEDURE — 99214 OFFICE O/P EST MOD 30 MIN: CPT | Performed by: FAMILY MEDICINE

## 2022-09-06 RX ORDER — SULFAMETHOXAZOLE AND TRIMETHOPRIM 800; 160 MG/1; MG/1
1 TABLET ORAL EVERY 12 HOURS SCHEDULED
Qty: 14 TABLET | Refills: 0 | Status: SHIPPED | OUTPATIENT
Start: 2022-09-06 | End: 2022-09-09

## 2022-09-06 NOTE — PATIENT INSTRUCTIONS
Urine dipstick compatible with a urinary tract infection  No blood in the urine  As symptoms have radiated up to her flank, will treat as a pyelonephritis or kidney infection with Bactrim ds twice daily for 1 week  Tylenol or ibuprofen as needed for pain  Follow-up if not improving

## 2022-09-06 NOTE — PROGRESS NOTES
Chief Complaint   Patient presents with    Urinary Tract Infection     Frequency , pressure , mid lower back pain x 2 days          HPI   Here with 3 days of intermittent pain in the right flank which radiates around anteriorly  No dysuria  Concerned about a UTI  Does have frequency  No history of renal colic  Discomfort is worse when she drinks  Not related to eating  Currently no pain  Did not take any Advil or Tylenol  Past Medical History:   Diagnosis Date    Anemia     Anemia 12/2/2016    Anxiety and depression     Asthma 10/26/2012    Overview:  ICD10 clean up    Chronic fatigue syndrome     Colon polyp     CPAP (continuous positive airway pressure) dependence     DDD (degenerative disc disease), lumbosacral 12/27/2018    Gluten intolerance     Hypersomnia 6/6/2018    Hypertension     Hypovitaminosis D     Impaired fasting glucose     Migraine     Obesity (BMI 30-39 9) 6/6/2018        Past Surgical History:   Procedure Laterality Date    APPENDECTOMY      BREAST BIOPSY Right 2006    not sure of the date    CHOLECYSTECTOMY      COLONOSCOPY      MI COLONOSCOPY FLX DX W/COLLJ SPEC WHEN PFRMD N/A 12/4/2017    Procedure: COLONOSCOPY;  Surgeon: Abi Huang DO;  Location: [facility];   Service: Gastroenterology    UPPER GASTROINTESTINAL ENDOSCOPY         Social History     Tobacco Use    Smoking status: Never Smoker    Smokeless tobacco: Never Used   Substance Use Topics    Alcohol use: Yes     Comment: rarely       Social History     Social History Narrative    Education: bachelor's degree in sociology    Learning Disabilities: none    Marital History: single    Children: none    Living Arrangement: lives alone    Occupational History: works for [employer] at [employer]: limited support system    Legal History: none     History: None    Caffeine use denied            The following portions of the patient's history were reviewed and updated as appropriate: allergies, current medications, past family history, past medical history, past social history, past surgical history and problem list       Review of Systems       /86   Pulse 93   Temp (!) 97 1 °F (36 2 °C)   Resp 18   Ht 5' 2" (1 575 m)   Wt 77 3 kg (170 lb 6 oz)   SpO2 97%   BMI 31 16 kg/m²      Physical Exam   Appears well and in no discomfort  No flank tenderness  Lungs are clear  No rash present on the right side of the back or around the torso  Abdomen is soft and nontender  Heart regular  Dipstick urine reveals a large amount of leukocytes, no blood                Current Outpatient Medications:     Ascorbic Acid (VITAMIN C PO), Take 1 tablet by mouth daily, Disp: , Rfl:     cholecalciferol (VITAMIN D3) 1,000 units tablet, Take 1,000 Units by mouth daily, Disp: , Rfl:     Cyanocobalamin (VITAMIN B-12) 500 MCG LOZG, Take 500 mcg by mouth every other day , Disp: , Rfl:     Diclofenac Sodium (VOLTAREN) 1 %, Apply 2 g topically 4 (four) times a day, Disp: 100 g, Rfl: 5    DULoxetine (CYMBALTA) 30 mg delayed release capsule, TAKE 1 CAPSULE BY MOUTH DAILY, Disp: 90 capsule, Rfl: 0    Elderberry 575 MG/5ML SYRP, Take by mouth, Disp: , Rfl:     hydrochlorothiazide (HYDRODIURIL) 12 5 mg tablet, TAKE ONE TABLET BY MOUTH DAILY, Disp: 90 tablet, Rfl: 0    losartan (COZAAR) 25 mg tablet, TAKE ONE TABLET BY MOUTH DAILY, Disp: 90 tablet, Rfl: 3    Magnesium Gluconate 250 MG TABS, Take 1 tablet by mouth daily, Disp: , Rfl:     potassium chloride (K-DUR,KLOR-CON) 10 mEq tablet, Take 1 tablet (10 mEq total) by mouth daily, Disp: 90 tablet, Rfl: 3    sulfamethoxazole-trimethoprim (BACTRIM DS) 800-160 mg per tablet, Take 1 tablet by mouth every 12 (twelve) hours for 3 days, Disp: 14 tablet, Rfl: 0    vitamin E 100 UNIT capsule, Take 100 Units by mouth daily, Disp: , Rfl:     Zinc 50 MG CAPS, Take 1 capsule by mouth daily, Disp: , Rfl:    methylPREDNISolone 4 MG tablet therapy pack, 24mg PO on day 1, then decrease by 4mg/day x 5 days per dose pack instructions  (Patient not taking: Reported on 9/6/2022), Disp: 21 tablet, Rfl: 0     No problem-specific Assessment & Plan notes found for this encounter  Diagnoses and all orders for this visit:    Acute pyelonephritis  -     sulfamethoxazole-trimethoprim (BACTRIM DS) 800-160 mg per tablet; Take 1 tablet by mouth every 12 (twelve) hours for 3 days        Patient Instructions   Urine dipstick compatible with a urinary tract infection  No blood in the urine  As symptoms have radiated up to her flank, will treat as a pyelonephritis or kidney infection with Bactrim ds twice daily for 1 week  Tylenol or ibuprofen as needed for pain  Follow-up if not improving

## 2022-09-07 ENCOUNTER — HOSPITAL ENCOUNTER (OUTPATIENT)
Dept: RADIOLOGY | Age: 62
Discharge: HOME | End: 2022-09-07
Payer: COMMERCIAL

## 2022-09-07 DIAGNOSIS — M25.512 LEFT SHOULDER PAIN, UNSPECIFIED CHRONICITY: ICD-10-CM

## 2022-09-07 PROCEDURE — 73221 MRI JOINT UPR EXTREM W/O DYE: CPT

## 2022-09-07 PROCEDURE — G1004 CDSM NDSC: HCPCS

## 2022-09-09 DIAGNOSIS — M75.100 NONTRAUMATIC TEAR OF ROTATOR CUFF, UNSPECIFIED LATERALITY, UNSPECIFIED TEAR EXTENT: Primary | ICD-10-CM

## 2022-09-19 ENCOUNTER — OFFICE VISIT (OUTPATIENT)
Dept: OBGYN CLINIC | Facility: OTHER | Age: 62
End: 2022-09-19
Payer: COMMERCIAL

## 2022-09-19 VITALS
WEIGHT: 168.2 LBS | HEIGHT: 62 IN | BODY MASS INDEX: 30.95 KG/M2 | DIASTOLIC BLOOD PRESSURE: 82 MMHG | HEART RATE: 68 BPM | SYSTOLIC BLOOD PRESSURE: 134 MMHG

## 2022-09-19 DIAGNOSIS — M75.102 NONTRAUMATIC TEAR OF LEFT ROTATOR CUFF, UNSPECIFIED TEAR EXTENT: Primary | ICD-10-CM

## 2022-09-19 DIAGNOSIS — M75.100 NONTRAUMATIC TEAR OF ROTATOR CUFF, UNSPECIFIED LATERALITY, UNSPECIFIED TEAR EXTENT: ICD-10-CM

## 2022-09-19 PROCEDURE — 99213 OFFICE O/P EST LOW 20 MIN: CPT | Performed by: ORTHOPAEDIC SURGERY

## 2022-09-19 NOTE — PROGRESS NOTES
Chief Complaint:  Left shoulder    HPI:    Thania Arrington is a 63y o  year old Female who presents today for new evaluation and treatment of left shoulder        Description of symptoms: Patient is a right-hand-dominant lady who presents today for evaluation of left shoulder pain which has been present for nearly 1 year duration  Denies any trauma prior to the onset of symptoms  She has done physical therapy rehabilitation with persistent pain and weakness  Pain is noted primarily on the lateral shoulder with radiates to her left mid arm  Symptoms are made worse with lifting her left arm or reaching behind her back  This significantly limits her functionally  Upon her last office visit with orthopedic YOANA Hart an MRI of the left shoulder was obtained and this reveals supraspinatus tendinosis with high-grade anterior leading edge undersurface tear measuring about 8 x 7 mm  Additionally mild infraspinatus and subscapular tendinosis, mild subacromial bursitis and mild long head of the biceps tendinosis without a tear are not ed  The patient is intolerant to oral NSAIDs    Summary of treatment to-date:   Medrol dose pack  Home exercise program  Formal outpatient physical therapy     I have personally reviewed pertinent films in PACS    08/09/2022 x-ray left shoulder: No acute osseous abnormality  09/07/2022 MRI left shoulder:  1  Supraspinatus tendinosis with high-grade (2/3rd thickness) anterior leading and undersurface tear at the footprint measuring 8/9 mm 2  Mild infraspinatus and subscapularis tendinosis without tear 3  Mild subacromial/subdeltoid bursitis 4  Mild intra-articular long head of the biceps tendinosis without tear    Patient Active Problem List   Diagnosis    Chronic fatigue syndrome    Hypertension    Fibromyalgia    Gluten intolerance    Hypovitaminosis D    Impaired fasting glucose    Migraine    Obstructive sleep apnea    Obesity (BMI 30-39  9)    Herniated lumbar intervertebral disc    DDD (degenerative disc disease), lumbosacral    CPAP (continuous positive airway pressure) dependence    Spondylolisthesis of lumbar region    Degenerative lumbar spinal stenosis    Bilateral tinnitus        Current Outpatient Medications on File Prior to Visit   Medication Sig Dispense Refill    Ascorbic Acid (VITAMIN C PO) Take 1 tablet by mouth daily      cholecalciferol (VITAMIN D3) 1,000 units tablet Take 1,000 Units by mouth daily      Cyanocobalamin (VITAMIN B-12) 500 MCG LOZG Take 500 mcg by mouth every other day       Diclofenac Sodium (VOLTAREN) 1 % Apply 2 g topically 4 (four) times a day 100 g 5    DULoxetine (CYMBALTA) 30 mg delayed release capsule TAKE 1 CAPSULE BY MOUTH DAILY 90 capsule 0    Elderberry 575 MG/5ML SYRP Take by mouth      hydrochlorothiazide (HYDRODIURIL) 12 5 mg tablet TAKE ONE TABLET BY MOUTH DAILY 90 tablet 0    losartan (COZAAR) 25 mg tablet TAKE ONE TABLET BY MOUTH DAILY 90 tablet 3    Magnesium Gluconate 250 MG TABS Take 1 tablet by mouth daily      methylPREDNISolone 4 MG tablet therapy pack 24mg PO on day 1, then decrease by 4mg/day x 5 days per dose pack instructions  (Patient not taking: Reported on 9/6/2022) 21 tablet 0    potassium chloride (K-DUR,KLOR-CON) 10 mEq tablet Take 1 tablet (10 mEq total) by mouth daily 90 tablet 3    vitamin E 100 UNIT capsule Take 100 Units by mouth daily      Zinc 50 MG CAPS Take 1 capsule by mouth daily       No current facility-administered medications on file prior to visit  Allergies   Allergen Reactions    Escitalopram Swelling and Edema     Category:  Allergy;     Fluticasone-Salmeterol      UNKNOWN    Nsaids      Action Taken: stomach issues;     Gluten Meal - Food Allergy Diarrhea    Latex      irritation      Other Rash     Adhesive bandages        Tobacco Use: Low Risk     Smoking Tobacco Use: Never Smoker    Smokeless Tobacco Use: Never Used        Social Determinants of Health Tobacco Use: Low Risk     Smoking Tobacco Use: Never Smoker    Smokeless Tobacco Use: Never Used   Alcohol Use: Not on file   Financial Resource Strain: Not on file   Food Insecurity: Not on file   Transportation Needs: Not on file   Physical Activity: Not on file   Stress: Not on file   Social Connections: Not on file   Intimate Partner Violence: Not on file   Depression: Not at risk    PHQ-2 Score: 0   Housing Stability: Not on file               Review of Systems     There is no height or weight on file to calculate BMI  Physical Exam  Vitals and nursing note reviewed  Constitutional:       Appearance: Normal appearance  HENT:      Head: Atraumatic  Eyes:      Conjunctiva/sclera: Conjunctivae normal    Cardiovascular:      Rate and Rhythm: Normal rate  Pulmonary:      Effort: Pulmonary effort is normal    Neurological:      Mental Status: She is oriented to person, place, and time  Psychiatric:         Mood and Affect: Mood normal          Behavior: Behavior normal           Ortho Exam:    Body part: left shoulder    Inspection:  No visible deformity    Palpation:  Tender to palpation over the long head of the biceps and subacromial space    Range of motion:  Active forward flexion is 90° with discomfort, active abduction is 30° with discomfort, external rotation in adduction is 75°, has discomfort with cross adduction and internal rotation is at the level of iliac crest    Special Tests:  Positive Buckley and positive Neer's  Positive empty can test   Positive [place]'s, no apprehension  Rotator cuff strength is subscapularis 4+/5, supraspinatus 4-/5, infraspinatus 4+/5  Further special testing deferred today due to discomfort  Distal Neurovascular Status: Intact, Yes    Procedures       Assessment:     Diagnosis ICD-10-CM Associated Orders   1  Nontraumatic tear of left rotator cuff, unspecified tear extent  M75 102 Ambulatory Referral to Orthopedic Surgery   2   Nontraumatic tear of rotator cuff, unspecified laterality, unspecified tear extent  M75 100 Ambulatory Referral to Sports Medicine        Plan:   Explained my current clinical findings and reviewed radiological findings with Vinayak Odalis  She has significant function limitation and pain with weakness in the left shoulder  She has underlying high-grade left supraspinatus tendon tear with associated tendinosis  So far, she has not improved with physical therapy rehabilitation and conservative management  Hence, at this time I suggest further surgical evaluation for which I will make a referral to my orthopedic colleague Dr Shashank Meredith  Activity modification and precautions were explained to the patient  She is encouraged to do active left shoulder range of motion exercises to prevent any joint stiffness  Follow-Up:    Return for Refer to Dr Liz Mobley  Portions of the record may have been created with voice recognition software  Occasional wrong word or "sound alike" substitutions may have occurred due to the inherent limitations of voice recognition software  Please review the chart carefully and recognize, using context, where substitutions/typographical errors may have occurred

## 2022-09-27 ENCOUNTER — OFFICE VISIT (OUTPATIENT)
Dept: OBGYN CLINIC | Facility: OTHER | Age: 62
End: 2022-09-27
Payer: COMMERCIAL

## 2022-09-27 VITALS
HEART RATE: 76 BPM | WEIGHT: 171 LBS | SYSTOLIC BLOOD PRESSURE: 122 MMHG | BODY MASS INDEX: 31.47 KG/M2 | DIASTOLIC BLOOD PRESSURE: 79 MMHG | HEIGHT: 62 IN

## 2022-09-27 DIAGNOSIS — M75.102 NONTRAUMATIC TEAR OF LEFT ROTATOR CUFF, UNSPECIFIED TEAR EXTENT: Primary | ICD-10-CM

## 2022-09-27 PROCEDURE — 99204 OFFICE O/P NEW MOD 45 MIN: CPT | Performed by: ORTHOPAEDIC SURGERY

## 2022-09-27 NOTE — PROGRESS NOTES
Assessment  Rotator cuff tear of the left shoulder     Discussion and Plan:  · Discussed both operative and non-operative treatment options  Risks and benefits of both were discussed in detail  Patient would like to continue with conservative treatments at this time  · I offered to preform a CSI of the left shoulder, patient declined at this time  · I recommended that she continue with physical therapy to make improvements with her range of motion and strength of the left shoulder  · Avoid painful activities   · Follow up in 6-8 weeks for re-evaluation  Possible surgical consultation if symptoms are not improving  Subjective: left shoulder pain   Patient ID: Ashu Schmitz is a 63 y o  female      Ashu Schmitz is a 63 y o  female who presents for initial evaluation of her left shoulder  Patient is right-hand dominant  She has had pain for over 1 year, denies any initial injury or trauma  She was previously being treated by Dr Anabel Infante who prescribed physical therapy and recommeneded activity modification  She notes some improvement thus far with physical therapy  She has decreased range of motion and pain with overhead activities  The following portions of the patient's history were reviewed and updated as appropriate: allergies, current medications, past family history, past medical history, past social history, past surgical history and problem list     Review of Systems   Constitutional: Positive for activity change  Negative for chills, fever and unexpected weight change  HENT: Negative for hearing loss, nosebleeds and sore throat  Eyes: Negative for pain, redness and visual disturbance  Respiratory: Negative for cough, shortness of breath and wheezing  Cardiovascular: Negative for chest pain, palpitations and leg swelling  Gastrointestinal: Negative for abdominal pain, nausea and vomiting  Endocrine: Negative for polydipsia and polyuria     Genitourinary: Negative for dysuria and hematuria  Musculoskeletal: Positive for arthralgias and myalgias  See HPI   Skin: Negative for rash and wound  Neurological: Negative for dizziness, numbness and headaches  Psychiatric/Behavioral: Negative for decreased concentration and suicidal ideas  The patient is not nervous/anxious  Objective:  /79 (BP Location: Right arm, Patient Position: Sitting, Cuff Size: Adult)   Pulse 76   Ht 5' 2" (1 575 m)   Wt 77 6 kg (171 lb)   BMI 31 28 kg/m²       Left Shoulder Exam     Range of Motion   Active abduction: abnormal   Forward flexion: abnormal     Tests   Apprehension: negative  Cross arm: negative  Impingement: negative    Other   Erythema: absent  Scars: absent  Sensation: normal  Pulse: present     Comments:  Empty can: positive               Physical Exam  Vitals and nursing note reviewed  Constitutional:       Appearance: She is well-developed  HENT:      Head: Normocephalic and atraumatic  Eyes:      General: No scleral icterus  Extraocular Movements: Extraocular movements intact  Conjunctiva/sclera: Conjunctivae normal    Cardiovascular:      Rate and Rhythm: Normal rate  Pulmonary:      Effort: Pulmonary effort is normal  No respiratory distress  Musculoskeletal:      Cervical back: Normal range of motion and neck supple  Comments: As noted in HPI   Skin:     General: Skin is warm and dry  Neurological:      Mental Status: She is alert and oriented to person, place, and time  Psychiatric:         Behavior: Behavior normal            I have personally reviewed pertinent films in PACS and my interpretation is as follows      MRI of the left shoulder preformed on 9/7/2022 shows a small near full-thickness supraspinatus tendon tear with no retraction and no atrophy    Scribe Attestation    I,:  Chayo Johnson am acting as a scribe while in the presence of the attending physician :       I,:  Del Schultz MD personally performed the services described in this documentation    as scribed in my presence :

## 2022-09-28 DIAGNOSIS — F32.A ANXIETY AND DEPRESSION: ICD-10-CM

## 2022-09-28 DIAGNOSIS — I10 HYPERTENSION, UNSPECIFIED TYPE: ICD-10-CM

## 2022-09-28 DIAGNOSIS — F41.9 ANXIETY AND DEPRESSION: ICD-10-CM

## 2022-09-28 DIAGNOSIS — M79.7 FIBROMYALGIA: ICD-10-CM

## 2022-09-28 RX ORDER — DULOXETIN HYDROCHLORIDE 30 MG/1
CAPSULE, DELAYED RELEASE ORAL
Qty: 90 CAPSULE | Refills: 0 | Status: SHIPPED | OUTPATIENT
Start: 2022-09-28

## 2022-09-29 RX ORDER — HYDROCHLOROTHIAZIDE 12.5 MG/1
TABLET ORAL
Qty: 90 TABLET | Refills: 1 | Status: SHIPPED | OUTPATIENT
Start: 2022-09-29

## 2022-09-29 NOTE — TELEPHONE ENCOUNTER
Hydrochlorothiazide refilled  Please ask her to repeat potassium blood work to make sure potassium is in good range now that she resumed supplement

## 2022-10-07 ENCOUNTER — EVALUATION (OUTPATIENT)
Dept: PHYSICAL THERAPY | Facility: CLINIC | Age: 62
End: 2022-10-07
Payer: COMMERCIAL

## 2022-10-07 DIAGNOSIS — M75.102 NONTRAUMATIC TEAR OF LEFT ROTATOR CUFF, UNSPECIFIED TEAR EXTENT: Primary | ICD-10-CM

## 2022-10-07 PROCEDURE — 97161 PT EVAL LOW COMPLEX 20 MIN: CPT | Performed by: PHYSICAL THERAPIST

## 2022-10-07 NOTE — PROGRESS NOTES
PT Evaluation     Today's date: 10/7/2022  Patient name: Laila Shukla  : 1960  MRN: [id number]  Referring provider: Jenae Diaz  Dx:   Encounter Diagnosis     ICD-10-CM    1  Nontraumatic tear of left rotator cuff, unspecified tear extent  M75 102 Ambulatory Referral to Physical Therapy                  Assessment  Assessment details: Pt is a 63 y o  female who presents to outpatient PT with pain, decreased rom, decreased strength and decreased functional mobility  She will benefit from skilled PT to address these deficits in order to achieve her goals and maximize her functional mobility  Understanding of Dx/Px/POC: good   Prognosis: good    Goals  Short Term Goals  Independent performance of initial hep  Decrease pain 2 points on VAS      Long Term Goals: Independent performance of comprehensive hep  Work performance is returned to max level of function  Performance of IADL's is returned to max level of function  Performance in recreational activities is improved to max level of function  Able to reach overhead with min pain  Plan  Planned therapy interventions: joint mobilization, manual therapy, massage, IADL retraining, patient education, postural training, home exercise program, strengthening, stretching, therapeutic activities, therapeutic exercise and therapeutic training  Frequency: 2x week  Duration in weeks: 8        Subjective Evaluation    History of Present Illness  Mechanism of injury: MRI of L shoulder reveals partial tear of supraspinatus  Reports that surgical intervention and CSI have been discussed pt she would like to attempt conservative managements first  Reports pain when reaching to shoulder height, behind her back and behind her back during self-care activites  Will occasionally take tylenol for pain   Reports frequent disturbances at night from pain  Pain  Current pain ratin  At worst pain rating: 10    Patient Goals  Patient goals for therapy: decreased pain, increased motion, increased strength, independence with ADLs/IADLs, return to sport/leisure activities and return to work          Objective       L shoulder:    AROM:   Flexion:  80   Abduction: 35   Sig shoulder shrug and pain with arom    PROM:   Flexion: 90   Abduction: 50   ER: 18   IR: 22    Strength:    Flexion: 3 /5   Abduction:  3- /5   ER:    3/5   IR:     4-/5    Slight pain with resisted ER and active flexion;/abductin    Buckley: pos  Neer: pos  Empty can: pos  Painful arc: pos  Scour: neg                         Precautions: fibromyalgia, chronic lumbar pain, chronic B/L shoulder pain            Manuals 6/15 6/20  6/22 6/27 6/29 7/6 5/23 5/26 6/1 6/6 6/8   Laser L shoulder JLW kl JLW NA JLW kl JLW MO JLW kl kl   Graston L distal deltoid JLW kl JLW NA - did manually as pt did not tolerate graston tools STM  JLW STM  KL JLW MO JLW kl kl                               Neuro Re-Ed                                                                                                                Ther Ex              pulleys flex   5"x20 Flexion 5"x20 flex  5"x20 Flex 5"x20 flex  10"x10 10"x10   5"x20 5"x20    rows red   20 Red x20 red  20 Red 20 red  20 Red x20 red  20 Red  20 red  20 Red x20 Red x15   Low rows red   20 pain red  20 Red 20 red  20 Red x20 red  20 Red  20 red  20 Red x20 Red x15   B/L tb ER     red  20 Red x20        Thoracic ext stretch seated              scap retraction with cane ext  5"x10 5"x10 5"x10 5"x10   5"x10 5"x10 5"x10 5"x10 5"x10   standing cane scap     5"x10  5"x10 5"x10 5"x10      Table slides              wall slides 5"x20 5"x20  5"x20 5"x20 abd  5"x10                       Ther Activity              ube 4'/4' 4'/4' 4'/4' 3'/3'  4'/4' 4'/4' 4'/4' 4'/4' 4'/4' 4'/4' 4'/4'                 Gait Training                                          Modalities              MHP post tx 10' 10' 10' 10'  10' 10' MHP L shoulder seated 10' MHP L should  seated 10' CP L shoulder  seated  10' mhp x10' mhp x10'

## 2022-10-10 ENCOUNTER — APPOINTMENT (OUTPATIENT)
Dept: LAB | Facility: AMBULARY SURGERY CENTER | Age: 62
End: 2022-10-10
Payer: COMMERCIAL

## 2022-10-10 DIAGNOSIS — E87.6 HYPOKALEMIA: ICD-10-CM

## 2022-10-10 LAB — POTASSIUM SERPL-SCNC: 3.9 MMOL/L (ref 3.5–5.3)

## 2022-10-10 PROCEDURE — 84132 ASSAY OF SERUM POTASSIUM: CPT

## 2022-10-10 PROCEDURE — 36415 COLL VENOUS BLD VENIPUNCTURE: CPT

## 2022-10-12 ENCOUNTER — OFFICE VISIT (OUTPATIENT)
Dept: PHYSICAL THERAPY | Facility: CLINIC | Age: 62
End: 2022-10-12
Payer: COMMERCIAL

## 2022-10-12 DIAGNOSIS — M75.102 NONTRAUMATIC TEAR OF LEFT ROTATOR CUFF, UNSPECIFIED TEAR EXTENT: Primary | ICD-10-CM

## 2022-10-12 PROCEDURE — 97110 THERAPEUTIC EXERCISES: CPT

## 2022-10-12 NOTE — PROGRESS NOTES
Daily Note     Today's date: 10/12/2022  Patient name: Akil Verduzco  : 1960  MRN: [id number]  Referring provider: Cristy Gaming  Dx:   Encounter Diagnosis     ICD-10-CM    1  Nontraumatic tear of left rotator cuff, unspecified tear extent  M75 102        Start Time: 1539  Stop Time: 1625  Total time in clinic (min): 46 minutes      Subjective: Patient states, "It's got a tear "  Patient reports reaching her left arm in all directions is causing left shoulder pain  Patient describes left shoulder ER as "the worst "      Objective: See treatment diary below  Assessment: Patient has fair tolerance to today's PT session  IASTM is not performed due to patient's fear of further increasing left shoulder pain  Plan: Continue treatment as per PT plan of care         Precautions: fibromyalgia, chronic lumbar pain, chronic B/L shoulder pain        Manuals 10/11 10/12          Laser L shoulder kl JLW          Graston L distal deltoid                                    Neuro Re-Ed                                                                                    Ther Ex            pulleys  flex  5"x10          rows  red  10          Low rows  red  10          Tb ir/erwalkouts  red  10 ea          scap retraction with cane ext   5"x10          table slides  scap  5"x10          ube  8'                      Ther Activity                                    Gait Training                                    Modalities            CP post tx  10'

## 2022-10-14 ENCOUNTER — OFFICE VISIT (OUTPATIENT)
Dept: PHYSICAL THERAPY | Facility: CLINIC | Age: 62
End: 2022-10-14
Payer: COMMERCIAL

## 2022-10-14 DIAGNOSIS — M75.102 NONTRAUMATIC TEAR OF LEFT ROTATOR CUFF, UNSPECIFIED TEAR EXTENT: Primary | ICD-10-CM

## 2022-10-14 PROCEDURE — 97530 THERAPEUTIC ACTIVITIES: CPT | Performed by: PHYSICAL THERAPIST

## 2022-10-14 PROCEDURE — 97110 THERAPEUTIC EXERCISES: CPT | Performed by: PHYSICAL THERAPIST

## 2022-10-14 NOTE — PROGRESS NOTES
Daily Note     Today's date: 10/14/2022  Patient name: Ezequiel Treadwell  : 1960  MRN: [id number]  Referring provider: Sascha Nickerson  Dx:   Encounter Diagnosis     ICD-10-CM    1  Nontraumatic tear of left rotator cuff, unspecified tear extent  M75 102                     Subjective: pt reports no sig soreness after her LV and that she feels she can lift her arm a little higher prior to pain onset  Objective: See treatment diary below  Assessment:    Plan: Continue treatment as per PT plan of care         Precautions: fibromyalgia, chronic lumbar pain, chronic B/L shoulder pain        Manuals 10/11 10/12 10/14         Laser L shoulder kl JLW kl         Graston L distal deltoid                                    Neuro Re-Ed                                                                                    Ther Ex            pulleys  flex  5"x10 5"x10         rows  red  10 Red x20         Low rows  red  10 Red x20         Tb ir/erwalkouts  red  10 ea Red x10ea         scap retraction with cane ext   5"x10 5"x10         table slides  scap  5"x10          ube  8' 8'                     Ther Activity                                    Gait Training                                    Modalities            CP post tx  10' 10'

## 2022-10-17 ENCOUNTER — APPOINTMENT (OUTPATIENT)
Dept: PHYSICAL THERAPY | Facility: CLINIC | Age: 62
End: 2022-10-17

## 2022-10-19 ENCOUNTER — OFFICE VISIT (OUTPATIENT)
Dept: PHYSICAL THERAPY | Facility: CLINIC | Age: 62
End: 2022-10-19
Payer: COMMERCIAL

## 2022-10-19 DIAGNOSIS — M75.102 NONTRAUMATIC TEAR OF LEFT ROTATOR CUFF, UNSPECIFIED TEAR EXTENT: Primary | ICD-10-CM

## 2022-10-19 PROCEDURE — 97530 THERAPEUTIC ACTIVITIES: CPT | Performed by: PHYSICAL THERAPIST

## 2022-10-19 PROCEDURE — 97110 THERAPEUTIC EXERCISES: CPT | Performed by: PHYSICAL THERAPIST

## 2022-10-19 NOTE — PROGRESS NOTES
Daily Note     Today's date: 10/19/2022  Patient name: Durwood Lanes  : 1960  MRN: [id number]  Referring provider: Shivani Pacheco  Dx:   Encounter Diagnosis     ICD-10-CM    1  Nontraumatic tear of left rotator cuff, unspecified tear extent  M75 102                   Subjective: pt reports that "its starting to feel looser" but that she continues to have pain      Objective: See treatment diary below      Assessment: prom on pulley is improving  Good tolerance to therex with no replication of pain  Pt reports "it feels worked" post-tx  Plan: Continue per plan of care        Precautions: fibromyalgia, chronic lumbar pain, chronic B/L shoulder pain        Manuals 10/11 10/12 10/14 10/19        Laser L shoulder kl JLW kl kl        Graston L distal deltoid                                    Neuro Re-Ed                                                                                    Ther Ex            pulleys  flex  5"x10 5"x10 5"x20        rows  red  10 Red x20 Red x20        Low rows  red  10 Red x20 Red x20        Tb ir/erwalkouts  red  10 ea Red x10ea Red x20        scap retraction with cane ext   5"x10 5"x10 5"x10        table slides  scap  5"x10          ube  8' 8' 8'                    Ther Activity                                    Gait Training                                    Modalities            CP post tx  10' 10' 10'

## 2022-10-21 ENCOUNTER — OFFICE VISIT (OUTPATIENT)
Dept: PHYSICAL THERAPY | Facility: CLINIC | Age: 62
End: 2022-10-21
Payer: COMMERCIAL

## 2022-10-21 DIAGNOSIS — M75.102 NONTRAUMATIC TEAR OF LEFT ROTATOR CUFF, UNSPECIFIED TEAR EXTENT: Primary | ICD-10-CM

## 2022-10-21 PROCEDURE — 97530 THERAPEUTIC ACTIVITIES: CPT | Performed by: PHYSICAL THERAPIST

## 2022-10-21 PROCEDURE — 97110 THERAPEUTIC EXERCISES: CPT | Performed by: PHYSICAL THERAPIST

## 2022-10-21 NOTE — PROGRESS NOTES
Daily Note     Today's date: 10/21/2022  Patient name: Johanna Samson  : 1960  MRN: [id number]  Referring provider: Patricia Valentin  Dx:   Encounter Diagnosis     ICD-10-CM    1  Nontraumatic tear of left rotator cuff, unspecified tear extent  M75 102                   Subjective: pt reports that "its starting to feel looser" but that she continues to have pain      Objective: See treatment diary below      Assessment: prom is initially more limited but this improves with reps on pulleys, plan to increase reps on TB therex NV  Plan: Continue per plan of care        Precautions: fibromyalgia, chronic lumbar pain, chronic B/L shoulder pain        Manuals 10/11 10/12 10/14 10/19 10/21       Laser L shoulder kl JLW kl kl kl       Graston L distal deltoid                                    Neuro Re-Ed                                                                                    Ther Ex            pulleys  flex  5"x10 5"x10 5"x20 5"x20       rows  red  10 Red x20 Red x20 Red x20       Low rows  red  10 Red x20 Red x20 Red x20       Tb ir/erwalkouts  red  10 ea Red x10ea Red x20 Red x20       scap retraction with cane ext   5"x10 5"x10 5"x10 5"x10       table slides  scap  5"x10          ube  8' 8' 8' 8'                   Ther Activity                                    Gait Training                                    Modalities            CP post tx  10' 10' 10'

## 2022-10-24 ENCOUNTER — OFFICE VISIT (OUTPATIENT)
Dept: PHYSICAL THERAPY | Facility: CLINIC | Age: 62
End: 2022-10-24
Payer: COMMERCIAL

## 2022-10-24 DIAGNOSIS — M75.102 NONTRAUMATIC TEAR OF LEFT ROTATOR CUFF, UNSPECIFIED TEAR EXTENT: Primary | ICD-10-CM

## 2022-10-24 PROCEDURE — 97110 THERAPEUTIC EXERCISES: CPT | Performed by: PHYSICAL THERAPIST

## 2022-10-24 PROCEDURE — 97530 THERAPEUTIC ACTIVITIES: CPT | Performed by: PHYSICAL THERAPIST

## 2022-10-24 NOTE — PROGRESS NOTES
Daily Note     Today's date: 10/24/2022  Patient name: Eddie Vargas  : 1960  MRN: [id number]  Referring provider: Prince Fitch  Dx:   Encounter Diagnosis     ICD-10-CM    1  Nontraumatic tear of left rotator cuff, unspecified tear extent  M75 102                   Subjective: pt reports that she has been suffering from "sidsticker cramps" all day today  Denies any increase in primary shoulder pain      Objective: See treatment diary below      Assessment: progressed resistance with tb therex as listed with no complaints  Pt reports cramping during low rows therefore this was deferred, plan to progress as imelda nV       Plan: Continue per plan of care        Precautions: fibromyalgia, chronic lumbar pain, chronic B/L shoulder pain        Manuals 10/11 10/12 10/14 10/19 10/21 10/24      Laser L shoulder kl JLW kl kl kl kl      Graston L distal deltoid                                    Neuro Re-Ed                                                                                    Ther Ex            pulleys  flex  5"x10 5"x10 5"x20 5"x20 5"x20      rows  red  10 Red x20 Red x20 Red x20 Red x20      Low rows  red  10 Red x20 Red x20 Red x20 nv      Tb ir/erwalkouts  red  10 ea Red x10ea Red x20 Red x20 Green x20/red x20      scap retraction with cane ext   5"x10 5"x10 5"x10 5"x10 5"x10      table slides  scap  5"x10          ube  8' 8' 8' 8' 8'                  Ther Activity                                    Gait Training                                    Modalities            CP post tx  10' 10' 10'

## 2022-10-26 ENCOUNTER — OFFICE VISIT (OUTPATIENT)
Dept: PHYSICAL THERAPY | Facility: CLINIC | Age: 62
End: 2022-10-26
Payer: COMMERCIAL

## 2022-10-26 DIAGNOSIS — M75.102 NONTRAUMATIC TEAR OF LEFT ROTATOR CUFF, UNSPECIFIED TEAR EXTENT: Primary | ICD-10-CM

## 2022-10-26 PROCEDURE — 97110 THERAPEUTIC EXERCISES: CPT | Performed by: PHYSICAL THERAPIST

## 2022-10-31 ENCOUNTER — OFFICE VISIT (OUTPATIENT)
Dept: PHYSICAL THERAPY | Facility: CLINIC | Age: 62
End: 2022-10-31

## 2022-10-31 DIAGNOSIS — M75.102 NONTRAUMATIC TEAR OF LEFT ROTATOR CUFF, UNSPECIFIED TEAR EXTENT: Primary | ICD-10-CM

## 2022-10-31 NOTE — PROGRESS NOTES
Daily Note     Today's date: 10/31/2022  Patient name: Xavi Cortez  : 1960  MRN: [id number]  Referring provider: Aubrey Figueroa  Dx:   Encounter Diagnosis     ICD-10-CM    1  Nontraumatic tear of left rotator cuff, unspecified tear extent  M75 102                   Subjective: "I have more flexibility, " adding "it's better then it was "    Objective: See treatment diary below    Assessment: Able to imelda exercise program  Comfortable throughout laser  Concluded w/CP x10 min  Will monitor  Plan: Cont per POC       Precautions: fibromyalgia, chronic lumbar pain, chronic B/L shoulder pain        Manuals 10/11 10/12 10/14 10/19 10/21 10/24 10/26 10/31    Laser L shoulder kl JLW kl kl kl kl kl MO    Graston L distal deltoid                                    Neuro Re-Ed                                                                                    Ther Ex            pulleys  flex  5"x10 5"x10 5"x20 5"x20 5"x20 5"x20 5"x20    rows  red  10 Red x20 Red x20 Red x20 Red x20 Red x10 Red  x10      Low rows  red  10 Red x20 Red x20 Red x20 nv Red x10 Red   x10    Tb ir/erwalkouts  red  10 ea Red x10ea Red x20 Red x20 Green x20/red x20 Red x20ea Red  x10 ea    scap retraction with cane ext   5"x10 5"x10 5"x10 5"x10 5"x10 5"x10 5"x10    table slides  scap  5"x10          ube  8' 8' 8' 8' 8' 4' 4'/4'                Ther Activity                                    Gait Training                                    Modalities            CP post tx  10' 10' 10' 10' 10' 10' 10'

## 2022-11-02 ENCOUNTER — OFFICE VISIT (OUTPATIENT)
Dept: PHYSICAL THERAPY | Facility: CLINIC | Age: 62
End: 2022-11-02

## 2022-11-02 DIAGNOSIS — M75.102 NONTRAUMATIC TEAR OF LEFT ROTATOR CUFF, UNSPECIFIED TEAR EXTENT: Primary | ICD-10-CM

## 2022-11-02 NOTE — PROGRESS NOTES
Daily Note     Today's date: 2022  Patient name: Karissa Griffin  : 1960  MRN: [id number]  Referring provider: Cory Waller  Dx:   Encounter Diagnosis     ICD-10-CM    1  Nontraumatic tear of left rotator cuff, unspecified tear extent  M75 102                   Subjective: Pt reports that she is sore all over today, reports that this may be " a weather related fibro-flare"  No specific change in shoulder pain noted  Objective: See treatment diary below    Assessment: able to increase reps and resistance as listed with no complaints of increased shoulder pain, continue to progress as imelda  Plan: Cont per POC       Precautions: fibromyalgia, chronic lumbar pain, chronic B/L shoulder pain        Manuals 10/11 10/12 10/14 10/19 10/21 10/24 10/26 10/31 11/2   Laser L shoulder kl JLW kl kl kl kl kl MO kl   Graston L distal deltoid                                    Neuro Re-Ed                                                                                    Ther Ex            pulleys  flex  5"x10 5"x10 5"x20 5"x20 5"x20 5"x20 5"x20 5"x20   rows  red  10 Red x20 Red x20 Red x20 Red x20 Red x10 Red  x10   Green x20   Low rows  red  10 Red x20 Red x20 Red x20 nv Red x10 Red   x10 Green x20   Tb ir/erwalkouts  red  10 ea Red x10ea Red x20 Red x20 Green x20/red x20 Red x20ea Red  x10 ea Green x10ea   scap retraction with cane ext   5"x10 5"x10 5"x10 5"x10 5"x10 5"x10 5"x10 5"x10   table slides  scap  5"x10          ube  8' 8' 8' 8' 8' 4' 4'/4' 4'/4'               Ther Activity                                    Gait Training                                    Modalities            CP post tx  10' 10' 10' 10' 10' 10' 10' 10'

## 2022-11-07 ENCOUNTER — EVALUATION (OUTPATIENT)
Dept: PHYSICAL THERAPY | Facility: CLINIC | Age: 62
End: 2022-11-07

## 2022-11-07 DIAGNOSIS — M75.102 NONTRAUMATIC TEAR OF LEFT ROTATOR CUFF, UNSPECIFIED TEAR EXTENT: Primary | ICD-10-CM

## 2022-11-07 NOTE — PROGRESS NOTES
PT Re-Evaluation     Today's date: 2022  Patient name: Lisa Carrillo  : 1960  MRN: [id number]  Referring provider: Perla Richardson  Dx:   Encounter Diagnosis     ICD-10-CM    1  Nontraumatic tear of left rotator cuff, unspecified tear extent  M75 102                   Assessment  Assessment details: Pt is being treated with outpatient PT  She has  made slow gains in rom, strength, pain reduction and functional mobility but continues to have deficits  She will benefit from continued skilled PT to address these deficits  Thank you for this referral               Understanding of Dx/Px/POC: good   Prognosis: good    Goals  Short Term Goals  Independent performance of initial hep-met  Decrease pain 2 points on VAS-met      Long Term Goals: ongoing  Independent performance of comprehensive hep  Work performance is returned to max level of function  Performance of IADL's is returned to max level of function  Performance in recreational activities is improved to max level of function  Able to reach overhead with min pain  Plan  Planned therapy interventions: joint mobilization, manual therapy, massage, IADL retraining, patient education, postural training, home exercise program, strengthening, stretching, therapeutic activities, therapeutic exercise and therapeutic training  Frequency: 2x week  Duration in weeks: 4        Subjective Evaluation    History of Present Illness  Mechanism of injury: Pt reports that overall she feel she is improving but that she continues to have eliminations  Reports that she is better able to reach through a greater rom but that she continues to have pain when reaching above shoulder especially into abduction  Reports compliance with her current hep      Pain  Current pain ratin  At worst pain ratin    Patient Goals  Patient goals for therapy: decreased pain, increased motion, increased strength, independence with ADLs/IADLs, return to sport/leisure activities and return to work          Objective       L shoulder:    AROM:   Flexion: 110   Abduction:70   Sig shoulder shrug and pain with arom    PROM:   Flexion: 140   Abduction: 130   ER: 32   IR: 18  Pain with end range prom in all planes    Strength:    Flexion: 3+ /5   Abduction:  3- /5   ER:    3+/5, pain produced   IR:     4/5 pain produce    Slight pain with resisted ER and active flexion;/abduction    Buckley: pos  Neer: pos  Empty can: pos  Painful arc: pos  Scour: neg                         Precautions: fibromyalgia, chronic lumbar pain, chronic B/L shoulder pain            Manuals 11/7 10/12 10/14 10/19 10/21 10/24 10/26 10/31 11/2   Laser L shoulder kl JLW kl kl kl kl kl MO kl   Graston L distal deltoid                                    Neuro Re-Ed                                                                                    Ther Ex            pulleys  flex  5"x10 5"x10 5"x20 5"x20 5"x20 5"x20 5"x20 5"x20   rows redx20 red  10 Red x20 Red x20 Red x20 Red x20 Red x10 Red  x10   Green x20   Low rows Red x20 red  10 Red x20 Red x20 Red x20 nv Red x10 Red   x10 Green x20   Tb ir/erwalkouts Red x20 red  10 ea Red x10ea Red x20 Red x20 Green x20/red x20 Red x20ea Red  x10 ea Green x10ea   scap retraction with cane ext  5"x10 5"x10 5"x10 5"x10 5"x10 5"x10 5"x10 5"x10 5"x10   table slides  scap  5"x10          ube 8' 8' 8' 8' 8' 8' 4' 4'/4' 4'/4'   Supine cane flexion 5"x10           Supine cane ER 5"x10                                                           Ther Activity                                    Gait Training                                    Modalities            CP post tx 10' 10' 10' 10' 10' 10' 10' 10' 10'

## 2022-11-09 ENCOUNTER — OFFICE VISIT (OUTPATIENT)
Dept: PHYSICAL THERAPY | Facility: CLINIC | Age: 62
End: 2022-11-09

## 2022-11-09 DIAGNOSIS — M75.102 NONTRAUMATIC TEAR OF LEFT ROTATOR CUFF, UNSPECIFIED TEAR EXTENT: Primary | ICD-10-CM

## 2022-11-09 NOTE — PROGRESS NOTES
Daily Note     Today's date: 2022  Patient name: Tona Jones  : 1960  MRN: [id number]  Referring provider: Sebastian Calloway  Dx:   Encounter Diagnosis     ICD-10-CM    1  Nontraumatic tear of left rotator cuff, unspecified tear extent  M75 102                   Subjective: "It's a little sore today "  Notes feeling stiff, "in other parts of my body "    Objective: See treatment diary below    Assessment: Performed exercise program with discomfort at times  Laser to L shoulder, f/b CP  Will monitor  Plan: Cont per POC  Precautions: fibromyalgia, chronic lumbar pain, chronic B/L shoulder pain            Manuals 11/7 11/9  10/19 10/21 10/24 10/26 10/31 11/2   Laser L shoulder kl MO  kl kl kl kl MO kl   Graston L distal deltoid                                    Neuro Re-Ed                                                                                    Ther Ex            pulleys    5"x20 5"x20 5"x20 5"x20 5"x20 5"x20   rows redx20 Red  20  Red x20 Red x20 Red x20 Red x10 Red  x10   Green x20   Low rows Red x20 Red  20  Red x20 Red x20 nv Red x10 Red   x10 Green x20   Tb ir/erwalkouts Red x20 Red  20  Red x20 Red x20 Green x20/red x20 Red x20ea Red  x10 ea Green x10ea   scap retraction with cane ext  5"x10 5"x10  5"x10 5"x10 5"x10 5"x10 5"x10 5"x10   table slides            ube 8' 4'/4'  8' 8' 8' 4' 4'/4' 4'/4'   Supine cane flexion 5"x10  5"x10          Supine cane ER 5"x10  5"x10                                                          Ther Activity                                    Gait Training                                    Modalities            CP post tx 10' 10'  10' 10' 10' 10' 10' 10'

## 2022-11-10 ENCOUNTER — OFFICE VISIT (OUTPATIENT)
Dept: OBGYN CLINIC | Facility: OTHER | Age: 62
End: 2022-11-10

## 2022-11-10 VITALS
HEIGHT: 62 IN | SYSTOLIC BLOOD PRESSURE: 125 MMHG | HEART RATE: 69 BPM | WEIGHT: 174 LBS | BODY MASS INDEX: 32.02 KG/M2 | DIASTOLIC BLOOD PRESSURE: 84 MMHG

## 2022-11-10 DIAGNOSIS — M75.102 TEAR OF LEFT SUPRASPINATUS TENDON: Primary | ICD-10-CM

## 2022-11-10 NOTE — PROGRESS NOTES
Assessment  Diagnoses and all orders for this visit:    Tear of left supraspinatus tendon      Discussion and Plan:    Discussed continue conservative treatment vs surgical intervention  Patient reports she would like to avoid surgery at this time  Patient would like to continue PT at this time which I feel is a reasonable choice  If she fails to improve with conservative treatment call the office for follow up and we can discuss surgery  Subjective:   Patient ID: Lanita Osler is a 63 y o  female      HPI    Patient presents today for follow up of a left shoulder supraspinatus tear  She has had pain for over 1 year, denies any initial injury or trauma  Patient declined an injection at the last visit  She has been attending PT as instructed and reports improvement  She states she is please with the progress and would like to try to avoid surgery  The following portions of the patient's history were reviewed and updated as appropriate: allergies, current medications, past family history, past medical history, past social history, past surgical history and problem list     Review of Systems   Constitutional: Negative for chills and fever  HENT: Negative for drooling and sneezing  Eyes: Negative for redness  Respiratory: Negative for cough and wheezing  Gastrointestinal: Negative for nausea and vomiting  Musculoskeletal:        Please see ortho exam   Psychiatric/Behavioral: Negative for behavioral problems  The patient is not nervous/anxious  Objective:  /84 (BP Location: Left arm, Patient Position: Sitting, Cuff Size: Adult)   Pulse 69   Ht 5' 2" (1 575 m)   Wt 78 9 kg (174 lb)   BMI 31 83 kg/m²       Left Shoulder Exam     Tenderness   The patient is experiencing no tenderness       Range of Motion   External rotation: 60   Forward flexion: 90 (160 PROM )     Muscle Strength   Abduction: 3/5   External rotation: 4/5     Other   Erythema: absent  Sensation: normal  Pulse: present             Physical Exam  Vitals reviewed  Constitutional:       Appearance: She is well-developed  Eyes:      Pupils: Pupils are equal, round, and reactive to light  Pulmonary:      Effort: Pulmonary effort is normal       Breath sounds: Normal breath sounds  Abdominal:      General: Abdomen is flat  There is no distension  Skin:     General: Skin is warm and dry  Neurological:      Mental Status: She is alert and oriented to person, place, and time  Psychiatric:         Behavior: Behavior normal          Thought Content:  Thought content normal          Judgment: Judgment normal            I have personally reviewed pertinent films in PACS and my interpretation is as follows      MRI of the left shoulder preformed on 9/7/2022 shows a small near full-thickness supraspinatus tendon tear with no retraction and no atrophy      Scribe Attestation    I,:  Gregory Zayas am acting as a scribe while in the presence of the attending physician :       I,:  Marc Dotson MD personally performed the services described in this documentation    as scribed in my presence :

## 2022-11-14 ENCOUNTER — OFFICE VISIT (OUTPATIENT)
Dept: PHYSICAL THERAPY | Facility: CLINIC | Age: 62
End: 2022-11-14

## 2022-11-14 DIAGNOSIS — M75.102 NONTRAUMATIC TEAR OF LEFT ROTATOR CUFF, UNSPECIFIED TEAR EXTENT: Primary | ICD-10-CM

## 2022-11-14 NOTE — PROGRESS NOTES
Daily Note     Today's date: 2022  Patient name: Justen Del Castillo  : 1960  MRN: [id number]  Referring provider: Leopold Barker  Dx:   Encounter Diagnosis     ICD-10-CM    1  Nontraumatic tear of left rotator cuff, unspecified tear extent  M75 102                   Subjective: Pt reports she saw her Dr on Thursday, he was pleased w/her progress  Pt states her L shoulder is "not too bad "  Notes increased shoulder flexion  Objective: See treatment diary below    Assessment:  Discomfort during wand ER stretch, performed remaining exercises w/o complaint  Able to imelda laser  Will monitor  Plan: Cont per POC  Precautions: fibromyalgia, chronic lumbar pain, chronic B/L shoulder pain          Manuals 11/7 11/9 11/14  10/21 10/24 10/26 10/31 11/2   Laser L shoulder kl MO MO  kl kl kl MO kl   Graston L distal deltoid                                    Neuro Re-Ed                                                                                    Ther Ex            pulleys     5"x20 5"x20 5"x20 5"x20 5"x20   rows redx20 Red  20 Red  20  Red x20 Red x20 Red x10 Red  x10   Green x20   Low rows Red x20 Red  20 Red   20  Red x20 nv Red x10 Red   x10 Green x20   Tb ir/erwalkouts Red x20 Red  20 Red  20 ea  Red x20 Green x20/red x20 Red x20ea Red  x10 ea Green x10ea   scap retraction with cane ext  5"x10 5"x10 5"x10  5"x10 5"x10 5"x10 5"x10 5"x10   table slides            ube 8' 4'/4' 4'/4'  8' 8' 4' 4'/4' 4'/4'   Supine cane flexion 5"x10  5"x10 5"x10         Supine cane ER 5"x10  5"x10 5"x10                                                         Ther Activity                                    Gait Training                                    Modalities            CP post tx 10' 10' 10'  10' 10' 10 10 10'

## 2022-11-16 ENCOUNTER — OFFICE VISIT (OUTPATIENT)
Dept: PHYSICAL THERAPY | Facility: CLINIC | Age: 62
End: 2022-11-16

## 2022-11-16 DIAGNOSIS — M75.102 NONTRAUMATIC TEAR OF LEFT ROTATOR CUFF, UNSPECIFIED TEAR EXTENT: Primary | ICD-10-CM

## 2022-11-16 NOTE — PROGRESS NOTES
Daily Note     Today's date: 2022  Patient name: Adilene Catherine  : 1960  MRN: [id number]  Referring provider: Leslie Sorensen  Dx:   Encounter Diagnosis     ICD-10-CM    1  Nontraumatic tear of left rotator cuff, unspecified tear extent  M75 102                      Subjective: pt reports that she feel she is improving but continues to have pain with putting on a jacket and when actively reaching above shoulder height  Objective: See treatment diary below    Assessment: prom is steadily improving, fatiue reported but no pain with therex  Progress as  listed NV  Plan: Cont per POC  Precautions: fibromyalgia, chronic lumbar pain, chronic B/L shoulder pain          Manuals 11/7 11/9 11/14 11/16 10/21 10/24 10/26 10/31 11/2   Laser L shoulder kl MO MO kl kl kl kl MO kl   Graston L distal deltoid                                    Neuro Re-Ed                                                                                    Ther Ex            pulleys     5"x20 5"x20 5"x20 5"x20 5"x20   rows redx20 Red  20 Red  20 Red x20 Red x20 Red x20 Red x10 Red  x10   Green x20   Low rows Red x20 Red  20 Red   20 Red x20 Red x20 nv Red x10 Red   x10 Green x20   Tb ir/erwalkouts Red x20 Red  20 Red  20 ea Red x20 Red x20 Green x20/red x20 Red x20ea Red  x10 ea Green x10ea   scap retraction with cane ext  5"x10 5"x10 5"x10 10"x10 5"x10 5"x10 5"x10 5"x10 5"x10   table slides            ube 8' 4'/4' 4'/4' 4'/4' 8' 8' 4' 4'/4' 4'/4'   Supine cane flexion 5"x10  5"x10 5"x10 5"x10        Supine cane ER 5"x10  5"x10 5"x10 5"x10        sidelying shoulder flexion    nv                                            Ther Activity                                    Gait Training                                    Modalities            CP post tx 10' 10' 10' nv 10' 10' 10' 10 10'

## 2022-11-21 ENCOUNTER — OFFICE VISIT (OUTPATIENT)
Dept: PHYSICAL THERAPY | Facility: CLINIC | Age: 62
End: 2022-11-21

## 2022-11-21 DIAGNOSIS — M75.102 NONTRAUMATIC TEAR OF LEFT ROTATOR CUFF, UNSPECIFIED TEAR EXTENT: Primary | ICD-10-CM

## 2022-11-21 NOTE — PROGRESS NOTES
Daily Note     Today's date: 2022  Patient name: Justyna Stevens  : 1960  MRN: [id number]  Referring provider: Fiona Zapien  Dx:   Encounter Diagnosis     ICD-10-CM    1  Nontraumatic tear of left rotator cuff, unspecified tear extent  M75 102                      Subjective: pt reports that she feel she is improving but continues to have pain with putting on a jacket and when actively reaching above shoulder height  Objective: See treatment diary below    Assessment: added sidelying flexion, pt requires AA from her other UE to complete comfortably, continue to progress as imelda nv  Plan: Cont per POC  Precautions: fibromyalgia, chronic lumbar pain, chronic B/L shoulder pain          Manuals 11/7 11/9 11/14 11/16 11/21 10/24 10/26 10/31 11/2   Laser L shoulder kl MO MO kl kl kl kl MO kl   Graston L distal deltoid                                    Neuro Re-Ed                                                                                    Ther Ex            pulleys     5"x20 5"x20 5"x20 5"x20 5"x20   rows redx20 Red  20 Red  20 Red x20 Red x30 Red x20 Red x10 Red  x10   Green x20   Low rows Red x20 Red  20 Red   20 Red x20 Red x30 nv Red x10 Red   x10 Green x20   Tb ir/erwalkouts Red x20 Red  20 Red  20 ea Red x20 Dbl Red x20 Green x20/red x20 Red x20ea Red  x10 ea Green x10ea   scap retraction with cane ext  5"x10 5"x10 5"x10 10"x10 5"x10 5"x10 5"x10 5"x10 5"x10   table slides            ube 8' 4'/4' 4'/4' 4'/4' 8' 8' 4' 4'/4' 4'/4'   Supine cane flexion 5"x10  5"x10 5"x10 5"x10 5"x10       Supine cane ER 5"x10  5"x10 5"x10 5"x10 5"x10       sidelying shoulder flexion    nv x20                                           Ther Activity                                    Gait Training                                    Modalities            CP post tx 10' 10' 10' nv 10' 10' 10' 10' 10'

## 2022-11-23 ENCOUNTER — OFFICE VISIT (OUTPATIENT)
Dept: PHYSICAL THERAPY | Facility: CLINIC | Age: 62
End: 2022-11-23

## 2022-11-23 DIAGNOSIS — M75.102 NONTRAUMATIC TEAR OF LEFT ROTATOR CUFF, UNSPECIFIED TEAR EXTENT: Primary | ICD-10-CM

## 2022-11-23 NOTE — PROGRESS NOTES
Daily Note     Today's date: 2022  Patient name: Lanita Osler  : 1960  MRN: [id number]  Referring provider: Samara Burton  Dx:   Encounter Diagnosis     ICD-10-CM    1  Nontraumatic tear of left rotator cuff, unspecified tear extent  M75 102           Start Time: 1402  Stop Time: 1445  Total time in clinic (min): 43 minutes      Subjective: Patient notices improved ROM in her left shoulder during daily activity  Objective: See treatment diary below  Assessment: Treatment is tolerated well  Left shoulder flexion AAROM improves as patient is performing therapeutic exercises  Plan: Continue treatment as per PT plan of care         Precautions: fibromyalgia, chronic lumbar pain, chronic B/L shoulder pain      Manuals 11/7 11/9 11/14 11/16 11/21 11/23  10/31 11/2   Laser L shoulder kl MO MO kl kl JLW  MO kl   Graston L distal deltoid                                    Neuro Re-Ed                                                                                    Ther Ex            pulleys     5"x20 5"x20  5"x20 5"x20   rows redx20 Red  20 Red  20 Red x20 Red x30 red  20  Red  x10   Green x20   Low rows Red x20 Red  20 Red   20 Red x20 Red x30 red  20  Red   x10 Green x20   Tb ir/erwalkouts Red x20 Red  20 Red  20 ea Red x20 Dbl Red x20 red  20 ea  Red  x10 ea Green x10ea   scap retraction with cane ext  5"x10 5"x10 5"x10 10"x10 5"x10 5"x10  5"x10 5"x10   table slides            ube 8' 4'/4' 4'/4' 4'/4' 8' 4'/4'  4'/4' 4'/4'   Supine cane flexion 5"x10  5"x10 5"x10 5"x10 5"x10  5"x10      Supine cane ER 5"x10  5"x10 5"x10 5"x10 5"x10  5"x10       sidelying shoulder flexion    nv x20  20                                          Ther Activity                                    Gait Training                                    Modalities            CP post tx 10' 10' 10' nv 10' 10'  10' 10'

## 2022-11-28 ENCOUNTER — OFFICE VISIT (OUTPATIENT)
Dept: PHYSICAL THERAPY | Facility: CLINIC | Age: 62
End: 2022-11-28

## 2022-11-28 DIAGNOSIS — M75.102 NONTRAUMATIC TEAR OF LEFT ROTATOR CUFF, UNSPECIFIED TEAR EXTENT: Primary | ICD-10-CM

## 2022-11-28 NOTE — PROGRESS NOTES
Daily Note     Today's date: 2022  Patient name: Lavinia Guido  : 1960  MRN: [id number]  Referring provider: Army Scheuermann  Dx:   Encounter Diagnosis     ICD-10-CM    1  Nontraumatic tear of left rotator cuff, unspecified tear extent  M75 102                        Subjective: Patient notices improved ROM in her left shoulder during daily activity  Objective: See treatment diary below  Assessment: added squizs shoulder flexion today with visible fatigue noted, min pain noted  Progress as imelda NV  Plan: Continue treatment as per PT plan of care         Precautions: fibromyalgia, chronic lumbar pain, chronic B/L shoulder pain      Manuals 11/7 11/9 11/14 11/16 11/21 11/23 11/28 10/31 11/2   Laser L shoulder kl MO MO kl kl JLW kl MO kl   Graston L distal deltoid                                    Neuro Re-Ed                                                                                    Ther Ex            pulleys     5"x20 5"x20 5"20 5"x20 5"x20   rows redx20 Red  20 Red  20 Red x20 Red x30 red  20 Red x20 Red  x10   Green x20   Low rows Red x20 Red  20 Red   20 Red x20 Red x30 red  20 Red x20 Red   x10 Green x20   Tb ir/erwalkouts Red x20 Red  20 Red  20 ea Red x20 Dbl Red x20 red  20 ea Red x20 Red  x10 ea Green x10ea   scap retraction with cane ext  5"x10 5"x10 5"x10 10"x10 5"x10 5"x10  5"x10 5"x10   table slides            ube 8' 4'/4' 4'/4' 4'/4' 8' 4'/4' 4'/4' 4'/4' 4'/4'   Supine cane flexion 5"x10  5"x10 5"x10 5"x10 5"x10  5"x10 5"x10     Supine cane ER 5"x10  5"x10 5"x10 5"x10 5"x10  5"x10  5"x10     sidelying shoulder flexion    nv x20  20 nv     squigz       x1                             Ther Activity                                    Gait Training                                    Modalities            CP post tx 10' 10' 10' nv 10' 10'  10' 10'

## 2022-11-30 ENCOUNTER — APPOINTMENT (OUTPATIENT)
Dept: PHYSICAL THERAPY | Facility: CLINIC | Age: 62
End: 2022-11-30

## 2022-12-01 ENCOUNTER — OFFICE VISIT (OUTPATIENT)
Dept: PHYSICAL THERAPY | Facility: CLINIC | Age: 62
End: 2022-12-01

## 2022-12-01 DIAGNOSIS — M75.102 NONTRAUMATIC TEAR OF LEFT ROTATOR CUFF, UNSPECIFIED TEAR EXTENT: Primary | ICD-10-CM

## 2022-12-01 NOTE — PROGRESS NOTES
Daily Note     Today's date: 2022  Patient name: Hakeem Winchester  : 1960  MRN: [id number]  Referring provider: Mg Valentin  Dx:   Encounter Diagnosis     ICD-10-CM    1  Nontraumatic tear of left rotator cuff, unspecified tear extent  M75 102                        Subjective: Pt reports that she feel she is progressing more slowly then she was initially but that she continues to notice improvements  Objective: See treatment diary below  Assessment: increased weight with therex  Pt reports fatigue with the progression but that she shoulder rom is improving  Reports that she notices decreased UE coordination when performing squigz  Plan: Continue treatment as per PT plan of care         Precautions: fibromyalgia, chronic lumbar pain, chronic B/L shoulder pain      Manuals    Laser L shoulder kl MO MO kl kl JLW kl kl kl   Graston L distal deltoid                                    Neuro Re-Ed                                                                                    Ther Ex            pulleys     5"x20 5"x20 5"20 5"x20 5"x20   rows redx20 Red  20 Red  20 Red x20 Red x30 red  20 Red x20 Red  x20   Green x20   Low rows Red x20 Red  20 Red   20 Red x20 Red x30 red  20 Red x20 Red   20 Green x20   Tb ir/erwalkouts Red x20 Red  20 Red  20 ea Red x20 Dbl Red x20 red  20 ea Red x20 Red  x20 ea Green x10ea   scap retraction with cane ext  5"x10 5"x10 5"x10 10"x10 5"x10 5"x10  5"x10 3# bar 5"x10   table slides            ube 8' 4'/4' 4'/4' 4'/4' 8' 4'/4' 4'/4' 4'/4' 4'/4'   Supine cane flexion 5"x10  5"x10 5"x10 5"x10 5"x10  5"x10 5"x10 5:x10    Supine cane ER 5"x10  5"x10 5"x10 5"x10 5"x10  5"x10  5"x10 3# bar 5"x10    sidelying shoulder flexion    nv x20  20 nv nv    squigz       x1 x1                            Ther Activity                                    Gait Training                                    Modalities CP post tx 10' 10' 10' nv 10' 10'  10' 10'

## 2022-12-05 ENCOUNTER — APPOINTMENT (OUTPATIENT)
Dept: PHYSICAL THERAPY | Facility: CLINIC | Age: 62
End: 2022-12-05

## 2022-12-07 ENCOUNTER — OFFICE VISIT (OUTPATIENT)
Dept: PHYSICAL THERAPY | Facility: CLINIC | Age: 62
End: 2022-12-07

## 2022-12-07 DIAGNOSIS — M75.102 NONTRAUMATIC TEAR OF LEFT ROTATOR CUFF, UNSPECIFIED TEAR EXTENT: Primary | ICD-10-CM

## 2022-12-07 NOTE — PROGRESS NOTES
Daily Note     Today's date: 2022  Patient name: Akil Verduzco  : 1960  MRN: [id number]  Referring provider: Cristy Gaming  Dx:   Encounter Diagnosis     ICD-10-CM    1  Nontraumatic tear of left rotator cuff, unspecified tear extent  M75 102                        Subjective: Pt reports continued overall improvements in her pain  Reports that she is currently suffering from a head/chest cold  Objective: See treatment diary below  Assessment: pt continues to report fatigue with step outs, no sig pain reported with other therex  Progress as imelda NV  Plan: Continue treatment as per PT plan of care         Precautions: fibromyalgia, chronic lumbar pain, chronic B/L shoulder pain      Manuals    Laser L shoulder kl MO MO kl kl JLW kl kl kl   Graston L distal deltoid                                    Neuro Re-Ed                                                                                    Ther Ex            pulleys     5"x20 5"x20 5"20 5"x20 5"x20   rows redx20 Red  20 Red  20 Red x20 Red x30 red  20 Red x20 Red  x20   red x20   Low rows Red x20 Red  20 Red   20 Red x20 Red x30 red  20 Red x20 Red   20 Green x20   Tb ir/erwalkouts Red x20 Red  20 Red  20 ea Red x20 Dbl Red x20 red  20 ea Red x20 Red  x20 ea red x10ea   scap retraction with cane ext  5"x10 5"x10 5"x10 10"x10 5"x10 5"x10  5"x10 3# bar 5"x10   table slides            ube 8' 4'/4' 4'/4' 4'/4' 8' 4'/4' 4'/4' 4'/4' 4'/4'   Supine cane flexion 5"x10  5"x10 5"x10 5"x10 5"x10  5"x10 5"x10 5:x10 5"x20   Supine cane ER 5"x10  5"x10 5"x10 5"x10 5"x10  5"x10  5"x10 3# bar 5"x10 5"x10   sidelying shoulder flexion    nv x20  20 nv nv    squigz       x1 x1 x1                           Ther Activity                                    Gait Training                                    Modalities            CP post tx 10' 10' 10' nv 10' 10'  10' 10'

## 2022-12-12 ENCOUNTER — OFFICE VISIT (OUTPATIENT)
Dept: PHYSICAL THERAPY | Facility: CLINIC | Age: 62
End: 2022-12-12

## 2022-12-12 DIAGNOSIS — M75.102 NONTRAUMATIC TEAR OF LEFT ROTATOR CUFF, UNSPECIFIED TEAR EXTENT: Primary | ICD-10-CM

## 2022-12-12 NOTE — PROGRESS NOTES
Daily Note     Today's date: 2022  Patient name: Samantha Rangel  : 1960  MRN: [id number]  Referring provider: Rhiannon Ramires  Dx:   Encounter Diagnosis     ICD-10-CM    1  Nontraumatic tear of left rotator cuff, unspecified tear extent  M75 102           Start Time: 1135  Stop Time: 1220  Total time in clinic (min): 45 minutes       Subjective: Patient states, "I think I slept on it wrong "      Objective: See treatment diary below  Assessment: Theraband rows increase discomfort in the left shoulder - remainder of therapeutic exercise program is tolerated well  Plan: Continue treatment as per PT plan of care         Precautions: fibromyalgia, chronic lumbar pain, chronic B/L shoulder pain      Manuals    Laser L shoulder JLW  MO kl kl JLW kl kl kl                                       Neuro Re-Ed                                                                                    Ther Ex            pulleys 5"x20    5"x20 5"x20 5"20 5"x20 5"x20   rows red  20  Red  20 Red x20 Red x30 red  20 Red x20 Red  x20   red x20   Low rows red  20  Red   20 Red x20 Red x30 red  20 Red x20 Red   20 Green x20   Tb ir/er walkouts red  20  Red  20 ea Red x20 Dbl Red x20 red  20 ea Red x20 Red  x20 ea red x10ea   scap retraction with cane ext  5"x10  5"x10 10"x10 5"x10 5"x10  5"x10 3# bar 5"x10   table slides            ube   4'/4' 4'/4' 8' 4'/4' 4'/4' 4'/4' 4'/4'   Supine cane flexion   5"x10 5"x10 5"x10  5"x10 5"x10 5:x10 5"x20   Supine cane ER   5"x10 5"x10 5"x10  5"x10  5"x10 3# bar 5"x10 5"x10   sidelying shoulder flexion    nv x20  20 nv nv    squigz x1      x1 x1 x1                           Ther Activity            ube 4'/4'                       Gait Training                                    Modalities            CP post tx 10'  10' nv 10' 10'  10' 10'

## 2022-12-14 ENCOUNTER — OFFICE VISIT (OUTPATIENT)
Dept: PHYSICAL THERAPY | Facility: CLINIC | Age: 62
End: 2022-12-14

## 2022-12-14 DIAGNOSIS — M75.102 NONTRAUMATIC TEAR OF LEFT ROTATOR CUFF, UNSPECIFIED TEAR EXTENT: Primary | ICD-10-CM

## 2022-12-14 NOTE — PROGRESS NOTES
Daily Note     Today's date: 2022  Patient name: Eduardo Mccauley  : 1960  MRN: [id number]  Referring provider: Jo Galicia  Dx:   Encounter Diagnosis     ICD-10-CM    1  Nontraumatic tear of left rotator cuff, unspecified tear extent  M75 102                         Subjective: Pt reports that overall she feels she is improving but is having increased pain today, pt is unable to identify a trigger for this  Objective: See treatment diary below  Assessment:  Pt continues to fatigue quickly with therex, progress as imelda NV  Plan: Continue treatment as per PT plan of care         Precautions: fibromyalgia, chronic lumbar pain, chronic B/L shoulder pain      Manuals    Laser L shoulder JLW kl MO kl kl JLW kl kl kl                                       Neuro Re-Ed                                                                                    Ther Ex            pulleys 5"x20 5"x20   5"x20 5"x20 5"20 5"x20 5"x20   rows red  20 Red x20 Red  20 Red x20 Red x30 red  20 Red x20 Red  x20   red x20   Low rows red  20 Red x20 Red   20 Red x20 Red x30 red  20 Red x20 Red   20 Green x20   Tb ir/er walkouts red  20 Red x20 Red  20 ea Red x20 Dbl Red x20 red  20 ea Red x20 Red  x20 ea red x10ea   scap retraction with cane ext  5"x10 5"x10 5"x10 10"x10 5"x10 5"x10  5"x10 3# bar 5"x10   table slides            ube   4'/4' 4'/4' 8' 4'/4' 4'/4' 4'/4' 4'/4'   Supine cane flexion  5"x20 5"x10 5"x10 5"x10  5"x10 5"x10 5:x10 5"x20   Supine cane ER  5"x20 5"x10 5"x10 5"x10  5"x10  5"x10 3# bar 5"x10 5"x10   sidelying shoulder flexion    nv x20  20 nv nv    squigz x1 nv     x1 x1 x1                           Ther Activity            ube 4'/4' 4'/4'                      Gait Training                                    Modalities            CP post tx 10' MHP 10' 10' nv 10' 10'  10' 10'

## 2022-12-19 ENCOUNTER — APPOINTMENT (OUTPATIENT)
Dept: PHYSICAL THERAPY | Facility: CLINIC | Age: 62
End: 2022-12-19

## 2022-12-20 ENCOUNTER — OFFICE VISIT (OUTPATIENT)
Dept: PHYSICAL THERAPY | Facility: CLINIC | Age: 62
End: 2022-12-20

## 2022-12-20 DIAGNOSIS — M75.102 NONTRAUMATIC TEAR OF LEFT ROTATOR CUFF, UNSPECIFIED TEAR EXTENT: Primary | ICD-10-CM

## 2022-12-20 NOTE — PROGRESS NOTES
Daily Note     Today's date: 2022  Patient name: Shun Meyer  : 1960  MRN: [id number]  Referring provider: Jocelyn Montgomery  Dx:   Encounter Diagnosis     ICD-10-CM    1  Nontraumatic tear of left rotator cuff, unspecified tear extent  M75 102           Subjective: Pt reports L shoulder pain level is 2/10 upon arrival to therapy  Objective: See treatment diary below    Assessment: Performed exercise program w/o complaint  Demonstrates good knowledge of same  Comfortable during laser to L shoulder  Will monitor  Plan: Cont per POC      Precautions: fibromyalgia, chronic lumbar pain, chronic B/L shoulder pain      Manuals    Laser L shoulder JLW kl kl  kl JLW kl kl kl                                       Neuro Re-Ed                                                                                    Ther Ex            pulleys 5"x20 5"x20 5"x20  5"x20 5"x20 5"20 5"x20 5"x20   rows red  20 Red x20 Red x20  Red x30 red  20 Red x20 Red  x20   red x20   Low rows red  20 Red x20 Red x20  Red x30 red  20 Red x20 Red   20 Green x20   Tb ir/er walkouts red  20 Red x20 Red x20  Dbl Red x20 red  20 ea Red x20 Red  x20 ea red x10ea   scap retraction with cane ext  5"x10 5"x10 5"x10  5"x10 5"x10  5"x10 3# bar 5"x10   table slides            ube     8' 4'/4' 4'/4' 4'/4' 4'/4'   Supine cane flexion  5"x20  5"x20  5"x10  5"x10 5"x10 5:x10 5"x20   Supine cane ER  5"x20  5"x20  5"x10  5"x10  5"x10 3# bar 5"x10 5"x10   sidelying shoulder flexion     x20  20 nv nv    squigz x1 nv nv    x1 x1 x1                           Ther Activity            ube 4'/4' 4'/4' 4'/4'                     Gait Training                                    Modalities            CP post tx 10' MHP 10' MHP 10'  10' 10'  10' 10'

## 2022-12-21 ENCOUNTER — OFFICE VISIT (OUTPATIENT)
Dept: PHYSICAL THERAPY | Facility: CLINIC | Age: 62
End: 2022-12-21

## 2022-12-21 DIAGNOSIS — F41.9 ANXIETY AND DEPRESSION: ICD-10-CM

## 2022-12-21 DIAGNOSIS — F32.A ANXIETY AND DEPRESSION: ICD-10-CM

## 2022-12-21 DIAGNOSIS — M79.7 FIBROMYALGIA: ICD-10-CM

## 2022-12-21 DIAGNOSIS — M75.102 NONTRAUMATIC TEAR OF LEFT ROTATOR CUFF, UNSPECIFIED TEAR EXTENT: Primary | ICD-10-CM

## 2022-12-21 RX ORDER — DULOXETIN HYDROCHLORIDE 30 MG/1
CAPSULE, DELAYED RELEASE ORAL
Qty: 90 CAPSULE | Refills: 0 | Status: SHIPPED | OUTPATIENT
Start: 2022-12-21

## 2022-12-21 NOTE — PROGRESS NOTES
Daily Note     Today's date: 2022  Patient name: Fay Avila  : 1960  MRN: [id number]  Referring provider: Ced Le  Dx:   Encounter Diagnosis     ICD-10-CM    1  Nontraumatic tear of left rotator cuff, unspecified tear extent  M75 102           Start Time: 930  Stop Time: 1023  Total time in clinic (min): 53 minutes       Subjective: Patient reports she is feeling sore since attending PT yesterday  Objective: See treatment diary below  Assessment: Therapeutic exercise program is tolerated well  Laser is effective in reducing left shoulder soreness  Plan: Continue treatment as per PT plan of care         Precautions: fibromyalgia, chronic lumbar pain, chronic B/L shoulder pain      Manuals    Laser L shoulder JLW kl kl JLW  JLW kl kl kl                                       Neuro Re-Ed                                                                                    Ther Ex            pulleys 5"x20 5"x20 5"x20 5"x20  5"x20 5"20 5"x20 5"x20   rows red  20 Red x20 Red x20 red  20  red  20 Red x20 Red  x20   red x20   Low rows red  20 Red x20 Red x20 red  20  red  20 Red x20 Red   20 Green x20   Tb ir/er walkouts red  20 Red x20 Red x20 red  20  red  20 ea Red x20 Red  x20 ea red x10ea   scap retraction with cane ext  5"x10 5"x10 5"x10 5"x10  5"x10  5"x10 3# bar 5"x10   table slides            ube      4'/4' 4'/4' 4'/4' 4'/4'   Supine cane flexion  5"x20  5"x20  5"x10   5"x10 5"x10 5:x10 5"x20   Supine cane ER  5"x20  5"x20  5"x10   5"x10  5"x10 3# bar 5"x10 5"x10   sidelying shoulder flexion       20 nv nv    squigz x1 nv nv  x1   x1 x1 x1                           Ther Activity            ube 4'/4' 4'/4' 4'/4' 4'/4'                    Gait Training                                    Modalities            CP post tx 10' MHP 10' MHP 10' MHP 10'  10'  10 10'

## 2022-12-28 ENCOUNTER — APPOINTMENT (OUTPATIENT)
Dept: PHYSICAL THERAPY | Facility: CLINIC | Age: 62
End: 2022-12-28

## 2023-01-04 ENCOUNTER — OFFICE VISIT (OUTPATIENT)
Dept: PHYSICAL THERAPY | Facility: CLINIC | Age: 63
End: 2023-01-04

## 2023-01-04 DIAGNOSIS — M75.102 NONTRAUMATIC TEAR OF LEFT ROTATOR CUFF, UNSPECIFIED TEAR EXTENT: Primary | ICD-10-CM

## 2023-01-04 NOTE — PROGRESS NOTES
Daily Note     Today's date: 2023  Patient name: Sirena Fleming  : 1960  MRN: [id number]  Referring provider: Faith Allred  Dx:   Encounter Diagnosis     ICD-10-CM    1  Nontraumatic tear of left rotator cuff, unspecified tear extent  M75 102           Start Time: 1615  Stop Time: 1705  Total time in clinic (min): 50 minutes       Subjective: Patient states, "It feels like the scar tissue is coming back "  However patient denies any loss of left shoulder ROM  Patient reports her activity has been nothing out of the ordinary  Objective: See treatment diary below  Assessment: Therapeutic exercise program is tolerated well without complaints of left shoulder pain  Plan: Continue treatment as per PT plan of care         Precautions: fibromyalgia, chronic lumbar pain, chronic B/L shoulder pain      Manuals    Laser L shoulder JLW kl kl JLW JLW  kl kl kl                                       Neuro Re-Ed                                                                                    Ther Ex            pulleys 5"x20 5"x20 5"x20 5"x20 5"x20  5"20 5"x20 5"x20   rows red  20 Red x20 Red x20 red  20 red  20  Red x20 Red  x20   red x20   Low rows red  20 Red x20 Red x20 red  20 red  20  Red x20 Red   20 Green x20   Tb ir/er walkouts red  20 Red x20 Red x20 red  20 red  20  Red x20 Red  x20 ea red x10ea   scap retraction with cane ext  5"x10 5"x10 5"x10 5"x10 5"x10   5"x10 3# bar 5"x10   table slides            ube       4'/4' 4'/4' 4'/4'   Supine cane flexion  5"x20  5"x20  5"x10  5"x10  5"x10 5:x10 5"x20   Supine cane ER  5"x20  5"x20  5"x10  5"x10   5"x10 3# bar 5"x10 5"x10   sidelying shoulder flexion        nv nv    squigz x1 nv nv  x1  x1  x1 x1 x1                           Ther Activity            ube 4'/4' 4'/4' 4'/4' 4'/4' 4'/4'                   Gait Training                                    Modalities            CP post tx 10' MHP 10' MHP 10' MHP 10' CP 10'   10' 10'

## 2023-01-09 ENCOUNTER — OFFICE VISIT (OUTPATIENT)
Dept: PHYSICAL THERAPY | Facility: CLINIC | Age: 63
End: 2023-01-09

## 2023-01-09 DIAGNOSIS — M75.102 NONTRAUMATIC TEAR OF LEFT ROTATOR CUFF, UNSPECIFIED TEAR EXTENT: Primary | ICD-10-CM

## 2023-01-09 NOTE — PROGRESS NOTES
Daily Note     Today's date: 2023  Patient name: Thania Arrington  : 1960  MRN: [id number]  Referring provider: Alen Mcnamara  Dx:   Encounter Diagnosis     ICD-10-CM    1  Nontraumatic tear of left rotator cuff, unspecified tear extent  M75 102           Start Time: 1620  Stop Time: 1710  Total time in clinic (min): 50 minutes      Subjective: Patient reports her left shoulder is feeling "actually pretty good "  Patient reports she is now experiencing "normal old age hurt "      Objective: See treatment diary below  Assessment: Therapeutic exercise program is tolerated well without complaints of left shoulder pain  Encouraged compliance with the HEP  Plan: Continue treatment as per PT plan of care         Precautions: fibromyalgia, chronic lumbar pain, chronic B/L shoulder pain      Manuals    Laser L shoulder JLW kl kl JLW JLW JLW   kl                                       Neuro Re-Ed                                                                                    Ther Ex            pulleys 5"x20 5"x20 5"x20 5"x20 5"x20 5"x20   5"x20   rows red  20 Red x20 Red x20 red  20 red  20 red  20   red x20   Low rows red  20 Red x20 Red x20 red  20 red  20 red  20   Green x20   Tb ir/er walkouts red  20 Red x20 Red x20 red  20 red  20 red  20   red x10ea   scap retraction with cane ext  5"x10 5"x10 5"x10 5"x10 5"x10 5"x10   5"x10   table slides            ube         4'/4'   Supine cane flexion  5"x20  5"x20  5"x10  5"x10  5"x10   5"x20   Supine cane ER  5"x20  5"x20  5"x10  5"x10  5"x10   5"x10   sidelying shoulder flexion             squigz x1 nv nv  x1  x1  1   x1                           Ther Activity            ube 4'/4' 4'/4' 4'/4' 4'/4' 4'/4' 4'/4'                  Gait Training                                    Modalities            CP post tx 10' MHP 10' MHP 10' MHP 10' CP 10' CP 10'   10'

## 2023-01-11 ENCOUNTER — OFFICE VISIT (OUTPATIENT)
Dept: PHYSICAL THERAPY | Facility: CLINIC | Age: 63
End: 2023-01-11

## 2023-01-11 DIAGNOSIS — M75.102 NONTRAUMATIC TEAR OF LEFT ROTATOR CUFF, UNSPECIFIED TEAR EXTENT: Primary | ICD-10-CM

## 2023-01-11 NOTE — PROGRESS NOTES
Daily Note     Today's date: 2023  Patient name: Ney Luque  : 1960  MRN: [id number]  Referring provider: Harper Holloway  Dx:   Encounter Diagnosis     ICD-10-CM    1  Nontraumatic tear of left rotator cuff, unspecified tear extent  M75 102                        Subjective: pt reports that she continues to notice improvements  Reports that she is better to reach over head but that "it's still tough to get it all the way there"  Objective: See treatment diary below  Assessment: progress therex as listed, pt reports fatigue and onset of soreness with shoulder scaption after 6 reps  No pain reported with other therex  Monitor response and progress as imelda Nv  Plan: Continue treatment as per PT plan of care         Precautions: fibromyalgia, chronic lumbar pain, chronic B/L shoulder pain      Manuals    Laser L shoulder JLW kl kl JLW JLW JLW kl  kl                                       Neuro Re-Ed                                                                                    Ther Ex            pulleys 5"x20 5"x20 5"x20 5"x20 5"x20 5"x20 5"x20  5"x20   rows red  20 Red x20 Red x20 red  20 red  20 red  20 Red x20  red x20   Low rows red  20 Red x20 Red x20 red  20 red  20 red  20 Red x20  Green x20   Tb ir/er walkouts red  20 Red x20 Red x20 red  20 red  20 red  20 Red x20  red x10ea   scap retraction with cane ext  5"x10 5"x10 5"x10 5"x10 5"x10 5"x10 5"x10  5"x10   table slides            ube         4'/4'   Supine cane flexion  5"x20  5"x20  5"x10  5"x10  5"x10 5"x10  5"x20   Supine cane ER  5"x20  5"x20  5"x10  5"x10  5"x10 5"x10  5"x10   sidelying shoulder flexion             squigz x1 nv nv  x1  x1  1   x1   Shoulder scaption       1#x6     scap retraction with bicep curls       3#x20     Ther Activity            ube 4'/4' 4'/4' 4'/4' 4'/4' 4'/4' 4'/4' 4'/4'                 Gait Training Modalities            CP post tx 8' MHP 10' MHP 10' MHP 10' CP 10' CP 10' Cp x10'  10'

## 2023-01-16 ENCOUNTER — OFFICE VISIT (OUTPATIENT)
Dept: PHYSICAL THERAPY | Facility: CLINIC | Age: 63
End: 2023-01-16

## 2023-01-16 DIAGNOSIS — M75.102 NONTRAUMATIC TEAR OF LEFT ROTATOR CUFF, UNSPECIFIED TEAR EXTENT: Primary | ICD-10-CM

## 2023-01-16 NOTE — PROGRESS NOTES
Daily Note     Today's date: 2023  Patient name: Fay Avila  : 1960  MRN: [id number]  Referring provider: Ced Le  Dx:   Encounter Diagnosis     ICD-10-CM    1  Nontraumatic tear of left rotator cuff, unspecified tear extent  M75 102                        Subjective: Patient reports shoulder is feeling better  Sleeping better at night and pain is well managed  Objective: See treatment diary below  Assessment: progress therex as listed, Patient able to progress strengthening to [employer] today  Continued weakness and scaption movement  No pain reported with other therex  Monitor response and progress as imelda Nv  Plan: Continue treatment as per PT plan of care         Precautions: fibromyalgia, chronic lumbar pain, chronic B/L shoulder pain      Manuals    Laser L shoulder JLW kl kl JLW JLW JLW kl ksg kl                                       Neuro Re-Ed                                                                                    Ther Ex            pulleys 5"x20 5"x20 5"x20 5"x20 5"x20 5"x20 5"x20 5" x20 5"x20   rows red  20 Red x20 Red x20 red  20 red  20 red  20 Red x20 Green x20 red x20   Low rows red  20 Red x20 Red x20 red  20 red  20 red  20 Red x20 Green x20 Green x20   Tb ir/er walkouts red  20 Red x20 Red x20 red  20 red  20 red  20 Red x20 Green x20 red x10ea   scap retraction with cane ext  5"x10 5"x10 5"x10 5"x10 5"x10 5"x10 5"x10 5" x10 5"x10   table slides            ube         4'/4'   Supine cane flexion  5"x20  5"x20  5"x10  5"x10  5"x10 5"x10 5" x10 5"x20   Supine cane ER  5"x20  5"x20  5"x10  5"x10  5"x10 5"x10 5'x10 5"x10   sidelying shoulder flexion             squigz x1 nv nv  x1  x1  1  x1   x1   Shoulder scaption       1#x6 1# x6    scap retraction with bicep curls       3#x20 3# x20    Ther Activity            ube 4'/4' 4'/4' 4'/4' 4'/4' 4'/4' 4'/4' 4'/4' 4'/4'                Gait Training                                    Modalities            CP post tx 10' MHP 10' MHP 10' MHP 10' CP 10' CP 10' Cp x10' CP x10' 10'

## 2023-01-18 ENCOUNTER — APPOINTMENT (OUTPATIENT)
Dept: PHYSICAL THERAPY | Facility: CLINIC | Age: 63
End: 2023-01-18

## 2023-01-23 ENCOUNTER — OFFICE VISIT (OUTPATIENT)
Dept: PHYSICAL THERAPY | Facility: CLINIC | Age: 63
End: 2023-01-23

## 2023-01-23 DIAGNOSIS — M75.102 NONTRAUMATIC TEAR OF LEFT ROTATOR CUFF, UNSPECIFIED TEAR EXTENT: Primary | ICD-10-CM

## 2023-01-23 NOTE — PROGRESS NOTES
Daily Note     Today's date: 2023  Patient name: Kulwant Erickson  : 1960  MRN: [id number]  Referring provider: Dominga Dietrich  Dx:   Encounter Diagnosis     ICD-10-CM    1  Nontraumatic tear of left rotator cuff, unspecified tear extent  M75 102                        Subjective: pt reports that she is feeling stiff today  Objective: See treatment diary below  Assessment: good tolerance to therex with no complaints other then fatigue, progress as imelda NV  Plan: Continue treatment as per PT plan of care         Precautions: fibromyalgia, chronic lumbar pain, chronic B/L shoulder pain      Manuals    Laser L shoulder JLW kl kl JLW JLW JLW kl ksg kl                                       Neuro Re-Ed                                                                                    Ther Ex            pulleys 5"x20 5"x20 5"x20 5"x20 5"x20 5"x20 5"x20 5" x20 5"x20   rows red  20 Red x20 Red x20 red  20 red  20 red  20 Red x20 Green x20 red x20   Low rows red  20 Red x20 Red x20 red  20 red  20 red  20 Red x20 Green x20 Green x20   Tb ir/er walkouts red  20 Red x20 Red x20 red  20 red  20 red  20 Red x20 Green x20 red x10ea   scap retraction with cane ext  5"x10 5"x10 5"x10 5"x10 5"x10 5"x10 5"x10 5" x10 5"x10   table slides            ube         4'/4'   Supine cane flexion  5"x20  5"x20  5"x10  5"x10  5"x10 5"x10 5" x10 5"x20   Supine cane ER  5"x20  5"x20  5"x10  5"x10  5"x10 5"x10 5'x10 5"x10   sidelying shoulder flexion             squigz x1 nv nv  x1  x1  1  x1   x1   Shoulder scaption       1#x6 1# x6 1#x5   scap retraction with bicep curls       3#x20 3# x20 3#x20   Ther Activity            ube 4'/4' 4'/4' 4'/4' 4'/4' 4'/4' 4'/4' 4'/4' 4'/4' 4'/4'               Gait Training                                    Modalities            CP post tx 10' MHP 10' MHP 10' MHP 10' CP 10' CP 10' Cp x10' CP x10' MHP 10'

## 2023-01-25 ENCOUNTER — OFFICE VISIT (OUTPATIENT)
Dept: PHYSICAL THERAPY | Facility: CLINIC | Age: 63
End: 2023-01-25

## 2023-01-25 DIAGNOSIS — M75.102 NONTRAUMATIC TEAR OF LEFT ROTATOR CUFF, UNSPECIFIED TEAR EXTENT: Primary | ICD-10-CM

## 2023-01-25 NOTE — PROGRESS NOTES
Daily Note     Today's date: 2023  Patient name: Kassi Luu  : 1960  MRN: [id number]  Referring provider: Heather Cox  Dx:   No diagnosis found  Subjective: pt reports that she is more stiff and sore today is unable to identify a trigger for this  Objective: See treatment diary below  Assessment: reduced resistance on tb today secondary to reports of soreness, resume original resistance NV  Plan: Continue treatment as per PT plan of care         Precautions: fibromyalgia, chronic lumbar pain, chronic B/L shoulder pain      Manuals    Laser L shoulder JLW kl kl JLW JLW JLW kl ksg kl kl                                          Neuro Re-Ed                                                                                           Ther Ex             pulleys 5"x20 5"x20 5"x20 5"x20 5"x20 5"x20 5"x20 5" x20 5"x20 5"x20   rows red  20 Red x20 Red x20 red  20 red  20 red  20 Red x20 Green x20 red x20 Red 20   Low rows red  20 Red x20 Red x20 red  20 red  20 red  20 Red x20 Green x20 Green x20 Red x20   Tb ir/er walkouts red  20 Red x20 Red x20 red  20 red  20 red  20 Red x20 Green x20 red x10ea Red x10   scap retraction with cane ext  5"x10 5"x10 5"x10 5"x10 5"x10 5"x10 5"x10 5" x10 5"x10 5"x10   table slides             ube         4'/4'    Supine cane flexion  5"x20  5"x20  5"x10  5"x10  5"x10 5"x10 5" x10 5"x20 5"x20   Supine cane ER  5"x20  5"x20  5"x10  5"x10  5"x10 5"x10 5'x10 5"x10 5"x10   sidelying shoulder flexion              squigz x1 nv nv  x1  x1  1  x1   x1    Shoulder scaption       1#x6 1# x6 1#x5    scap retraction with bicep curls       3#x20 3# x20 3#x20    Ther Activity             ube 4'/4' 4'/4' 4'/4' 4'/4' 4'/4' 4'/4' 4'/4' 4'/4' 4'/4' 4'/4'                Gait Training                                       Modalities             CP post tx 10' MHP 10' MHP 10' MHP 10' CP 10' CP 10' Cp x10' CP x10' P 10' [facility] 10'

## 2023-01-30 ENCOUNTER — OFFICE VISIT (OUTPATIENT)
Dept: PHYSICAL THERAPY | Facility: CLINIC | Age: 63
End: 2023-01-30

## 2023-01-30 DIAGNOSIS — M75.102 NONTRAUMATIC TEAR OF LEFT ROTATOR CUFF, UNSPECIFIED TEAR EXTENT: Primary | ICD-10-CM

## 2023-01-30 NOTE — PROGRESS NOTES
Daily Note     Today's date: 2023  Patient name: Kassi Luu  : 1960  MRN: [id number]  Referring provider: Heather Cox  Dx:   Encounter Diagnosis     ICD-10-CM    1  Nontraumatic tear of left rotator cuff, unspecified tear extent  M75 102                      Subjective: Pt offers no new complaints at this time  Objective: See treatment diary below      Assessment: Tolerated treatment well  Patient would benefit from continued PT      Plan: Continue per plan of care        Precautions: fibromyalgia, chronic lumbar pain, chronic B/L shoulder pain      Manuals    Laser L shoulder JLW JLW kl ksg kl kl HA                                 Neuro Re-Ed                                                                      Ther Ex          pulleys 5"x20 5"x20 5"x20 5" x20 5"x20 5"x20 5"x20   rows red  20 red  20 Red x20 Green x20 red x20 Red 20 Red 20    Low rows red  20 red  20 Red x20 Green x20 Green x20 Red x20 Red x20   Tb ir/er walkouts red  20 red  20 Red x20 Green x20 red x10ea Red x10 Red x10   scap retraction with cane ext  5"x10 5"x10 5"x10 5" x10 5"x10 5"x10 5"x10   table slides          ube     4'/4'  4'/4'   Supine cane flexion  5"x10  5"x10 5"x10 5" x10 5"x20 5"x20 5"x20   Supine cane ER  5"x10  5"x10 5"x10 5'x10 5"x10 5"x10 5"x10   sidelying shoulder flexion           squigz  x1  1  x1   x1     Shoulder scaption   1#x6 1# x6 1#x5  1# 2x10   scap retraction with bicep curls   3#x20 3# x20 3#x20  3# x20   Ther Activity          ube 4'/4' 4'/4' 4'/4' 4'/4' 4'/4' 4'/4' 4'/4'             Gait Training                              Modalities          CP post tx CP 10' CP 10' Cp x10' CP x10' MHP 10' MHP 10'

## 2023-02-01 ENCOUNTER — OFFICE VISIT (OUTPATIENT)
Dept: PHYSICAL THERAPY | Facility: CLINIC | Age: 63
End: 2023-02-01

## 2023-02-01 DIAGNOSIS — M75.102 NONTRAUMATIC TEAR OF LEFT ROTATOR CUFF, UNSPECIFIED TEAR EXTENT: Primary | ICD-10-CM

## 2023-02-01 NOTE — PROGRESS NOTES
Daily Noted/DC summary    Today's date: 2023  Patient name: Marybel Gottlieb  : 1960  MRN: [id number]  Referring provider: Bobbi Willams  Dx:   Encounter Diagnosis     ICD-10-CM    1  Nontraumatic tear of left rotator cuff, unspecified tear extent  M75 102                      Subjective: Pt reports that she continues to notice soreness with overhead reaching but overall has noticed improvements since starting PT  Objective: See treatment diary below      Assessment: pt demonstrates good form with therex and has good understanding of proper progression  Plan: Pt has achieved a majority of her goals set at the start of therapy, is independent with her hep, and will be Dc'd at this time  She is instructed to call the clinic if she has question with her hep or if symptoms return    Thank you for this referral        Precautions: fibromyalgia, chronic lumbar pain, chronic B/L shoulder pain      Manuals    Laser L shoulder JLW JLW kl ksg kl kl HA kl                                    Neuro Re-Ed                                                                             Ther Ex           pulleys 5"x20 5"x20 5"x20 5" x20 5"x20 5"x20 5"x20 5"x20   rows red  20 red  20 Red x20 Green x20 red x20 Red 20 Red 20  Red x20   Low rows red  20 red  20 Red x20 Green x20 Green x20 Red x20 Red x20 Green x20   Tb ir/er walkouts red  20 red  20 Red x20 Green x20 red x10ea Red x10 Red x10 Green x20   scap retraction with cane ext  5"x10 5"x10 5"x10 5" x10 5"x10 5"x10 5"x10 5"x10   table slides           ube     4'/4'  4'/4' 4'/4'   Supine cane flexion  5"x10  5"x10 5"x10 5" x10 5"x20 5"x20 5"x20 5"x20   Supine cane ER  5"x10  5"x10 5"x10 5'x10 5"x10 5"x10 5"x10 5"x10   sidelying shoulder flexion            squigz  x1  1  x1   x1      Shoulder scaption   1#x6 1# x6 1#x5  1# 2x10 1#x2   scap retraction with bicep curls   3#x20 3# x20 3#x20  3# x20 3#x20   Ther Activity ube 4'/4' 4'/4' 4'/4' 4'/4' 4'/4' 4'/4' 4'/4' 4'/4'              Gait Training                                 Modalities           CP post tx CP 10' CP 10' Cp x10' CP x10' MHP 10' MHP 10'

## 2023-03-28 DIAGNOSIS — F41.9 ANXIETY AND DEPRESSION: ICD-10-CM

## 2023-03-28 DIAGNOSIS — M79.7 FIBROMYALGIA: ICD-10-CM

## 2023-03-28 DIAGNOSIS — I10 HYPERTENSION, UNSPECIFIED TYPE: ICD-10-CM

## 2023-03-28 DIAGNOSIS — F32.A ANXIETY AND DEPRESSION: ICD-10-CM

## 2023-03-28 RX ORDER — HYDROCHLOROTHIAZIDE 12.5 MG/1
TABLET ORAL
Qty: 90 TABLET | Refills: 0 | Status: SHIPPED | OUTPATIENT
Start: 2023-03-28

## 2023-03-28 RX ORDER — DULOXETIN HYDROCHLORIDE 30 MG/1
CAPSULE, DELAYED RELEASE ORAL
Qty: 90 CAPSULE | Refills: 0 | Status: SHIPPED | OUTPATIENT
Start: 2023-03-28

## 2023-04-05 ENCOUNTER — HOSPITAL ENCOUNTER (OUTPATIENT)
Age: 63
Discharge: HOME | End: 2023-04-05

## 2023-04-05 VITALS — BODY MASS INDEX: 34.56 KG/M2 | HEIGHT: 60 IN

## 2023-04-05 DIAGNOSIS — Z12.31 ENCOUNTER FOR SCREENING MAMMOGRAM FOR BREAST CANCER: ICD-10-CM

## 2023-04-05 DIAGNOSIS — M51.9 DISC DISORDER: ICD-10-CM

## 2023-04-05 DIAGNOSIS — Z12.31 ENCOUNTER FOR SCREENING MAMMOGRAM FOR BREAST CANCER: Primary | ICD-10-CM

## 2023-05-02 ENCOUNTER — OFFICE VISIT (OUTPATIENT)
Dept: FAMILY MEDICINE CLINIC | Facility: CLINIC | Age: 63
End: 2023-05-02

## 2023-05-02 VITALS
BODY MASS INDEX: 34.08 KG/M2 | RESPIRATION RATE: 18 BRPM | OXYGEN SATURATION: 98 % | TEMPERATURE: 97.3 F | HEIGHT: 60 IN | SYSTOLIC BLOOD PRESSURE: 128 MMHG | HEART RATE: 73 BPM | DIASTOLIC BLOOD PRESSURE: 84 MMHG | WEIGHT: 173.6 LBS

## 2023-05-02 DIAGNOSIS — Z13.220 LIPID SCREENING: ICD-10-CM

## 2023-05-02 DIAGNOSIS — M79.7 FIBROMYALGIA: ICD-10-CM

## 2023-05-02 DIAGNOSIS — R73.01 IMPAIRED FASTING GLUCOSE: ICD-10-CM

## 2023-05-02 DIAGNOSIS — Z01.818 PRE-OPERATIVE EXAMINATION: Primary | ICD-10-CM

## 2023-05-02 DIAGNOSIS — G47.33 OBSTRUCTIVE SLEEP APNEA: ICD-10-CM

## 2023-05-02 DIAGNOSIS — E55.9 HYPOVITAMINOSIS D: ICD-10-CM

## 2023-05-02 DIAGNOSIS — H25.9 SENILE CATARACT OF LEFT EYE, UNSPECIFIED AGE-RELATED CATARACT TYPE: ICD-10-CM

## 2023-05-02 DIAGNOSIS — I10 HYPERTENSION, UNSPECIFIED TYPE: ICD-10-CM

## 2023-05-02 RX ORDER — OFLOXACIN 3 MG/ML
SOLUTION/ DROPS OPHTHALMIC
COMMUNITY
Start: 2023-04-14

## 2023-05-02 RX ORDER — PREDNISOLONE ACETATE 10 MG/ML
SUSPENSION/ DROPS OPHTHALMIC
COMMUNITY
Start: 2023-04-14

## 2023-05-02 NOTE — PROGRESS NOTES
FAMILY PRACTICE OFFICE VISIT       NAME: Nicolasa Mascorro  AGE: 63 y o  SEX: female       : 1960        MRN: [id number]    Assessment and Plan   1  Pre-operative examination  Left eye cataract surgery scheduled May 11th with Dr Lul Borrego  Chronic conditions are stable  She may proceed with cataract surgery as scheduled  2  Senile cataract of left eye, unspecified age-related cataract type    3  Hypertension, unspecified type  Assessment & Plan:  BP is well controlled on losartan and hydrochlorothiazide  Orders:  -     CBC and differential; Future  -     Comprehensive metabolic panel; Future  -     TSH, 3rd generation; Future    4  Obstructive sleep apnea  Assessment & Plan:  Stable on CPAP  5  Impaired fasting glucose  Assessment & Plan:  Last fasting sugar 118, with A1c 5 7%  Will repeat A1c  Orders:  -     Hemoglobin A1C; Future    6  Fibromyalgia  Assessment & Plan:  Flares with weather changes, but tolerable  7  Hypovitaminosis D  Assessment & Plan:  Vitamin D 25 hydroxy level 29 6  Continue vitamin D3 supplement 1000 units daily  Repeat vitamin D 25 hydroxy level with next blood work  Orders:  -     Vitamin D 25 hydroxy; Future    8  Lipid screening  -     Lipid panel; Future     Repeat blood work in next 1-2 months  Follow up in 6 months  Chief Complaint     Chief Complaint   Patient presents with    Pre-op Exam       History of Present Illness     Nicolasa Mascorro is a 63year old female presenting today for per-operative examination  Left eye cataract surgery on May 11th  Right eye cataract surgery previously completed  She is feeling well  No recent illness  No prior anesthesia problems for herself or family members  No personal or family problems with bleeding or clotting  Review of Systems   Review of Systems   Constitutional: Negative  HENT: Negative      Eyes: Positive for visual disturbance (left eye cloudy vision--cataract)  Respiratory: Negative  Cardiovascular: Negative  Gastrointestinal: Negative  Genitourinary: Negative  Musculoskeletal: Negative  Skin: Negative  Neurological: Negative  Hematological: Negative  Psychiatric/Behavioral: Negative  I have reviewed the patient's medical history in detail; there are no changes to the history as noted in the electronic medical record  Patient Active Problem List   Diagnosis    Chronic fatigue syndrome    Hypertension    Fibromyalgia    Gluten intolerance    Hypovitaminosis D    Impaired fasting glucose    Migraine    Obstructive sleep apnea    Obesity (BMI 30-39  9)    CPAP (continuous positive airway pressure) dependence    Spondylolisthesis of lumbar region    Degenerative lumbar spinal stenosis    Bilateral tinnitus     Past Medical History:   Diagnosis Date    Anemia 12/2/2016    Anxiety and depression     Asthma 10/26/2012    Overview:  ICD10 clean up    Chronic fatigue syndrome     Colon polyp     CPAP (continuous positive airway pressure) dependence     DDD (degenerative disc disease), lumbosacral 12/27/2018    Gluten intolerance     Herniated lumbar intervertebral disc 12/27/2018    Hypersomnia 6/6/2018    Hypertension     Hypovitaminosis D     Impaired fasting glucose     Migraine     Obesity (BMI 30-39 9) 6/6/2018    Tear of left supraspinatus tendon 9/27/2022     Past Surgical History:   Procedure Laterality Date    APPENDECTOMY      BREAST BIOPSY Right 2006    not sure of the date    CATARACT EXTRACTION Right     CHOLECYSTECTOMY      COLONOSCOPY      [place] COLONOSCOPY FLX DX W/COLLJ SPEC WHEN PFRMD N/A 12/04/2017    Procedure: COLONOSCOPY;  Surgeon: Tex Russo DO;  [facility];   Service: Gastroenterology    UPPER GASTROINTESTINAL ENDOSCOPY       Family History   Problem Relation Age of Onset    Depression Mother     Hypertension Mother         essential     Mitral valve "prolapse Mother     No Known Problems Father     Heart disease Maternal Grandmother         cardiac disorder    Hyperlipidemia Maternal Grandmother     Stroke Paternal Grandmother     Stroke Paternal Grandfather     Diabetes Brother     Heart attack Brother     Breast cancer additional onset Maternal Aunt         unkno    Breast cancer Maternal Aunt 50     Social History     Socioeconomic History    Marital status: Single     Spouse name: Not on file    Number of children: 0    Years of education: bachelor's degree    Highest education level: Not on file   Occupational History    Occupation: works for [employer]     Employer: [employer]   Tobacco Use    Smoking status: Never    Smokeless tobacco: Never   Vaping Use    Vaping Use: Never used   Substance and Sexual Activity    Alcohol use: Yes     Comment: rarely    Drug use: No    Sexual activity: Not Currently   Other Topics Concern    Not on file   Social History Narrative    Education: bachelor's degree in sociology    Learning Disabilities: none    Marital History: single    Children: none    Living Arrangement: lives alone    Occupational History: works for [employer]: limited support system    Legal History: none     History: None    Caffeine use denied         Social Determinants of Health     Financial Resource Strain: Not on file   Food Insecurity: Not on file   Transportation Needs: Not on file   Physical Activity: Not on file   Stress: Not on file   Social Connections: Not on file   Intimate Partner Violence: Not on file   Housing Stability: Not on file       Objective     Vitals:    05/02/23 1120   BP: 128/84   BP Location: Left arm   Patient Position: Sitting   Cuff Size: Large   Pulse: 73   Resp: 18   Temp: (!) 97 3 °F (36 3 °C)   TempSrc: Temporal   SpO2: 98%   Weight: 78 7 kg (173 lb 9 6 oz)   Height: 4' 11 5\" (1 511 m)     Wt Readings from " Last 3 Encounters:   05/02/23 78 7 kg (173 lb 9 6 oz)   11/10/22 78 9 kg (174 lb)   09/27/22 77 6 kg (171 lb)     Physical Exam  Vitals and nursing note reviewed  Constitutional:       General: She is not in acute distress  Appearance: Normal appearance  She is well-developed  She is not ill-appearing  HENT:      Head: Normocephalic and atraumatic  Right Ear: Tympanic membrane normal       Left Ear: Tympanic membrane normal       Mouth/Throat:      Mouth: Mucous membranes are moist       Pharynx: Oropharynx is clear  Eyes:      Conjunctiva/sclera: Conjunctivae normal       Pupils: Pupils are equal, round, and reactive to light  Comments: Left eye cataract   Neck:      Thyroid: No thyromegaly  Cardiovascular:      Rate and Rhythm: Normal rate and regular rhythm  Heart sounds: No murmur heard  Pulmonary:      Effort: Pulmonary effort is normal       Breath sounds: Normal breath sounds  Abdominal:      General: Bowel sounds are normal       Palpations: Abdomen is soft  Tenderness: There is no abdominal tenderness  Musculoskeletal:      Cervical back: Normal range of motion and neck supple  Right lower leg: No edema  Left lower leg: No edema  Lymphadenopathy:      Cervical: No cervical adenopathy  Skin:     Findings: No rash  Neurological:      Mental Status: She is alert and oriented to person, place, and time  Psychiatric:         Mood and Affect: Mood normal        BMI Counseling: Body mass index is 34 48 kg/m²  The BMI is above normal  Nutrition recommendations include encouraging healthy choices of fruits and vegetables, moderation in carbohydrate intake and increasing intake of lean protein  Exercise recommendations include moderate physical activity 150 minutes/week  Rationale for BMI follow-up plan is due to patient being overweight or obese  Depression Screening and Follow-up Plan: Patient was screened for depression during today's encounter   They screened negative with a PHQ-2 score of 0  ALLERGIES:  Allergies   Allergen Reactions    Escitalopram Swelling and Edema     Category: Allergy;     Fluticasone-Salmeterol      UNKNOWN    Nsaids      Action Taken: stomach issues;     Gluten Meal - Food Allergy Diarrhea    Latex      irritation      Other Rash     Adhesive bandages       Current Medications     Current Outpatient Medications   Medication Sig Dispense Refill    Ascorbic Acid (VITAMIN C PO) Take 1 tablet by mouth daily      cholecalciferol (VITAMIN D3) 1,000 units tablet Take 1,000 Units by mouth daily      Cyanocobalamin (VITAMIN B-12) 500 MCG LOZG Take 500 mcg by mouth every other day       DULoxetine (CYMBALTA) 30 mg delayed release capsule TAKE 1 CAPSULE BY MOUTH DAILY 90 capsule 0    Elderberry 575 MG/5ML SYRP Take by mouth      hydrochlorothiazide (HYDRODIURIL) 12 5 mg tablet TAKE ONE TABLET BY MOUTH DAILY 90 tablet 0    losartan (COZAAR) 25 mg tablet TAKE ONE TABLET BY MOUTH DAILY 90 tablet 3    Magnesium Gluconate 250 MG TABS Take 1 tablet by mouth daily      ofloxacin (OCUFLOX) 0 3 % ophthalmic solution       potassium chloride (K-DUR,KLOR-CON) 10 mEq tablet Take 1 tablet (10 mEq total) by mouth daily 90 tablet 3    prednisoLONE acetate (PRED FORTE) 1 % ophthalmic suspension       vitamin E 100 UNIT capsule Take 100 Units by mouth daily      Zinc 50 MG CAPS Take 1 capsule by mouth daily       No current facility-administered medications for this visit           Health Maintenance     Health Maintenance   Topic Date Due    HIV Screening  Never done    COVID-19 Vaccine (3 - Booster for Secret series) 11/17/2021    PT PLAN OF CARE  12/07/2022    Annual Physical  07/27/2023    Influenza Vaccine (Season Ended) 09/01/2023    Depression Screening  05/02/2024    BMI: Followup Plan  05/02/2024    BMI: Adult  05/02/2024    Breast Cancer Screening: Mammogram  04/05/2025    Cervical Cancer Screening  08/20/2025    Colorectal Cancer Screening  03/17/2026    DTaP,Tdap,and Td Vaccines (2 - Td or Tdap) 02/20/2027    Hepatitis C Screening  Completed    Osteoporosis Screening  Completed    Pneumococcal Vaccine: Pediatrics (0 to 5 Years) and At-Risk Patients (6 to 59 Years)  Aged Out    HIB Vaccine  Aged Out    IPV Vaccine  Aged Out    Hepatitis A Vaccine  Aged Out    Meningococcal ACWY Vaccine  Aged Out    HPV Vaccine  Aged [employer] History   Administered Date(s) Administered    COVID-19 PFIZER VACCINE 0 3 ML IM 08/25/2021, 09/22/2021    Influenza, seasonal, injectable 02/15/2017    Tdap 02/20/2017       JOSÉ ANTONIO Cabrera

## 2023-05-03 PROBLEM — M51.26 HERNIATED LUMBAR INTERVERTEBRAL DISC: Status: RESOLVED | Noted: 2018-12-27 | Resolved: 2023-05-03

## 2023-05-03 PROBLEM — M75.102 TEAR OF LEFT SUPRASPINATUS TENDON: Status: RESOLVED | Noted: 2022-09-27 | Resolved: 2023-05-03

## 2023-05-03 PROBLEM — M51.379 DDD (DEGENERATIVE DISC DISEASE), LUMBOSACRAL: Status: RESOLVED | Noted: 2018-12-27 | Resolved: 2023-05-03

## 2023-05-03 PROBLEM — M51.37 DDD (DEGENERATIVE DISC DISEASE), LUMBOSACRAL: Status: RESOLVED | Noted: 2018-12-27 | Resolved: 2023-05-03

## 2023-05-04 NOTE — ASSESSMENT & PLAN NOTE
Vitamin D 25 hydroxy level 29 6  Continue vitamin D3 supplement 1000 units daily  Repeat vitamin D 25 hydroxy level with next blood work

## 2023-06-29 DIAGNOSIS — M79.7 FIBROMYALGIA: ICD-10-CM

## 2023-06-29 DIAGNOSIS — I10 HYPERTENSION, UNSPECIFIED TYPE: ICD-10-CM

## 2023-06-29 DIAGNOSIS — F41.9 ANXIETY AND DEPRESSION: ICD-10-CM

## 2023-06-29 DIAGNOSIS — F32.A ANXIETY AND DEPRESSION: ICD-10-CM

## 2023-06-29 RX ORDER — HYDROCHLOROTHIAZIDE 12.5 MG/1
TABLET ORAL
Qty: 90 TABLET | Refills: 0 | Status: SHIPPED | OUTPATIENT
Start: 2023-06-29

## 2023-06-29 RX ORDER — DULOXETIN HYDROCHLORIDE 30 MG/1
CAPSULE, DELAYED RELEASE ORAL
Qty: 90 CAPSULE | Refills: 0 | Status: SHIPPED | OUTPATIENT
Start: 2023-06-29

## 2023-07-25 DIAGNOSIS — E87.6 LOW SERUM POTASSIUM: ICD-10-CM

## 2023-07-25 RX ORDER — POTASSIUM CHLORIDE 750 MG/1
10 TABLET, EXTENDED RELEASE ORAL DAILY
Qty: 90 TABLET | Refills: 0 | Status: SHIPPED | OUTPATIENT
Start: 2023-07-25

## 2023-08-08 DIAGNOSIS — I10 HYPERTENSION, UNSPECIFIED TYPE: ICD-10-CM

## 2023-08-08 RX ORDER — LOSARTAN POTASSIUM 25 MG/1
TABLET ORAL
Qty: 90 TABLET | Refills: 1 | Status: SHIPPED | OUTPATIENT
Start: 2023-08-08

## 2023-08-09 ENCOUNTER — APPOINTMENT (OUTPATIENT)
Dept: LAB | Facility: AMBULARY SURGERY CENTER | Age: 63
End: 2023-08-09
Payer: COMMERCIAL

## 2023-08-09 DIAGNOSIS — Z13.220 LIPID SCREENING: ICD-10-CM

## 2023-08-09 DIAGNOSIS — I10 HYPERTENSION, UNSPECIFIED TYPE: ICD-10-CM

## 2023-08-09 DIAGNOSIS — E55.9 HYPOVITAMINOSIS D: ICD-10-CM

## 2023-08-09 DIAGNOSIS — R73.01 IMPAIRED FASTING GLUCOSE: ICD-10-CM

## 2023-08-09 LAB
25(OH)D3 SERPL-MCNC: 23.3 NG/ML (ref 30–100)
ALBUMIN SERPL BCP-MCNC: 3.7 G/DL (ref 3.5–5)
ALP SERPL-CCNC: 69 U/L (ref 46–116)
ALT SERPL W P-5'-P-CCNC: 43 U/L (ref 12–78)
ANION GAP SERPL CALCULATED.3IONS-SCNC: 5 MMOL/L
AST SERPL W P-5'-P-CCNC: 17 U/L (ref 5–45)
BASOPHILS # BLD AUTO: 0.02 THOUSANDS/ÂΜL (ref 0–0.1)
BASOPHILS NFR BLD AUTO: 0 % (ref 0–1)
BILIRUB SERPL-MCNC: 0.53 MG/DL (ref 0.2–1)
BUN SERPL-MCNC: 23 MG/DL (ref 5–25)
CALCIUM SERPL-MCNC: 9.6 MG/DL (ref 8.3–10.1)
CHLORIDE SERPL-SCNC: 109 MMOL/L (ref 96–108)
CHOLEST SERPL-MCNC: 206 MG/DL
CO2 SERPL-SCNC: 27 MMOL/L (ref 21–32)
CREAT SERPL-MCNC: 0.88 MG/DL (ref 0.6–1.3)
EOSINOPHIL # BLD AUTO: 0.2 THOUSAND/ÂΜL (ref 0–0.61)
EOSINOPHIL NFR BLD AUTO: 3 % (ref 0–6)
ERYTHROCYTE [DISTWIDTH] IN BLOOD BY AUTOMATED COUNT: 12.5 % (ref 11.6–15.1)
GFR SERPL CREATININE-BSD FRML MDRD: 71 ML/MIN/1.73SQ M
GLUCOSE P FAST SERPL-MCNC: 100 MG/DL (ref 65–99)
HCT VFR BLD AUTO: 39.1 % (ref 34.8–46.1)
HDLC SERPL-MCNC: 53 MG/DL
HGB BLD-MCNC: 13.1 G/DL (ref 11.5–15.4)
IMM GRANULOCYTES # BLD AUTO: 0.02 THOUSAND/UL (ref 0–0.2)
IMM GRANULOCYTES NFR BLD AUTO: 0 % (ref 0–2)
LDLC SERPL CALC-MCNC: 135 MG/DL (ref 0–100)
LYMPHOCYTES # BLD AUTO: 1.95 THOUSANDS/ÂΜL (ref 0.6–4.47)
LYMPHOCYTES NFR BLD AUTO: 32 % (ref 14–44)
MCH RBC QN AUTO: 30.9 PG (ref 26.8–34.3)
MCHC RBC AUTO-ENTMCNC: 33.5 G/DL (ref 31.4–37.4)
MCV RBC AUTO: 92 FL (ref 82–98)
MONOCYTES # BLD AUTO: 0.43 THOUSAND/ÂΜL (ref 0.17–1.22)
MONOCYTES NFR BLD AUTO: 7 % (ref 4–12)
NEUTROPHILS # BLD AUTO: 3.49 THOUSANDS/ÂΜL (ref 1.85–7.62)
NEUTS SEG NFR BLD AUTO: 58 % (ref 43–75)
NONHDLC SERPL-MCNC: 153 MG/DL
NRBC BLD AUTO-RTO: 0 /100 WBCS
PLATELET # BLD AUTO: 302 THOUSANDS/UL (ref 149–390)
PMV BLD AUTO: 9.6 FL (ref 8.9–12.7)
POTASSIUM SERPL-SCNC: 4.1 MMOL/L (ref 3.5–5.3)
PROT SERPL-MCNC: 7.1 G/DL (ref 6.4–8.4)
RBC # BLD AUTO: 4.24 MILLION/UL (ref 3.81–5.12)
SODIUM SERPL-SCNC: 141 MMOL/L (ref 135–147)
TRIGL SERPL-MCNC: 88 MG/DL
TSH SERPL DL<=0.05 MIU/L-ACNC: 2.31 UIU/ML (ref 0.45–4.5)
WBC # BLD AUTO: 6.11 THOUSAND/UL (ref 4.31–10.16)

## 2023-08-09 PROCEDURE — 36415 COLL VENOUS BLD VENIPUNCTURE: CPT

## 2023-08-09 PROCEDURE — 85025 COMPLETE CBC W/AUTO DIFF WBC: CPT

## 2023-08-09 PROCEDURE — 82306 VITAMIN D 25 HYDROXY: CPT

## 2023-08-09 PROCEDURE — 84443 ASSAY THYROID STIM HORMONE: CPT

## 2023-08-09 PROCEDURE — 80061 LIPID PANEL: CPT

## 2023-08-09 PROCEDURE — 80053 COMPREHEN METABOLIC PANEL: CPT

## 2023-08-09 PROCEDURE — 83036 HEMOGLOBIN GLYCOSYLATED A1C: CPT

## 2023-08-10 LAB
EST. AVERAGE GLUCOSE BLD GHB EST-MCNC: 126 MG/DL
HBA1C MFR BLD: 6 %

## 2023-09-19 DIAGNOSIS — M79.7 FIBROMYALGIA: ICD-10-CM

## 2023-09-19 DIAGNOSIS — I10 HYPERTENSION, UNSPECIFIED TYPE: ICD-10-CM

## 2023-09-19 DIAGNOSIS — F32.A ANXIETY AND DEPRESSION: ICD-10-CM

## 2023-09-19 DIAGNOSIS — F41.9 ANXIETY AND DEPRESSION: ICD-10-CM

## 2023-09-19 RX ORDER — DULOXETIN HYDROCHLORIDE 30 MG/1
CAPSULE, DELAYED RELEASE ORAL
Qty: 90 CAPSULE | Refills: 0 | Status: SHIPPED | OUTPATIENT
Start: 2023-09-19

## 2023-09-19 RX ORDER — HYDROCHLOROTHIAZIDE 12.5 MG/1
TABLET ORAL
Qty: 90 TABLET | Refills: 0 | Status: SHIPPED | OUTPATIENT
Start: 2023-09-19

## 2023-10-20 DIAGNOSIS — E87.6 LOW SERUM POTASSIUM: ICD-10-CM

## 2023-10-20 RX ORDER — POTASSIUM CHLORIDE 750 MG/1
10 TABLET, EXTENDED RELEASE ORAL DAILY
Qty: 90 TABLET | Refills: 0 | Status: SHIPPED | OUTPATIENT
Start: 2023-10-20

## 2023-12-15 DIAGNOSIS — F32.A ANXIETY AND DEPRESSION: ICD-10-CM

## 2023-12-15 DIAGNOSIS — M79.7 FIBROMYALGIA: ICD-10-CM

## 2023-12-15 DIAGNOSIS — F41.9 ANXIETY AND DEPRESSION: ICD-10-CM

## 2023-12-17 RX ORDER — DULOXETIN HYDROCHLORIDE 30 MG/1
30 CAPSULE, DELAYED RELEASE ORAL DAILY
Qty: 90 CAPSULE | Refills: 1 | Status: SHIPPED | OUTPATIENT
Start: 2023-12-17

## 2023-12-19 DIAGNOSIS — I10 HYPERTENSION, UNSPECIFIED TYPE: ICD-10-CM

## 2023-12-19 RX ORDER — HYDROCHLOROTHIAZIDE 12.5 MG/1
12.5 TABLET ORAL DAILY
Qty: 90 TABLET | Refills: 0 | Status: SHIPPED | OUTPATIENT
Start: 2023-12-19

## 2024-01-09 ENCOUNTER — OFFICE VISIT (OUTPATIENT)
Dept: OTOLARYNGOLOGY | Facility: CLINIC | Age: 64
End: 2024-01-09
Payer: COMMERCIAL

## 2024-01-09 VITALS — TEMPERATURE: 97.6 F | WEIGHT: 173 LBS | BODY MASS INDEX: 33.96 KG/M2 | HEIGHT: 60 IN

## 2024-01-09 DIAGNOSIS — H93.13 BILATERAL TINNITUS: Primary | ICD-10-CM

## 2024-01-09 DIAGNOSIS — H61.23 BILATERAL IMPACTED CERUMEN: ICD-10-CM

## 2024-01-09 PROCEDURE — 69210 REMOVE IMPACTED EAR WAX UNI: CPT | Performed by: NURSE PRACTITIONER

## 2024-01-09 PROCEDURE — 99214 OFFICE O/P EST MOD 30 MIN: CPT | Performed by: NURSE PRACTITIONER

## 2024-01-09 NOTE — ASSESSMENT & PLAN NOTE
On exam noted bilateral cerumen impaction and unable to fully view tympanic membrane.  Cerumen impaction removed bilateral eac with irrigation and suction, pt tolerated procedure well.  Upon removal, improved hearing and decreased clogged sensation of bilateral ears.  Discussed routine cerumen care including avoidance of q-tips, may use cerumen softeners every one to two months.  Hydrocortisone cream pea sized amount on finger as needed for itching in ears.  Encourage ongoing follow up annually to monitor for cerumen and hearing.  Audiogram if symptoms worsen.

## 2024-01-09 NOTE — ASSESSMENT & PLAN NOTE
Bilateral tinnitus  Bilateral tinnitus for weeks.  Improved with cerumen removal     Audiogram 02/2022 indicating normal bilateral hearing with borderline mild SNHL in higher frequencies, Tymps type A, Word discrimination normal.  Discussed tinnitus associated with high frequency SNHL change.  Hearing change most likely due to prior noise exposure.     Recommend hearing protection when able.   Tinnitus and possible causes including medications, viral illness, inner ear disorder, TMJ syndrome, or hearing changes.   Options for bilateral tinnitus including adding background noise, tinnitus retraining therapy, masking device, Resound tinnitus yuki, Acupuncture, or acceptance.  Limit caffeine and ibuprofen intake. Discouraged q-tip use due to damage of ear canal.  Consider Flonase daily for eustachian tube dysfunction.  No specific medications or surgery indicated for treatment of tinnitus.  Consider MRI imaging if worsens   Repeat audiogram in one to two years.

## 2024-01-09 NOTE — PROGRESS NOTES
Assessment/Plan:    Bilateral tinnitus  Bilateral tinnitus  Bilateral tinnitus for weeks.  Improved with cerumen removal     Audiogram 02/2022 indicating normal bilateral hearing with borderline mild SNHL in higher frequencies, Tymps type A, Word discrimination normal.  Discussed tinnitus associated with high frequency SNHL change.  Hearing change most likely due to prior noise exposure.     Recommend hearing protection when able.   Tinnitus and possible causes including medications, viral illness, inner ear disorder, TMJ syndrome, or hearing changes.   Options for bilateral tinnitus including adding background noise, tinnitus retraining therapy, masking device, Resound tinnitus yuki, Acupuncture, or acceptance.  Limit caffeine and ibuprofen intake. Discouraged q-tip use due to damage of ear canal.  Consider Flonase daily for eustachian tube dysfunction.  No specific medications or surgery indicated for treatment of tinnitus.  Consider MRI imaging if worsens   Repeat audiogram in one to two years.      Bilateral impacted cerumen    On exam noted bilateral cerumen impaction and unable to fully view tympanic membrane.  Cerumen impaction removed bilateral eac with irrigation and suction, pt tolerated procedure well.  Upon removal, improved hearing and decreased clogged sensation of bilateral ears.  Discussed routine cerumen care including avoidance of q-tips, may use cerumen softeners every one to two months.  Hydrocortisone cream pea sized amount on finger as needed for itching in ears.  Encourage ongoing follow up annually to monitor for cerumen and hearing.  Audiogram if symptoms worsen.         Diagnoses and all orders for this visit:    Bilateral tinnitus  -     Ambulatory referral to Audiology; Future    Bilateral impacted cerumen  -     Ear cerumen removal          Subjective:      Patient ID: Demetria Brandon is a 63 y.o. female.    Presents today as a follow up due to ear concerns. Worse over past couple of  "months. Hearing gradually worsening.  Bilateral ringing tinnitus.  No otalgia or otorrhea.  No history of ear surgery.  No current hearing aids.  No use air pods or ear buds. Positive q-tip use.             The following portions of the patient's history were reviewed and updated as appropriate: allergies, current medications, past family history, past medical history, past social history, past surgical history, and problem list.    Review of Systems   Constitutional: Negative.    HENT:  Positive for tinnitus. Negative for congestion, ear discharge, ear pain, hearing loss, nosebleeds, postnasal drip, rhinorrhea, sinus pressure, sinus pain, sore throat and voice change.    Respiratory:  Negative for chest tightness and shortness of breath.    Skin:  Negative for color change.   Neurological:  Negative for dizziness, numbness and headaches.   Psychiatric/Behavioral: Negative.           Objective:      Temp 97.6 °F (36.4 °C) (Temporal)   Ht 4' 11.5\" (1.511 m)   Wt 78.5 kg (173 lb)   BMI 34.36 kg/m²          Physical Exam  Constitutional:       Appearance: She is well-developed.   HENT:      Head: Normocephalic.      Right Ear: Hearing, tympanic membrane, ear canal and external ear normal. No decreased hearing noted. No drainage or tenderness. There is impacted cerumen. Tympanic membrane is not perforated or erythematous.      Left Ear: Hearing, tympanic membrane, ear canal and external ear normal. No decreased hearing noted. No drainage or tenderness. There is impacted cerumen. Tympanic membrane is not perforated or erythematous.      Nose: Nose normal. No nasal deformity or septal deviation.      Mouth/Throat:      Mouth: Mucous membranes are not pale and not dry. No oral lesions.      Dentition: Normal dentition.      Pharynx: Uvula midline. No oropharyngeal exudate.   Neck:      Trachea: No tracheal deviation.   Pulmonary:      Effort: Pulmonary effort is normal. No accessory muscle usage or respiratory distress. "   Musculoskeletal:      Cervical back: Full passive range of motion without pain and neck supple.   Lymphadenopathy:      Cervical: No cervical adenopathy.   Skin:     General: Skin is warm and dry.   Neurological:      Mental Status: She is alert and oriented to person, place, and time.      Cranial Nerves: No cranial nerve deficit.      Sensory: No sensory deficit.   Psychiatric:         Behavior: Behavior is cooperative.         Ear cerumen removal    Date/Time: 1/9/2024 2:30 PM    Performed by: JOSÉ ANTONIO Marshall  Authorized by: JOSÉ ANTONIO Marshall  Universal Protocol:  Consent: Verbal consent obtained.  Risks and benefits: risks, benefits and alternatives were discussed  Consent given by: patient  Patient understanding: patient states understanding of the procedure being performed    Patient location:  Clinic  Procedure details:     Local anesthetic:  None    Location:  L ear and R ear    Approach:  External  Post-procedure details:     Complication:  None    Hearing quality:  Normal    Patient tolerance of procedure:  Tolerated well, no immediate complications

## 2024-01-09 NOTE — PATIENT INSTRUCTIONS
Tinnitus and possible causes including medications, viral illness, inner ear disorder, TMJ syndrome, or hearing changes.   Options for bilateral tinnitus including adding background noise, tinnitus retraining therapy, masking device, Resound tinnitus yuki, Acupuncture, or acceptance.  Limit caffeine and ibuprofen intake. Discouraged q-tip use due to damage of ear canal.  Consider Flonase daily for eustachian tube dysfunction.  No specific medications or surgery indicated for treatment of tinnitus.  Consider MRI imaging if worsens     Wax care at home - avoidance of q-tips, may use cerumen softeners every one to two months.  Hydrocortisone cream pea sized amount on finger as needed for itching in ears.

## 2024-02-07 DIAGNOSIS — E87.6 LOW SERUM POTASSIUM: ICD-10-CM

## 2024-02-07 DIAGNOSIS — I10 HYPERTENSION, UNSPECIFIED TYPE: ICD-10-CM

## 2024-02-07 RX ORDER — LOSARTAN POTASSIUM 25 MG/1
TABLET ORAL
Qty: 90 TABLET | Refills: 0 | Status: SHIPPED | OUTPATIENT
Start: 2024-02-07

## 2024-02-07 RX ORDER — POTASSIUM CHLORIDE 750 MG/1
10 TABLET, EXTENDED RELEASE ORAL DAILY
Qty: 90 TABLET | Refills: 0 | Status: SHIPPED | OUTPATIENT
Start: 2024-02-07

## 2024-02-21 PROBLEM — H61.23 BILATERAL IMPACTED CERUMEN: Status: RESOLVED | Noted: 2024-01-09 | Resolved: 2024-02-21

## 2024-03-19 DIAGNOSIS — I10 HYPERTENSION, UNSPECIFIED TYPE: ICD-10-CM

## 2024-03-19 RX ORDER — HYDROCHLOROTHIAZIDE 12.5 MG/1
12.5 TABLET ORAL DAILY
Qty: 90 TABLET | Refills: 0 | Status: SHIPPED | OUTPATIENT
Start: 2024-03-19

## 2024-05-07 DIAGNOSIS — I10 HYPERTENSION, UNSPECIFIED TYPE: ICD-10-CM

## 2024-05-07 RX ORDER — LOSARTAN POTASSIUM 25 MG/1
TABLET ORAL
Qty: 90 TABLET | Refills: 0 | Status: SHIPPED | OUTPATIENT
Start: 2024-05-07 | End: 2024-05-14 | Stop reason: SDUPTHER

## 2024-05-14 ENCOUNTER — OFFICE VISIT (OUTPATIENT)
Dept: FAMILY MEDICINE CLINIC | Facility: CLINIC | Age: 64
End: 2024-05-14
Payer: COMMERCIAL

## 2024-05-14 VITALS
DIASTOLIC BLOOD PRESSURE: 80 MMHG | OXYGEN SATURATION: 98 % | TEMPERATURE: 97.6 F | HEART RATE: 98 BPM | BODY MASS INDEX: 34.36 KG/M2 | RESPIRATION RATE: 16 BRPM | HEIGHT: 60 IN | WEIGHT: 175 LBS | SYSTOLIC BLOOD PRESSURE: 120 MMHG

## 2024-05-14 DIAGNOSIS — M79.7 FIBROMYALGIA: ICD-10-CM

## 2024-05-14 DIAGNOSIS — R73.01 IMPAIRED FASTING GLUCOSE: ICD-10-CM

## 2024-05-14 DIAGNOSIS — E55.9 HYPOVITAMINOSIS D: ICD-10-CM

## 2024-05-14 DIAGNOSIS — E87.6 LOW SERUM POTASSIUM: ICD-10-CM

## 2024-05-14 DIAGNOSIS — G47.33 OBSTRUCTIVE SLEEP APNEA: ICD-10-CM

## 2024-05-14 DIAGNOSIS — Z12.31 BREAST CANCER SCREENING BY MAMMOGRAM: ICD-10-CM

## 2024-05-14 DIAGNOSIS — Z00.00 PHYSICAL EXAM, ANNUAL: Primary | ICD-10-CM

## 2024-05-14 DIAGNOSIS — Z13.220 LIPID SCREENING: ICD-10-CM

## 2024-05-14 DIAGNOSIS — I10 HYPERTENSION, UNSPECIFIED TYPE: ICD-10-CM

## 2024-05-14 PROCEDURE — 99213 OFFICE O/P EST LOW 20 MIN: CPT | Performed by: NURSE PRACTITIONER

## 2024-05-14 PROCEDURE — 99396 PREV VISIT EST AGE 40-64: CPT | Performed by: NURSE PRACTITIONER

## 2024-05-14 RX ORDER — HYDROCHLOROTHIAZIDE 12.5 MG/1
12.5 TABLET ORAL DAILY
Qty: 90 TABLET | Refills: 3 | Status: SHIPPED | OUTPATIENT
Start: 2024-05-14

## 2024-05-14 RX ORDER — POTASSIUM CHLORIDE 750 MG/1
10 TABLET, EXTENDED RELEASE ORAL DAILY
Qty: 90 TABLET | Refills: 3 | Status: SHIPPED | OUTPATIENT
Start: 2024-05-14

## 2024-05-14 RX ORDER — LOSARTAN POTASSIUM 25 MG/1
25 TABLET ORAL DAILY
Qty: 90 TABLET | Refills: 3 | Status: SHIPPED | OUTPATIENT
Start: 2024-05-14

## 2024-05-14 RX ORDER — DULOXETIN HYDROCHLORIDE 30 MG/1
30 CAPSULE, DELAYED RELEASE ORAL DAILY
Qty: 90 CAPSULE | Refills: 3 | Status: SHIPPED | OUTPATIENT
Start: 2024-05-14

## 2024-05-14 NOTE — PROGRESS NOTES
[facility] HEALTH MAINTENANCE OFFICE VISIT  [facility] - [facility]    NAME: Demetria Brandon  AGE: 63 y.o. SEX: female  : 1960     DATE: 2024    Assessment and Plan     1. Physical exam, annual    2. Hypertension, unspecified type  Assessment & Plan:  Stable blood pressure on losartan and hydrochlorothiazide.     Orders:  -     CBC; Future  -     Comprehensive metabolic panel; Future  -     Hemoglobin A1C; Future  -     TSH, 3rd generation; Future  -     hydroCHLOROthiazide 12.5 mg tablet; Take 1 tablet (12.5 mg total) by mouth daily  -     losartan (COZAAR) 25 mg tablet; Take 1 tablet (25 mg total) by mouth daily    3. Hypovitaminosis D  Assessment & Plan:  Continue current vitamin D3 supplement.   Repeat vitamin D 25 hydroxy.     Orders:  -     Vitamin D 25 hydroxy; Future    4. Impaired fasting glucose  Assessment & Plan:  Repeat A1c.     Orders:  -     Hemoglobin A1C; Future    5. Obstructive sleep apnea  Assessment & Plan:  Stable on CPAP.      6. Fibromyalgia  Assessment & Plan:  Stable on Cymbalta.     Orders:  -     DULoxetine (CYMBALTA) 30 mg delayed release capsule; Take 1 capsule (30 mg total) by mouth daily    7. Low serum potassium  -     potassium chloride (Klor-Con M10) 10 mEq tablet; Take 1 tablet (10 mEq total) by mouth daily    8. Lipid screening  -     Lipid panel; Future    9. Breast cancer screening by mammogram  -     Mammo screening bilateral w 3d & cad; Future        Patient Counseling:   Nutrition: Stressed importance of a well balanced diet, moderation of sodium/saturated fat, caloric balance and sufficient intake of fiber  Exercise: Stressed the importance of regular exercise with a goal of 150 minutes per week  Dental Health: Discussed daily flossing and brushing and regular dental visits     Immunizations reviewed:  Discussed shingrix, declines right now.   Discussed benefits of:  Colon Cancer Screening, Mammogram , Cervical Cancer  screening, and Screening labs.      Follow up for annual exam in one year, or sooner if needed.         Chief Complaint     Chief Complaint   Patient presents with    Well Check       History of Present Illness     Demetria Brandon is a 63 year old female presenting today for annual exam.     She feels well and has no complaints today.           Well Adult Physical   Patient here for a comprehensive physical exam.      Diet and Physical Activity  Diet: well balanced diet  Exercise:  Job is very active.   No formal exercise.      Depression Screen  PHQ-2/9 Depression Screening    Little interest or pleasure in doing things: 0 - not at all  Feeling down, depressed, or hopeless: 0 - not at all  PHQ-2 Score: 0  PHQ-2 Interpretation: Negative depression screen          General Health  Hearing: Normal:  bilateral  Vision: goes for regular eye exams, most recent eye exam <1 year, and wears glasses  Dental: regular dental visits    Reproductive Health  Post-menopausal  and Follows with gynecologist      The following portions of the patient's history were reviewed and updated as appropriate: allergies, current medications, past family history, past medical history, past social history, past surgical history and problem list.    Review of Systems     Review of Systems   Constitutional: Negative.    HENT: Negative.     Respiratory: Negative.     Cardiovascular: Negative.    Gastrointestinal: Negative.    Genitourinary: Negative.    Musculoskeletal: Negative.    Skin: Negative.    Neurological: Negative.    Hematological: Negative.    Psychiatric/Behavioral: Negative.         Past Medical History     Past Medical History:   Diagnosis Date    Anemia 12/2/2016    Anxiety and depression     Asthma 10/26/2012    Overview:  ICD10 clean up    Chronic fatigue syndrome     Colon polyp     CPAP (continuous positive airway pressure) dependence     DDD (degenerative disc disease), lumbosacral 12/27/2018    Gluten intolerance      Herniated lumbar intervertebral disc 12/27/2018    Hypersomnia 6/6/2018    Hypertension     Hypovitaminosis D     Impaired fasting glucose     Migraine     Obesity (BMI 30-39.9) 6/6/2018    Tear of left supraspinatus tendon 9/27/2022       Past Surgical History     Past Surgical History:   Procedure Laterality Date    APPENDECTOMY      BREAST BIOPSY Right 2006    not sure of the date    CATARACT EXTRACTION Right     CHOLECYSTECTOMY      COLONOSCOPY      OR COLONOSCOPY FLX DX W/COLLJ SPEC WHEN PFRMD N/A 12/04/2017    Procedure: COLONOSCOPY;  Surgeon: Tracey Santos DO;  Location: [facility];  Service: Gastroenterology    UPPER GASTROINTESTINAL ENDOSCOPY         Social History     Social History     Socioeconomic History    Marital status: Single     Spouse name: None    Number of children: 0    Years of education: bachelor's degree    Highest education level: None   Occupational History    Occupation: works for [employer]     Employer: [employer]   Tobacco Use    Smoking status: Never    Smokeless tobacco: Never   Vaping Use    Vaping status: Never Used   Substance and Sexual Activity    Alcohol use: Yes     Comment: rarely    Drug use: No    Sexual activity: Not Currently   Other Topics Concern    None   Social History Narrative    Education: bachelor's degree in sociology    Learning Disabilities: none    Marital History: single    Children: none    Living Arrangement: lives alone    Occupational History: works for [employer] at [employer]    Functioning Relationships: limited support system    Legal History: none     History: None    Caffeine use denied         Social Determinants of Health     Financial Resource Strain: Not on file   Food Insecurity: Not on file   Transportation Needs: Not on file   Physical Activity: Not on file   Stress: Not on file   Social Connections: Not on file   Intimate Partner Violence: Not on file   Housing Stability: Not on file  "      Family History     Family History   Problem Relation Age of Onset    Depression Mother     Hypertension Mother         essential     Mitral valve prolapse Mother     No Known Problems Father     Heart disease Maternal Grandmother         cardiac disorder    Hyperlipidemia Maternal Grandmother     Stroke Paternal Grandmother     Stroke Paternal Grandfather     Diabetes Brother     Heart attack Brother     Breast cancer additional onset Maternal Aunt         unkno    Breast cancer Maternal Aunt 50       Current Medications       Current Outpatient Medications:     Ascorbic Acid (VITAMIN C PO), Take 1 tablet by mouth daily, Disp: , Rfl:     cholecalciferol (VITAMIN D3) 1,000 units tablet, Take 1,000 Units by mouth daily, Disp: , Rfl:     Cyanocobalamin (VITAMIN B-12) 500 MCG LOZG, Take 500 mcg by mouth every other day , Disp: , Rfl:     DULoxetine (CYMBALTA) 30 mg delayed release capsule, Take 1 capsule (30 mg total) by mouth daily, Disp: 90 capsule, Rfl: 3    Elderberry 575 MG/5ML SYRP, Take by mouth, Disp: , Rfl:     hydroCHLOROthiazide 12.5 mg tablet, Take 1 tablet (12.5 mg total) by mouth daily, Disp: 90 tablet, Rfl: 3    losartan (COZAAR) 25 mg tablet, Take 1 tablet (25 mg total) by mouth daily, Disp: 90 tablet, Rfl: 3    potassium chloride (Klor-Con M10) 10 mEq tablet, Take 1 tablet (10 mEq total) by mouth daily, Disp: 90 tablet, Rfl: 3    vitamin E 100 UNIT capsule, Take 100 Units by mouth daily, Disp: , Rfl:     Zinc 50 MG CAPS, Take 1 capsule by mouth daily, Disp: , Rfl:      Allergies     Allergies   Allergen Reactions    Escitalopram Swelling and Edema     Category: Allergy;     Fluticasone-Salmeterol      UNKNOWN    Nsaids      Action Taken: stomach issues;     Gluten Meal - Food Allergy Diarrhea    Latex      irritation      Other Rash     Adhesive bandages       Objective     /80   Pulse 98   Temp 97.6 °F (36.4 °C) (Temporal)   Resp 16   Ht 4' 11.5\" (1.511 m)   Wt 79.4 kg (175 lb)   SpO2 " 98%   BMI 34.75 kg/m²      Physical Exam  Vitals and nursing note reviewed.   Constitutional:       Appearance: Normal appearance. She is well-developed.   HENT:      Head: Normocephalic and atraumatic.      Right Ear: Tympanic membrane normal.      Left Ear: Tympanic membrane normal.      Mouth/Throat:      Mouth: Mucous membranes are moist.      Pharynx: Oropharynx is clear.   Eyes:      Conjunctiva/sclera: Conjunctivae normal.      Pupils: Pupils are equal, round, and reactive to light.   Neck:      Thyroid: No thyromegaly.      Vascular: No carotid bruit.   Cardiovascular:      Rate and Rhythm: Normal rate and regular rhythm.      Heart sounds: No murmur heard.  Pulmonary:      Effort: Pulmonary effort is normal.      Breath sounds: Normal breath sounds.   Abdominal:      General: Bowel sounds are normal.      Palpations: Abdomen is soft.      Tenderness: There is no abdominal tenderness.   Musculoskeletal:      Cervical back: Normal range of motion and neck supple.      Right lower leg: No edema.      Left lower leg: No edema.   Lymphadenopathy:      Cervical: No cervical adenopathy.   Skin:     Findings: No rash.   Neurological:      Mental Status: She is alert and oriented to person, place, and time.   Psychiatric:         Mood and Affect: Mood normal.             JOSÉ ANTONIO Baldwin  [facility], [place], operated by [facility]

## 2024-06-20 ENCOUNTER — APPOINTMENT (OUTPATIENT)
Dept: LAB | Facility: AMBULARY SURGERY CENTER | Age: 64
End: 2024-06-20
Payer: COMMERCIAL

## 2024-06-20 ENCOUNTER — TRANSCRIBE ORDERS (OUTPATIENT)
Dept: LAB | Facility: AMBULARY SURGERY CENTER | Age: 64
End: 2024-06-20

## 2024-06-20 DIAGNOSIS — R73.01 IMPAIRED FASTING GLUCOSE: ICD-10-CM

## 2024-06-20 DIAGNOSIS — I10 HYPERTENSION, UNSPECIFIED TYPE: ICD-10-CM

## 2024-06-20 DIAGNOSIS — Z00.8 HEALTH EXAMINATION IN POPULATION SURVEY: Primary | ICD-10-CM

## 2024-06-20 DIAGNOSIS — Z00.8 HEALTH EXAMINATION IN POPULATION SURVEY: ICD-10-CM

## 2024-06-20 DIAGNOSIS — E55.9 HYPOVITAMINOSIS D: ICD-10-CM

## 2024-06-20 DIAGNOSIS — Z13.220 LIPID SCREENING: ICD-10-CM

## 2024-06-20 LAB
25(OH)D3 SERPL-MCNC: 31.4 NG/ML (ref 30–100)
ALBUMIN SERPL BCG-MCNC: 4.3 G/DL (ref 3.5–5)
ALP SERPL-CCNC: 73 U/L (ref 34–104)
ALT SERPL W P-5'-P-CCNC: 32 U/L (ref 7–52)
ANION GAP SERPL CALCULATED.3IONS-SCNC: 12 MMOL/L (ref 4–13)
AST SERPL W P-5'-P-CCNC: 22 U/L (ref 13–39)
BILIRUB SERPL-MCNC: 0.5 MG/DL (ref 0.2–1)
BUN SERPL-MCNC: 19 MG/DL (ref 5–25)
CALCIUM SERPL-MCNC: 9.2 MG/DL (ref 8.4–10.2)
CHLORIDE SERPL-SCNC: 104 MMOL/L (ref 96–108)
CHOLEST SERPL-MCNC: 202 MG/DL
CO2 SERPL-SCNC: 26 MMOL/L (ref 21–32)
CREAT SERPL-MCNC: 0.84 MG/DL (ref 0.6–1.3)
ERYTHROCYTE [DISTWIDTH] IN BLOOD BY AUTOMATED COUNT: 12.1 % (ref 11.6–15.1)
EST. AVERAGE GLUCOSE BLD GHB EST-MCNC: 117 MG/DL
GFR SERPL CREATININE-BSD FRML MDRD: 75 ML/MIN/1.73SQ M
GLUCOSE SERPL-MCNC: 99 MG/DL (ref 65–140)
HBA1C MFR BLD: 5.7 %
HCT VFR BLD AUTO: 41.2 % (ref 34.8–46.1)
HDLC SERPL-MCNC: 56 MG/DL
HGB BLD-MCNC: 13.9 G/DL (ref 11.5–15.4)
LDLC SERPL CALC-MCNC: 124 MG/DL (ref 0–100)
MCH RBC QN AUTO: 30.6 PG (ref 26.8–34.3)
MCHC RBC AUTO-ENTMCNC: 33.7 G/DL (ref 31.4–37.4)
MCV RBC AUTO: 91 FL (ref 82–98)
NONHDLC SERPL-MCNC: 146 MG/DL
PLATELET # BLD AUTO: 334 THOUSANDS/UL (ref 149–390)
PMV BLD AUTO: 9.3 FL (ref 8.9–12.7)
POTASSIUM SERPL-SCNC: 3.6 MMOL/L (ref 3.5–5.3)
PROT SERPL-MCNC: 6.9 G/DL (ref 6.4–8.4)
RBC # BLD AUTO: 4.54 MILLION/UL (ref 3.81–5.12)
SODIUM SERPL-SCNC: 142 MMOL/L (ref 135–147)
TRIGL SERPL-MCNC: 108 MG/DL
TSH SERPL DL<=0.05 MIU/L-ACNC: 1.54 UIU/ML (ref 0.45–4.5)
WBC # BLD AUTO: 6.54 THOUSAND/UL (ref 4.31–10.16)

## 2024-06-20 PROCEDURE — 80053 COMPREHEN METABOLIC PANEL: CPT

## 2024-06-20 PROCEDURE — 84443 ASSAY THYROID STIM HORMONE: CPT

## 2024-06-20 PROCEDURE — 36415 COLL VENOUS BLD VENIPUNCTURE: CPT

## 2024-06-20 PROCEDURE — 80061 LIPID PANEL: CPT

## 2024-06-20 PROCEDURE — 85027 COMPLETE CBC AUTOMATED: CPT

## 2024-06-20 PROCEDURE — 82306 VITAMIN D 25 HYDROXY: CPT

## 2024-06-20 PROCEDURE — 83036 HEMOGLOBIN GLYCOSYLATED A1C: CPT

## 2024-09-03 NOTE — PROGRESS NOTES
Daily Note     Today's date: 3/25/2019  Patient name: Sandra Stephens  : 1960  MRN: [id number]  Referring provider: Taras Rivera MD  Dx:   Encounter Diagnosis     ICD-10-CM    1  Neck pain on right side M54 2                 Subjective: Pt reports that she had a good weekend in regards to her pain but that it increased t/o her work day    Objective: See treatment diary below  PT performs mobilizations as noted  Precautions: fibromyalgia       Daily Treatment Diary     Manuals 3/18 3/25       3/6 3/11 3/13   Cervical/thoracic pa's 5' 5'       5' 5' 5'   gentle STM R paraspinals         NP      gentle cervical traction 5'   5'       5' 5' 5'                  Exercise Diary               ube 8' 8'       6 min 8' 8'   Chin tuck 5"x20 30"x4       5"x20 20 20   UT stretch 30"x4 30"x4       30"x4 4 4   Levator stretch 30"x4 30"x4       30"x4 4 4   Low rows Red tb to fatigu Blue x20       Red  1x15 Green tb 20 Blue tb 20   rows Red tb to fatigue Blue x20       Red  1x15 Green x20 Blue tb x20   scap depression              scap retraction with cane ext         3lb bar  10"x10 10"x10 10"x10   scap squeeze with er         NP                                                                                                                                                                                                        Modalities              MHP post tx prone 10 10'       10' 10' 10'                    Assessment: resumed full program today  Pt denies any increase in pain with manual tx or therex  Reports decreased stiffness post-tx/       Plan: Continue treatment as per PT plan of care 
No

## 2025-07-03 ENCOUNTER — APPOINTMENT (OUTPATIENT)
Dept: LAB | Facility: CLINIC | Age: 65
End: 2025-07-03
Attending: PREVENTIVE MEDICINE

## 2025-07-03 DIAGNOSIS — Z00.8 ENCOUNTER FOR OTHER GENERAL EXAMINATION: ICD-10-CM

## 2025-07-03 LAB
CHOLEST SERPL-MCNC: 207 MG/DL (ref ?–200)
EST. AVERAGE GLUCOSE BLD GHB EST-MCNC: 123 MG/DL
HBA1C MFR BLD: 5.9 %
HDLC SERPL-MCNC: 58 MG/DL
LDLC SERPL CALC-MCNC: 119 MG/DL (ref 0–100)
NONHDLC SERPL-MCNC: 149 MG/DL
TRIGL SERPL-MCNC: 151 MG/DL (ref ?–150)

## 2025-07-03 PROCEDURE — 36415 COLL VENOUS BLD VENIPUNCTURE: CPT

## 2025-07-03 PROCEDURE — 83036 HEMOGLOBIN GLYCOSYLATED A1C: CPT

## 2025-07-03 PROCEDURE — 80061 LIPID PANEL: CPT

## 2025-07-15 ENCOUNTER — OFFICE VISIT (OUTPATIENT)
Dept: FAMILY MEDICINE CLINIC | Facility: CLINIC | Age: 65
End: 2025-07-15
Payer: COMMERCIAL

## 2025-07-15 VITALS
BODY MASS INDEX: 34.08 KG/M2 | HEIGHT: 60 IN | HEART RATE: 73 BPM | OXYGEN SATURATION: 97 % | TEMPERATURE: 98.2 F | RESPIRATION RATE: 18 BRPM | SYSTOLIC BLOOD PRESSURE: 130 MMHG | WEIGHT: 173.6 LBS | DIASTOLIC BLOOD PRESSURE: 80 MMHG

## 2025-07-15 DIAGNOSIS — Z99.89 CPAP (CONTINUOUS POSITIVE AIRWAY PRESSURE) DEPENDENCE: ICD-10-CM

## 2025-07-15 DIAGNOSIS — I10 HYPERTENSION, UNSPECIFIED TYPE: ICD-10-CM

## 2025-07-15 DIAGNOSIS — R73.01 IMPAIRED FASTING GLUCOSE: ICD-10-CM

## 2025-07-15 DIAGNOSIS — Z12.31 ENCOUNTER FOR SCREENING MAMMOGRAM FOR BREAST CANCER: ICD-10-CM

## 2025-07-15 DIAGNOSIS — Z00.00 ANNUAL PHYSICAL EXAM: Primary | ICD-10-CM

## 2025-07-15 PROCEDURE — 99213 OFFICE O/P EST LOW 20 MIN: CPT | Performed by: NURSE PRACTITIONER

## 2025-07-15 PROCEDURE — 99396 PREV VISIT EST AGE 40-64: CPT | Performed by: NURSE PRACTITIONER

## 2025-07-15 RX ORDER — HYDROCODONE BITARTRATE AND ACETAMINOPHEN 5; 325 MG/1; MG/1
TABLET ORAL
COMMUNITY
Start: 2025-05-02 | End: 2025-07-15 | Stop reason: ALTCHOICE

## 2025-08-05 DIAGNOSIS — E87.6 LOW SERUM POTASSIUM: ICD-10-CM

## 2025-08-05 DIAGNOSIS — I10 HYPERTENSION, UNSPECIFIED TYPE: ICD-10-CM

## 2025-08-07 RX ORDER — LOSARTAN POTASSIUM 25 MG/1
25 TABLET ORAL DAILY
Qty: 30 TABLET | Refills: 0 | Status: SHIPPED | OUTPATIENT
Start: 2025-08-07

## 2025-08-07 RX ORDER — POTASSIUM CHLORIDE 750 MG/1
10 TABLET, EXTENDED RELEASE ORAL DAILY
Qty: 30 TABLET | Refills: 0 | Status: SHIPPED | OUTPATIENT
Start: 2025-08-07